# Patient Record
Sex: MALE | Race: WHITE | Employment: OTHER | URBAN - NONMETROPOLITAN AREA
[De-identification: names, ages, dates, MRNs, and addresses within clinical notes are randomized per-mention and may not be internally consistent; named-entity substitution may affect disease eponyms.]

---

## 2017-01-07 DIAGNOSIS — I10 ESSENTIAL HYPERTENSION: Primary | ICD-10-CM

## 2017-01-09 RX ORDER — OLMESARTAN MEDOXOMIL 20 MG/1
TABLET, FILM COATED ORAL
Qty: 90 TABLET | Refills: 1 | Status: SHIPPED | OUTPATIENT
Start: 2017-01-09 | End: 2017-04-06 | Stop reason: ALTCHOICE

## 2017-01-20 ENCOUNTER — NURSE ONLY (OUTPATIENT)
Dept: FAMILY MEDICINE CLINIC | Facility: CLINIC | Age: 77
End: 2017-01-20

## 2017-01-20 ENCOUNTER — TELEPHONE (OUTPATIENT)
Dept: INTERNAL MEDICINE CLINIC | Facility: CLINIC | Age: 77
End: 2017-01-20

## 2017-01-20 DIAGNOSIS — I48.0 PAROXYSMAL A-FIB (HCC): Primary | ICD-10-CM

## 2017-01-20 DIAGNOSIS — Z95.1 S/P CABG (CORONARY ARTERY BYPASS GRAFT): ICD-10-CM

## 2017-01-20 DIAGNOSIS — Z79.01 MONITORING FOR LONG-TERM ANTICOAGULANT USE: ICD-10-CM

## 2017-01-20 DIAGNOSIS — Z51.81 MONITORING FOR LONG-TERM ANTICOAGULANT USE: ICD-10-CM

## 2017-01-20 DIAGNOSIS — Z95.2 H/O AORTIC VALVE REPLACEMENT: ICD-10-CM

## 2017-01-20 LAB — INR: 2.1 (ref 0.8–1.3)

## 2017-01-20 NOTE — TELEPHONE ENCOUNTER
----- Message from Tyra Beck MD sent at 1/20/2017 10:22 AM CST -----  Notify pt inr is at goal, recheck in 4 weeks

## 2017-02-20 ENCOUNTER — NURSE ONLY (OUTPATIENT)
Dept: FAMILY MEDICINE CLINIC | Facility: CLINIC | Age: 77
End: 2017-02-20

## 2017-02-20 DIAGNOSIS — I48.0 PAROXYSMAL A-FIB (HCC): ICD-10-CM

## 2017-02-20 DIAGNOSIS — Z79.01 MONITORING FOR LONG-TERM ANTICOAGULANT USE: Primary | ICD-10-CM

## 2017-02-20 DIAGNOSIS — Z95.2 H/O AORTIC VALVE REPLACEMENT: ICD-10-CM

## 2017-02-20 DIAGNOSIS — I25.10 ATHEROSCLEROSIS OF NATIVE CORONARY ARTERY OF NATIVE HEART WITHOUT ANGINA PECTORIS: ICD-10-CM

## 2017-02-20 DIAGNOSIS — Z51.81 MONITORING FOR LONG-TERM ANTICOAGULANT USE: Primary | ICD-10-CM

## 2017-02-20 DIAGNOSIS — Z95.1 S/P CABG (CORONARY ARTERY BYPASS GRAFT): ICD-10-CM

## 2017-02-20 LAB — INR: 1.9 (ref 0.8–1.2)

## 2017-02-20 PROCEDURE — 85610 PROTHROMBIN TIME: CPT | Performed by: INTERNAL MEDICINE

## 2017-03-06 ENCOUNTER — PATIENT OUTREACH (OUTPATIENT)
Dept: CASE MANAGEMENT | Age: 77
End: 2017-03-06

## 2017-03-20 ENCOUNTER — NURSE ONLY (OUTPATIENT)
Dept: FAMILY MEDICINE CLINIC | Facility: CLINIC | Age: 77
End: 2017-03-20

## 2017-03-20 DIAGNOSIS — Z79.01 LONG TERM (CURRENT) USE OF ANTICOAGULANTS: Primary | ICD-10-CM

## 2017-03-20 LAB — INR: 3.1 (ref 0.8–1.2)

## 2017-03-20 PROCEDURE — 85610 PROTHROMBIN TIME: CPT | Performed by: INTERNAL MEDICINE

## 2017-03-26 DIAGNOSIS — E11.9 TYPE 2 DIABETES MELLITUS WITHOUT COMPLICATION, WITHOUT LONG-TERM CURRENT USE OF INSULIN (HCC): Primary | ICD-10-CM

## 2017-03-27 RX ORDER — GLIMEPIRIDE 2 MG/1
TABLET ORAL
Qty: 180 TABLET | Refills: 0 | Status: SHIPPED | OUTPATIENT
Start: 2017-03-27 | End: 2017-06-25

## 2017-04-04 ENCOUNTER — TELEPHONE (OUTPATIENT)
Dept: INTERNAL MEDICINE CLINIC | Facility: CLINIC | Age: 77
End: 2017-04-04

## 2017-04-04 ENCOUNTER — NURSE ONLY (OUTPATIENT)
Dept: FAMILY MEDICINE CLINIC | Facility: CLINIC | Age: 77
End: 2017-04-04

## 2017-04-04 DIAGNOSIS — Z95.2 H/O AORTIC VALVE REPLACEMENT: ICD-10-CM

## 2017-04-04 DIAGNOSIS — Z51.81 MONITORING FOR LONG-TERM ANTICOAGULANT USE: ICD-10-CM

## 2017-04-04 DIAGNOSIS — Z79.01 MONITORING FOR LONG-TERM ANTICOAGULANT USE: ICD-10-CM

## 2017-04-04 NOTE — TELEPHONE ENCOUNTER
----- Message from Kristin Castellanos MD sent at 4/4/2017 10:09 AM CDT -----  Cont current coumadin dose and recheck inr in 4 weeks

## 2017-04-04 NOTE — TELEPHONE ENCOUNTER
Spoke with patient and relayed test results. He verbalized understanding and agreed with plan. Coumadin Clinic is monitoring patient.

## 2017-04-06 ENCOUNTER — OFFICE VISIT (OUTPATIENT)
Dept: INTERNAL MEDICINE CLINIC | Facility: CLINIC | Age: 77
End: 2017-04-06

## 2017-04-06 VITALS
SYSTOLIC BLOOD PRESSURE: 130 MMHG | DIASTOLIC BLOOD PRESSURE: 70 MMHG | WEIGHT: 198 LBS | HEART RATE: 62 BPM | RESPIRATION RATE: 16 BRPM | OXYGEN SATURATION: 98 % | HEIGHT: 72 IN | TEMPERATURE: 98 F | BODY MASS INDEX: 26.82 KG/M2

## 2017-04-06 DIAGNOSIS — Z00.00 PHYSICAL EXAM, ANNUAL: Primary | ICD-10-CM

## 2017-04-06 DIAGNOSIS — I25.5 ISCHEMIC DILATED CARDIOMYOPATHY (HCC): Chronic | ICD-10-CM

## 2017-04-06 DIAGNOSIS — I65.21 RIGHT-SIDED CAROTID ARTERY OCCLUSION WITHOUT CEREBRAL INFARCTION: ICD-10-CM

## 2017-04-06 DIAGNOSIS — E78.2 MIXED HYPERLIPIDEMIA: ICD-10-CM

## 2017-04-06 DIAGNOSIS — E11.9 TYPE 2 DIABETES MELLITUS WITHOUT COMPLICATION, WITHOUT LONG-TERM CURRENT USE OF INSULIN (HCC): ICD-10-CM

## 2017-04-06 DIAGNOSIS — Z12.5 PROSTATE CANCER SCREENING: ICD-10-CM

## 2017-04-06 DIAGNOSIS — Z13.39 SCREENING FOR ALCOHOL PROBLEM: ICD-10-CM

## 2017-04-06 DIAGNOSIS — Z95.1 S/P CABG (CORONARY ARTERY BYPASS GRAFT): ICD-10-CM

## 2017-04-06 DIAGNOSIS — I48.0 PAROXYSMAL A-FIB (HCC): ICD-10-CM

## 2017-04-06 DIAGNOSIS — I10 ESSENTIAL HYPERTENSION: ICD-10-CM

## 2017-04-06 DIAGNOSIS — I25.10 ATHEROSCLEROSIS OF NATIVE CORONARY ARTERY OF NATIVE HEART WITHOUT ANGINA PECTORIS: ICD-10-CM

## 2017-04-06 DIAGNOSIS — Z00.00 ENCOUNTER FOR ANNUAL HEALTH EXAMINATION: ICD-10-CM

## 2017-04-06 DIAGNOSIS — Z95.2 H/O AORTIC VALVE REPLACEMENT: ICD-10-CM

## 2017-04-06 DIAGNOSIS — Z13.31 DEPRESSION SCREENING: ICD-10-CM

## 2017-04-06 DIAGNOSIS — I42.0 ISCHEMIC DILATED CARDIOMYOPATHY (HCC): Chronic | ICD-10-CM

## 2017-04-06 PROCEDURE — 99397 PER PM REEVAL EST PAT 65+ YR: CPT | Performed by: INTERNAL MEDICINE

## 2017-04-06 PROCEDURE — 96160 PT-FOCUSED HLTH RISK ASSMT: CPT | Performed by: INTERNAL MEDICINE

## 2017-04-06 PROCEDURE — G0442 ANNUAL ALCOHOL SCREEN 15 MIN: HCPCS | Performed by: INTERNAL MEDICINE

## 2017-04-06 PROCEDURE — G0439 PPPS, SUBSEQ VISIT: HCPCS | Performed by: INTERNAL MEDICINE

## 2017-04-06 RX ORDER — LOSARTAN POTASSIUM 50 MG/1
50 TABLET ORAL DAILY
Qty: 90 TABLET | Refills: 1 | COMMUNITY
Start: 2017-04-06 | End: 2017-07-10

## 2017-04-06 RX ORDER — ATORVASTATIN CALCIUM 40 MG/1
40 TABLET, FILM COATED ORAL DAILY
Qty: 90 TABLET | Refills: 1 | Status: SHIPPED | OUTPATIENT
Start: 2017-04-06 | End: 2017-10-16

## 2017-04-06 NOTE — PROGRESS NOTES
HPI:   Evelena Goodell is a 67year old male who presents for a MA (Medicare Advantage) [facility] (Once per calendar year).     HPI:  Here for physical  No complaints  denies hypoglycemia event  Denies edema, cp or sob  Annual Physical due on 04/06/2018 DIGOX 125 MCG Oral Tab TAKE 1 TABLET BY MOUTH DAILY   Warfarin Sodium 4 MG Oral Tab Take 1 to 1 1/2 tablet every night as directed by Coumadin Clinic.    furosemide 40 MG Oral Tab TAKE ONE TABLET BY MOUTH ONCE DAILY   Verapamil HCl  MG Oral Tab CR T back pain  NEURO: denies headaches  PSYCHE: denies depression or anxiety  HEMATOLOGIC: denies hx of anemia  ENDOCRINE: denies thyroid history  ALL/ASTHMA: denies hx of allergy or asthma    EXAM:   /70 mmHg  Pulse 62  Temp(Src) 97.8 °F (36.6 °C)  Resp texture, turgor normal, no rashes or lesions   Lymph nodes: Cervical, supraclavicular nodes normal   Neurologic: Bilateral barefoot skin diabetic exam is normal, visualized feet and the appearance is normal.  Bilateral monofilament/sensation of both feet i Myesha Montalvo does not have a Living Will on file in [address] [place] [address] Rd. Discussed with patient and provided information      Healthcare Power of Francisca on file in Epic:    Carleen Og does not have a Power of  for [employer] on file in [address] [place] [address] Rd.  Discussed with judie Functional Ability     Bathing or Showering: Able without help    Toileting: Able without help    Dressing: Able without help    Eating: Able without help    Driving: Able without help    Preparing your meals: Able without help    Managing money/bills: SERVICES  INDICATIONS AND SCHEDULE Internal Lab or Procedure External Lab or Procedure   Diabetes Screening      HbgA1C   Annually HEMOGLOBIN A1C (% of total Hgb)   Date Value   05/30/2013 7.1*     HGBA1C (%)   Date Value   11/17/2016 6.5*   04/23/2014 7. 0 performed in visit on 04/14/15  -PNEUMOCOCCAL VACC, 13 BELÉN IM         Pneumococcal 23 (Pneumovax) No orders found for this or any previous visit. Hepatitis B No orders found for this or any previous visit.      Tetanus No orders found for this or any pr

## 2017-04-06 NOTE — PATIENT INSTRUCTIONS
Prevention Guidelines, Men Ages 72 and Older  Screening tests and vaccines are an important part of managing your health. Health counseling is essential, too. Below are guidelines for these, for men ages 72 and older.  Talk with your healthcare provider t Prostate cancer All men in this age group, talk to healthcare provider about risks and benefits of digital rectal exam (ELIAS) and prostate-specific antigen (PSA) screening1 At routine exams   Syphilis Men at increased risk for infection – talk with your hea Fall prevention (exercise, vitamin D supplements) All men in this age group At routine exams   Sexually transmitted infection Men at increased risk for infection – talk with your healthcare provider At routine exams   Use of daily aspirin Men ages 39 to 78 Cardiovascular Disease Screening     Cholesterol, covered every 5 yrs including Total, LDL and Trigs LDL CHOLESTEROL (mg/dL)   Date Value   02/01/2016 64     LDL-CHOLESTEROL (mg/dL (calc))   Date Value   05/30/2013 98     LDL CHOLESTROL (mg/dL)   Date Valu PSA  Annually to 75 then as needed or ELIAS annually to 75 There are no preventive care reminders to display for this patient. No results found for this or any previous visit.    Annually (age 48 or over), ELIAS not paid separately when covered E/M service is [employer]. org/publications/Documents/personal_dec. pdf  An information packet, including necessary form from the [employer] [address] website. http://www. [place]. [place]/public/books/advin.htm  A link to the Mahin Serrano Cholesterol, covered every 5 yrs including Total, LDL and Trigs LDL CHOLESTEROL (mg/dL)   Date Value   02/01/2016 64     LDL-CHOLESTEROL (mg/dL (calc))   Date Value   05/30/2013 98     LDL CHOLESTROL (mg/dL)   Date Value   04/23/2014 61     CHOLESTEROL, T [employer]no. org/publications/Documents/personal_dec. pdf  An information packet, including necessary form from the [employer] website. http://www. [place]ph.state. [place]/public/books/advin.htm  A link to the Mahin Serrano

## 2017-04-09 DIAGNOSIS — E11.9 TYPE 2 DIABETES MELLITUS WITHOUT COMPLICATION, WITHOUT LONG-TERM CURRENT USE OF INSULIN (HCC): Primary | ICD-10-CM

## 2017-04-17 ENCOUNTER — LAB ENCOUNTER (OUTPATIENT)
Dept: LAB | Age: 77
End: 2017-04-17
Attending: INTERNAL MEDICINE
Payer: MEDICARE

## 2017-04-17 DIAGNOSIS — E78.2 MIXED HYPERLIPIDEMIA: ICD-10-CM

## 2017-04-17 DIAGNOSIS — E11.9 TYPE 2 DIABETES MELLITUS WITHOUT COMPLICATION, WITHOUT LONG-TERM CURRENT USE OF INSULIN (HCC): ICD-10-CM

## 2017-04-17 DIAGNOSIS — I10 ESSENTIAL HYPERTENSION: ICD-10-CM

## 2017-04-17 DIAGNOSIS — Z12.5 PROSTATE CANCER SCREENING: ICD-10-CM

## 2017-04-17 DIAGNOSIS — I48.0 PAROXYSMAL A-FIB (HCC): ICD-10-CM

## 2017-04-17 PROCEDURE — 84443 ASSAY THYROID STIM HORMONE: CPT | Performed by: INTERNAL MEDICINE

## 2017-04-17 PROCEDURE — 80053 COMPREHEN METABOLIC PANEL: CPT | Performed by: INTERNAL MEDICINE

## 2017-04-17 PROCEDURE — 85027 COMPLETE CBC AUTOMATED: CPT | Performed by: INTERNAL MEDICINE

## 2017-04-17 PROCEDURE — 36415 COLL VENOUS BLD VENIPUNCTURE: CPT | Performed by: INTERNAL MEDICINE

## 2017-04-17 PROCEDURE — 81003 URINALYSIS AUTO W/O SCOPE: CPT | Performed by: INTERNAL MEDICINE

## 2017-04-17 PROCEDURE — G0103 PSA SCREENING: HCPCS | Performed by: INTERNAL MEDICINE

## 2017-04-17 PROCEDURE — 83036 HEMOGLOBIN GLYCOSYLATED A1C: CPT | Performed by: INTERNAL MEDICINE

## 2017-04-17 PROCEDURE — 82043 UR ALBUMIN QUANTITATIVE: CPT | Performed by: INTERNAL MEDICINE

## 2017-04-17 PROCEDURE — 80061 LIPID PANEL: CPT | Performed by: INTERNAL MEDICINE

## 2017-04-17 PROCEDURE — 82570 ASSAY OF URINE CREATININE: CPT | Performed by: INTERNAL MEDICINE

## 2017-04-19 ENCOUNTER — HOSPITAL ENCOUNTER (OUTPATIENT)
Dept: CV DIAGNOSTICS | Age: 77
Discharge: HOME | End: 2017-04-19
Attending: INTERNAL MEDICINE
Payer: MEDICARE

## 2017-04-19 DIAGNOSIS — I42.0 ISCHEMIC DILATED CARDIOMYOPATHY (HCC): Chronic | ICD-10-CM

## 2017-04-19 DIAGNOSIS — I25.5 ISCHEMIC DILATED CARDIOMYOPATHY (HCC): Chronic | ICD-10-CM

## 2017-04-19 PROCEDURE — 93306 TTE W/DOPPLER COMPLETE: CPT | Performed by: INTERNAL MEDICINE

## 2017-04-19 PROCEDURE — 93306 TTE W/DOPPLER COMPLETE: CPT

## 2017-04-20 ENCOUNTER — TELEPHONE (OUTPATIENT)
Dept: INTERNAL MEDICINE CLINIC | Facility: CLINIC | Age: 77
End: 2017-04-20

## 2017-04-20 NOTE — TELEPHONE ENCOUNTER
Notes Recorded by Alexandra Neville MD on 4/20/2017 at 8:45 AM  Stable EF. No new AV prosthesis functioning normally      Spoke with Felipe Keenan and relayed test results. She verbalized understanding and agreed with plan.  She wanted to know if patient could finish

## 2017-04-27 ENCOUNTER — PATIENT OUTREACH (OUTPATIENT)
Dept: CASE MANAGEMENT | Age: 77
End: 2017-04-27

## 2017-05-02 ENCOUNTER — NURSE ONLY (OUTPATIENT)
Dept: FAMILY MEDICINE CLINIC | Facility: CLINIC | Age: 77
End: 2017-05-02

## 2017-05-02 DIAGNOSIS — Z95.2 H/O AORTIC VALVE REPLACEMENT: ICD-10-CM

## 2017-05-02 DIAGNOSIS — Z51.81 MONITORING FOR LONG-TERM ANTICOAGULANT USE: ICD-10-CM

## 2017-05-02 DIAGNOSIS — Z79.01 MONITORING FOR LONG-TERM ANTICOAGULANT USE: ICD-10-CM

## 2017-05-30 ENCOUNTER — TELEPHONE (OUTPATIENT)
Dept: INTERNAL MEDICINE CLINIC | Facility: CLINIC | Age: 77
End: 2017-05-30

## 2017-05-30 ENCOUNTER — NURSE ONLY (OUTPATIENT)
Dept: FAMILY MEDICINE CLINIC | Facility: CLINIC | Age: 77
End: 2017-05-30

## 2017-05-30 DIAGNOSIS — Z95.2 H/O AORTIC VALVE REPLACEMENT: ICD-10-CM

## 2017-05-30 DIAGNOSIS — Z51.81 MONITORING FOR LONG-TERM ANTICOAGULANT USE: ICD-10-CM

## 2017-05-30 DIAGNOSIS — Z79.01 MONITORING FOR LONG-TERM ANTICOAGULANT USE: ICD-10-CM

## 2017-05-30 NOTE — TELEPHONE ENCOUNTER
Spoke with patient and he confirms the coumadin clinic is monitoring his levels. States he has already been notified about results.

## 2017-05-30 NOTE — TELEPHONE ENCOUNTER
----- Message from Rudy Lorenzana MD sent at 5/30/2017  4:03 PM CDT -----  Cont current coumadin dose.  Recheck inr in 4 weeks

## 2017-06-12 ENCOUNTER — PATIENT OUTREACH (OUTPATIENT)
Dept: CASE MANAGEMENT | Age: 77
End: 2017-06-12

## 2017-06-12 DIAGNOSIS — I10 ESSENTIAL HYPERTENSION: Primary | ICD-10-CM

## 2017-06-12 DIAGNOSIS — I65.21 RIGHT-SIDED CAROTID ARTERY OCCLUSION WITHOUT CEREBRAL INFARCTION: ICD-10-CM

## 2017-06-12 DIAGNOSIS — E11.9 TYPE 2 DIABETES MELLITUS WITHOUT COMPLICATION, WITHOUT LONG-TERM CURRENT USE OF INSULIN (HCC): ICD-10-CM

## 2017-06-12 DIAGNOSIS — I48.0 PAROXYSMAL A-FIB (HCC): ICD-10-CM

## 2017-06-12 DIAGNOSIS — E78.2 MIXED HYPERLIPIDEMIA: ICD-10-CM

## 2017-06-12 PROCEDURE — 99490 CHRNC CARE MGMT STAFF 1ST 20: CPT

## 2017-06-12 NOTE — PROGRESS NOTES
Spoke to pt at length about CCM, current care plan and performed CCM assessment with pt, reviewed meds and compliance. Reviewed pt dx DM, HTN, HLD, a-fib, CAD, hx of CABG. Support system: lives with spouse and family nearby  Diet: Low carb diet.   Exercis

## 2017-06-25 DIAGNOSIS — E11.9 TYPE 2 DIABETES MELLITUS WITHOUT COMPLICATION, WITHOUT LONG-TERM CURRENT USE OF INSULIN (HCC): ICD-10-CM

## 2017-06-26 RX ORDER — GLIMEPIRIDE 2 MG/1
TABLET ORAL
Qty: 180 TABLET | Refills: 1 | Status: SHIPPED | OUTPATIENT
Start: 2017-06-26 | End: 2017-12-27

## 2017-06-29 PROBLEM — I42.0 DCM (DILATED CARDIOMYOPATHY) (HCC): Status: ACTIVE | Noted: 2017-06-29

## 2017-07-10 ENCOUNTER — TELEPHONE (OUTPATIENT)
Dept: INTERNAL MEDICINE CLINIC | Facility: CLINIC | Age: 77
End: 2017-07-10

## 2017-07-10 DIAGNOSIS — E11.9 TYPE 2 DIABETES MELLITUS WITHOUT COMPLICATION, WITHOUT LONG-TERM CURRENT USE OF INSULIN (HCC): ICD-10-CM

## 2017-07-10 DIAGNOSIS — I10 ESSENTIAL HYPERTENSION: ICD-10-CM

## 2017-07-10 RX ORDER — LOSARTAN POTASSIUM 50 MG/1
50 TABLET ORAL DAILY
Qty: 90 TABLET | Refills: 1 | Status: SHIPPED | OUTPATIENT
Start: 2017-07-10 | End: 2018-01-03

## 2017-07-10 NOTE — TELEPHONE ENCOUNTER
Dr. Chip Jacobs recommends switch from Benicar to Losartan. Spoke with spouse and relayed message. Advised to continue monitoring BP and call with any concerns. She verbalized understanding and agreed with plan.    Rx sent to [facility]

## 2017-07-10 NOTE — TELEPHONE ENCOUNTER
Pt's wife called and said the pt's current blood pressure medication is too expensive and wants to know if there is an alternate that can be prescribed?

## 2017-07-24 ENCOUNTER — PATIENT OUTREACH (OUTPATIENT)
Dept: CASE MANAGEMENT | Age: 77
End: 2017-07-24

## 2017-07-24 NOTE — PROGRESS NOTES
Chart review performed. No upcoming appts with PCP. Will call pt for CCM next month.     Time spent: collecting and reviewing pt data 4 minutes

## 2017-08-01 ENCOUNTER — NURSE ONLY (OUTPATIENT)
Dept: FAMILY MEDICINE CLINIC | Facility: CLINIC | Age: 77
End: 2017-08-01

## 2017-08-01 DIAGNOSIS — Z51.81 MONITORING FOR LONG-TERM ANTICOAGULANT USE: ICD-10-CM

## 2017-08-01 DIAGNOSIS — Z79.01 MONITORING FOR LONG-TERM ANTICOAGULANT USE: ICD-10-CM

## 2017-08-01 DIAGNOSIS — Z95.2 H/O AORTIC VALVE REPLACEMENT: ICD-10-CM

## 2017-08-01 LAB — INR: 2.8 (ref 0.8–1.3)

## 2017-08-21 ENCOUNTER — PATIENT OUTREACH (OUTPATIENT)
Dept: CASE MANAGEMENT | Age: 77
End: 2017-08-21

## 2017-08-21 NOTE — PROGRESS NOTES
Spoke to pt for CCM today and wish pt a happy birthday. Pt states he is doing well and enjoying spending his birthday with his spouse and daughters. Pt thanked me for the call today. Will call pt again another time for CCM assessment.   PCP 6 month appt

## 2017-08-29 ENCOUNTER — NURSE ONLY (OUTPATIENT)
Dept: FAMILY MEDICINE CLINIC | Facility: CLINIC | Age: 77
End: 2017-08-29

## 2017-08-29 DIAGNOSIS — Z51.81 MONITORING FOR LONG-TERM ANTICOAGULANT USE: ICD-10-CM

## 2017-08-29 DIAGNOSIS — I25.5 ISCHEMIC DILATED CARDIOMYOPATHY (HCC): Primary | Chronic | ICD-10-CM

## 2017-08-29 DIAGNOSIS — I42.0 ISCHEMIC DILATED CARDIOMYOPATHY (HCC): Primary | Chronic | ICD-10-CM

## 2017-08-29 DIAGNOSIS — Z79.01 MONITORING FOR LONG-TERM ANTICOAGULANT USE: ICD-10-CM

## 2017-08-29 LAB — INR: 1.8 (ref 0.8–1.2)

## 2017-08-29 PROCEDURE — 85610 PROTHROMBIN TIME: CPT | Performed by: INTERNAL MEDICINE

## 2017-09-21 ENCOUNTER — PATIENT OUTREACH (OUTPATIENT)
Dept: CASE MANAGEMENT | Age: 77
End: 2017-09-21

## 2017-09-21 NOTE — PROGRESS NOTES
Reviewed chart for CCM. Will f/up with pt after upcoming PCP appt on 10/03/17. Pt up to date with health maintenance. Time spent: 2 minutes collecting, reviewing pt data, and documentation.

## 2017-10-03 ENCOUNTER — OFFICE VISIT (OUTPATIENT)
Dept: INTERNAL MEDICINE CLINIC | Facility: CLINIC | Age: 77
End: 2017-10-03

## 2017-10-03 VITALS
OXYGEN SATURATION: 97 % | HEIGHT: 72 IN | TEMPERATURE: 98 F | BODY MASS INDEX: 27.22 KG/M2 | RESPIRATION RATE: 16 BRPM | HEART RATE: 64 BPM | WEIGHT: 201 LBS | SYSTOLIC BLOOD PRESSURE: 136 MMHG | DIASTOLIC BLOOD PRESSURE: 86 MMHG

## 2017-10-03 DIAGNOSIS — I48.0 PAROXYSMAL A-FIB (HCC): ICD-10-CM

## 2017-10-03 DIAGNOSIS — Z23 NEED FOR INFLUENZA VACCINATION: ICD-10-CM

## 2017-10-03 DIAGNOSIS — E11.9 TYPE 2 DIABETES MELLITUS WITHOUT COMPLICATION, WITHOUT LONG-TERM CURRENT USE OF INSULIN (HCC): Primary | ICD-10-CM

## 2017-10-03 DIAGNOSIS — E11.9 TYPE 2 DIABETES MELLITUS WITHOUT COMPLICATION, WITHOUT LONG-TERM CURRENT USE OF INSULIN (HCC): ICD-10-CM

## 2017-10-03 DIAGNOSIS — E78.2 MIXED HYPERLIPIDEMIA: ICD-10-CM

## 2017-10-03 DIAGNOSIS — I25.10 ATHEROSCLEROSIS OF NATIVE CORONARY ARTERY OF NATIVE HEART WITHOUT ANGINA PECTORIS: ICD-10-CM

## 2017-10-03 DIAGNOSIS — I10 ESSENTIAL HYPERTENSION: ICD-10-CM

## 2017-10-03 PROCEDURE — 90653 IIV ADJUVANT VACCINE IM: CPT | Performed by: INTERNAL MEDICINE

## 2017-10-03 PROCEDURE — 99214 OFFICE O/P EST MOD 30 MIN: CPT | Performed by: INTERNAL MEDICINE

## 2017-10-03 PROCEDURE — G0008 ADMIN INFLUENZA VIRUS VAC: HCPCS | Performed by: INTERNAL MEDICINE

## 2017-10-03 RX ORDER — DIGOXIN 125 MCG
125 TABLET ORAL DAILY
COMMUNITY
End: 2017-10-10

## 2017-10-03 RX ORDER — SIMVASTATIN 20 MG
20 TABLET ORAL DAILY
COMMUNITY
End: 2017-10-03 | Stop reason: ALTCHOICE

## 2017-10-03 NOTE — PROGRESS NOTES
HPI:    Patient ID: Nona Stallworth is a 65year old male. HPI  HPI:   Nona Stallworth is a 65year old male who presents for recheck of his diabetes, htn, hld, cad and afib. Patient’s FBS have been at goal. No hypoglycemia.  Last visit with ophthalmolog ER 10 MEQ Oral Tab CR Take 1 tablet (10 mEq total) by mouth 2 (two) times daily.  Disp: 180 tablet Rfl: 3   GLIMEPIRIDE 2 MG Oral Tab TAKE 1 TABLET BY MOUTH TWICE DAILY Disp: 180 tablet Rfl: 1   Atorvastatin Calcium 40 MG Oral Tab Take 1 tablet (40 mg total week    Exercise:  twice per week, three times per week.   Diet: watches sugar closely and watches calories closely     REVIEW OF SYSTEMS:   GENERAL HEALTH: feels well otherwise  SKIN: denies any unusual skin lesions or rashes  RESPIRATORY: denies shortness METFORMIN  MG Oral Tab TAKE 1 TABLET BY MOUTH TWICE DAILY WITH MEALS Disp: 180 tablet Rfl: 0   Potassium Chloride ER 10 MEQ Oral Tab CR Take 1 tablet (10 mEq total) by mouth 2 (two) times daily.  Disp: 180 tablet Rfl: 3   GLIMEPIRIDE 2 MG Oral Tab

## 2017-10-11 ENCOUNTER — PATIENT OUTREACH (OUTPATIENT)
Dept: CASE MANAGEMENT | Age: 77
End: 2017-10-11

## 2017-10-11 ENCOUNTER — NURSE ONLY (OUTPATIENT)
Dept: FAMILY MEDICINE CLINIC | Facility: CLINIC | Age: 77
End: 2017-10-11

## 2017-10-11 DIAGNOSIS — I10 ESSENTIAL HYPERTENSION: ICD-10-CM

## 2017-10-11 DIAGNOSIS — I48.0 PAROXYSMAL A-FIB (HCC): Primary | ICD-10-CM

## 2017-10-11 DIAGNOSIS — E11.9 TYPE 2 DIABETES MELLITUS WITHOUT COMPLICATION, WITHOUT LONG-TERM CURRENT USE OF INSULIN (HCC): ICD-10-CM

## 2017-10-11 DIAGNOSIS — E78.2 MIXED HYPERLIPIDEMIA: ICD-10-CM

## 2017-10-11 DIAGNOSIS — Z95.1 S/P CABG (CORONARY ARTERY BYPASS GRAFT): ICD-10-CM

## 2017-10-11 DIAGNOSIS — I48.0 PAROXYSMAL A-FIB (HCC): ICD-10-CM

## 2017-10-11 DIAGNOSIS — I25.10 ATHEROSCLEROSIS OF NATIVE CORONARY ARTERY OF NATIVE HEART WITHOUT ANGINA PECTORIS: ICD-10-CM

## 2017-10-11 DIAGNOSIS — Z79.01 MONITORING FOR LONG-TERM ANTICOAGULANT USE: ICD-10-CM

## 2017-10-11 DIAGNOSIS — Z51.81 MONITORING FOR LONG-TERM ANTICOAGULANT USE: ICD-10-CM

## 2017-10-11 PROCEDURE — 85610 PROTHROMBIN TIME: CPT | Performed by: INTERNAL MEDICINE

## 2017-10-11 PROCEDURE — 99490 CHRNC CARE MGMT STAFF 1ST 20: CPT

## 2017-10-11 NOTE — PROGRESS NOTES
Spoke to pt at length about CCM, current care plan and performed CCM assessment with pt, reviewed meds and compliance. Reviewed pt dx HTN, HLD, a-fib, DM, and hx CABG. Support system: Lives with spouse. Diet: Follows low carb well balanced diet.   Pt test

## 2017-10-16 DIAGNOSIS — E78.2 MIXED HYPERLIPIDEMIA: ICD-10-CM

## 2017-10-16 RX ORDER — ATORVASTATIN CALCIUM 40 MG/1
TABLET, FILM COATED ORAL
Qty: 90 TABLET | Refills: 1 | Status: ON HOLD | OUTPATIENT
Start: 2017-10-16 | End: 2018-04-17

## 2017-11-13 ENCOUNTER — NURSE ONLY (OUTPATIENT)
Dept: FAMILY MEDICINE CLINIC | Facility: CLINIC | Age: 77
End: 2017-11-13

## 2017-11-13 DIAGNOSIS — I48.0 PAROXYSMAL A-FIB (HCC): ICD-10-CM

## 2017-11-13 DIAGNOSIS — Z79.01 MONITORING FOR LONG-TERM ANTICOAGULANT USE: ICD-10-CM

## 2017-11-13 DIAGNOSIS — Z51.81 MONITORING FOR LONG-TERM ANTICOAGULANT USE: ICD-10-CM

## 2017-11-20 ENCOUNTER — PATIENT OUTREACH (OUTPATIENT)
Dept: CASE MANAGEMENT | Age: 77
End: 2017-11-20

## 2017-11-20 NOTE — PROGRESS NOTES
Reviewed chart for CCM. LVM for pt to call back. Time spent this ccm encounter: 3 minutes collecting, reviewing pt data, communication and documentation.

## 2017-12-11 ENCOUNTER — PATIENT OUTREACH (OUTPATIENT)
Dept: CASE MANAGEMENT | Age: 77
End: 2017-12-11

## 2017-12-11 NOTE — PROGRESS NOTES
Reviewed chart for ccm. LVM to call back. Next AWV scheduled for 04/03/18. Pt up to with health maintenance. Time spent for ccm: 3 min collecting, reviewing pt data, communication and documentation.

## 2017-12-27 ENCOUNTER — NURSE ONLY (OUTPATIENT)
Dept: FAMILY MEDICINE CLINIC | Facility: CLINIC | Age: 77
End: 2017-12-27

## 2017-12-27 DIAGNOSIS — E11.9 TYPE 2 DIABETES MELLITUS WITHOUT COMPLICATION, WITHOUT LONG-TERM CURRENT USE OF INSULIN (HCC): ICD-10-CM

## 2017-12-27 DIAGNOSIS — I48.0 PAROXYSMAL A-FIB (HCC): Primary | ICD-10-CM

## 2017-12-27 DIAGNOSIS — Z51.81 MONITORING FOR LONG-TERM ANTICOAGULANT USE: ICD-10-CM

## 2017-12-27 DIAGNOSIS — Z79.01 MONITORING FOR LONG-TERM ANTICOAGULANT USE: ICD-10-CM

## 2017-12-27 RX ORDER — GLIMEPIRIDE 2 MG/1
TABLET ORAL
Qty: 180 TABLET | Refills: 0 | Status: SHIPPED | OUTPATIENT
Start: 2017-12-27 | End: 2018-04-01

## 2018-01-03 DIAGNOSIS — I10 ESSENTIAL HYPERTENSION: ICD-10-CM

## 2018-01-03 RX ORDER — LOSARTAN POTASSIUM 50 MG/1
TABLET ORAL
Qty: 90 TABLET | Refills: 1 | Status: SHIPPED | OUTPATIENT
Start: 2018-01-03 | End: 2018-04-03

## 2018-01-08 ENCOUNTER — PATIENT OUTREACH (OUTPATIENT)
Dept: CASE MANAGEMENT | Age: 78
End: 2018-01-08

## 2018-01-08 NOTE — PROGRESS NOTES
Have been unsuccessful reaching pt for past 3 months for ccm. Pt has been removed from ccm program and letter sent to pt. Pt can re-enroll at anytime by calling [facility] main number.

## 2018-01-26 ENCOUNTER — NURSE ONLY (OUTPATIENT)
Dept: FAMILY MEDICINE CLINIC | Facility: CLINIC | Age: 78
End: 2018-01-26

## 2018-01-26 DIAGNOSIS — I48.0 PAROXYSMAL A-FIB (HCC): ICD-10-CM

## 2018-01-26 DIAGNOSIS — Z79.01 MONITORING FOR LONG-TERM ANTICOAGULANT USE: ICD-10-CM

## 2018-01-26 DIAGNOSIS — Z51.81 MONITORING FOR LONG-TERM ANTICOAGULANT USE: ICD-10-CM

## 2018-01-26 LAB — INR: 2.4 (ref 0.8–1.3)

## 2018-02-05 ENCOUNTER — APPOINTMENT (OUTPATIENT)
Dept: LAB | Age: 78
End: 2018-02-05
Attending: INTERNAL MEDICINE
Payer: MEDICARE

## 2018-02-05 DIAGNOSIS — I10 ESSENTIAL HYPERTENSION: ICD-10-CM

## 2018-02-05 DIAGNOSIS — E11.9 TYPE 2 DIABETES MELLITUS WITHOUT COMPLICATION, WITHOUT LONG-TERM CURRENT USE OF INSULIN (HCC): ICD-10-CM

## 2018-02-05 LAB
ALBUMIN SERPL-MCNC: 3.8 G/DL (ref 3.5–4.8)
ALP LIVER SERPL-CCNC: 62 U/L (ref 45–117)
ALT SERPL-CCNC: 24 U/L (ref 17–63)
AST SERPL-CCNC: 17 U/L (ref 15–41)
BILIRUB SERPL-MCNC: 0.6 MG/DL (ref 0.1–2)
BUN BLD-MCNC: 23 MG/DL (ref 8–20)
CALCIUM BLD-MCNC: 9.5 MG/DL (ref 8.3–10.3)
CHLORIDE: 109 MMOL/L (ref 101–111)
CO2: 27 MMOL/L (ref 22–32)
CREAT BLD-MCNC: 0.94 MG/DL (ref 0.7–1.3)
EST. AVERAGE GLUCOSE BLD GHB EST-MCNC: 140 MG/DL (ref 68–126)
GLUCOSE BLD-MCNC: 137 MG/DL (ref 70–99)
HBA1C MFR BLD HPLC: 6.5 % (ref ?–5.7)
M PROTEIN MFR SERPL ELPH: 7.4 G/DL (ref 6.1–8.3)
POTASSIUM SERPL-SCNC: 4.3 MMOL/L (ref 3.6–5.1)
SODIUM SERPL-SCNC: 143 MMOL/L (ref 136–144)

## 2018-02-05 PROCEDURE — 80053 COMPREHEN METABOLIC PANEL: CPT

## 2018-02-05 PROCEDURE — 83036 HEMOGLOBIN GLYCOSYLATED A1C: CPT

## 2018-04-01 DIAGNOSIS — E11.9 TYPE 2 DIABETES MELLITUS WITHOUT COMPLICATION, WITHOUT LONG-TERM CURRENT USE OF INSULIN (HCC): ICD-10-CM

## 2018-04-02 ENCOUNTER — NURSE ONLY (OUTPATIENT)
Dept: FAMILY MEDICINE CLINIC | Facility: CLINIC | Age: 78
End: 2018-04-02

## 2018-04-02 DIAGNOSIS — I48.0 PAROXYSMAL A-FIB (HCC): Primary | ICD-10-CM

## 2018-04-02 DIAGNOSIS — I42.0 ISCHEMIC DILATED CARDIOMYOPATHY (HCC): Chronic | ICD-10-CM

## 2018-04-02 DIAGNOSIS — I25.5 ISCHEMIC DILATED CARDIOMYOPATHY (HCC): Chronic | ICD-10-CM

## 2018-04-02 DIAGNOSIS — Z51.81 MONITORING FOR LONG-TERM ANTICOAGULANT USE: ICD-10-CM

## 2018-04-02 DIAGNOSIS — Z79.01 MONITORING FOR LONG-TERM ANTICOAGULANT USE: ICD-10-CM

## 2018-04-02 RX ORDER — GLIMEPIRIDE 2 MG/1
TABLET ORAL
Qty: 180 TABLET | Refills: 0 | Status: ON HOLD | OUTPATIENT
Start: 2018-04-02 | End: 2018-04-17

## 2018-04-02 NOTE — PATIENT INSTRUCTIONS
RESULTS FAXED TO [facility]. [place] COMES TO THIS OFFICE FOR INR.

## 2018-04-03 ENCOUNTER — OFFICE VISIT (OUTPATIENT)
Dept: INTERNAL MEDICINE CLINIC | Facility: CLINIC | Age: 78
End: 2018-04-03

## 2018-04-03 ENCOUNTER — LAB ENCOUNTER (OUTPATIENT)
Dept: LAB | Age: 78
End: 2018-04-03
Attending: INTERNAL MEDICINE
Payer: MEDICARE

## 2018-04-03 VITALS
WEIGHT: 202 LBS | OXYGEN SATURATION: 97 % | BODY MASS INDEX: 27.36 KG/M2 | HEART RATE: 59 BPM | TEMPERATURE: 98 F | HEIGHT: 72 IN | DIASTOLIC BLOOD PRESSURE: 78 MMHG | SYSTOLIC BLOOD PRESSURE: 110 MMHG | RESPIRATION RATE: 16 BRPM

## 2018-04-03 DIAGNOSIS — D68.4 ACQUIRED COAGULATION FACTOR INHIBITOR DISORDER (HCC): ICD-10-CM

## 2018-04-03 DIAGNOSIS — E11.9 TYPE 2 DIABETES MELLITUS WITHOUT COMPLICATION, WITHOUT LONG-TERM CURRENT USE OF INSULIN (HCC): ICD-10-CM

## 2018-04-03 DIAGNOSIS — Z00.00 PHYSICAL EXAM, ANNUAL: Primary | ICD-10-CM

## 2018-04-03 DIAGNOSIS — E78.5 HYPERLIPIDEMIA ASSOCIATED WITH TYPE 2 DIABETES MELLITUS (HCC): ICD-10-CM

## 2018-04-03 DIAGNOSIS — I25.5 ISCHEMIC DILATED CARDIOMYOPATHY (HCC): Chronic | ICD-10-CM

## 2018-04-03 DIAGNOSIS — Z95.2 H/O AORTIC VALVE REPLACEMENT: ICD-10-CM

## 2018-04-03 DIAGNOSIS — I42.0 ISCHEMIC DILATED CARDIOMYOPATHY (HCC): Chronic | ICD-10-CM

## 2018-04-03 DIAGNOSIS — E11.59 HYPERTENSION ASSOCIATED WITH DIABETES (HCC): ICD-10-CM

## 2018-04-03 DIAGNOSIS — Z95.1 S/P CABG (CORONARY ARTERY BYPASS GRAFT): ICD-10-CM

## 2018-04-03 DIAGNOSIS — I25.10 ATHEROSCLEROSIS OF NATIVE CORONARY ARTERY OF NATIVE HEART WITHOUT ANGINA PECTORIS: ICD-10-CM

## 2018-04-03 DIAGNOSIS — Z00.00 ENCOUNTER FOR ANNUAL HEALTH EXAMINATION: ICD-10-CM

## 2018-04-03 DIAGNOSIS — E11.69 HYPERLIPIDEMIA ASSOCIATED WITH TYPE 2 DIABETES MELLITUS (HCC): ICD-10-CM

## 2018-04-03 DIAGNOSIS — Z12.5 PROSTATE CANCER SCREENING: ICD-10-CM

## 2018-04-03 DIAGNOSIS — I65.21 RIGHT-SIDED CAROTID ARTERY OCCLUSION WITHOUT CEREBRAL INFARCTION: ICD-10-CM

## 2018-04-03 DIAGNOSIS — I48.0 PAROXYSMAL A-FIB (HCC): ICD-10-CM

## 2018-04-03 DIAGNOSIS — I15.2 HYPERTENSION ASSOCIATED WITH DIABETES (HCC): ICD-10-CM

## 2018-04-03 PROCEDURE — 82570 ASSAY OF URINE CREATININE: CPT | Performed by: INTERNAL MEDICINE

## 2018-04-03 PROCEDURE — G0439 PPPS, SUBSEQ VISIT: HCPCS | Performed by: INTERNAL MEDICINE

## 2018-04-03 PROCEDURE — 82043 UR ALBUMIN QUANTITATIVE: CPT | Performed by: INTERNAL MEDICINE

## 2018-04-03 PROCEDURE — 99397 PER PM REEVAL EST PAT 65+ YR: CPT | Performed by: INTERNAL MEDICINE

## 2018-04-03 PROCEDURE — 96160 PT-FOCUSED HLTH RISK ASSMT: CPT | Performed by: INTERNAL MEDICINE

## 2018-04-03 RX ORDER — POTASSIUM CHLORIDE 750 MG/1
10 TABLET, EXTENDED RELEASE ORAL 2 TIMES DAILY
Status: ON HOLD | COMMUNITY
End: 2018-04-20

## 2018-04-03 NOTE — PROGRESS NOTES
HPI:   Franklin Santana is a 65year old male who presents for a MA (Medicare Advantage) [facility] (Once per calendar year).     HPI:  Here for physical  Reports feeling great and normal state of health  Denies cp or sob    PAST MEDICAL, SOCIAL, FAMILY HIST takes aspirin and has it on his medication list.   CAGE Alcohol screening   Luís Hillman was screened for Alcohol abuse and had a score of 0 so is at low risk.      Patient Care Team: Patient Care Team:  Liza Aguillon MD as PCP - General (Internal Medic Diabetes [facility] Dysmetabolic syndrome X; Easy bruising; Essential hypertension, malignant; Flatulence/gas pain/belching; Hemorrhoids; High cholesterol;  Inguinal hernia without mention of obstruction or gangrene, unilateral or unspecified, (not spe Pass               Visual Acuity  Right Eye Visual Acuity: Corrected Right Eye Chart Acuity: 20/20   Left Eye Visual Acuity: Corrected Left Eye Chart Acuity: 20/20   Both Eyes Visual Acuity: Corrected Both Eyes Chart Acuity: 20/20   Able To Tolerate Visual 10/04/2016   • Pneumococcal (Prevnar 13) 04/14/2015   • Pneumovax 04/14/2007        ASSESSMENT AND OTHER RELEVANT CHRONIC CONDITIONS:   Elenita Yoon is a 65year old male who presents for a Medicare Assessment.      PLAN SUMMARY:   Diagnoses and all orde indicates understanding of these issues and agrees to the plan. Reinforced healthy diet, lifestyle, and exercise. Return in about 6 months (around 10/3/2018).      Prakash Soliman MD, 4/3/2018     General Health          This section provided for quick re Please get every year    Pneumococcal 13 (Prevnar)  Covered Once after 65 04/14/2015 Please get once after your 65th birthday    Pneumococcal 23 (Pneumovax)  Covered Once after 65 04/14/2007 Please get once after your 65th birthday    Hepatitis B for Moder Annually LDL-CHOLESTEROL (mg/dL (calc))   Date Value   05/30/2013 98     LDL CHOLESTROL (mg/dL)   Date Value   04/23/2014 61     LDL Cholesterol (mg/dL)   Date Value   04/17/2017 67    No flowsheet data found.      Dilated Eye exam  Annually Data entered on

## 2018-04-03 NOTE — PATIENT INSTRUCTIONS
Diabetes: Activity Tips    Being more active can help you manage your diabetes. The tips on this sheet can help you get the most from your exercise. They can also help you stay safe.   Staying Active  It’s important for adults to spend less time sitting a You may be told to plan your exercise for 1 to 2 hours after a meal. In most cases, you don’t need to eat while being active. If you take insulin or medicine that can cause low blood sugar, test your blood sugar before exercising.  And carry a fast-acting s Tests on this list are recommended by your physician but may not be covered, or covered at this frequency, by your insurer. Please check with your insurance carrier before scheduling to verify coverage.     PREVENTATIVE SERVICES  INDICATIONS AND SCHEDULE In Electrocardiogram date10/26/2016 Routine EKG is not a screening covered service except at the Welcome to Medicare Visit    Abdominal aortic aneurysm screening (once between ages 73-68)  No results found for this or any previous visit.  Limited to patients -INFLUENZA VIRUS VACCINE, PRESERV FREE, >=1YEARS OF AGE   -ADMIN INFLUENZA VIRUS VAC    Please get every year    Pneumococcal 13 (Prevnar)  Covered Once after 65   Orders placed or performed in visit on 04/14/15  -PNEUMOCOCCAL VACC, 13 BELÉN IM    Please ge

## 2018-04-16 ENCOUNTER — OFFICE VISIT (OUTPATIENT)
Dept: INTERNAL MEDICINE CLINIC | Facility: CLINIC | Age: 78
End: 2018-04-16

## 2018-04-16 ENCOUNTER — APPOINTMENT (OUTPATIENT)
Dept: GENERAL RADIOLOGY | Facility: HOSPITAL | Age: 78
DRG: 308 | End: 2018-04-16
Attending: EMERGENCY MEDICINE
Payer: MEDICARE

## 2018-04-16 ENCOUNTER — HOSPITAL ENCOUNTER (INPATIENT)
Facility: HOSPITAL | Age: 78
LOS: 4 days | Discharge: HOME | DRG: 308 | End: 2018-04-21
Attending: EMERGENCY MEDICINE | Admitting: HOSPITALIST
Payer: MEDICARE

## 2018-04-16 VITALS
WEIGHT: 212 LBS | SYSTOLIC BLOOD PRESSURE: 112 MMHG | BODY MASS INDEX: 28.71 KG/M2 | TEMPERATURE: 98 F | HEART RATE: 92 BPM | RESPIRATION RATE: 18 BRPM | DIASTOLIC BLOOD PRESSURE: 88 MMHG | HEIGHT: 72 IN | OXYGEN SATURATION: 97 %

## 2018-04-16 DIAGNOSIS — I50.21 ACUTE SYSTOLIC HEART FAILURE (HCC): ICD-10-CM

## 2018-04-16 DIAGNOSIS — Z51.81 MONITORING FOR LONG-TERM ANTICOAGULANT USE: ICD-10-CM

## 2018-04-16 DIAGNOSIS — R06.02 SOB (SHORTNESS OF BREATH): ICD-10-CM

## 2018-04-16 DIAGNOSIS — I10 ESSENTIAL HYPERTENSION: ICD-10-CM

## 2018-04-16 DIAGNOSIS — I20.8 ATYPICAL ANGINA (HCC): Primary | ICD-10-CM

## 2018-04-16 DIAGNOSIS — I48.0 PAROXYSMAL A-FIB (HCC): ICD-10-CM

## 2018-04-16 DIAGNOSIS — S16.1XXA STRAIN OF NECK MUSCLE, INITIAL ENCOUNTER: ICD-10-CM

## 2018-04-16 DIAGNOSIS — I48.91 ATRIAL FIBRILLATION WITH RVR (HCC): ICD-10-CM

## 2018-04-16 DIAGNOSIS — Z79.01 MONITORING FOR LONG-TERM ANTICOAGULANT USE: ICD-10-CM

## 2018-04-16 DIAGNOSIS — I48.91 ATRIAL FIBRILLATION WITH RVR (HCC): Primary | ICD-10-CM

## 2018-04-16 PROBLEM — E78.5 DYSLIPIDEMIA: Status: ACTIVE | Noted: 2018-04-03

## 2018-04-16 PROCEDURE — 93000 ELECTROCARDIOGRAM COMPLETE: CPT | Performed by: INTERNAL MEDICINE

## 2018-04-16 PROCEDURE — 99223 1ST HOSP IP/OBS HIGH 75: CPT | Performed by: INTERNAL MEDICINE

## 2018-04-16 PROCEDURE — 99214 OFFICE O/P EST MOD 30 MIN: CPT | Performed by: INTERNAL MEDICINE

## 2018-04-16 PROCEDURE — 71045 X-RAY EXAM CHEST 1 VIEW: CPT | Performed by: EMERGENCY MEDICINE

## 2018-04-16 RX ORDER — LOSARTAN POTASSIUM 50 MG/1
50 TABLET ORAL 2 TIMES DAILY
Qty: 180 TABLET | Refills: 3 | Status: ON HOLD | COMMUNITY
Start: 2018-04-16 | End: 2018-04-20

## 2018-04-16 RX ORDER — SODIUM CHLORIDE 9 MG/ML
INJECTION, SOLUTION INTRAVENOUS CONTINUOUS
Status: DISCONTINUED | OUTPATIENT
Start: 2018-04-16 | End: 2018-04-16

## 2018-04-16 RX ORDER — WARFARIN SODIUM 2 MG/1
4 TABLET ORAL NIGHTLY
Status: DISCONTINUED | OUTPATIENT
Start: 2018-04-16 | End: 2018-04-19

## 2018-04-16 RX ORDER — DEXTROSE MONOHYDRATE 25 G/50ML
50 INJECTION, SOLUTION INTRAVENOUS
Status: DISCONTINUED | OUTPATIENT
Start: 2018-04-16 | End: 2018-04-21

## 2018-04-16 RX ORDER — ACETAMINOPHEN 325 MG/1
650 TABLET ORAL EVERY 6 HOURS PRN
Status: DISCONTINUED | OUTPATIENT
Start: 2018-04-16 | End: 2018-04-21

## 2018-04-16 RX ORDER — ATORVASTATIN CALCIUM 40 MG/1
40 TABLET, FILM COATED ORAL DAILY
Status: DISCONTINUED | OUTPATIENT
Start: 2018-04-16 | End: 2018-04-21

## 2018-04-16 RX ORDER — LOSARTAN POTASSIUM 50 MG/1
50 TABLET ORAL DAILY
Status: DISCONTINUED | OUTPATIENT
Start: 2018-04-16 | End: 2018-04-20

## 2018-04-16 RX ORDER — ONDANSETRON 2 MG/ML
4 INJECTION INTRAMUSCULAR; INTRAVENOUS EVERY 6 HOURS PRN
Status: DISCONTINUED | OUTPATIENT
Start: 2018-04-16 | End: 2018-04-21

## 2018-04-16 RX ORDER — CYCLOBENZAPRINE HCL 10 MG
10 TABLET ORAL NIGHTLY
Qty: 7 TABLET | Refills: 0 | Status: ON HOLD | OUTPATIENT
Start: 2018-04-16 | End: 2018-04-17

## 2018-04-16 RX ORDER — FUROSEMIDE 20 MG/1
20 TABLET ORAL DAILY
Status: DISCONTINUED | OUTPATIENT
Start: 2018-04-16 | End: 2018-04-18

## 2018-04-16 RX ORDER — CYCLOBENZAPRINE HCL 10 MG
10 TABLET ORAL NIGHTLY
Status: DISCONTINUED | OUTPATIENT
Start: 2018-04-16 | End: 2018-04-21

## 2018-04-16 RX ORDER — ASPIRIN 81 MG/1
81 TABLET ORAL DAILY
Status: DISCONTINUED | OUTPATIENT
Start: 2018-04-16 | End: 2018-04-21

## 2018-04-16 NOTE — RESPIRATORY THERAPY NOTE
Pt is on MILES protocol and does not wear CPAP at home. Will continue to monitor pt with pulse ox at night.

## 2018-04-16 NOTE — ED PROVIDER NOTES
Patient Seen in: [facility] Emergency Department    History   Patient presents with:  Arrythmia/Palpitations (cardiovascular)  Dyspnea HARRISON SOB (respiratory)    Stated Complaint: shortness of breath, not feeling well, in a-fib    HPI    Jerrell garcia Smokeless tobacco: Never Used                      Comment: quit over 35 years ago  Alcohol use:  Yes              Comment: 8-10 drinks per week      Review of Systems    Positive for stated complaint: shortness of breath, not feeling well, in a-fib  Othe DIFFERENTIAL WITH PLATELET    Narrative: The following orders were created for panel order CBC WITH DIFFERENTIAL WITH PLATELET.   Procedure                               Abnormality         Status                     ---------

## 2018-04-16 NOTE — CONSULTS
[facility]    PATIENT'S NAME: Aye Cha   ATTENDING PHYSICIAN: AMADOU De Ada: Scarlet Plata M.D.    PATIENT ACCOUNT#:   [de-identified]    LOCATION:  [address]  MEDICAL RECORD #:   [id number]       DATE OF BIRTH: and his rate is slowing. The patient denies chest pain. He has a little right shoulder ache when he moves his shoulder. His low back has been painful at rest.  He is not short of breath.   Heart rate was initially 140s, now is 105 and irregular, his bloo bicarb 21. BUN and creatinine 25 and 1.4. Troponin negative. INR 2.69. CBC normal except for hemoglobin of 12.8. EKG:  Atrial fibrillation, rapid ventricular response, interventricular conduction delay consistent with left bundle branch block.     AS

## 2018-04-16 NOTE — ED INITIAL ASSESSMENT (HPI)
Shortness of breath this am.  Was seen by primary MD today. EKG showed new onset afib. Hx open heart, 2012, HTN.

## 2018-04-16 NOTE — H&P
[facility]OSPITAL[facility]  History and Physical     Ray Yann Patient Status:  Emergency    1940 MRN [id number]   Location [facility] Attending Timothy Marie MD   Hosp Day # 0 PCP Anay Burton MD     Chief Complaint: that he does not use drugs. Family History:   Family History   Problem Relation Age of Onset   • Prostate Cancer Brother    • Diabetes Father    • Diabetes Mother        Allergies:    Ace Inhibitors          Coughing    Medications:    No current facilit Normocephalic atraumatic. Moist mucous membranes. EOM-I. PERRLA. Anicteric. Neck: No lymphadenopathy. No JVD. No carotid bruits. Respiratory: Clear to auscultation bilaterally. No wheezes. No rhonchi.   Cardiovascular: irreg irreg  Chest and Back: No tend

## 2018-04-16 NOTE — PATIENT INSTRUCTIONS
Neck Clock (Flexibility)    1. Lie on your back on the floor, with your knees bent and your feet flat on the floor. Place a rolled-up towel or neck roll under your neck. 2. Close your eyes and imagine a clock face.  With your nose, slowly trace the outer

## 2018-04-16 NOTE — CONSULTS
[facility] Cardiology Consultation Marina Quigley MD    The patient was interviewed, examined, the chart was reviewed and the consult was dictated. This is a 65year old male with a chief complaint of exertional shortness of breath and shoulder discomfort. Antony Rai

## 2018-04-16 NOTE — PROGRESS NOTES
HPI:    Patient ID: Cintia Garcia is a 65year old male. Shortness Of Breath   This is a new problem. The current episode started 1 to 4 weeks ago. The problem occurs constantly. The problem has been unchanged.  Associated symptoms include neck pain (r Rfl: 0   Warfarin Sodium 4 MG Oral Tab Take 1 to 1 1/2 tablet every night as directed by Coumadin Clinic.  Disp: 120 tablet Rfl: 3   furosemide 40 MG Oral Tab TAKE ONE TABLET BY MOUTH ONCE DAILY Disp: 90 tablet Rfl: 3   Metoprolol Tartrate 50 MG Oral Tab TA Referrals:  ELECTROCARDIOGRAM, COMPLETE  CARD ECHO 2D DOPPLER (CPT=93306)  XR CHEST PA + LAT CHEST (CPT=71046)       DS#[id number]

## 2018-04-17 ENCOUNTER — APPOINTMENT (OUTPATIENT)
Dept: CV DIAGNOSTICS | Facility: HOSPITAL | Age: 78
DRG: 308 | End: 2018-04-17
Attending: INTERNAL MEDICINE
Payer: MEDICARE

## 2018-04-17 ENCOUNTER — APPOINTMENT (OUTPATIENT)
Dept: GENERAL RADIOLOGY | Facility: HOSPITAL | Age: 78
DRG: 308 | End: 2018-04-17
Attending: INTERNAL MEDICINE
Payer: MEDICARE

## 2018-04-17 PROCEDURE — 93306 TTE W/DOPPLER COMPLETE: CPT | Performed by: INTERNAL MEDICINE

## 2018-04-17 PROCEDURE — 71045 X-RAY EXAM CHEST 1 VIEW: CPT | Performed by: INTERNAL MEDICINE

## 2018-04-17 PROCEDURE — 99232 SBSQ HOSP IP/OBS MODERATE 35: CPT | Performed by: INTERNAL MEDICINE

## 2018-04-17 RX ORDER — GLIMEPIRIDE 2 MG/1
2 TABLET ORAL 2 TIMES DAILY
COMMUNITY
End: 2018-10-08

## 2018-04-17 RX ORDER — FUROSEMIDE 40 MG/1
40 TABLET ORAL DAILY
Status: ON HOLD | COMMUNITY
End: 2018-04-21

## 2018-04-17 RX ORDER — FUROSEMIDE 10 MG/ML
40 INJECTION INTRAMUSCULAR; INTRAVENOUS ONCE
Status: COMPLETED | OUTPATIENT
Start: 2018-04-17 | End: 2018-04-17

## 2018-04-17 RX ORDER — METOPROLOL TARTRATE 50 MG/1
25 TABLET, FILM COATED ORAL 2 TIMES DAILY
Status: ON HOLD | COMMUNITY
End: 2018-04-20

## 2018-04-17 RX ORDER — METOPROLOL TARTRATE 50 MG/1
50 TABLET, FILM COATED ORAL
Status: DISCONTINUED | OUTPATIENT
Start: 2018-04-17 | End: 2018-04-21

## 2018-04-17 RX ORDER — WARFARIN SODIUM 4 MG/1
4 TABLET ORAL SEE ADMIN INSTRUCTIONS
COMMUNITY
End: 2018-10-29

## 2018-04-17 RX ORDER — WARFARIN SODIUM 4 MG/1
6 TABLET ORAL SEE ADMIN INSTRUCTIONS
COMMUNITY
End: 2018-07-25

## 2018-04-17 RX ORDER — ATORVASTATIN CALCIUM 40 MG/1
40 TABLET, FILM COATED ORAL DAILY
COMMUNITY
End: 2018-04-25

## 2018-04-17 NOTE — PAYOR COMM NOTE
--------------  Order Requisition   Place in observation Once (Order #[id number]) on 4/16/18   STATUS CHANGED TO INPT ON 4/17  Order Requisition   Admit to inpatient Once (Order #[id number]) on 4/17/18     ADMISSION REVIEW     Payor: Zhang Jennings Jenelle Zhong is a pleasant 26-year-old male presenting to the emergency department for palpitations.   Patient states that since last night he started noticing his heart beating a little fast prompting his visit here he did have recent changes in medication 3 charbel History of Present Illness: Mireya Vasquez is a 65year old male with PMhx of DM2, HTN, DL, Afib presents with palpitations. Pt states symptoms started last night. He felt his heart racing so he came in. He denies any CP.  Pt states he recenlty had some me HISTORY OF PRESENT ILLNESS:  A 74-year-old patient recently known to me but long-term patient of my partner, Dr. Babetta Alpers, with history of coronary disease, dilated cardiomyopathy, left bundle branch block, aortic valve replacement, paroxysmal atrial fibri Pt now requiring 7L hi-flow to maintain O2 sat >90%. Lung sounds diminished at bases. RR 20. Hospitalist notified and orders received. HOSPITALIST:  Denies any SOB but needing 7L O2. He denies any CP.      Vital signs:  Temp:  [97.1 °F (36.2 °C)-98.1 °F Cyclobenzaprine HCl (cyclobenzaprine) tab 10 mg     Date Action Dose Route User    4/16/2018 2118 Given 10 mg Oral Reilly Pike, RN      diltiazem 100mg/100ml in NaCl (CARDIZEM) premix/add-vantage     Date Action Dose Route User    4/17/2018 9772 New metoprolol Tartrate (LOPRESSOR) tab 25 mg     Date Action Dose Route User    4/17/2018 0524 Given 25 mg Oral Manny Pike, RN    4/16/2018 1725 Given 25 mg Oral Lissett Infante RN      0.9%  NaCl infusion     Date Action Dose Route User    4/16/2018 1802 R

## 2018-04-17 NOTE — PLAN OF CARE
Assumed care at 1900. Pt A&Ox4. 5L NC, pt wears no O2 at baseline. Hospitalist updated on respiratory status, no new orders. Afib on tele. Cardizem gtt adjusted per protocol. Coumadin given. Up with sba. Needs attended to. Will monitor.      Pt requiring 7L

## 2018-04-17 NOTE — PLAN OF CARE
Assumed care at 0700. Denies pain. Initially on 7L of O2, attempted to wean, currently at 5L. Mildly sob w/ exertion/ conversation. Denies cough. Lungs diminished. CXR this am, IV lasix x 1. A fib on tele. HR 80s-120s. Cardizem gtt at 15ml/hour.

## 2018-04-17 NOTE — PROGRESS NOTES
[address]  [facility] Cardiology Progress Note - Tom Fatima Patient Status:  Inpatient    1940 MRN [id number]   [facility] 7NE-A Attending Mireya Mcconnell MD   Hosp Day # 0 PCP Hayde Bearden MD     Subjective:  Some constitutional distress. HEENT: Normocephalic, anicteric sclera, neck supple, no thyromegaly or adenopathy. Neck: No JVD, carotids 2+, no bruits. Cardiac: Regular rate and rhythm.  S1, S2 normal. No murmur, pericardial rub, S3, thrill, heave or extra car Min PRN   Or      dextrose 50 % injection 50 mL 50 mL Intravenous Q15 Min PRN   Or      glucose (DEX4) oral liquid 30 g 30 g Oral Q15 Min PRN   Or      Glucose-Vitamin C (DEX-4) 4-0.006 g chewable tab 8 tablet 8 tablet Oral Q15 Min PRN   acetaminophen (TYL

## 2018-04-17 NOTE — PROGRESS NOTES
[facility]  Progress Note     Lucia Obrien Patient Status:  Observation    1940 MRN [id number]   [facility] 7NE-A Attending Nolan Stallworth MD   Hosp Day # 0 PCP Jewell Delcid MD     Chief Complaint: palpitations    S: Alesha Angles Warfarin Sodium  4 mg Oral Nightly   • Cyclobenzaprine HCl  10 mg Oral Nightly   • Insulin Aspart Pen  1-5 Units Subcutaneous TID CC and HS       ASSESSMENT / PLAN:     1. Afib w/ RVR  1. Continue diltiazem gtt  2. Cardiology to eval  3.  Monitor on tele  4

## 2018-04-18 ENCOUNTER — TELEPHONE (OUTPATIENT)
Dept: INTERNAL MEDICINE CLINIC | Facility: CLINIC | Age: 78
End: 2018-04-18

## 2018-04-18 PROCEDURE — 99232 SBSQ HOSP IP/OBS MODERATE 35: CPT | Performed by: INTERNAL MEDICINE

## 2018-04-18 RX ORDER — FUROSEMIDE 10 MG/ML
40 INJECTION INTRAMUSCULAR; INTRAVENOUS DAILY
Status: DISCONTINUED | OUTPATIENT
Start: 2018-04-18 | End: 2018-04-21

## 2018-04-18 RX ORDER — VERAPAMIL HYDROCHLORIDE 40 MG/1
40 TABLET ORAL EVERY 8 HOURS SCHEDULED
Status: DISCONTINUED | OUTPATIENT
Start: 2018-04-18 | End: 2018-04-18

## 2018-04-18 RX ORDER — VERAPAMIL HYDROCHLORIDE 80 MG/1
80 TABLET ORAL EVERY 8 HOURS SCHEDULED
Status: DISCONTINUED | OUTPATIENT
Start: 2018-04-18 | End: 2018-04-21

## 2018-04-18 NOTE — PROGRESS NOTES
[facility] Cardiology Progress Note - Solange Neilherminio Patient Status:  Inpatient    1940 MRN [id number]   [facility] 7NE-A Attending Jevon Hart MD   Hosp Day # 1 PCP Angus Guillermo MD   Subjective:  Patient i coagulation factor inhibitor disorder (HCC)     Dyslipidemia     Atrial fibrillation with RVR (HCC)     Hypoxia     Acute systolic heart failure (HCC)      Objective:   Temp: 97.8 °F (36.6 °C)  Pulse: 108  Resp: 18  BP: 132/79    Intake/Output:     Intake/ Coughing    Medications:    Current Facility-Administered Medications:  Verapamil HCl (CALAN) tab 40 mg 40 mg Oral Q8H [address]   Metoprolol Tartrate (LOPRESSOR) tab 50 mg 50 mg Oral 2x Daily(Beta Blocker)   Perflutren Lipid Microsphere (DEFINITY) 6.52 MG/ML in

## 2018-04-18 NOTE — PLAN OF CARE
Assumed care at 1900. Pt A&Ox4. Requiring 5-7L O2. Dyspnea noted with exertion. At rest pt denies being sob and appears comfortable. Lung sounds diminished, faint crackles. Afib on tele. Cardizem gtt adjusted per protocol.  Rate decreased for HR 60-70 and l

## 2018-04-18 NOTE — TELEPHONE ENCOUNTER
Spoke with Bridget Oquendo, states patient has been there since Monday 4/16/18. She states they have not been able to regulate his pulse and he is still being monitored by Dr. Priya Shah.    She requested the information of a different Cardiology team as she would like

## 2018-04-18 NOTE — PROGRESS NOTES
[facility]  Progress Note     Christopher Patel Patient Status:  Observation    1940 MRN [id number]   [facility] 7NE-A Attending Saurav Tripathi MD   Hosp Day # 1 PCP Renetta Garcia MD     Chief Complaint: palpitations    S: Williams Cline 20 mg Oral Daily   • Losartan Potassium  50 mg Oral Daily   • Warfarin Sodium  4 mg Oral Nightly   • Cyclobenzaprine HCl  10 mg Oral Nightly   • Insulin Aspart Pen  1-5 Units Subcutaneous TID CC and HS       ASSESSMENT / PLAN:     1. Afib w/ RVR  1.  Contin

## 2018-04-18 NOTE — TELEPHONE ENCOUNTER
Patient's spouse called and requested to speak to  Patient. She stated that patient is in the hospital since Sunday, and will be there for another day- has an irregular heartbeat, and wants to know what is going on? Please advise.

## 2018-04-18 NOTE — PLAN OF CARE
Able to wean off cardizem. O2 to 4l per nc. bp stable. States feeling better. Monitor blood glucose. Will cont to monitor.    CARDIOVASCULAR - ADULT    • Maintains optimal cardiac output and hemodynamic stability Progressing    • Absence of cardiac arrhythm

## 2018-04-19 ENCOUNTER — APPOINTMENT (OUTPATIENT)
Dept: GENERAL RADIOLOGY | Facility: HOSPITAL | Age: 78
DRG: 308 | End: 2018-04-19
Attending: INTERNAL MEDICINE
Payer: MEDICARE

## 2018-04-19 PROCEDURE — 99232 SBSQ HOSP IP/OBS MODERATE 35: CPT | Performed by: INTERNAL MEDICINE

## 2018-04-19 PROCEDURE — 71045 X-RAY EXAM CHEST 1 VIEW: CPT | Performed by: INTERNAL MEDICINE

## 2018-04-19 RX ORDER — AMIODARONE HYDROCHLORIDE 200 MG/1
400 TABLET ORAL 2 TIMES DAILY
Status: DISCONTINUED | OUTPATIENT
Start: 2018-04-19 | End: 2018-04-21

## 2018-04-19 RX ORDER — AMIODARONE HYDROCHLORIDE 200 MG/1
400 TABLET ORAL
Status: DISCONTINUED | OUTPATIENT
Start: 2018-04-19 | End: 2018-04-19

## 2018-04-19 RX ORDER — WARFARIN SODIUM 2 MG/1
4 TABLET ORAL NIGHTLY
Status: DISCONTINUED | OUTPATIENT
Start: 2018-04-20 | End: 2018-04-21

## 2018-04-19 RX ORDER — MAGNESIUM OXIDE 400 MG (241.3 MG MAGNESIUM) TABLET
400 TABLET ONCE
Status: COMPLETED | OUTPATIENT
Start: 2018-04-19 | End: 2018-04-19

## 2018-04-19 NOTE — PLAN OF CARE
Patient A/Ox4 pleasant and cooperative with care and staff  Currently on 2L NC stating at 90%.    AFIB HR 100s this morning, Dr. Bruna Celis updated on patient's HR sustaining in the 130s at approximately 04:00 and requiring his scheduled medications earlier  P

## 2018-04-19 NOTE — PROGRESS NOTES
[facility]  Progress Note     Taylor Tawny Patient Status:  Inpatient    1940 MRN [id number]   [facility] 7NE-A Attending Florencio Marrero MD   Hosp Day # 2 PCP Shantanu Santiago MD     Chief Complaint: Afib    S: Patient with no Recent Labs   Lab  04/16/18   1142   TROP  <0.046            Imaging: Imaging data reviewed in Epic.     Medications:   • [START ON 4/20/2018] Warfarin Sodium  4 mg Oral Nightly   • furosemide  40 mg Intravenous Daily   • Verapamil HCl  80 mg Oral Q8H

## 2018-04-19 NOTE — CONSULTS
I was asked by Dr. Mario Banuelos to evaluate for AFib RVR    65year old male with PMhx as below with AF/RVR post-op from aortic valve surgery without recurrence until this hospitalization.       Early April seen in Dr Prosper Ramírez office and change verapamil to ARB Units Subcutaneous TID CC and HS          Smoking status: Former Smoker  For 20.00 Years     Smokeless tobacco: Never Used    Comment: quit over 35 years ago    Alcohol use Yes    Comment: 8-10 drinks per week       Family history: no sudden cardiac death atrium: The left atrium was moderately dilated. 5. Right atrium: The atrium was mildly to moderately dilated. 6. Aortic valve: A prosthesis was present and functioning normally. The     prosthesis had a normal range of motion.  The sewing ring appeared no

## 2018-04-19 NOTE — PROGRESS NOTES
[facility] Cardiology Progress Note - Skyla Lorenzana Patient Status:  Inpatient    1940 MRN [id number]   [facility] 7NE-A Attending Giovani Graham MD   Hosp Day # 2 PCP Eh Rivera MD     Subjective:   The patien RVR ([facility]     Hypoxia     Acute systolic heart failure (HCC)      Objective:   Temp: 98 °F (36.7 °C)  Pulse: 78  Resp: 20  BP: 120/99    Intake/Output:     Intake/Output Summary (Last 24 hours) at 04/19/18 0941  Last data filed at 04/19/18 0850   Gross per 04/16/18   1142   TROP  <0.046       Allergies:      Ace Inhibitors          Coughing    Medications:    Current Facility-Administered Medications:  [START ON 4/20/2018] Warfarin Sodium (COUMADIN) tab 4 mg 4 mg Oral Nightly   furosemide (LASIX) injection 40

## 2018-04-19 NOTE — PLAN OF CARE
Assumed care of pt 1930. Aox4. A-fib per tele. HR 80-90's. Plan to recheck INR levels in am. Received pt on 4L02, sat's 95%. IBANEZ. Lungs dim. Encouraged IS. IS to 750. Plan for CXR in am. . Voiding in BR. Clear, yellow urine. Denies pain.  POC updated wit

## 2018-04-20 PROCEDURE — 99232 SBSQ HOSP IP/OBS MODERATE 35: CPT | Performed by: INTERNAL MEDICINE

## 2018-04-20 RX ORDER — METOPROLOL TARTRATE 50 MG/1
50 TABLET, FILM COATED ORAL
Qty: 180 TABLET | Refills: 1 | Status: SHIPPED | OUTPATIENT
Start: 2018-04-20 | End: 2018-06-26

## 2018-04-20 RX ORDER — AMIODARONE HYDROCHLORIDE 200 MG/1
TABLET ORAL
Qty: 97 TABLET | Refills: 0 | Status: SHIPPED | OUTPATIENT
Start: 2018-04-20 | End: 2018-04-20

## 2018-04-20 RX ORDER — MAGNESIUM OXIDE 400 MG (241.3 MG MAGNESIUM) TABLET
400 TABLET ONCE
Status: COMPLETED | OUTPATIENT
Start: 2018-04-20 | End: 2018-04-20

## 2018-04-20 RX ORDER — LOSARTAN POTASSIUM 25 MG/1
25 TABLET ORAL DAILY
Qty: 90 TABLET | Refills: 1 | Status: SHIPPED | OUTPATIENT
Start: 2018-04-21 | End: 2018-04-25

## 2018-04-20 RX ORDER — ATORVASTATIN CALCIUM 40 MG/1
40 TABLET, FILM COATED ORAL DAILY
Qty: 90 TABLET | Refills: 1 | Status: SHIPPED | OUTPATIENT
Start: 2018-04-20 | End: 2018-06-26

## 2018-04-20 RX ORDER — POTASSIUM CHLORIDE 20 MEQ/1
40 TABLET, EXTENDED RELEASE ORAL EVERY 4 HOURS
Status: COMPLETED | OUTPATIENT
Start: 2018-04-20 | End: 2018-04-20

## 2018-04-20 RX ORDER — LOSARTAN POTASSIUM 25 MG/1
25 TABLET ORAL DAILY
Status: DISCONTINUED | OUTPATIENT
Start: 2018-04-21 | End: 2018-04-21

## 2018-04-20 NOTE — PLAN OF CARE
SPO2% ON ROOM AIR AT REST: 94%  SPO2% AMBULATION ON ROOM AIR: 82%  SPO2% AMBULATION ON O2: 92% ON: 4 LITERS PER MINUTE

## 2018-04-20 NOTE — PROGRESS NOTES
[facility] Cardiology Progress Note - Laura Ventura Patient Status:  Inpatient    1940 MRN [id number]   [facility] 7NE-A Attending Sergio Gonzalez MD   Hosp Day # 3 PCP Clarissa Coe MD     Subjective:   The patien systolic heart failure (HCC)     Coagulopathy ([facility]     Uncontrolled type 2 diabetes mellitus with ketoacidosis without coma, without long-term current use of insulin (HCC)      Objective:   Temp: 98 °F (36.7 °C)  Pulse: 93  Resp: 20  BP: 135/88    Intake/ hours. Allergies:      Ace Inhibitors          Coughing    Medications:    Current Facility-Administered Medications:  Warfarin Sodium (COUMADIN) tab 4 mg 4 mg Oral Nightly   amiodarone HCl (PACERONE) tab 400 mg 400 mg Oral BID   furosemide (LASIX) injec

## 2018-04-20 NOTE — PROGRESS NOTES
[facility]PITAL[facility]  Progress Note     Jacques Oar Patient Status:  Inpatient    1940 MRN [id number]   [facility] 7NE-A Attending Sergio Gonzalez MD   Hosp Day # 3 PCP Clarissa Coe MD     Chief Complaint: low appetite  S: Patient wa Oral Daily   • Losartan Potassium  50 mg Oral Daily   • Cyclobenzaprine HCl  10 mg Oral Nightly   • Insulin Aspart Pen  1-5 Units Subcutaneous TID CC and HS       ASSESSMENT / PLAN:     1. Afib w/ RVR- resolved  1. verapimil and BB, amiodarone   2.  Possibl

## 2018-04-20 NOTE — PLAN OF CARE
Assumed care of pt 1930. Aox4. A-fib per tele. HR 80-90's. Scheduled verapamil and amiodarone given as ordered. 2L02 while awake, 3L02 while sleeping. Ciaran Lobe. Voiding in BR. Gait steady. Pt denies pain. Sleeping comfortably. Will continue to monitor.

## 2018-04-21 VITALS
TEMPERATURE: 99 F | WEIGHT: 215.19 LBS | BODY MASS INDEX: 29.15 KG/M2 | OXYGEN SATURATION: 94 % | RESPIRATION RATE: 20 BRPM | SYSTOLIC BLOOD PRESSURE: 124 MMHG | DIASTOLIC BLOOD PRESSURE: 69 MMHG | HEART RATE: 71 BPM | HEIGHT: 72.01 IN

## 2018-04-21 PROCEDURE — 99239 HOSP IP/OBS DSCHRG MGMT >30: CPT | Performed by: INTERNAL MEDICINE

## 2018-04-21 RX ORDER — POTASSIUM CHLORIDE 20 MEQ/1
40 TABLET, EXTENDED RELEASE ORAL ONCE
Status: COMPLETED | OUTPATIENT
Start: 2018-04-21 | End: 2018-04-21

## 2018-04-21 RX ORDER — SPIRONOLACTONE 25 MG/1
25 TABLET ORAL DAILY
Qty: 90 TABLET | Refills: 0 | Status: SHIPPED | OUTPATIENT
Start: 2018-04-21 | End: 2018-06-26

## 2018-04-21 RX ORDER — POTASSIUM CHLORIDE 20 MEQ/1
40 TABLET, EXTENDED RELEASE ORAL ONCE
Status: DISCONTINUED | OUTPATIENT
Start: 2018-04-21 | End: 2018-04-21

## 2018-04-21 RX ORDER — SPIRONOLACTONE 25 MG/1
25 TABLET ORAL DAILY
Status: DISCONTINUED | OUTPATIENT
Start: 2018-04-21 | End: 2018-04-21

## 2018-04-21 RX ORDER — FUROSEMIDE 10 MG/ML
40 INJECTION INTRAMUSCULAR; INTRAVENOUS ONCE
Status: COMPLETED | OUTPATIENT
Start: 2018-04-21 | End: 2018-04-21

## 2018-04-21 RX ORDER — FUROSEMIDE 40 MG/1
40 TABLET ORAL 2 TIMES DAILY
Qty: 120 TABLET | Refills: 2 | Status: SHIPPED | OUTPATIENT
Start: 2018-04-21 | End: 2018-05-17

## 2018-04-21 NOTE — PLAN OF CARE
A&Ox4. Afib on tele. Wean O2 to O.5L while resting. O2 walk completed this afternoon. Voiding per urinal. Up ad eulogio, tolerating well. Denies pain. Zofran given x1 for nausea w/ relief. Family updated on POC. Will continue to monitor.     CARDIOVASCULAR - AD

## 2018-04-21 NOTE — DISCHARGE SUMMARY
[facility] HOSPITALIST  DISCHARGE SUMMARY     Isaac Roe Patient Status:  Inpatient    1940 MRN [id number]   [facility] 7NE-A Attending No att. providers found   Hosp Day # 4 PCP Cheikh Kendall MD     Date of Admission: 2018  Date continue his coumadin.    Procedures during hospitalization:   • none  Incidental or significant findings and recommendations (brief descriptions):  • As above    Lab/Test results pending at Discharge:   · none    Consultants:  • Cardiology, Electrophysiolo decreased   Quantity:  90 tablet  Refills:  1     Metoprolol Tartrate 50 MG Tabs  Commonly known as:  LOPRESSOR  What changed:  · how much to take  · how to take this  · when to take this  · additional instructions  · Another medication with the same name acute distress. Respiratory: Clear to auscultation bilaterally. No wheezes. No rhonchi. Cardiovascular: S1, S2. IR IR   Abdomen: Soft, nontender, nondistended. Positive bowel sounds. No rebound or guarding. Neurologic: No focal neurological deficits.

## 2018-04-21 NOTE — PROGRESS NOTES
Pt seen and examined this am    He may be DC today. Home O2 arrangement.      DC summary to follow     Kayleigh Archuleta MD

## 2018-04-21 NOTE — PROGRESS NOTES
[facility] Cardiology Progress Note - Amari Fishman Patient Status:  Inpatient    1940 MRN [id number]   [facility] 7NE-A Attending Holland Skinner MD   Hosp Day # 4 PCP Hortensia High MD     Subjective:   The patien diabetes ([facility]     Acquired coagulation factor inhibitor disorder (HCC)     Dyslipidemia     Atrial fibrillation with RVR (HCC)     Hypoxia     Acute systolic heart failure (HCC)     Coagulopathy ([facility]     Uncontrolled type 2 diabetes mellitus with [facility] 8.4   MG  1.7*  1.8   --   1.9   GLU  218*  226*   --   229*       No results for input(s): ALT, AST, ALB, AMYLASE, LIPASE, LDH in the last 72 hours. Invalid input(s): ALPHOS, TBIL, DBIL, TPROT    No results for input(s): TROP in the last 72 hours.     A

## 2018-04-21 NOTE — PLAN OF CARE
NURSING DISCHARGE NOTE    Discharged Home via Wheelchair. Accompanied by Spouse  Belongings Taken by patient/family. Discharge instructions reviewed with patient's spouse, including new mediations and follow up appointments.  Verbalized Understandin

## 2018-04-21 NOTE — CM/SW NOTE
Home oxygen order and sats noted. [facility] referral made to [facility]. Referral pending.  sw to follow once referral is accepted

## 2018-04-21 NOTE — PROGRESS NOTES
04/20/18 1634   Mobility   O2 walk?  Yes   SPO2 on Room Air at Rest 94   SPO2 Ambulation on Room Air 82   SPO2 Ambulation on Oxygen 92   Ambulation oxygen flow (liters per minute) 4

## 2018-04-23 ENCOUNTER — PATIENT OUTREACH (OUTPATIENT)
Dept: CASE MANAGEMENT | Age: 78
End: 2018-04-23

## 2018-04-23 ENCOUNTER — NURSE ONLY (OUTPATIENT)
Dept: FAMILY MEDICINE CLINIC | Facility: CLINIC | Age: 78
End: 2018-04-23

## 2018-04-23 DIAGNOSIS — I50.21 ACUTE SYSTOLIC HEART FAILURE (HCC): ICD-10-CM

## 2018-04-23 DIAGNOSIS — I48.91 ATRIAL FIBRILLATION WITH RVR (HCC): ICD-10-CM

## 2018-04-23 DIAGNOSIS — I48.0 PAROXYSMAL A-FIB (HCC): ICD-10-CM

## 2018-04-23 DIAGNOSIS — I25.5 ISCHEMIC DILATED CARDIOMYOPATHY (HCC): Chronic | ICD-10-CM

## 2018-04-23 DIAGNOSIS — Z79.01 MONITORING FOR LONG-TERM ANTICOAGULANT USE: ICD-10-CM

## 2018-04-23 DIAGNOSIS — I42.0 ISCHEMIC DILATED CARDIOMYOPATHY (HCC): Chronic | ICD-10-CM

## 2018-04-23 DIAGNOSIS — Z51.81 MONITORING FOR LONG-TERM ANTICOAGULANT USE: ICD-10-CM

## 2018-04-24 NOTE — PROGRESS NOTES
Initial Post Discharge Follow Up   Discharge Date: 4/21/18  Contact Date: 4/23/2018    Consent Verification:  Assessment Completed With: Patient  Spouse: Erlinda Million received per patient?  written  HIPAA Verified?   Yes    Discharge Dx:    Shan bradshaw Tartrate 50 MG Oral Tab Take 1 tablet (50 mg total) by mouth 2x Daily(Beta Blocker). Disp: 180 tablet Rfl: 1   atorvastatin 40 MG Oral Tab Take 1 tablet (40 mg total) by mouth daily.  Disp: 90 tablet Rfl: 1   Verapamil HCl  MG Oral Tab CR Take 1 table Lb   Today? 204 Lb. Were you told about any fluid restrictions? No  Have you noticed any shortness of breath or waking up short of breath? no  Since discharge do you feel you are urinating more or less? more    Are you urinating more at night?  Patient is [address] [facility] 190, [id number] [facility] [id number] [phone] SP CARDIOLOGY  [place] at [address] [id number] [facility] [phone]          PCP TCM/HFU appointme

## 2018-04-27 ENCOUNTER — NURSE ONLY (OUTPATIENT)
Dept: FAMILY MEDICINE CLINIC | Facility: CLINIC | Age: 78
End: 2018-04-27

## 2018-04-27 ENCOUNTER — OFFICE VISIT (OUTPATIENT)
Dept: INTERNAL MEDICINE CLINIC | Facility: CLINIC | Age: 78
End: 2018-04-27

## 2018-04-27 VITALS
SYSTOLIC BLOOD PRESSURE: 112 MMHG | OXYGEN SATURATION: 98 % | HEIGHT: 72 IN | RESPIRATION RATE: 16 BRPM | WEIGHT: 201 LBS | DIASTOLIC BLOOD PRESSURE: 80 MMHG | TEMPERATURE: 98 F | HEART RATE: 53 BPM | BODY MASS INDEX: 27.22 KG/M2

## 2018-04-27 DIAGNOSIS — Z95.2 H/O AORTIC VALVE REPLACEMENT: ICD-10-CM

## 2018-04-27 DIAGNOSIS — I50.21 ACUTE SYSTOLIC HEART FAILURE (HCC): Primary | ICD-10-CM

## 2018-04-27 DIAGNOSIS — Z51.81 MONITORING FOR LONG-TERM ANTICOAGULANT USE: ICD-10-CM

## 2018-04-27 DIAGNOSIS — I48.0 PAROXYSMAL A-FIB (HCC): Primary | ICD-10-CM

## 2018-04-27 DIAGNOSIS — I48.91 ATRIAL FIBRILLATION WITH RVR (HCC): ICD-10-CM

## 2018-04-27 DIAGNOSIS — Z09 HOSPITAL DISCHARGE FOLLOW-UP: ICD-10-CM

## 2018-04-27 DIAGNOSIS — Z79.01 MONITORING FOR LONG-TERM ANTICOAGULANT USE: ICD-10-CM

## 2018-04-27 PROBLEM — N18.30 CKD STAGE 3 DUE TO TYPE 2 DIABETES MELLITUS (HCC): Status: ACTIVE | Noted: 2018-04-27

## 2018-04-27 PROBLEM — E78.5 DYSLIPIDEMIA: Status: RESOLVED | Noted: 2018-04-03 | Resolved: 2018-04-27

## 2018-04-27 PROBLEM — E11.22 CKD STAGE 3 DUE TO TYPE 2 DIABETES MELLITUS (HCC): Status: ACTIVE | Noted: 2018-04-27

## 2018-04-27 PROCEDURE — 1111F DSCHRG MED/CURRENT MED MERGE: CPT | Performed by: INTERNAL MEDICINE

## 2018-04-27 PROCEDURE — 99496 TRANSJ CARE MGMT HIGH F2F 7D: CPT | Performed by: INTERNAL MEDICINE

## 2018-04-27 NOTE — PATIENT INSTRUCTIONS
Understanding Atrial Fibrillation    An arrhythmia is any problem with the speed or pattern of the heartbeat. Atrial fibrillation (AFib) is a common type of arrhythmia. It causes fast, chaotic electrical signals in the atria.  This leads to poor functioni · A fast, pounding, irregular heartbeat  · Shortness of breath  · Tiredness  · Dizziness or fainting  · Chest pain  How is atrial fibrillation treated? Treatments for AFib can include any of the options below. · Medicines.  You may be prescribed:  ¨ Heart © 7577-2889 [employer] [address]. [address] [facility] in the [place], [facility] [address]. All rights reserved. This information is not intended as a substitute for professional medical care. Always follow your healthcare professional's instructions.

## 2018-04-27 NOTE — PROGRESS NOTES
HPI:    Franklin Santana is a 65year old male here today for hospital follow up.    He was discharged from Inpatient hospital, [facility] to Home   Admission Date: 4/16/18   Discharge Date: 4/21/18  Hospital Discharge Diagnoses (since 3/28/2018)     Af feels fine. Denies fatigue, sob or palpitations. Seen at [facility]. Lasix decreased due to ckd. PAST MEDICAL, SOCIAL, FAMILY HISTORIES REVIEWED WITH PT      Allergies:  He is allergic to ace inhibitors.     Current Meds:    Current Outpatient Prescrip recurrent); Myalgia and myositis, unspecified; Obesity, unspecified; Special screening for malignant neoplasm of prostate; Type II or unspecified type diabetes mellitus without mention of complication, uncontrolled; Undiagnosed cardiac murmurs;  Unspecified systolic heart failure [facility])    Atrial fibrillation with RVR [facility])    Hospital discharge follow-up      - acute heart failure- compensated. Suspect ckd fue to lasix and aldactone. Monitor, cont arb, BB  - a fib- rate controlled.  Cont coumadin (titrated at

## 2018-05-02 ENCOUNTER — TELEPHONE (OUTPATIENT)
Dept: INTERNAL MEDICINE CLINIC | Facility: CLINIC | Age: 78
End: 2018-05-02

## 2018-05-02 NOTE — TELEPHONE ENCOUNTER
Received CMN form from [address] [place] [address],[address] for pt's oxygen. Complete form, sign and fax back to #[phone]. Form in triage.

## 2018-05-03 ENCOUNTER — NURSE ONLY (OUTPATIENT)
Dept: FAMILY MEDICINE CLINIC | Facility: CLINIC | Age: 78
End: 2018-05-03

## 2018-05-03 ENCOUNTER — APPOINTMENT (OUTPATIENT)
Dept: LAB | Age: 78
End: 2018-05-03
Attending: NURSE PRACTITIONER
Payer: MEDICARE

## 2018-05-03 DIAGNOSIS — Z51.81 MONITORING FOR LONG-TERM ANTICOAGULANT USE: ICD-10-CM

## 2018-05-03 DIAGNOSIS — I48.91 ATRIAL FIBRILLATION WITH RVR (HCC): ICD-10-CM

## 2018-05-03 DIAGNOSIS — Z79.01 MONITORING FOR LONG-TERM ANTICOAGULANT USE: ICD-10-CM

## 2018-05-03 DIAGNOSIS — Z95.2 H/O AORTIC VALVE REPLACEMENT: ICD-10-CM

## 2018-05-03 DIAGNOSIS — I48.0 PAROXYSMAL A-FIB (HCC): ICD-10-CM

## 2018-05-03 PROCEDURE — 85610 PROTHROMBIN TIME: CPT

## 2018-05-10 ENCOUNTER — TELEPHONE (OUTPATIENT)
Dept: INTERNAL MEDICINE CLINIC | Facility: CLINIC | Age: 78
End: 2018-05-10

## 2018-05-10 ENCOUNTER — NURSE ONLY (OUTPATIENT)
Dept: FAMILY MEDICINE CLINIC | Facility: CLINIC | Age: 78
End: 2018-05-10

## 2018-05-10 DIAGNOSIS — I48.0 PAROXYSMAL A-FIB (HCC): ICD-10-CM

## 2018-05-10 DIAGNOSIS — Z79.01 MONITORING FOR LONG-TERM ANTICOAGULANT USE: ICD-10-CM

## 2018-05-10 DIAGNOSIS — Z51.81 MONITORING FOR LONG-TERM ANTICOAGULANT USE: ICD-10-CM

## 2018-05-10 DIAGNOSIS — Z95.2 H/O AORTIC VALVE REPLACEMENT: ICD-10-CM

## 2018-05-10 NOTE — TELEPHONE ENCOUNTER
Spouse reports was discharged from the hospital with a oxygen tank. The spouse states the pt does not use the machine and has not been SOB at home since discharge. The pt has even finished vacuuming the house without having any SOB symptoms.    The spouse s

## 2018-05-10 NOTE — TELEPHONE ENCOUNTER
Patient's wife called and they have an oxygen tank that was ordered for them. Amanda Hong does not need oxygen and she is wondering who ordered it.

## 2018-05-14 ENCOUNTER — TELEPHONE (OUTPATIENT)
Dept: INTERNAL MEDICINE CLINIC | Facility: CLINIC | Age: 78
End: 2018-05-14

## 2018-05-14 ENCOUNTER — OFFICE VISIT (OUTPATIENT)
Dept: INTERNAL MEDICINE CLINIC | Facility: CLINIC | Age: 78
End: 2018-05-14

## 2018-05-14 VITALS
DIASTOLIC BLOOD PRESSURE: 62 MMHG | BODY MASS INDEX: 26.89 KG/M2 | RESPIRATION RATE: 16 BRPM | SYSTOLIC BLOOD PRESSURE: 118 MMHG | HEART RATE: 59 BPM | OXYGEN SATURATION: 97 % | TEMPERATURE: 98 F | WEIGHT: 198.5 LBS | HEIGHT: 72 IN

## 2018-05-14 DIAGNOSIS — I25.5 ISCHEMIC DILATED CARDIOMYOPATHY (HCC): Chronic | ICD-10-CM

## 2018-05-14 DIAGNOSIS — I48.0 PAROXYSMAL A-FIB (HCC): Primary | ICD-10-CM

## 2018-05-14 DIAGNOSIS — I42.0 ISCHEMIC DILATED CARDIOMYOPATHY (HCC): Chronic | ICD-10-CM

## 2018-05-14 DIAGNOSIS — I50.20 SYSTOLIC CONGESTIVE HEART FAILURE, UNSPECIFIED HF CHRONICITY (HCC): ICD-10-CM

## 2018-05-14 PROCEDURE — 99214 OFFICE O/P EST MOD 30 MIN: CPT | Performed by: PHYSICIAN ASSISTANT

## 2018-05-14 NOTE — TELEPHONE ENCOUNTER
Order faxed to [facility] to discontinue oxygen.

## 2018-05-14 NOTE — PROGRESS NOTES
Celine Choudhary is a 65year old male. HPI:   Patient following up on hypoxia. Has not been checking or using o2 at home. Has not felt short of breath since hospital discharge. Taking lasix as directed.  o2 today normal.   Is scheduled with cardiologist Diabetes mellitus ([facility]    • Dysmetabolic syndrome X    • Easy bruising    • Essential hypertension, malignant    • Flatulence/gas pain/belching    • Hemorrhoids    • High cholesterol    • Inguinal hernia without mention of obstruction or gangrene, unilate Paroxysmal a-fib (hcc)  (primary encounter diagnosis)- in normal rhythm today.  f/u with cardiology and electrophysiology for further management  Ischemic dilated cardiomyopathy (hcc) ef 40%- BP at goal, stable  Systolic congestive heart failure, unspecif

## 2018-05-17 ENCOUNTER — ANTI-COAG VISIT (OUTPATIENT)
Dept: FAMILY MEDICINE CLINIC | Facility: CLINIC | Age: 78
End: 2018-05-17

## 2018-05-17 DIAGNOSIS — I48.0 PAROXYSMAL A-FIB (HCC): ICD-10-CM

## 2018-05-17 DIAGNOSIS — Z79.01 MONITORING FOR LONG-TERM ANTICOAGULANT USE: ICD-10-CM

## 2018-05-17 DIAGNOSIS — Z51.81 MONITORING FOR LONG-TERM ANTICOAGULANT USE: ICD-10-CM

## 2018-05-17 PROCEDURE — 85610 PROTHROMBIN TIME: CPT | Performed by: INTERNAL MEDICINE

## 2018-05-17 NOTE — PATIENT INSTRUCTIONS
Patient awaiting call from coumadin clinic/ [facility].  To recheck in approximately 1 month

## 2018-05-31 ENCOUNTER — ANTI-COAG VISIT (OUTPATIENT)
Dept: FAMILY MEDICINE CLINIC | Facility: CLINIC | Age: 78
End: 2018-05-31

## 2018-05-31 DIAGNOSIS — I48.0 PAROXYSMAL A-FIB (HCC): ICD-10-CM

## 2018-05-31 DIAGNOSIS — Z79.01 MONITORING FOR LONG-TERM ANTICOAGULANT USE: ICD-10-CM

## 2018-05-31 DIAGNOSIS — Z51.81 MONITORING FOR LONG-TERM ANTICOAGULANT USE: ICD-10-CM

## 2018-05-31 DIAGNOSIS — Z95.2 H/O AORTIC VALVE REPLACEMENT: ICD-10-CM

## 2018-05-31 NOTE — PATIENT INSTRUCTIONS
PATIENT WILL WAIT TO HEAR [facility]

## 2018-06-08 ENCOUNTER — LABORATORY ENCOUNTER (OUTPATIENT)
Dept: LAB | Age: 78
End: 2018-06-08
Attending: INTERNAL MEDICINE
Payer: MEDICARE

## 2018-06-08 DIAGNOSIS — I25.5 ISCHEMIC DILATED CARDIOMYOPATHY (HCC): ICD-10-CM

## 2018-06-08 DIAGNOSIS — I48.0 PAROXYSMAL ATRIAL FIBRILLATION (HCC): Primary | ICD-10-CM

## 2018-06-08 DIAGNOSIS — Z12.5 PROSTATE CANCER SCREENING: ICD-10-CM

## 2018-06-08 DIAGNOSIS — Z79.01 MONITORING FOR LONG-TERM ANTICOAGULANT USE: ICD-10-CM

## 2018-06-08 DIAGNOSIS — I48.0 PAROXYSMAL A-FIB (HCC): ICD-10-CM

## 2018-06-08 DIAGNOSIS — I50.21 ACUTE SYSTOLIC HEART FAILURE (HCC): ICD-10-CM

## 2018-06-08 DIAGNOSIS — Z51.81 MONITORING FOR LONG-TERM ANTICOAGULANT USE: ICD-10-CM

## 2018-06-08 DIAGNOSIS — E11.9 TYPE 2 DIABETES MELLITUS WITHOUT COMPLICATION, WITHOUT LONG-TERM CURRENT USE OF INSULIN (HCC): ICD-10-CM

## 2018-06-08 DIAGNOSIS — I42.0 ISCHEMIC DILATED CARDIOMYOPATHY (HCC): ICD-10-CM

## 2018-06-08 PROCEDURE — 85025 COMPLETE CBC W/AUTO DIFF WBC: CPT

## 2018-06-08 PROCEDURE — 80048 BASIC METABOLIC PNL TOTAL CA: CPT

## 2018-06-08 PROCEDURE — 36415 COLL VENOUS BLD VENIPUNCTURE: CPT

## 2018-06-08 PROCEDURE — 85610 PROTHROMBIN TIME: CPT

## 2018-06-12 ENCOUNTER — HOSPITAL (OUTPATIENT)
Dept: OTHER | Age: 78
End: 2018-06-12
Attending: INTERNAL MEDICINE

## 2018-06-12 LAB
GLUCOSE BLDC GLUCOMTR-MCNC: 73 MG/DL (ref 65–99)
GLUCOSE BLDC GLUCOMTR-MCNC: 87 MG/DL (ref 65–99)
INR PPP: 1.3
PROTHROMBIN TIME: 13.4 SECONDS (ref 9.7–11.8)
PROTHROMBIN TIME: ABNORMAL

## 2018-06-13 LAB
GLUCOSE BLDC GLUCOMTR-MCNC: 169 MG/DL (ref 65–99)
INR PPP: 1.2
PROTHROMBIN TIME: 12 SECONDS (ref 9.7–11.8)
PROTHROMBIN TIME: ABNORMAL

## 2018-06-14 ENCOUNTER — ANTI-COAG VISIT (OUTPATIENT)
Dept: FAMILY MEDICINE CLINIC | Facility: CLINIC | Age: 78
End: 2018-06-14

## 2018-06-14 DIAGNOSIS — Z51.81 ANTICOAGULATION MANAGEMENT ENCOUNTER: Primary | ICD-10-CM

## 2018-06-14 DIAGNOSIS — Z79.01 ANTICOAGULATION MANAGEMENT ENCOUNTER: Primary | ICD-10-CM

## 2018-06-21 ENCOUNTER — ANTI-COAG VISIT (OUTPATIENT)
Dept: FAMILY MEDICINE CLINIC | Facility: CLINIC | Age: 78
End: 2018-06-21

## 2018-06-21 DIAGNOSIS — Z79.01 MONITORING FOR LONG-TERM ANTICOAGULANT USE: ICD-10-CM

## 2018-06-21 DIAGNOSIS — Z51.81 MONITORING FOR LONG-TERM ANTICOAGULANT USE: ICD-10-CM

## 2018-06-21 DIAGNOSIS — I48.0 PAROXYSMAL A-FIB (HCC): ICD-10-CM

## 2018-06-21 PROCEDURE — 85610 PROTHROMBIN TIME: CPT | Performed by: FAMILY MEDICINE

## 2018-06-21 NOTE — PATIENT INSTRUCTIONS
Patient will wait to hear from [facility] for further instructions.

## 2018-07-02 DIAGNOSIS — E11.9 TYPE 2 DIABETES MELLITUS WITHOUT COMPLICATION, WITHOUT LONG-TERM CURRENT USE OF INSULIN (HCC): ICD-10-CM

## 2018-07-03 RX ORDER — GLIMEPIRIDE 2 MG/1
TABLET ORAL
Qty: 180 TABLET | Refills: 0 | Status: SHIPPED | OUTPATIENT
Start: 2018-07-03 | End: 2018-07-31

## 2018-07-07 DIAGNOSIS — E11.9 TYPE 2 DIABETES MELLITUS WITHOUT COMPLICATION, WITHOUT LONG-TERM CURRENT USE OF INSULIN (HCC): ICD-10-CM

## 2018-07-09 ENCOUNTER — ANTI-COAG VISIT (OUTPATIENT)
Dept: FAMILY MEDICINE CLINIC | Facility: CLINIC | Age: 78
End: 2018-07-09

## 2018-07-09 DIAGNOSIS — I48.0 PAROXYSMAL A-FIB (HCC): ICD-10-CM

## 2018-07-09 DIAGNOSIS — Z79.01 MONITORING FOR LONG-TERM ANTICOAGULANT USE: ICD-10-CM

## 2018-07-09 DIAGNOSIS — Z51.81 MONITORING FOR LONG-TERM ANTICOAGULANT USE: ICD-10-CM

## 2018-07-09 LAB — INR: 4.4 (ref 0.8–1.2)

## 2018-07-09 PROCEDURE — 85610 PROTHROMBIN TIME: CPT

## 2018-07-09 NOTE — PROGRESS NOTES
PATIENT INSTRUCTED NOT TO TAKE ANY COUMADIN UNTIL INSTRUCTED BY COUMADIN NURSE.   EXPRESSES UNDERSTANDING

## 2018-09-24 ENCOUNTER — OFFICE VISIT (OUTPATIENT)
Dept: INTERNAL MEDICINE CLINIC | Facility: CLINIC | Age: 78
End: 2018-09-24
Payer: COMMERCIAL

## 2018-09-24 VITALS
HEART RATE: 64 BPM | SYSTOLIC BLOOD PRESSURE: 118 MMHG | BODY MASS INDEX: 27.66 KG/M2 | RESPIRATION RATE: 16 BRPM | HEIGHT: 71.5 IN | TEMPERATURE: 98 F | WEIGHT: 202 LBS | DIASTOLIC BLOOD PRESSURE: 60 MMHG

## 2018-09-24 DIAGNOSIS — E11.29 TYPE 2 DIABETES MELLITUS WITH MICROALBUMINURIA, WITHOUT LONG-TERM CURRENT USE OF INSULIN (HCC): Primary | ICD-10-CM

## 2018-09-24 DIAGNOSIS — M10.9 ACUTE GOUT INVOLVING TOE OF RIGHT FOOT, UNSPECIFIED CAUSE: ICD-10-CM

## 2018-09-24 DIAGNOSIS — E11.59 HYPERTENSION ASSOCIATED WITH DIABETES (HCC): ICD-10-CM

## 2018-09-24 DIAGNOSIS — N18.30 CKD STAGE 3 DUE TO TYPE 2 DIABETES MELLITUS (HCC): ICD-10-CM

## 2018-09-24 DIAGNOSIS — I15.2 HYPERTENSION ASSOCIATED WITH DIABETES (HCC): ICD-10-CM

## 2018-09-24 DIAGNOSIS — E11.22 CKD STAGE 3 DUE TO TYPE 2 DIABETES MELLITUS (HCC): ICD-10-CM

## 2018-09-24 DIAGNOSIS — R80.9 TYPE 2 DIABETES MELLITUS WITH MICROALBUMINURIA, WITHOUT LONG-TERM CURRENT USE OF INSULIN (HCC): Primary | ICD-10-CM

## 2018-09-24 DIAGNOSIS — Z23 NEED FOR PROPHYLACTIC VACCINATION AND INOCULATION AGAINST INFLUENZA: ICD-10-CM

## 2018-09-24 PROCEDURE — G0008 ADMIN INFLUENZA VIRUS VAC: HCPCS | Performed by: PHYSICIAN ASSISTANT

## 2018-09-24 PROCEDURE — 90653 IIV ADJUVANT VACCINE IM: CPT | Performed by: PHYSICIAN ASSISTANT

## 2018-09-24 PROCEDURE — 99214 OFFICE O/P EST MOD 30 MIN: CPT | Performed by: PHYSICIAN ASSISTANT

## 2018-09-24 RX ORDER — PREDNISONE 20 MG/1
20 TABLET ORAL 2 TIMES DAILY
Qty: 10 TABLET | Refills: 0 | Status: SHIPPED | OUTPATIENT
Start: 2018-09-24 | End: 2018-09-29

## 2018-09-24 NOTE — PATIENT INSTRUCTIONS
Have labs drawn, fasting 8-10 hours prior (water before is ok). Take prednisone as directed for 5 days.    Follow up with cardiologist as planned

## 2018-09-24 NOTE — PROGRESS NOTES
HPI:    Patient ID: Pancho Stubbs is a 77year old male. Patient presents with:   Toe Pain: c/o right great toe pain for the past month; does admit to redness and inflammation; did admit to a fall in early September; Rm 4  Diabetes: 6 month FU  Hypertens problems. Review of Systems   Constitutional: Negative for chills, diaphoresis, fatigue and fever. Cardiovascular: Negative for chest pain. Gastrointestinal: Negative for abdominal pain.    Endocrine: Negative for polydipsia, polyphagia and polyu clear and moist.   Cardiovascular: Normal rate, regular rhythm and normal heart sounds. No murmur heard. Pulses:       Dorsalis pedis pulses are 2+ on the right side, and 2+ on the left side.    Pulmonary/Chest: Effort normal and breath sounds normal. compliant on ARB, check CMP and continue current therapy. Avoid NSAIDS  Acute gout involving toe of right foot, unspecified cause- avoid NSAIDs due to chronic anticoagulant use and CKD. Start prednisone as follows.  Discussed medication indications, risks,

## 2018-10-07 DIAGNOSIS — E11.9 TYPE 2 DIABETES MELLITUS WITHOUT COMPLICATION, WITHOUT LONG-TERM CURRENT USE OF INSULIN (HCC): ICD-10-CM

## 2018-10-08 ENCOUNTER — OFFICE VISIT (OUTPATIENT)
Dept: INTERNAL MEDICINE CLINIC | Facility: CLINIC | Age: 78
End: 2018-10-08
Payer: COMMERCIAL

## 2018-10-08 ENCOUNTER — TELEPHONE (OUTPATIENT)
Dept: INTERNAL MEDICINE CLINIC | Facility: CLINIC | Age: 78
End: 2018-10-08

## 2018-10-08 VITALS
SYSTOLIC BLOOD PRESSURE: 122 MMHG | HEART RATE: 60 BPM | RESPIRATION RATE: 16 BRPM | DIASTOLIC BLOOD PRESSURE: 82 MMHG | WEIGHT: 202 LBS | TEMPERATURE: 98 F | HEIGHT: 71.5 IN | BODY MASS INDEX: 27.66 KG/M2

## 2018-10-08 DIAGNOSIS — M10.071 ACUTE IDIOPATHIC GOUT INVOLVING TOE OF RIGHT FOOT: Primary | ICD-10-CM

## 2018-10-08 PROCEDURE — 99213 OFFICE O/P EST LOW 20 MIN: CPT | Performed by: INTERNAL MEDICINE

## 2018-10-08 RX ORDER — GLIMEPIRIDE 2 MG/1
TABLET ORAL
Qty: 180 TABLET | Refills: 1 | Status: SHIPPED | OUTPATIENT
Start: 2018-10-08 | End: 2018-10-15

## 2018-10-08 RX ORDER — INDOMETHACIN 50 MG/1
50 CAPSULE ORAL
Qty: 30 CAPSULE | Refills: 0 | Status: ON HOLD | OUTPATIENT
Start: 2018-10-08 | End: 2018-10-16

## 2018-10-08 NOTE — TELEPHONE ENCOUNTER
Dr. Koffi Chow is aware of the interaction and it is ok to dispense. April aware and expressed understanding.

## 2018-10-08 NOTE — TELEPHONE ENCOUNTER
April from [address] Maur[address] calling to verify it is ok to dispense indomethacin 50 MG since the patient is on warfarin, and there is a risk for increased bleeding.

## 2018-10-08 NOTE — PROGRESS NOTES
HPI:    Patient ID: Ruiz Figueroa is a 77year old male. Toe Pain   This is a new problem. Episode onset: 4 weeks. The problem occurs constantly. The problem has been unchanged. Associated symptoms comments: Right 1st tor pain (medial joint).  The symp Pulmonary/Chest: Effort normal and breath sounds normal. No respiratory distress. He has no wheezes. He has no rales. Abdominal: Soft. Bowel sounds are normal. He exhibits no distension. There is no rebound.    Musculoskeletal:        Right foot: There is

## 2018-10-09 ENCOUNTER — MED REC SCAN ONLY (OUTPATIENT)
Dept: INTERNAL MEDICINE CLINIC | Facility: CLINIC | Age: 78
End: 2018-10-09

## 2018-10-15 ENCOUNTER — HOSPITAL ENCOUNTER (INPATIENT)
Facility: HOSPITAL | Age: 78
LOS: 1 days | Discharge: HOME | DRG: 641 | End: 2018-10-16
Attending: EMERGENCY MEDICINE | Admitting: INTERNAL MEDICINE
Payer: MEDICARE

## 2018-10-15 ENCOUNTER — APPOINTMENT (OUTPATIENT)
Dept: LAB | Facility: HOSPITAL | Age: 78
DRG: 641 | End: 2018-10-15
Attending: CLINICAL NURSE SPECIALIST
Payer: MEDICARE

## 2018-10-15 ENCOUNTER — TELEPHONE (OUTPATIENT)
Dept: INTERNAL MEDICINE CLINIC | Facility: CLINIC | Age: 78
End: 2018-10-15

## 2018-10-15 DIAGNOSIS — E87.5 HYPERKALEMIA: Primary | ICD-10-CM

## 2018-10-15 DIAGNOSIS — E11.22 CKD STAGE 3 DUE TO TYPE 2 DIABETES MELLITUS (HCC): ICD-10-CM

## 2018-10-15 DIAGNOSIS — R80.9 TYPE 2 DIABETES MELLITUS WITH MICROALBUMINURIA, WITHOUT LONG-TERM CURRENT USE OF INSULIN (HCC): ICD-10-CM

## 2018-10-15 DIAGNOSIS — N18.30 CKD STAGE 3 DUE TO TYPE 2 DIABETES MELLITUS (HCC): ICD-10-CM

## 2018-10-15 DIAGNOSIS — E11.29 TYPE 2 DIABETES MELLITUS WITH MICROALBUMINURIA, WITHOUT LONG-TERM CURRENT USE OF INSULIN (HCC): ICD-10-CM

## 2018-10-15 PROBLEM — N17.9 ACUTE RENAL FAILURE (ARF): Status: ACTIVE | Noted: 2018-10-15

## 2018-10-15 PROBLEM — R73.9 HYPERGLYCEMIA: Status: ACTIVE | Noted: 2018-10-15

## 2018-10-15 PROBLEM — R79.89 AZOTEMIA: Status: ACTIVE | Noted: 2018-10-15

## 2018-10-15 PROBLEM — D64.9 ANEMIA: Status: ACTIVE | Noted: 2018-10-15

## 2018-10-15 PROBLEM — N17.9 ACUTE RENAL FAILURE (ARF) (HCC): Status: ACTIVE | Noted: 2018-10-15

## 2018-10-15 PROCEDURE — 83036 HEMOGLOBIN GLYCOSYLATED A1C: CPT

## 2018-10-15 PROCEDURE — 80061 LIPID PANEL: CPT

## 2018-10-15 PROCEDURE — 36415 COLL VENOUS BLD VENIPUNCTURE: CPT

## 2018-10-15 PROCEDURE — 80053 COMPREHEN METABOLIC PANEL: CPT

## 2018-10-15 PROCEDURE — 99223 1ST HOSP IP/OBS HIGH 75: CPT | Performed by: INTERNAL MEDICINE

## 2018-10-15 RX ORDER — SODIUM POLYSTYRENE SULFONATE 15 G/60ML
30 SUSPENSION ORAL; RECTAL ONCE
Status: COMPLETED | OUTPATIENT
Start: 2018-10-15 | End: 2018-10-15

## 2018-10-15 RX ORDER — HYDRALAZINE HYDROCHLORIDE 20 MG/ML
5 INJECTION INTRAMUSCULAR; INTRAVENOUS EVERY 4 HOURS PRN
Status: DISCONTINUED | OUTPATIENT
Start: 2018-10-15 | End: 2018-10-16

## 2018-10-15 RX ORDER — ASPIRIN 81 MG/1
81 TABLET ORAL NIGHTLY
Status: DISCONTINUED | OUTPATIENT
Start: 2018-10-15 | End: 2018-10-16

## 2018-10-15 RX ORDER — DEXTROSE MONOHYDRATE 25 G/50ML
50 INJECTION, SOLUTION INTRAVENOUS
Status: DISCONTINUED | OUTPATIENT
Start: 2018-10-15 | End: 2018-10-16

## 2018-10-15 RX ORDER — ATORVASTATIN CALCIUM 40 MG/1
40 TABLET, FILM COATED ORAL NIGHTLY
Status: DISCONTINUED | OUTPATIENT
Start: 2018-10-15 | End: 2018-10-16

## 2018-10-15 RX ORDER — GLIMEPIRIDE 2 MG/1
2 TABLET ORAL 2 TIMES DAILY
COMMUNITY
End: 2019-06-28

## 2018-10-15 RX ORDER — DEXTROSE MONOHYDRATE 25 G/50ML
50 INJECTION, SOLUTION INTRAVENOUS ONCE
Status: COMPLETED | OUTPATIENT
Start: 2018-10-15 | End: 2018-10-15

## 2018-10-15 RX ORDER — ACETAMINOPHEN 325 MG/1
650 TABLET ORAL EVERY 6 HOURS PRN
Status: DISCONTINUED | OUTPATIENT
Start: 2018-10-15 | End: 2018-10-16

## 2018-10-15 RX ORDER — METOCLOPRAMIDE HYDROCHLORIDE 5 MG/ML
5 INJECTION INTRAMUSCULAR; INTRAVENOUS EVERY 8 HOURS PRN
Status: DISCONTINUED | OUTPATIENT
Start: 2018-10-15 | End: 2018-10-16

## 2018-10-15 RX ORDER — METOPROLOL SUCCINATE 100 MG/1
200 TABLET, EXTENDED RELEASE ORAL
Status: DISCONTINUED | OUTPATIENT
Start: 2018-10-16 | End: 2018-10-16

## 2018-10-15 RX ORDER — ONDANSETRON 2 MG/ML
4 INJECTION INTRAMUSCULAR; INTRAVENOUS EVERY 6 HOURS PRN
Status: DISCONTINUED | OUTPATIENT
Start: 2018-10-15 | End: 2018-10-16

## 2018-10-15 RX ORDER — AMIODARONE HYDROCHLORIDE 200 MG/1
200 TABLET ORAL DAILY
Status: DISCONTINUED | OUTPATIENT
Start: 2018-10-16 | End: 2018-10-16

## 2018-10-15 RX ORDER — SODIUM CHLORIDE 9 MG/ML
INJECTION, SOLUTION INTRAVENOUS CONTINUOUS
Status: DISCONTINUED | OUTPATIENT
Start: 2018-10-15 | End: 2018-10-16

## 2018-10-15 RX ORDER — WARFARIN SODIUM 2 MG/1
2 TABLET ORAL SEE ADMIN INSTRUCTIONS
COMMUNITY
End: 2020-10-20

## 2018-10-15 RX ORDER — WARFARIN SODIUM 2 MG/1
4 TABLET ORAL
Status: DISCONTINUED | OUTPATIENT
Start: 2018-10-16 | End: 2018-10-16

## 2018-10-15 RX ORDER — SODIUM POLYSTYRENE SULFONATE 15 G/60ML
SUSPENSION ORAL; RECTAL
Status: DISPENSED
Start: 2018-10-15 | End: 2018-10-16

## 2018-10-15 RX ORDER — WARFARIN SODIUM 2 MG/1
2 TABLET ORAL
Status: DISCONTINUED | OUTPATIENT
Start: 2018-10-15 | End: 2018-10-16

## 2018-10-15 NOTE — H&P
[facility] HOSPITALIST  History and Physical     Chema Ian Patient Status:  Emergency    1940 MRN [id number]   Location [address] [facility] Attending Rose Mary Camacho MD   Hosp Day # 0 PCP Sreedhar Smalls MD     Chief Complaint smokeless tobacco. He reports that he drinks alcohol. He reports that he does not use drugs. Family History:   Family History   Problem Relation Age of Onset   • Prostate Cancer Brother    • Diabetes Father    • Diabetes Mother        Allergies:    Ace I (6')   Wt 198 lb (89.8 kg)   SpO2 100%   BMI 26.85 kg/m²   General: No acute distress. Alert and oriented x 3. HEENT: Normocephalic atraumatic. Moist mucous membranes. EOM-I. PERRLA. Anicteric. Neck: No lymphadenopathy. No JVD. No carotid bruits.   Respir Afib  1. Continue home meds, BB and amio  2.  Monitor INR    Quality:  · DVT Prophylaxis: coumadin  · CODE status: Full  · Pike: none    Plan of care discussed with patient, ED physician    Daryle Ast, MD  10/15/2018

## 2018-10-15 NOTE — CONSULTS
[facility]  Report of Consultation    Latrice Mirza Patient Status:  Inpatient    1940 MRN [id number]   [facility] 7NE-A Attending Skyla Rangel MD   Hosp Day # 0 PCP Rudy Lorenzana MD     Reason for Consultation:  ILIR  Hyper [facility] ENDOSCOPY   • COLONOSCOPY N/A 6/27/2014    Performed by Mino Cervantes MD at [facility] ENDOSCOPY   • HERNIA SURGERY     • VALVE REPAIR       Family History   Problem Relation Age of Onset   • Prostate Cancer Brother    • Diabetes Father    • Diabetes Mother signs: Blood pressure (!) 170/66, pulse 90, temperature 97.7 °F (36.5 °C), temperature source Oral, resp. rate 20, height 72\", weight 198 lb, SpO2 100 %. General: No acute distress. Alert and oriented x 3. HEENT: Moist mucous membranes. EOM-I.  PERRL  Ne Imaging:  Reviewed    Impression:  1. ILIR - suspect due to high dose nsaids in background of ongoing arb/aldactone use  His volume status is stable  - agree w/ fluids  - aldactone/lasix/losartan held  - monitor labs closely  - check urine chem  2.

## 2018-10-15 NOTE — ED INITIAL ASSESSMENT (HPI)
Pt presents to the ED accompanied by family with complaints of abnormal result. Pt was having routine blood work done today and was told to come to the ED due to elevated potassium. Pt denies any complaints.  Pt awake and alert, skin w/d,resps reg/unlabored

## 2018-10-15 NOTE — ED PROVIDER NOTES
Patient Seen in: [facility] Emergency Department    History   Patient presents with:  Abnormal Result (metabolic, cardiac)    Stated Complaint: pt called by lab for change in K    HPI    Patient presents with hyperkalemia.   The patient states that h 6/27/2014    Performed by Itzel Joseph MD at [address] [facility] ENDOSCOPY   • HERNIA SURGERY     • VALVE REPAIR             Social History    Tobacco Use      Smoking status: Former Smoker        Years: 20.00      Smokeless tobacco: Never Used      Tobacco comment: q the following components:    PT 30.6 (*)     INR 2.82 (*)     All other components within normal limits   CBC W/ DIFFERENTIAL - Abnormal; Notable for the following components:    RBC 3.54 (*)     HGB 11.0 (*)     HCT 33.3 (*)     Lymphocyte Absolute 0.82 ( patient's renal insufficiency may be related to the indomethacin and that appears to be what induced the hyperkalemia. He will be admitted to telemetry for close observation and further treatment.   Dr. Sully wahl from the hospitalist service will admit

## 2018-10-15 NOTE — TELEPHONE ENCOUNTER
The lab contacted the office to report a potassium level of 6.4. Graciela Tidwell will be informed of this value. Per Graciela Tidwell, due to the pt's potassium levels and worsened kidney functions, the pt is to go to the ER for IV fluids and monitoring.      Spouse was

## 2018-10-16 VITALS
OXYGEN SATURATION: 96 % | RESPIRATION RATE: 18 BRPM | TEMPERATURE: 98 F | HEIGHT: 72 IN | BODY MASS INDEX: 26.82 KG/M2 | SYSTOLIC BLOOD PRESSURE: 145 MMHG | WEIGHT: 198 LBS | DIASTOLIC BLOOD PRESSURE: 71 MMHG | HEART RATE: 62 BPM

## 2018-10-16 PROCEDURE — 99239 HOSP IP/OBS DSCHRG MGMT >30: CPT | Performed by: INTERNAL MEDICINE

## 2018-10-16 PROCEDURE — 99232 SBSQ HOSP IP/OBS MODERATE 35: CPT | Performed by: INTERNAL MEDICINE

## 2018-10-16 RX ORDER — FUROSEMIDE 10 MG/ML
20 INJECTION INTRAMUSCULAR; INTRAVENOUS ONCE
Status: COMPLETED | OUTPATIENT
Start: 2018-10-16 | End: 2018-10-16

## 2018-10-16 RX ORDER — ALLOPURINOL 100 MG/1
100 TABLET ORAL DAILY
Qty: 30 TABLET | Refills: 1 | Status: SHIPPED | OUTPATIENT
Start: 2018-10-16 | End: 2018-10-29

## 2018-10-16 RX ORDER — SODIUM CHLORIDE 450 MG/100ML
INJECTION, SOLUTION INTRAVENOUS CONTINUOUS
Status: DISCONTINUED | OUTPATIENT
Start: 2018-10-16 | End: 2018-10-16

## 2018-10-16 NOTE — PLAN OF CARE
Assumed patient care at 0730. Vital signs stable   K- 5.2 this am.  One time doses of lasix and sodium bicarb given.  IVF changed to 0.45 NS   Plan to re-draw K this afternoon   Anticipated discharge   Plan of care discussed with patient and family   Will c

## 2018-10-16 NOTE — PROGRESS NOTES
[facility]  Progress Note    Ender  Patient Status:  Inpatient    1940 MRN [id number]   [facility] 7NE-A Attending Shanelle Garza MD   Hosp Day # 1 PCP Tyra Beck MD     No complains today  Denies n/v/sob/cp      Cu pulses   Abdomen: Soft, nontender, nondistended. Positive bowel sounds. No rebound tenderness   Neurologic: No focal neurological deficits. Musculoskeletal: Full range of motion of all extremities. No swelling noted. Integument: No lesions.  No erythe

## 2018-10-16 NOTE — PROGRESS NOTES
[facility]PITAL[facility]  Progress Note     [facility] Patient Status:  Inpatient    1940 MRN [id number]   [facility] 7NE-A Attending Franci Waggoner MD   Hosp Day # 1 PCP Lillian Stone MD     Chief Complaint: hyperkalemia    S: Patie results for input(s): TROP, CK in the last 168 hours. Imaging: Imaging data reviewed in Epic.     Medications:   • Insulin Aspart Pen  1-5 Units Subcutaneous TID CC and HS   • amiodarone HCl  200 mg Oral Daily   • aspirin  81 mg Oral Nightly   • shaun

## 2018-10-16 NOTE — DISCHARGE SUMMARY
[facility] [place] HOSPITALIST  DISCHARGE SUMMARY     Chema Sosa Patient Status:  Inpatient    1940 MRN [id number]   [facility] 7NE-A Attending Jessica Silver MD   Hosp Day # 1 PCP Sreedhar Smalls MD     Date of Admission: 10/15/2018  Date o (100 mg total) by mouth daily. Quantity:  30 tablet  Refills:  1        CONTINUE taking these medications      Instructions Prescription details   amiodarone HCl 200 MG Tabs  Commonly known as:  PACERONE      Take 1 tablet (200 mg total) by mouth daily. taking these medications    indomethacin 50 MG Caps  Commonly known as:  INDOCIN              Where to Get Your Medications      These medications were sent to [address] [place] [address], [address], [phone], [phone]

## 2018-10-16 NOTE — PAYOR COMM NOTE
Admit to inpatient Once (Order #[id number]) on 10/15/18    ADMISSION REVIEW     Payor: [address] [place] [address] #:  [id number]  Authorization Number: [id number]    Admit date: 10/15/18  Admit time: 1647       Admitting Physician: Jazmine Rivera specified as recurrent)    • Myalgia and myositis, unspecified    • Special screening for malignant neoplasm of prostate    • Type II or unspecified type diabetes mellitus without mention of complication, uncontrolled    • Undiagnosed cardiac murmurs    • 21.3 (*)     Calculated Osmolality 313 (*)     GFR, Non- 27 (*)     GFR, -American 31 (*)     A/G Ratio 0.9 (*)     All other components within normal limits   MAGNESIUM - Abnormal; Notable for the following components:    Magnesium further treatment. Dr. Susan Goodman on from the hospitalist service will admit the patient primarily. A consult has also been placed to Dr. Terry Alonzo from nephrology.     Admission disposition: 10/15/2018  3:32 PM    Disposition and Plan     Clinical Impressio on file prior to encounter. Current Outpatient Medications on File Prior to Encounter:  glimepiride 2 MG Oral Tab Take 2 mg by mouth 2 (two) times daily. Disp:  Rfl:    Warfarin Sodium 2 MG Oral Tab Take 2 mg by mouth See Admin Instructions.  Monday, [address] Regular rate and rhythm. No murmurs, rubs or gallops. Equal pulses. Chest and Back: No tenderness or deformity. Abdomen: Soft, nontender, nondistended. Positive bowel sounds. No rebound, guarding or organomegaly.   Neurologic: No focal neurological defi 81 mg     Date Action Dose Route User    10/15/2018 1953 Given 81 mg Oral Wendy Mcneill RN      atorvastatin (LIPITOR) tab 40 mg     Date Action Dose Route User    10/15/2018 1953 Given 40 mg Oral Wendy Mcneill RN      calcium gluconate 1 g in so Date Action Dose Route User    10/15/2018 1338 New Bag 1000 mL Intravenous Jessica De La Cruz, RN      Sodium Polystyrene Sulfonate (KAYEXALATE) 15 GM/60ML suspension 30 g     Date Action Dose Route User    10/15/2018 1611 Given 30 g Oral Emperatriz Carlton, R

## 2018-10-16 NOTE — PROGRESS NOTES
Discussed lab values with Dr. Yajaira Walton.  Okay for discharge   Medication and follow up discharge instructions reviewed   IV removed, tele removed   Waiting for patients wife for transport home

## 2018-10-16 NOTE — PLAN OF CARE
Patient alert and oriented times four. Meds given per MAR. Patient denies any pain or discomfort. Vital signs stable. Up with 1 person assist to the bathroom. Resting comfortably in bed. Call light in reach.     CARDIOVASCULAR - ADULT    • Maintains optimal

## 2018-10-17 ENCOUNTER — PATIENT OUTREACH (OUTPATIENT)
Dept: CASE MANAGEMENT | Age: 78
End: 2018-10-17

## 2018-10-17 DIAGNOSIS — Z02.9 ENCOUNTERS FOR ADMINISTRATIVE PURPOSE: ICD-10-CM

## 2018-10-17 PROCEDURE — 1111F DSCHRG MED/CURRENT MED MERGE: CPT

## 2018-10-17 NOTE — PROGRESS NOTES
Initial Post Discharge Follow Up   Discharge Date: 10/16/18  Contact Date: 10/17/2018    Consent Verification:  Assessment Completed With: Spouse: Kellee Escobar received per patient?  written  HIPAA Verified?   Yes    Discharge Dx:    Hyperkalemia furosemide 40 MG Oral Tab Take 0.5 tablets (20 mg total) by mouth daily. Disp: 90 tablet Rfl: 3   amiodarone HCl 200 MG Oral Tab Take 1 tablet (200 mg total) by mouth daily.  Disp: 90 tablet Rfl: 3   atorvastatin 40 MG Oral Tab Take 1 tablet (40 mg total) 6 [address] [place] [address] E)    Nov 29, 2018  1:15 AM CST REMOTE SERVICE with SP DEVICE REMOTE TX SP CARDIOLOGY ([facility] at [facility])    Feb 07, 2019  2:00 PM CST FOLLOW UP with Wilberto Braga MD SP CARDIOLOGY [place] at [address] [place] [address] Dr)    Mar 25, 2019

## 2018-10-29 ENCOUNTER — OFFICE VISIT (OUTPATIENT)
Dept: INTERNAL MEDICINE CLINIC | Facility: CLINIC | Age: 78
End: 2018-10-29
Payer: COMMERCIAL

## 2018-10-29 ENCOUNTER — APPOINTMENT (OUTPATIENT)
Dept: LAB | Age: 78
End: 2018-10-29
Attending: INTERNAL MEDICINE
Payer: MEDICARE

## 2018-10-29 VITALS
HEART RATE: 68 BPM | TEMPERATURE: 98 F | OXYGEN SATURATION: 97 % | SYSTOLIC BLOOD PRESSURE: 118 MMHG | HEIGHT: 71.5 IN | RESPIRATION RATE: 18 BRPM | WEIGHT: 201 LBS | DIASTOLIC BLOOD PRESSURE: 66 MMHG | BODY MASS INDEX: 27.52 KG/M2

## 2018-10-29 DIAGNOSIS — N17.9 ACUTE KIDNEY INJURY (HCC): ICD-10-CM

## 2018-10-29 DIAGNOSIS — E87.5 HYPERKALEMIA: ICD-10-CM

## 2018-10-29 DIAGNOSIS — N17.9 ACUTE KIDNEY INJURY (HCC): Primary | ICD-10-CM

## 2018-10-29 DIAGNOSIS — Z09 HOSPITAL DISCHARGE FOLLOW-UP: ICD-10-CM

## 2018-10-29 PROBLEM — D64.9 ANEMIA: Status: RESOLVED | Noted: 2018-10-15 | Resolved: 2018-10-29

## 2018-10-29 PROBLEM — R79.89 AZOTEMIA: Status: RESOLVED | Noted: 2018-10-15 | Resolved: 2018-10-29

## 2018-10-29 PROBLEM — R73.9 HYPERGLYCEMIA: Status: RESOLVED | Noted: 2018-10-15 | Resolved: 2018-10-29

## 2018-10-29 PROBLEM — E78.5 DYSLIPIDEMIA: Status: RESOLVED | Noted: 2018-04-03 | Resolved: 2018-10-29

## 2018-10-29 PROCEDURE — 99495 TRANSJ CARE MGMT MOD F2F 14D: CPT | Performed by: INTERNAL MEDICINE

## 2018-10-29 PROCEDURE — 80048 BASIC METABOLIC PNL TOTAL CA: CPT

## 2018-10-29 PROCEDURE — 36415 COLL VENOUS BLD VENIPUNCTURE: CPT

## 2018-10-29 RX ORDER — ALLOPURINOL 100 MG/1
100 TABLET ORAL DAILY
Qty: 90 TABLET | Refills: 3 | Status: SHIPPED | OUTPATIENT
Start: 2018-10-29 | End: 2019-11-03

## 2018-10-29 NOTE — PROGRESS NOTES
HPI:    Josep Nguyễn is a 77year old male here today for hospital follow up.    He was discharged from Inpatient hospital, [facility] to Home   Admission Date: 10/15/18   Discharge Date: 10/16/18  Hospital Discharge Diagnoses (since 9/29/2018) attacks  Today he feels fine. No complaints    PAST MEDICAL, SOCIAL, FAMILY HISTORIES REVIEWED WITH PT            Allergies:  He is allergic to ace inhibitors.     Current Meds:    Current Outpatient Medications on File Prior to Visit:  spironolactone 25 MG murmurs, Unspecified sleep apnea ([facility] 6-26-13), and Wears glasses. He  has a past surgical history that includes cabg (2/2012); cath transcatheter aortic valve replacement (2/2012); hernia surgery; valve repair; and COLONOSCOPY (N/A, 6/27/2014). daily.    - Acute kidney injury due to NSAIDS- resolved.   - hyperkalemia due to #1- resilved  -gout- cont allopurinol    Orders Placed This Encounter      Basic Metabolic Panel (8) [E]      Meds & Refills for this Visit:  Requested Prescriptions     Signed

## 2018-11-09 ENCOUNTER — OFFICE VISIT (OUTPATIENT)
Dept: NEPHROLOGY | Facility: CLINIC | Age: 78
End: 2018-11-09
Payer: COMMERCIAL

## 2018-11-09 VITALS — DIASTOLIC BLOOD PRESSURE: 70 MMHG | WEIGHT: 204 LBS | SYSTOLIC BLOOD PRESSURE: 116 MMHG | BODY MASS INDEX: 28 KG/M2

## 2018-11-09 DIAGNOSIS — N18.30 CKD STAGE 3 DUE TO TYPE 2 DIABETES MELLITUS (HCC): Primary | ICD-10-CM

## 2018-11-09 DIAGNOSIS — E11.22 CKD STAGE 3 DUE TO TYPE 2 DIABETES MELLITUS (HCC): Primary | ICD-10-CM

## 2018-11-09 DIAGNOSIS — N17.9 AKI (ACUTE KIDNEY INJURY) (HCC): ICD-10-CM

## 2018-11-09 PROCEDURE — 99214 OFFICE O/P EST MOD 30 MIN: CPT | Performed by: INTERNAL MEDICINE

## 2018-11-09 NOTE — PROGRESS NOTES
Nephrology Progress Note      Evelena Goodell is a 77year old male. HPI:   Patient presents with:  Chronic Kidney Disease    76 y/o male with hxof HTN, DM, Afib, who was seen in hospital for evaluation of ILIR/hyperkalemia in mid October 2018.   He inita Performed by Ivonne Ocampo MD at [facility] ENDOSCOPY   • HERNIA SURGERY     • VALVE REPAIR        Family History   Problem Relation Age of Onset   • Prostate Cancer Brother    • Diabetes Father    • Diabetes Mother       Social History: Social History    T edema  Denies skin rashes/myalgias/arthralgias      PHYSICAL EXAM:   /70   Wt 204 lb   BMI 28.45 kg/m²   Wt Readings from Last 6 Encounters:  11/09/18 : 204 lb  10/30/18 : 200 lb  10/29/18 : 201 lb  10/15/18 : 198 lb  10/08/18 : 202 lb  09/24/18 : 20

## 2018-12-31 ENCOUNTER — LAB ENCOUNTER (OUTPATIENT)
Dept: LAB | Facility: HOSPITAL | Age: 78
End: 2018-12-31
Attending: INTERNAL MEDICINE
Payer: MEDICARE

## 2018-12-31 DIAGNOSIS — N18.30 CKD STAGE 3 DUE TO TYPE 2 DIABETES MELLITUS (HCC): ICD-10-CM

## 2018-12-31 DIAGNOSIS — E11.22 CKD STAGE 3 DUE TO TYPE 2 DIABETES MELLITUS (HCC): ICD-10-CM

## 2018-12-31 DIAGNOSIS — N17.9 AKI (ACUTE KIDNEY INJURY) (HCC): ICD-10-CM

## 2018-12-31 LAB
ANION GAP SERPL CALC-SCNC: 6 MMOL/L (ref 0–18)
BUN BLD-MCNC: 46 MG/DL (ref 8–20)
BUN/CREAT SERPL: 23.4 (ref 10–20)
CALCIUM BLD-MCNC: 8.6 MG/DL (ref 8.3–10.3)
CHLORIDE SERPL-SCNC: 109 MMOL/L (ref 101–111)
CO2 SERPL-SCNC: 24 MMOL/L (ref 22–32)
CREAT BLD-MCNC: 1.97 MG/DL (ref 0.7–1.3)
GLUCOSE BLD-MCNC: 170 MG/DL (ref 70–99)
HAV IGM SER QL: 1.4 MG/DL (ref 1.8–2.5)
OSMOLALITY SERPL CALC.SUM OF ELEC: 304 MOSM/KG (ref 275–295)
POTASSIUM SERPL-SCNC: 4.4 MMOL/L (ref 3.6–5.1)
SODIUM SERPL-SCNC: 139 MMOL/L (ref 136–144)
URATE SERPL-MCNC: 5.5 MG/DL (ref 2.4–8.7)

## 2018-12-31 PROCEDURE — 84550 ASSAY OF BLOOD/URIC ACID: CPT

## 2018-12-31 PROCEDURE — 83735 ASSAY OF MAGNESIUM: CPT

## 2018-12-31 PROCEDURE — 36415 COLL VENOUS BLD VENIPUNCTURE: CPT

## 2018-12-31 PROCEDURE — 80048 BASIC METABOLIC PNL TOTAL CA: CPT

## 2019-01-02 ENCOUNTER — OFFICE VISIT (OUTPATIENT)
Dept: NEPHROLOGY | Facility: CLINIC | Age: 79
End: 2019-01-02
Payer: COMMERCIAL

## 2019-01-02 VITALS — SYSTOLIC BLOOD PRESSURE: 110 MMHG | DIASTOLIC BLOOD PRESSURE: 58 MMHG | BODY MASS INDEX: 29 KG/M2 | WEIGHT: 207 LBS

## 2019-01-02 DIAGNOSIS — N18.30 CKD STAGE 3 DUE TO TYPE 2 DIABETES MELLITUS (HCC): Primary | ICD-10-CM

## 2019-01-02 DIAGNOSIS — N17.9 AKI (ACUTE KIDNEY INJURY) (HCC): ICD-10-CM

## 2019-01-02 DIAGNOSIS — E11.22 CKD STAGE 3 DUE TO TYPE 2 DIABETES MELLITUS (HCC): Primary | ICD-10-CM

## 2019-01-02 PROCEDURE — 99213 OFFICE O/P EST LOW 20 MIN: CPT | Performed by: INTERNAL MEDICINE

## 2019-01-02 RX ORDER — LOSARTAN POTASSIUM 25 MG/1
25 TABLET ORAL DAILY
Qty: 30 TABLET | Refills: 5 | Status: SHIPPED | OUTPATIENT
Start: 2019-01-02 | End: 2019-06-24

## 2019-01-02 RX ORDER — SPIRONOLACTONE 25 MG/1
12.5 TABLET ORAL DAILY
Qty: 90 TABLET | Refills: 3 | Status: SHIPPED | OUTPATIENT
Start: 2019-01-02 | End: 2020-04-20

## 2019-01-02 NOTE — PROGRESS NOTES
Nephrology Progress Note      Lorenza Brunson is a 77year old male. HPI:   Patient presents with:  Acute Renal Injury  Hypertension    78 y/o male with hxof HTN, DM, Afib, who was seen in hospital for evaluation of ILIR/hyperkalemia in mid October 2018. Family History   Problem Relation Age of Onset   • Prostate Cancer Brother    • Diabetes Father    • Diabetes Mother       Social History: Social History    Tobacco Use      Smoking status: Former Smoker        Years: 20.00      Smokeless tobacco: Nev hematuria  Denies LE edema  Denies skin rashes/myalgias/arthralgias      PHYSICAL EXAM:   /58   Wt 207 lb   BMI 28.87 kg/m²   Wt Readings from Last 6 Encounters:  01/02/19 : 207 lb  11/09/18 : 204 lb  10/30/18 : 200 lb  10/29/18 : 201 lb  10/15/18 : aldactone to 12.5 daily  - decrease losartan to 25 daily    2. Hyperkalemia - resolved    3. Gout - no further problem since d/c home. Continue allopurinol; gave pt a script for steroids to be used prn for acute attacks in stead of nsaids    4.  HTN- contro

## 2019-01-22 ENCOUNTER — APPOINTMENT (OUTPATIENT)
Dept: LAB | Facility: HOSPITAL | Age: 79
End: 2019-01-22
Attending: INTERNAL MEDICINE
Payer: MEDICARE

## 2019-01-22 DIAGNOSIS — N18.30 CKD STAGE 3 DUE TO TYPE 2 DIABETES MELLITUS (HCC): ICD-10-CM

## 2019-01-22 DIAGNOSIS — N17.9 AKI (ACUTE KIDNEY INJURY) (HCC): ICD-10-CM

## 2019-01-22 DIAGNOSIS — E11.22 CKD STAGE 3 DUE TO TYPE 2 DIABETES MELLITUS (HCC): ICD-10-CM

## 2019-01-22 LAB
ANION GAP SERPL CALC-SCNC: 9 MMOL/L (ref 0–18)
BUN BLD-MCNC: 50 MG/DL (ref 8–20)
BUN/CREAT SERPL: 22.9 (ref 10–20)
CALCIUM BLD-MCNC: 8.8 MG/DL (ref 8.3–10.3)
CHLORIDE SERPL-SCNC: 102 MMOL/L (ref 101–111)
CO2 SERPL-SCNC: 25 MMOL/L (ref 22–32)
CREAT BLD-MCNC: 2.18 MG/DL (ref 0.7–1.3)
GLUCOSE BLD-MCNC: 236 MG/DL (ref 70–99)
HAV IGM SER QL: 1.6 MG/DL (ref 1.8–2.5)
OSMOLALITY SERPL CALC.SUM OF ELEC: 303 MOSM/KG (ref 275–295)
POTASSIUM SERPL-SCNC: 4.3 MMOL/L (ref 3.6–5.1)
SODIUM SERPL-SCNC: 136 MMOL/L (ref 136–144)

## 2019-01-22 PROCEDURE — 36415 COLL VENOUS BLD VENIPUNCTURE: CPT

## 2019-01-22 PROCEDURE — 83735 ASSAY OF MAGNESIUM: CPT

## 2019-01-22 PROCEDURE — 80048 BASIC METABOLIC PNL TOTAL CA: CPT

## 2019-01-23 ENCOUNTER — TELEPHONE (OUTPATIENT)
Dept: INTERNAL MEDICINE CLINIC | Facility: CLINIC | Age: 79
End: 2019-01-23

## 2019-01-23 DIAGNOSIS — E11.8 TYPE 2 DIABETES MELLITUS WITH COMPLICATION, WITHOUT LONG-TERM CURRENT USE OF INSULIN (HCC): Primary | ICD-10-CM

## 2019-01-23 DIAGNOSIS — L84 CALLUS: ICD-10-CM

## 2019-01-23 DIAGNOSIS — M79.671 PAIN IN BOTH FEET: ICD-10-CM

## 2019-01-23 DIAGNOSIS — E10.8: ICD-10-CM

## 2019-01-23 DIAGNOSIS — M79.672 PAIN IN BOTH FEET: ICD-10-CM

## 2019-01-23 NOTE — TELEPHONE ENCOUNTER
Referral placed. Patient is on [facility] Medicare PPO plan. Spoke with Spouse and advised above.

## 2019-01-23 NOTE — TELEPHONE ENCOUNTER
Patient's spouse called and requested a referral to be written for patient to see Dr. Latoya Marie and Ankle Specialist, in [facility]. Patient is not able to see this specialist without a referral since the MD is not in network. Please advise.  # [phone]

## 2019-03-01 ENCOUNTER — TELEPHONE (OUTPATIENT)
Dept: INTERNAL MEDICINE CLINIC | Facility: CLINIC | Age: 79
End: 2019-03-01

## 2019-03-01 NOTE — TELEPHONE ENCOUNTER
Wife states patient's last bowel movement was 2 days ago. Denies abdominal pain or blood in stool. Started a stool softener yesterday w/out any relief.      Advised patient continue taking stool softener as directed until bowel movement is achieved, advised

## 2019-03-01 NOTE — TELEPHONE ENCOUNTER
Patient's wife called, said the patient has been having trouble with bowel movements, and has been using stool softeners for 2 days without improvement. Please c/b to advise what else he can do.

## 2019-03-05 ENCOUNTER — TELEPHONE (OUTPATIENT)
Dept: NEPHROLOGY | Facility: CLINIC | Age: 79
End: 2019-03-05

## 2019-03-05 ENCOUNTER — TELEPHONE (OUTPATIENT)
Dept: INTERNAL MEDICINE CLINIC | Facility: CLINIC | Age: 79
End: 2019-03-05

## 2019-03-05 ENCOUNTER — APPOINTMENT (OUTPATIENT)
Dept: GENERAL RADIOLOGY | Facility: HOSPITAL | Age: 79
End: 2019-03-05
Attending: EMERGENCY MEDICINE
Payer: MEDICARE

## 2019-03-05 ENCOUNTER — HOSPITAL ENCOUNTER (EMERGENCY)
Facility: HOSPITAL | Age: 79
Discharge: HOME | End: 2019-03-05
Attending: EMERGENCY MEDICINE
Payer: MEDICARE

## 2019-03-05 VITALS
DIASTOLIC BLOOD PRESSURE: 89 MMHG | HEIGHT: 72 IN | TEMPERATURE: 98 F | SYSTOLIC BLOOD PRESSURE: 156 MMHG | BODY MASS INDEX: 27.77 KG/M2 | WEIGHT: 205 LBS | RESPIRATION RATE: 18 BRPM | OXYGEN SATURATION: 99 % | HEART RATE: 86 BPM

## 2019-03-05 DIAGNOSIS — K56.41 FECAL IMPACTION (HCC): Primary | ICD-10-CM

## 2019-03-05 LAB
ALBUMIN SERPL-MCNC: 3.6 G/DL (ref 3.4–5)
ALBUMIN/GLOB SERPL: 1.1 {RATIO} (ref 1–2)
ALP LIVER SERPL-CCNC: 75 U/L (ref 45–117)
ALT SERPL-CCNC: 55 U/L (ref 16–61)
ANION GAP SERPL CALC-SCNC: 8 MMOL/L (ref 0–18)
AST SERPL-CCNC: 38 U/L (ref 15–37)
BASOPHILS # BLD AUTO: 0.04 X10(3) UL (ref 0–0.2)
BASOPHILS NFR BLD AUTO: 0.7 %
BILIRUB SERPL-MCNC: 0.3 MG/DL (ref 0.1–2)
BUN BLD-MCNC: 35 MG/DL (ref 7–18)
BUN/CREAT SERPL: 16.7 (ref 10–20)
CALCIUM BLD-MCNC: 8.8 MG/DL (ref 8.5–10.1)
CHLORIDE SERPL-SCNC: 108 MMOL/L (ref 98–107)
CO2 SERPL-SCNC: 24 MMOL/L (ref 21–32)
CREAT BLD-MCNC: 2.1 MG/DL (ref 0.7–1.3)
DEPRECATED RDW RBC AUTO: 46.4 FL (ref 35.1–46.3)
EOSINOPHIL # BLD AUTO: 0.07 X10(3) UL (ref 0–0.7)
EOSINOPHIL NFR BLD AUTO: 1.1 %
ERYTHROCYTE [DISTWIDTH] IN BLOOD BY AUTOMATED COUNT: 13.5 % (ref 11–15)
GLOBULIN PLAS-MCNC: 3.4 G/DL (ref 2.8–4.4)
GLUCOSE BLD-MCNC: 131 MG/DL (ref 70–99)
HCT VFR BLD AUTO: 34.3 % (ref 39–53)
HGB BLD-MCNC: 11.5 G/DL (ref 13–17.5)
IMM GRANULOCYTES # BLD AUTO: 0.02 X10(3) UL (ref 0–1)
IMM GRANULOCYTES NFR BLD: 0.3 %
LYMPHOCYTES # BLD AUTO: 1.05 X10(3) UL (ref 1–4)
LYMPHOCYTES NFR BLD AUTO: 17.1 %
M PROTEIN MFR SERPL ELPH: 7 G/DL (ref 6.4–8.2)
MCH RBC QN AUTO: 31.2 PG (ref 26–34)
MCHC RBC AUTO-ENTMCNC: 33.5 G/DL (ref 31–37)
MCV RBC AUTO: 93 FL (ref 80–100)
MONOCYTES # BLD AUTO: 0.66 X10(3) UL (ref 0.1–1)
MONOCYTES NFR BLD AUTO: 10.7 %
NEUTROPHILS # BLD AUTO: 4.31 X10 (3) UL (ref 1.5–7.7)
NEUTROPHILS # BLD AUTO: 4.31 X10(3) UL (ref 1.5–7.7)
NEUTROPHILS NFR BLD AUTO: 70.1 %
OSMOLALITY SERPL CALC.SUM OF ELEC: 300 MOSM/KG (ref 275–295)
PLATELET # BLD AUTO: 196 10(3)UL (ref 150–450)
POTASSIUM SERPL-SCNC: 4.6 MMOL/L (ref 3.5–5.1)
RBC # BLD AUTO: 3.69 X10(6)UL (ref 3.8–5.8)
SODIUM SERPL-SCNC: 140 MMOL/L (ref 136–145)
WBC # BLD AUTO: 6.2 X10(3) UL (ref 4–11)

## 2019-03-05 PROCEDURE — 80053 COMPREHEN METABOLIC PANEL: CPT | Performed by: EMERGENCY MEDICINE

## 2019-03-05 PROCEDURE — 82272 OCCULT BLD FECES 1-3 TESTS: CPT

## 2019-03-05 PROCEDURE — 99285 EMERGENCY DEPT VISIT HI MDM: CPT

## 2019-03-05 PROCEDURE — 96360 HYDRATION IV INFUSION INIT: CPT

## 2019-03-05 PROCEDURE — 99284 EMERGENCY DEPT VISIT MOD MDM: CPT

## 2019-03-05 PROCEDURE — 74019 RADEX ABDOMEN 2 VIEWS: CPT | Performed by: EMERGENCY MEDICINE

## 2019-03-05 PROCEDURE — 96361 HYDRATE IV INFUSION ADD-ON: CPT

## 2019-03-05 PROCEDURE — 85025 COMPLETE CBC W/AUTO DIFF WBC: CPT | Performed by: EMERGENCY MEDICINE

## 2019-03-05 RX ORDER — SODIUM CHLORIDE 9 MG/ML
INJECTION, SOLUTION INTRAVENOUS CONTINUOUS
Status: DISCONTINUED | OUTPATIENT
Start: 2019-03-05 | End: 2019-03-05

## 2019-03-05 RX ORDER — LIDOCAINE HYDROCHLORIDE 20 MG/ML
10 JELLY TOPICAL ONCE
Status: COMPLETED | OUTPATIENT
Start: 2019-03-05 | End: 2019-03-05

## 2019-03-05 RX ORDER — BISACODYL 10 MG
10 SUPPOSITORY, RECTAL RECTAL
Status: DISCONTINUED | OUTPATIENT
Start: 2019-03-05 | End: 2019-03-05

## 2019-03-05 NOTE — TELEPHONE ENCOUNTER
Patient's wife called stating patient has been having a hard time moving his bowels and has abdominal bloating. Patient had a 3 month f/u scheduled for 4/12. At wife's request I moved appointment up to 3/13. The would like you to call back.

## 2019-03-05 NOTE — ED PROVIDER NOTES
Patient Seen in: [facility] Emergency Department    History   Patient presents with:  Constipation (gastrointestinal)    Stated Complaint: constipation    HPI    Patient presents for constipation.   He told nursing staff he had not had a bowel moveme History:   Procedure Laterality Date   • CABG  2/2012    CABG x 3   • CATH TRANSCATHETER AORTIC VALVE REPLACEMENT  2/2012   • COLONOSCOPY N/A 6/27/2014    Performed by Kaye Ying MD at [facility] Abnormal; Notable for the following components:       Result Value    Glucose 131 (*)     Chloride 108 (*)     BUN 35 (*)     Creatinine 2.10 (*)     Calculated Osmolality 300 (*)     GFR, Non- 29 (*)     GFR, -American 34 (*)     AS full.  Soapsuds enema administered. Patient had more evacuation after this      At this point, I believe patient may be safely discharged home. I recommend rest and fluids with a clear liquid diet until he feels that he has completely evacuated.   He greta

## 2019-03-05 NOTE — TELEPHONE ENCOUNTER
The pt's appointment with Dr. Seema Leung was scheduled for 3/13/2019 due to abdominal symptoms. The spouse did contact the office on 3/1/2019 c/o constipation. The following was recommended for the pt:    Advised patient continue taking stool softener as di

## 2019-03-05 NOTE — ED INITIAL ASSESSMENT (HPI)
Pt here for evaluation of constipation   Per pt, \"I haven't had a bowel movement in a couple days. I've been having issues for a week or so. I took Colace last Thursday and I went a few times but it didn't clean me out. Now i'm having issues again.  I spok

## 2019-03-05 NOTE — TELEPHONE ENCOUNTER
Patient spouse called and stated patient is having bowel issues, and has a swollen stomach, and is tight. Please advise.

## 2019-03-13 ENCOUNTER — OFFICE VISIT (OUTPATIENT)
Dept: NEPHROLOGY | Facility: CLINIC | Age: 79
End: 2019-03-13
Payer: COMMERCIAL

## 2019-03-13 VITALS — DIASTOLIC BLOOD PRESSURE: 70 MMHG | WEIGHT: 207 LBS | SYSTOLIC BLOOD PRESSURE: 120 MMHG | BODY MASS INDEX: 28 KG/M2

## 2019-03-13 DIAGNOSIS — E11.22 CKD STAGE 3 DUE TO TYPE 2 DIABETES MELLITUS (HCC): Primary | ICD-10-CM

## 2019-03-13 DIAGNOSIS — N18.30 CKD STAGE 3 DUE TO TYPE 2 DIABETES MELLITUS (HCC): Primary | ICD-10-CM

## 2019-03-13 PROCEDURE — 99214 OFFICE O/P EST MOD 30 MIN: CPT | Performed by: INTERNAL MEDICINE

## 2019-03-13 NOTE — PROGRESS NOTES
Nephrology Progress Note      Tavares Hyatt is a 77year old male. HPI:   Patient presents with:  Acute Renal Injury  Hypertension    76 y/o male with hxof HTN, DM, Afib, who was seen in hospital for evaluation of ILIR/hyperkalemia in mid October 2018. HERNIA SURGERY     • VALVE REPAIR        Family History   Problem Relation Age of Onset   • Prostate Cancer Brother    • Diabetes Father    • Diabetes Mother       Social History: Social History    Tobacco Use      Smoking status: Former Smoker        Year rashes/myalgias/arthralgias      PHYSICAL EXAM:   /70   Wt 207 lb   BMI 28.07 kg/m²   Wt Readings from Last 6 Encounters:  03/13/19 : 207 lb  03/05/19 : 205 lb  02/07/19 : 207 lb  01/02/19 : 207 lb  11/09/18 : 204 lb  10/30/18 : 200 lb       General:

## 2019-03-15 PROBLEM — I50.22 CHRONIC SYSTOLIC HEART FAILURE (HCC): Status: ACTIVE | Noted: 2019-03-15

## 2019-03-24 ENCOUNTER — MA CHART PREP (OUTPATIENT)
Dept: FAMILY MEDICINE CLINIC | Facility: CLINIC | Age: 79
End: 2019-03-24

## 2019-03-24 PROBLEM — I77.9 CAROTID ARTERIAL DISEASE: Status: ACTIVE | Noted: 2019-03-24

## 2019-03-24 PROBLEM — I65.21 OCCLUSION OF RIGHT CAROTID ARTERY: Status: ACTIVE | Noted: 2019-03-24

## 2019-03-24 PROBLEM — I77.9 CAROTID ARTERIAL DISEASE (HCC): Status: ACTIVE | Noted: 2019-03-24

## 2019-03-25 ENCOUNTER — OFFICE VISIT (OUTPATIENT)
Dept: INTERNAL MEDICINE CLINIC | Facility: CLINIC | Age: 79
End: 2019-03-25
Payer: COMMERCIAL

## 2019-03-25 VITALS
TEMPERATURE: 98 F | DIASTOLIC BLOOD PRESSURE: 76 MMHG | WEIGHT: 206 LBS | RESPIRATION RATE: 16 BRPM | BODY MASS INDEX: 28.84 KG/M2 | SYSTOLIC BLOOD PRESSURE: 128 MMHG | HEART RATE: 60 BPM | HEIGHT: 71 IN

## 2019-03-25 DIAGNOSIS — E11.59 HYPERTENSION ASSOCIATED WITH DIABETES (HCC): ICD-10-CM

## 2019-03-25 DIAGNOSIS — Z00.00 ANNUAL PHYSICAL EXAM: Primary | ICD-10-CM

## 2019-03-25 DIAGNOSIS — Z00.00 ENCOUNTER FOR ANNUAL HEALTH EXAMINATION: ICD-10-CM

## 2019-03-25 DIAGNOSIS — E11.69 HYPERLIPIDEMIA ASSOCIATED WITH TYPE 2 DIABETES MELLITUS (HCC): ICD-10-CM

## 2019-03-25 DIAGNOSIS — E11.22 CKD STAGE 3 DUE TO TYPE 2 DIABETES MELLITUS (HCC): ICD-10-CM

## 2019-03-25 DIAGNOSIS — N18.30 CKD STAGE 3 DUE TO TYPE 2 DIABETES MELLITUS (HCC): ICD-10-CM

## 2019-03-25 DIAGNOSIS — Z79.01 MONITORING FOR LONG-TERM ANTICOAGULANT USE: ICD-10-CM

## 2019-03-25 DIAGNOSIS — E11.8 TYPE 2 DIABETES MELLITUS WITH COMPLICATION, WITHOUT LONG-TERM CURRENT USE OF INSULIN (HCC): ICD-10-CM

## 2019-03-25 DIAGNOSIS — I65.21 OCCLUSION OF RIGHT CAROTID ARTERY: ICD-10-CM

## 2019-03-25 DIAGNOSIS — I44.7 LBBB (LEFT BUNDLE BRANCH BLOCK): ICD-10-CM

## 2019-03-25 DIAGNOSIS — I42.0 ISCHEMIC DILATED CARDIOMYOPATHY (HCC): Chronic | ICD-10-CM

## 2019-03-25 DIAGNOSIS — I15.2 HYPERTENSION ASSOCIATED WITH DIABETES (HCC): ICD-10-CM

## 2019-03-25 DIAGNOSIS — Z51.81 MONITORING FOR LONG-TERM ANTICOAGULANT USE: ICD-10-CM

## 2019-03-25 DIAGNOSIS — E78.5 HYPERLIPIDEMIA ASSOCIATED WITH TYPE 2 DIABETES MELLITUS (HCC): ICD-10-CM

## 2019-03-25 DIAGNOSIS — D68.4 ACQUIRED COAGULATION FACTOR INHIBITOR DISORDER (HCC): ICD-10-CM

## 2019-03-25 DIAGNOSIS — I25.5 ISCHEMIC DILATED CARDIOMYOPATHY (HCC): Chronic | ICD-10-CM

## 2019-03-25 DIAGNOSIS — I50.22 CHRONIC SYSTOLIC HEART FAILURE (HCC): ICD-10-CM

## 2019-03-25 DIAGNOSIS — D68.9 COAGULATION DEFECT (HCC): ICD-10-CM

## 2019-03-25 DIAGNOSIS — I48.0 PAROXYSMAL A-FIB (HCC): ICD-10-CM

## 2019-03-25 DIAGNOSIS — Z95.2 H/O AORTIC VALVE REPLACEMENT: ICD-10-CM

## 2019-03-25 DIAGNOSIS — I25.10 CORONARY ARTERY DISEASE INVOLVING NATIVE CORONARY ARTERY OF NATIVE HEART WITHOUT ANGINA PECTORIS: ICD-10-CM

## 2019-03-25 DIAGNOSIS — Z95.1 S/P CABG (CORONARY ARTERY BYPASS GRAFT): ICD-10-CM

## 2019-03-25 DIAGNOSIS — I65.21 RIGHT-SIDED CAROTID ARTERY OCCLUSION WITHOUT CEREBRAL INFARCTION: ICD-10-CM

## 2019-03-25 DIAGNOSIS — D68.9 COAGULOPATHY (HCC): ICD-10-CM

## 2019-03-25 PROBLEM — I20.89 ATYPICAL ANGINA (HCC): Status: RESOLVED | Noted: 2019-03-25 | Resolved: 2019-03-25

## 2019-03-25 PROBLEM — N18.4 CKD (CHRONIC KIDNEY DISEASE) STAGE 4, GFR 15-29 ML/MIN (HCC): Chronic | Status: RESOLVED | Noted: 2019-03-25 | Resolved: 2019-03-25

## 2019-03-25 PROBLEM — I20.89 ATYPICAL ANGINA: Status: ACTIVE | Noted: 2019-03-25

## 2019-03-25 PROBLEM — N18.4 CKD (CHRONIC KIDNEY DISEASE) STAGE 4, GFR 15-29 ML/MIN (HCC): Chronic | Status: ACTIVE | Noted: 2019-03-25

## 2019-03-25 PROBLEM — I20.89 ATYPICAL ANGINA (HCC): Status: ACTIVE | Noted: 2019-03-25

## 2019-03-25 PROBLEM — I20.8 ATYPICAL ANGINA: Status: RESOLVED | Noted: 2019-03-25 | Resolved: 2019-03-25

## 2019-03-25 PROBLEM — I20.89 ATYPICAL ANGINA: Status: RESOLVED | Noted: 2019-03-25 | Resolved: 2019-03-25

## 2019-03-25 PROBLEM — I20.8 ATYPICAL ANGINA: Status: ACTIVE | Noted: 2019-03-25

## 2019-03-25 PROBLEM — I20.8 ATYPICAL ANGINA (HCC): Status: RESOLVED | Noted: 2019-03-25 | Resolved: 2019-03-25

## 2019-03-25 PROBLEM — I20.8 ATYPICAL ANGINA (HCC): Status: ACTIVE | Noted: 2019-03-25

## 2019-03-25 PROCEDURE — G0439 PPPS, SUBSEQ VISIT: HCPCS | Performed by: INTERNAL MEDICINE

## 2019-03-25 PROCEDURE — 99397 PER PM REEVAL EST PAT 65+ YR: CPT | Performed by: INTERNAL MEDICINE

## 2019-03-25 PROCEDURE — 96160 PT-FOCUSED HLTH RISK ASSMT: CPT | Performed by: INTERNAL MEDICINE

## 2019-03-25 NOTE — PATIENT INSTRUCTIONS
Colt Haji's SCREENING SCHEDULE   Tests on this list are recommended by your physician but may not be covered, or covered at this frequency, by your insurer. Please check with your insurance carrier before scheduling to verify coverage.     HORACIO medical reasons    Electrocardiogram date10/15/2018 Routine EKG is not a screening covered service except at the Welcome to Medicare Visit    Abdominal aortic aneurysm screening (once between ages 73-68)  No results found for this or any previous visit.  Karen Choi INFLUENZA VIRUS VACCINE, PRESERV FREE, >=1YEARS OF AGE   • ADMIN INFLUENZA VIRUS VAC    Please get every year    Pneumococcal 13 (Prevnar)  Covered Once after 65 Orders placed or performed in visit on 04/14/15   • PNEUMOCOCCAL VACC, 13 BELÉN IM    Please ge

## 2019-03-25 NOTE — PROGRESS NOTES
HPI:   Carleen Og is a 77year old male who presents for a MA (Medicare Advantage) [address] [facility] [place]Once per calendar year).     HPI:  Here for physical  No issues to day  Reports normal state of heatlh  Denies cp or sob    PAST MEDICAL, SOCIAL, FAMILY HIST MD as PCP - General (Internal Medicine)  [address]- Anti Coagulation Clinic/ Grace Light MD (Cardiovascular Diseases)  Janice Greco MD (NEPHROLOGY)    Patient Active Problem List:     Paroxysmal A-fib [facility])     S/P CABG (coronary artery bypass Take 25 mg by mouth daily. spironolactone 25 MG Oral Tab Take 0.5 tablets (12.5 mg total) by mouth daily. allopurinol 100 MG Oral Tab Take 1 tablet (100 mg total) by mouth daily. atorvastatin 40 MG Oral Tab Take 1 tablet (40 mg total) by mouth daily. that he drinks alcohol. He reports that he does not use drugs.      REVIEW OF SYSTEMS:   GENERAL: feels well otherwise  SKIN: denies any unusual skin lesions  EYES: denies blurred vision or double vision  HEENT: denies nasal congestion, sinus pain or ST  BG no CVA tenderness   Lungs:   Clear to auscultation bilaterally, respirations unlabored   Chest Wall:  No tenderness or deformity   Heart:  Regular rate and rhythm, S1, S2 normal, no murmur, rub or gallop   Abdomen:   Soft, non-tender, bowel sounds active a use    LBBB (left bundle branch block)    Paroxysmal A-fib (HCC)    Right-sided carotid artery occlusion without cerebral infarction    S/P CABG (coronary artery bypass graft)    Type 2 diabetes mellitus with complication, without long-term current use of arterial disease (HCC)-stable. Cont control of cad risk factors      The patient indicates understanding of these issues and agrees to the plan. Reinforced healthy diet, lifestyle, and exercise. Return in about 6 months (around 9/25/2019).      Jose J Monique Annually No results found for: FOB No flowsheet data found.     Glaucoma Screening      Ophthalmology Visit Annually: Diabetics, FHx Glaucoma, AA>50, > 65 Data entered on: 1/5/2018   Last Dilated Eye Exam 1/5/2018       Prostate Cancer Screening No flowsheet data found. Drug Serum Conc  Annually No results found for: DIGOXIN, DIG, VALP No flowsheet data found.        Diabetes      HgbA1C  Annually HEMOGLOBIN A1c (% of total Hgb)   Date Value   05/30/2013 7.1 (H)     HGBA1C (%)   Date Value amiodarone HCl 200 MG Oral Tab Take 1 tablet (200 mg total) by mouth daily. Disp: 90 tablet Rfl: 3   aspirin 81 MG Oral Tab EC Take 81 mg by mouth nightly. Disp:  Rfl:      Allergies:   Ace Inhibitors          Coughing   PHYSICAL EXAM:   Physical Exam

## 2019-04-10 DIAGNOSIS — E11.9 TYPE 2 DIABETES MELLITUS WITHOUT COMPLICATION, WITHOUT LONG-TERM CURRENT USE OF INSULIN (HCC): ICD-10-CM

## 2019-04-10 RX ORDER — GLIMEPIRIDE 2 MG/1
TABLET ORAL
Qty: 180 TABLET | Refills: 0 | Status: SHIPPED | OUTPATIENT
Start: 2019-04-10 | End: 2019-07-08

## 2019-05-09 ENCOUNTER — APPOINTMENT (OUTPATIENT)
Dept: LAB | Facility: HOSPITAL | Age: 79
End: 2019-05-09
Attending: INTERNAL MEDICINE
Payer: MEDICARE

## 2019-05-09 DIAGNOSIS — E11.8 TYPE 2 DIABETES MELLITUS WITH COMPLICATION, WITHOUT LONG-TERM CURRENT USE OF INSULIN (HCC): ICD-10-CM

## 2019-05-09 PROCEDURE — 82043 UR ALBUMIN QUANTITATIVE: CPT

## 2019-05-09 PROCEDURE — 36415 COLL VENOUS BLD VENIPUNCTURE: CPT

## 2019-05-09 PROCEDURE — 82570 ASSAY OF URINE CREATININE: CPT

## 2019-05-09 PROCEDURE — 83036 HEMOGLOBIN GLYCOSYLATED A1C: CPT

## 2019-06-19 ENCOUNTER — PATIENT OUTREACH (OUTPATIENT)
Dept: INTERNAL MEDICINE CLINIC | Facility: CLINIC | Age: 79
End: 2019-06-19

## 2019-06-19 DIAGNOSIS — E11.9 TYPE 2 DIABETES MELLITUS WITHOUT COMPLICATION, WITHOUT LONG-TERM CURRENT USE OF INSULIN (HCC): Primary | ICD-10-CM

## 2019-06-30 RX ORDER — LOSARTAN POTASSIUM 25 MG/1
TABLET ORAL
Qty: 30 TABLET | Refills: 5 | Status: SHIPPED | OUTPATIENT
Start: 2019-06-30 | End: 2019-12-30

## 2019-07-02 ENCOUNTER — HOSPITAL ENCOUNTER (EMERGENCY)
Facility: HOSPITAL | Age: 79
Discharge: HOME | End: 2019-07-02
Attending: EMERGENCY MEDICINE
Payer: MEDICARE

## 2019-07-02 VITALS
SYSTOLIC BLOOD PRESSURE: 155 MMHG | HEIGHT: 72 IN | OXYGEN SATURATION: 95 % | TEMPERATURE: 98 F | HEART RATE: 60 BPM | DIASTOLIC BLOOD PRESSURE: 86 MMHG | WEIGHT: 207 LBS | BODY MASS INDEX: 28.04 KG/M2 | RESPIRATION RATE: 18 BRPM

## 2019-07-02 DIAGNOSIS — R04.0 EPISTAXIS: Primary | ICD-10-CM

## 2019-07-02 LAB
INR BLD: 2.05 (ref 0.9–1.1)
PSA SERPL DL<=0.01 NG/ML-MCNC: 24.4 SECONDS (ref 12.5–14.7)

## 2019-07-02 PROCEDURE — 85610 PROTHROMBIN TIME: CPT | Performed by: EMERGENCY MEDICINE

## 2019-07-02 PROCEDURE — 30903 CONTROL OF NOSEBLEED: CPT

## 2019-07-02 PROCEDURE — 36415 COLL VENOUS BLD VENIPUNCTURE: CPT

## 2019-07-02 PROCEDURE — 99284 EMERGENCY DEPT VISIT MOD MDM: CPT

## 2019-07-02 PROCEDURE — 99283 EMERGENCY DEPT VISIT LOW MDM: CPT

## 2019-07-02 RX ORDER — CEPHALEXIN 500 MG/1
500 CAPSULE ORAL 4 TIMES DAILY
Qty: 12 CAPSULE | Refills: 0 | Status: SHIPPED | OUTPATIENT
Start: 2019-07-02 | End: 2019-07-05

## 2019-07-02 NOTE — ED PROVIDER NOTES
Patient Seen in: [facility] Emergency Department    History   Patient presents with:  Nose Bleed (nasopharyngeal)    Stated Complaint:     HPI    Patient is a 59-year-old gentleman on Coumadin comes in for evaluation of her right nare epistaxis.   Pa complaint:   Other systems are as noted in HPI. Constitutional and vital signs reviewed. All other systems reviewed and negative except as noted above. Physical Exam     ED Triage Vitals [07/02/19 0201]   /86   Pulse 60   Resp 18   Temp 97. 8 discharge              Disposition and Plan     Clinical Impression:  Epistaxis  (primary encounter diagnosis)    Disposition:  Discharge  7/2/2019  3:11 am    Follow-up:  Krishna Payton MD  [address] [place] [address] Dr MADRIGAL [phone]

## 2019-07-03 ENCOUNTER — TELEPHONE (OUTPATIENT)
Dept: INTERNAL MEDICINE CLINIC | Facility: CLINIC | Age: 79
End: 2019-07-03

## 2019-07-03 NOTE — TELEPHONE ENCOUNTER
Spoke with Lizette Murguia and she reports patient is c/o increased pain in sinuses since balloon was placed. States he can not sleep and area looks \"distended\", thinks they might have over inflated it.  He was instructed by the ED doctor to keep it in for 2 days,

## 2019-07-03 NOTE — TELEPHONE ENCOUNTER
Patient's wife called, said the patient was in the ER yesterday for a nose bleed, and a \"balloon\" was placed up his nostril. She said he is complaining of a lot of pain, wants to know what he should do. Please c/b to advise.

## 2019-07-05 NOTE — CM/SW NOTE
Pt has appt with ENT on Monday 7/8. The office recommended patient be on the antibiotic longer than the 3 days.     Cephalexin 500 mg QID x 5 days #20 no refill  Gilberto Patton  [phone]    Called in to pharmacy

## 2019-07-08 DIAGNOSIS — E11.9 TYPE 2 DIABETES MELLITUS WITHOUT COMPLICATION, WITHOUT LONG-TERM CURRENT USE OF INSULIN (HCC): ICD-10-CM

## 2019-07-08 RX ORDER — GLIMEPIRIDE 2 MG/1
TABLET ORAL
Qty: 180 TABLET | Refills: 1 | Status: SHIPPED | OUTPATIENT
Start: 2019-07-08 | End: 2019-12-29

## 2019-07-10 ENCOUNTER — LAB ENCOUNTER (OUTPATIENT)
Dept: LAB | Facility: HOSPITAL | Age: 79
End: 2019-07-10
Attending: INTERNAL MEDICINE
Payer: MEDICARE

## 2019-07-10 DIAGNOSIS — N18.30 CKD STAGE 3 DUE TO TYPE 2 DIABETES MELLITUS (HCC): ICD-10-CM

## 2019-07-10 DIAGNOSIS — E11.22 CKD STAGE 3 DUE TO TYPE 2 DIABETES MELLITUS (HCC): ICD-10-CM

## 2019-07-10 LAB
ANION GAP SERPL CALC-SCNC: 8 MMOL/L (ref 0–18)
BASOPHILS # BLD AUTO: 0.04 X10(3) UL (ref 0–0.2)
BASOPHILS NFR BLD AUTO: 0.7 %
BILIRUB UR QL STRIP.AUTO: NEGATIVE
BUN BLD-MCNC: 43 MG/DL (ref 7–18)
BUN/CREAT SERPL: 19 (ref 10–20)
CALCIUM BLD-MCNC: 9.1 MG/DL (ref 8.5–10.1)
CHLORIDE SERPL-SCNC: 107 MMOL/L (ref 98–112)
CLARITY UR REFRACT.AUTO: CLEAR
CO2 SERPL-SCNC: 25 MMOL/L (ref 21–32)
CREAT BLD-MCNC: 2.26 MG/DL (ref 0.7–1.3)
CREAT UR-SCNC: 17.9 MG/DL
DEPRECATED RDW RBC AUTO: 45.7 FL (ref 35.1–46.3)
EOSINOPHIL # BLD AUTO: 0.13 X10(3) UL (ref 0–0.7)
EOSINOPHIL NFR BLD AUTO: 2.1 %
ERYTHROCYTE [DISTWIDTH] IN BLOOD BY AUTOMATED COUNT: 13.2 % (ref 11–15)
GLUCOSE BLD-MCNC: 123 MG/DL (ref 70–99)
GLUCOSE UR STRIP.AUTO-MCNC: NEGATIVE MG/DL
HCT VFR BLD AUTO: 36.7 % (ref 39–53)
HGB BLD-MCNC: 12 G/DL (ref 13–17.5)
IMM GRANULOCYTES # BLD AUTO: 0.02 X10(3) UL (ref 0–1)
IMM GRANULOCYTES NFR BLD: 0.3 %
KETONES UR STRIP.AUTO-MCNC: NEGATIVE MG/DL
LEUKOCYTE ESTERASE UR QL STRIP.AUTO: NEGATIVE
LYMPHOCYTES # BLD AUTO: 1.28 X10(3) UL (ref 1–4)
LYMPHOCYTES NFR BLD AUTO: 21.2 %
MCH RBC QN AUTO: 30.4 PG (ref 26–34)
MCHC RBC AUTO-ENTMCNC: 32.7 G/DL (ref 31–37)
MCV RBC AUTO: 92.9 FL (ref 80–100)
MONOCYTES # BLD AUTO: 0.7 X10(3) UL (ref 0.1–1)
MONOCYTES NFR BLD AUTO: 11.6 %
NEUTROPHILS # BLD AUTO: 3.88 X10 (3) UL (ref 1.5–7.7)
NEUTROPHILS # BLD AUTO: 3.88 X10(3) UL (ref 1.5–7.7)
NEUTROPHILS NFR BLD AUTO: 64.1 %
NITRITE UR QL STRIP.AUTO: NEGATIVE
OSMOLALITY SERPL CALC.SUM OF ELEC: 302 MOSM/KG (ref 275–295)
PH UR STRIP.AUTO: 5 [PH] (ref 4.5–8)
PHOSPHATE SERPL-MCNC: 3.2 MG/DL (ref 2.5–4.9)
PLATELET # BLD AUTO: 218 10(3)UL (ref 150–450)
POTASSIUM SERPL-SCNC: 4.3 MMOL/L (ref 3.5–5.1)
PROT UR STRIP.AUTO-MCNC: NEGATIVE MG/DL
PROT UR-MCNC: <5 MG/DL
PTH-INTACT SERPL-MCNC: 114.2 PG/ML (ref 18.5–88)
RBC # BLD AUTO: 3.95 X10(6)UL (ref 3.8–5.8)
RBC UR QL AUTO: NEGATIVE
SODIUM SERPL-SCNC: 140 MMOL/L (ref 136–145)
SP GR UR STRIP.AUTO: 1.01 (ref 1–1.03)
UROBILINOGEN UR STRIP.AUTO-MCNC: <2 MG/DL
VIT D+METAB SERPL-MCNC: 11.5 NG/ML (ref 30–100)
WBC # BLD AUTO: 6.1 X10(3) UL (ref 4–11)

## 2019-07-10 PROCEDURE — 84156 ASSAY OF PROTEIN URINE: CPT

## 2019-07-10 PROCEDURE — 83970 ASSAY OF PARATHORMONE: CPT

## 2019-07-10 PROCEDURE — 36415 COLL VENOUS BLD VENIPUNCTURE: CPT

## 2019-07-10 PROCEDURE — 82306 VITAMIN D 25 HYDROXY: CPT

## 2019-07-10 PROCEDURE — 84100 ASSAY OF PHOSPHORUS: CPT

## 2019-07-10 PROCEDURE — 85025 COMPLETE CBC W/AUTO DIFF WBC: CPT

## 2019-07-10 PROCEDURE — 81003 URINALYSIS AUTO W/O SCOPE: CPT

## 2019-07-10 PROCEDURE — 80048 BASIC METABOLIC PNL TOTAL CA: CPT

## 2019-07-10 PROCEDURE — 82570 ASSAY OF URINE CREATININE: CPT

## 2019-07-17 ENCOUNTER — OFFICE VISIT (OUTPATIENT)
Dept: NEPHROLOGY | Facility: CLINIC | Age: 79
End: 2019-07-17
Payer: COMMERCIAL

## 2019-07-17 VITALS — DIASTOLIC BLOOD PRESSURE: 76 MMHG | BODY MASS INDEX: 29 KG/M2 | SYSTOLIC BLOOD PRESSURE: 124 MMHG | WEIGHT: 214 LBS

## 2019-07-17 DIAGNOSIS — E11.22 CKD STAGE 3 DUE TO TYPE 2 DIABETES MELLITUS (HCC): Primary | ICD-10-CM

## 2019-07-17 DIAGNOSIS — N18.30 CKD STAGE 3 DUE TO TYPE 2 DIABETES MELLITUS (HCC): Primary | ICD-10-CM

## 2019-07-17 PROCEDURE — 99213 OFFICE O/P EST LOW 20 MIN: CPT | Performed by: INTERNAL MEDICINE

## 2019-07-17 NOTE — PROGRESS NOTES
Nephrology Progress Note      Sara Waddell is a 77year old male.     HPI:   Patient presents with:  Chronic Kidney Disease  Hypertension    78 y/o male with hxof HTN, DM, Afib, who was seen in hospital for evaluation of ILIR/hyperkalemia in mid October 2 Relation Age of Onset   • Prostate Cancer Brother    • Diabetes Father    • Diabetes Mother       Social History: Social History    Tobacco Use      Smoking status: Former Smoker        Years: 20.00      Smokeless tobacco: Never Used      Tobacco comment: flank pain  Denies N/V/D  Denies change in urinary habits or gross hematuria  Denies LE edema  Denies skin rashes/myalgias/arthralgias      PHYSICAL EXAM:   /76   Wt 214 lb   BMI 29.02 kg/m²   Wt Readings from Last 6 Encounters:  07/17/19 : 214 lb  0 Latest Ref Range: 39.0 - 53.0 % 36.7 (L)   Platelet Count Latest Ref Range: 150.0 - 450.0 10(3)uL 218.0   RBC Latest Ref Range: 3.80 - 5.80 x10(6)uL 3.95   MCH Latest Ref Range: 26.0 - 34.0 pg 30.4   MCHC Latest Ref Range: 31.0 - 37.0 g/dL 32.7   MCV Lates fairly bland most recently. Volume status stable. - cpm; will monitor CKD profile    2. Hyperkalemia - resolved    3. Gout - no further flares. Continue allopurinol     4. HTN- controlled; adjust meds as noted above    5. DM; controlled     6.  Afib- contr

## 2019-09-19 PROBLEM — Z95.810 ICD (IMPLANTABLE CARDIOVERTER-DEFIBRILLATOR) IN PLACE: Status: ACTIVE | Noted: 2019-09-19

## 2019-09-24 ENCOUNTER — OFFICE VISIT (OUTPATIENT)
Dept: INTERNAL MEDICINE CLINIC | Facility: CLINIC | Age: 79
End: 2019-09-24
Payer: COMMERCIAL

## 2019-09-24 ENCOUNTER — LAB ENCOUNTER (OUTPATIENT)
Dept: LAB | Age: 79
End: 2019-09-24
Attending: INTERNAL MEDICINE
Payer: MEDICARE

## 2019-09-24 VITALS
SYSTOLIC BLOOD PRESSURE: 124 MMHG | BODY MASS INDEX: 29.26 KG/M2 | WEIGHT: 209 LBS | HEART RATE: 60 BPM | TEMPERATURE: 98 F | HEIGHT: 71 IN | DIASTOLIC BLOOD PRESSURE: 84 MMHG | RESPIRATION RATE: 16 BRPM

## 2019-09-24 DIAGNOSIS — E78.2 MIXED HYPERLIPIDEMIA: ICD-10-CM

## 2019-09-24 DIAGNOSIS — E11.8 TYPE 2 DIABETES MELLITUS WITH COMPLICATION, WITHOUT LONG-TERM CURRENT USE OF INSULIN (HCC): Primary | ICD-10-CM

## 2019-09-24 DIAGNOSIS — I10 ESSENTIAL HYPERTENSION: ICD-10-CM

## 2019-09-24 DIAGNOSIS — I48.0 PAROXYSMAL A-FIB (HCC): ICD-10-CM

## 2019-09-24 DIAGNOSIS — E11.8 TYPE 2 DIABETES MELLITUS WITH COMPLICATION, WITHOUT LONG-TERM CURRENT USE OF INSULIN (HCC): ICD-10-CM

## 2019-09-24 LAB
ALBUMIN SERPL-MCNC: 3.7 G/DL (ref 3.4–5)
ALBUMIN/GLOB SERPL: 1 {RATIO} (ref 1–2)
ALP LIVER SERPL-CCNC: 80 U/L (ref 45–117)
ALT SERPL-CCNC: 89 U/L (ref 16–61)
ANION GAP SERPL CALC-SCNC: 7 MMOL/L (ref 0–18)
AST SERPL-CCNC: 61 U/L (ref 15–37)
BASOPHILS # BLD AUTO: 0.05 X10(3) UL (ref 0–0.2)
BASOPHILS NFR BLD AUTO: 0.9 %
BILIRUB SERPL-MCNC: 0.6 MG/DL (ref 0.1–2)
BUN BLD-MCNC: 42 MG/DL (ref 7–18)
BUN/CREAT SERPL: 19.6 (ref 10–20)
CALCIUM BLD-MCNC: 9.3 MG/DL (ref 8.5–10.1)
CHLORIDE SERPL-SCNC: 105 MMOL/L (ref 98–112)
CHOLEST SMN-MCNC: 151 MG/DL (ref ?–200)
CO2 SERPL-SCNC: 24 MMOL/L (ref 21–32)
CREAT BLD-MCNC: 2.14 MG/DL (ref 0.7–1.3)
CREAT UR-SCNC: 189 MG/DL
DEPRECATED RDW RBC AUTO: 46.5 FL (ref 35.1–46.3)
EOSINOPHIL # BLD AUTO: 0.15 X10(3) UL (ref 0–0.7)
EOSINOPHIL NFR BLD AUTO: 2.6 %
ERYTHROCYTE [DISTWIDTH] IN BLOOD BY AUTOMATED COUNT: 13.7 % (ref 11–15)
EST. AVERAGE GLUCOSE BLD GHB EST-MCNC: 157 MG/DL (ref 68–126)
GLOBULIN PLAS-MCNC: 3.6 G/DL (ref 2.8–4.4)
GLUCOSE BLD-MCNC: 138 MG/DL (ref 70–99)
HBA1C MFR BLD HPLC: 7.1 % (ref ?–5.7)
HCT VFR BLD AUTO: 38.5 % (ref 39–53)
HDLC SERPL-MCNC: 51 MG/DL (ref 40–59)
HGB BLD-MCNC: 12.7 G/DL (ref 13–17.5)
IMM GRANULOCYTES # BLD AUTO: 0.02 X10(3) UL (ref 0–1)
IMM GRANULOCYTES NFR BLD: 0.3 %
LDLC SERPL CALC-MCNC: 59 MG/DL (ref ?–100)
LYMPHOCYTES # BLD AUTO: 1.02 X10(3) UL (ref 1–4)
LYMPHOCYTES NFR BLD AUTO: 17.7 %
M PROTEIN MFR SERPL ELPH: 7.3 G/DL (ref 6.4–8.2)
MCH RBC QN AUTO: 30.8 PG (ref 26–34)
MCHC RBC AUTO-ENTMCNC: 33 G/DL (ref 31–37)
MCV RBC AUTO: 93.2 FL (ref 80–100)
MICROALBUMIN UR-MCNC: 1 MG/DL
MICROALBUMIN/CREAT 24H UR-RTO: 5.3 UG/MG (ref ?–30)
MONOCYTES # BLD AUTO: 0.63 X10(3) UL (ref 0.1–1)
MONOCYTES NFR BLD AUTO: 10.9 %
NEUTROPHILS # BLD AUTO: 3.89 X10 (3) UL (ref 1.5–7.7)
NEUTROPHILS # BLD AUTO: 3.89 X10(3) UL (ref 1.5–7.7)
NEUTROPHILS NFR BLD AUTO: 67.6 %
NONHDLC SERPL-MCNC: 100 MG/DL (ref ?–130)
OSMOLALITY SERPL CALC.SUM OF ELEC: 295 MOSM/KG (ref 275–295)
PLATELET # BLD AUTO: 208 10(3)UL (ref 150–450)
POTASSIUM SERPL-SCNC: 4.5 MMOL/L (ref 3.5–5.1)
RBC # BLD AUTO: 4.13 X10(6)UL (ref 3.8–5.8)
SODIUM SERPL-SCNC: 136 MMOL/L (ref 136–145)
TRIGL SERPL-MCNC: 205 MG/DL (ref 30–149)
TSI SER-ACNC: 1.4 MIU/ML (ref 0.36–3.74)
VLDLC SERPL CALC-MCNC: 41 MG/DL (ref 0–30)
WBC # BLD AUTO: 5.8 X10(3) UL (ref 4–11)

## 2019-09-24 PROCEDURE — 82570 ASSAY OF URINE CREATININE: CPT

## 2019-09-24 PROCEDURE — 82043 UR ALBUMIN QUANTITATIVE: CPT

## 2019-09-24 PROCEDURE — 83036 HEMOGLOBIN GLYCOSYLATED A1C: CPT

## 2019-09-24 PROCEDURE — 85025 COMPLETE CBC W/AUTO DIFF WBC: CPT

## 2019-09-24 PROCEDURE — 36415 COLL VENOUS BLD VENIPUNCTURE: CPT

## 2019-09-24 PROCEDURE — 99214 OFFICE O/P EST MOD 30 MIN: CPT | Performed by: INTERNAL MEDICINE

## 2019-09-24 PROCEDURE — 80061 LIPID PANEL: CPT

## 2019-09-24 PROCEDURE — 84443 ASSAY THYROID STIM HORMONE: CPT

## 2019-09-24 PROCEDURE — 80053 COMPREHEN METABOLIC PANEL: CPT

## 2019-09-24 NOTE — PATIENT INSTRUCTIONS
Diabetes: Activity Tips    Being more active can help you manage your diabetes. The tips on this sheet can help you get the most from your exercise. They can also help you stay safe.   Staying active  It’s important for adults to spend less time sitting a You may be told to plan your exercise for 1 to 2 hours after a meal. In most cases, you don’t need to eat while being active. Test your blood sugar before exercising if you take insulin or medicine that can cause low blood sugar.  And carry a fast-acting menjivar

## 2019-09-24 NOTE — PROGRESS NOTES
HPI:    Patient ID: Mireya Vasquez is a [de-identified]year old male. HPI  HPI:   Mireya Vasquez is a [de-identified]year old male who presents for recheck of his diabetes, htn, hyperlipidemia, afib and cad. Patient’s FBS have been at goal. afb has been rate controlled.  No b on even days , 20 mg on odd days Disp: 45 tablet Rfl: 1   furosemide 20 MG Oral Tab Take 1 tablet (20 mg total) by mouth every other day.  Take 40 mg on even days , 20 mg on odd days Disp: 45 tablet Rfl: 1   GLIMEPIRIDE 2 MG Oral Tab TAKE 1 TABLET BY MOUTH without mention of complication, uncontrolled    • Undiagnosed cardiac murmurs    • Unspecified sleep apnea [facility] 6-26-13    AHI 21.7 SaO2 yashira 79.9 %    • Wears glasses       Past Surgical History:   Procedure Laterality Date   • CABG  2/2012    CAB controlled. Cont control of CAD risk facotrs  Recommendations are: continue present meds, check HgbA1C,  fasting lipids and CMP, lose wgt with carbohydrate controlled diet and exercise, refer to Ophthamology, check feet daily.   The patient indicates unders EXAM:   Physical Exam           ASSESSMENT/PLAN:   Paroxysmal a-fib (hcc)  Type 2 diabetes mellitus with complication, without long-term current use of insulin (hcc)  (primary encounter diagnosis)  Essential hypertension  Mixed hyperlipidemia    Orders Puja

## 2019-09-25 ENCOUNTER — TELEPHONE (OUTPATIENT)
Dept: INTERNAL MEDICINE CLINIC | Facility: CLINIC | Age: 79
End: 2019-09-25

## 2019-09-25 DIAGNOSIS — R79.89 ELEVATED LFTS: Primary | ICD-10-CM

## 2019-09-25 NOTE — TELEPHONE ENCOUNTER
----- Message from Margy Heart MD sent at 9/25/2019  9:11 AM CDT -----  dm2 is controlled  ua is band  ckd is stable. Normal tsh  lft is elevated.  Check US liver with elasticity  Cbc is normal

## 2019-09-25 NOTE — TELEPHONE ENCOUNTER
Results and recommendations d/w spouse. Understanding expressed and # given to schedule. Order placed.

## 2019-10-06 DIAGNOSIS — E11.8 TYPE 2 DIABETES MELLITUS WITH COMPLICATION, WITHOUT LONG-TERM CURRENT USE OF INSULIN (HCC): ICD-10-CM

## 2019-10-07 NOTE — TELEPHONE ENCOUNTER
Last time medication was refilled 03/2019   Quantity  and number of refills 180 with 1 refill    Last office visit 09/2019   Next office visit 03/2020 scheduled

## 2019-10-25 DIAGNOSIS — E11.69 HYPERLIPIDEMIA ASSOCIATED WITH TYPE 2 DIABETES MELLITUS (HCC): Primary | ICD-10-CM

## 2019-10-25 DIAGNOSIS — E78.5 HYPERLIPIDEMIA ASSOCIATED WITH TYPE 2 DIABETES MELLITUS (HCC): Primary | ICD-10-CM

## 2019-10-25 RX ORDER — ATORVASTATIN CALCIUM 40 MG/1
40 TABLET, FILM COATED ORAL DAILY
Qty: 90 TABLET | Refills: 3 | Status: SHIPPED | OUTPATIENT
Start: 2019-10-25 | End: 2020-10-19

## 2019-10-25 NOTE — TELEPHONE ENCOUNTER
Patient's wife called to ask if atorvastatin 40mg can be prescribed from our office rather than Dr Labmert Rae. Please send to [address] on file.

## 2019-11-03 DIAGNOSIS — M1A.9XX0 CHRONIC GOUT WITHOUT TOPHUS, UNSPECIFIED CAUSE, UNSPECIFIED SITE: Primary | ICD-10-CM

## 2019-11-04 RX ORDER — ALLOPURINOL 100 MG/1
TABLET ORAL
Qty: 90 TABLET | Refills: 3 | Status: SHIPPED | OUTPATIENT
Start: 2019-11-04 | End: 2020-11-02

## 2019-12-29 DIAGNOSIS — E11.9 TYPE 2 DIABETES MELLITUS WITHOUT COMPLICATION, WITHOUT LONG-TERM CURRENT USE OF INSULIN (HCC): ICD-10-CM

## 2019-12-29 DIAGNOSIS — E11.8 TYPE 2 DIABETES MELLITUS WITH COMPLICATION, WITHOUT LONG-TERM CURRENT USE OF INSULIN (HCC): ICD-10-CM

## 2019-12-29 RX ORDER — GLIMEPIRIDE 2 MG/1
TABLET ORAL
Qty: 180 TABLET | Refills: 1 | Status: SHIPPED | OUTPATIENT
Start: 2019-12-29 | End: 2020-07-06

## 2019-12-30 RX ORDER — LOSARTAN POTASSIUM 25 MG/1
TABLET ORAL
Qty: 90 TABLET | Refills: 1 | Status: SHIPPED | OUTPATIENT
Start: 2019-12-30 | End: 2020-06-24

## 2020-01-13 ENCOUNTER — LAB ENCOUNTER (OUTPATIENT)
Dept: LAB | Age: 80
End: 2020-01-13
Attending: INTERNAL MEDICINE
Payer: MEDICARE

## 2020-01-13 DIAGNOSIS — I13.10 CARDIORENAL DISEASE: ICD-10-CM

## 2020-01-13 DIAGNOSIS — E11.22 CKD STAGE 3 DUE TO TYPE 2 DIABETES MELLITUS (HCC): ICD-10-CM

## 2020-01-13 DIAGNOSIS — N18.30 CKD STAGE 3 DUE TO TYPE 2 DIABETES MELLITUS (HCC): ICD-10-CM

## 2020-01-13 LAB
ANION GAP SERPL CALC-SCNC: 6 MMOL/L (ref 0–18)
BASOPHILS # BLD AUTO: 0.06 X10(3) UL (ref 0–0.2)
BASOPHILS NFR BLD AUTO: 1 %
BUN BLD-MCNC: 39 MG/DL (ref 7–18)
BUN/CREAT SERPL: 18.4 (ref 10–20)
CALCIUM BLD-MCNC: 9.1 MG/DL (ref 8.5–10.1)
CHLORIDE SERPL-SCNC: 107 MMOL/L (ref 98–112)
CO2 SERPL-SCNC: 25 MMOL/L (ref 21–32)
CREAT BLD-MCNC: 2.12 MG/DL (ref 0.7–1.3)
DEPRECATED RDW RBC AUTO: 45.1 FL (ref 35.1–46.3)
EOSINOPHIL # BLD AUTO: 0.19 X10(3) UL (ref 0–0.7)
EOSINOPHIL NFR BLD AUTO: 3.3 %
ERYTHROCYTE [DISTWIDTH] IN BLOOD BY AUTOMATED COUNT: 13.2 % (ref 11–15)
GLUCOSE BLD-MCNC: 214 MG/DL (ref 70–99)
HAV IGM SER QL: 1.6 MG/DL (ref 1.6–2.6)
HCT VFR BLD AUTO: 37.4 % (ref 39–53)
HGB BLD-MCNC: 12.3 G/DL (ref 13–17.5)
IMM GRANULOCYTES # BLD AUTO: 0.01 X10(3) UL (ref 0–1)
IMM GRANULOCYTES NFR BLD: 0.2 %
LYMPHOCYTES # BLD AUTO: 1.34 X10(3) UL (ref 1–4)
LYMPHOCYTES NFR BLD AUTO: 23.1 %
MCH RBC QN AUTO: 30.7 PG (ref 26–34)
MCHC RBC AUTO-ENTMCNC: 32.9 G/DL (ref 31–37)
MCV RBC AUTO: 93.3 FL (ref 80–100)
MONOCYTES # BLD AUTO: 0.56 X10(3) UL (ref 0.1–1)
MONOCYTES NFR BLD AUTO: 9.7 %
NEUTROPHILS # BLD AUTO: 3.63 X10 (3) UL (ref 1.5–7.7)
NEUTROPHILS # BLD AUTO: 3.63 X10(3) UL (ref 1.5–7.7)
NEUTROPHILS NFR BLD AUTO: 62.7 %
OSMOLALITY SERPL CALC.SUM OF ELEC: 302 MOSM/KG (ref 275–295)
PATIENT FASTING Y/N/NP: NO
PHOSPHATE SERPL-MCNC: 2.8 MG/DL (ref 2.5–4.9)
PLATELET # BLD AUTO: 209 10(3)UL (ref 150–450)
POTASSIUM SERPL-SCNC: 4.7 MMOL/L (ref 3.5–5.1)
PTH-INTACT SERPL-MCNC: 87.7 PG/ML (ref 18.5–88)
RBC # BLD AUTO: 4.01 X10(6)UL (ref 3.8–5.8)
SODIUM SERPL-SCNC: 138 MMOL/L (ref 136–145)
URATE SERPL-MCNC: 6.5 MG/DL (ref 3.5–7.2)
VIT D+METAB SERPL-MCNC: 28.6 NG/ML (ref 30–100)
WBC # BLD AUTO: 5.8 X10(3) UL (ref 4–11)

## 2020-01-13 PROCEDURE — 80048 BASIC METABOLIC PNL TOTAL CA: CPT

## 2020-01-13 PROCEDURE — 82306 VITAMIN D 25 HYDROXY: CPT

## 2020-01-13 PROCEDURE — 85025 COMPLETE CBC W/AUTO DIFF WBC: CPT

## 2020-01-13 PROCEDURE — 84550 ASSAY OF BLOOD/URIC ACID: CPT

## 2020-01-13 PROCEDURE — 84100 ASSAY OF PHOSPHORUS: CPT

## 2020-01-13 PROCEDURE — 83970 ASSAY OF PARATHORMONE: CPT

## 2020-01-13 PROCEDURE — 83735 ASSAY OF MAGNESIUM: CPT

## 2020-01-15 ENCOUNTER — OFFICE VISIT (OUTPATIENT)
Dept: NEPHROLOGY | Facility: CLINIC | Age: 80
End: 2020-01-15
Payer: COMMERCIAL

## 2020-01-15 VITALS
SYSTOLIC BLOOD PRESSURE: 118 MMHG | DIASTOLIC BLOOD PRESSURE: 68 MMHG | HEART RATE: 68 BPM | WEIGHT: 215.19 LBS | HEIGHT: 72 IN | OXYGEN SATURATION: 98 % | BODY MASS INDEX: 29.15 KG/M2 | RESPIRATION RATE: 16 BRPM

## 2020-01-15 DIAGNOSIS — N18.30 STAGE 3 CHRONIC KIDNEY DISEASE (HCC): Primary | ICD-10-CM

## 2020-01-15 PROCEDURE — 99213 OFFICE O/P EST LOW 20 MIN: CPT | Performed by: INTERNAL MEDICINE

## 2020-01-15 NOTE — PROGRESS NOTES
Nephrology Progress Note      Gabriela Fontana is a [de-identified]year old male. HPI:   Patient presents with: Follow - Up    [de-identified] y/o male with hxof HTN, DM, Afib, who was seen in hospital for evaluation of ILIR/hyperkalemia in mid October 2018.   He initally had an Tobacco comment: quit over 35 years ago    Alcohol use: Yes      Comment: 8-10 drinks per week    Drug use: No       Medications (Active prior to today's visit):  Current Outpatient Medications   Medication Sig Dispense Refill   • LOSARTAN POTASSIUM 25 Resp 16   Ht 72\"   Wt 215 lb 3.2 oz (97.6 kg)   SpO2 98%   BMI 29.19 kg/m²   Wt Readings from Last 6 Encounters:  01/15/20 : 215 lb 3.2 oz (97.6 kg)  09/24/19 : 209 lb (94.8 kg)  08/08/19 : 211 lb (95.7 kg)  08/07/19 : 209 lb (94.8 kg)  07/17/19 : 214 lb 5.8   Hemoglobin Latest Ref Range: 13.0 - 17.5 g/dL 12.3 (L)   Hematocrit Latest Ref Range: 39.0 - 53.0 % 37.4 (L)   Platelet Count Latest Ref Range: 150.0 - 450.0 10(3)uL 209.0   RBC Latest Ref Range: 3.80 - 5.80 x10(6)uL 4.01   MCH Latest Ref Range: 26. 0

## 2020-04-20 RX ORDER — SPIRONOLACTONE 25 MG/1
TABLET ORAL
Qty: 90 TABLET | Refills: 0 | Status: SHIPPED | OUTPATIENT
Start: 2020-04-20 | End: 2020-08-03

## 2020-06-04 ENCOUNTER — TELEPHONE (OUTPATIENT)
Dept: INTERNAL MEDICINE CLINIC | Facility: CLINIC | Age: 80
End: 2020-06-04

## 2020-06-04 NOTE — TELEPHONE ENCOUNTER
Spouse called to let us know pt has a cough and isnt feeling well. No known fever at this time. Please call to discuss next steps.      Thank you

## 2020-06-04 NOTE — TELEPHONE ENCOUNTER
Spoke to patient's spouse Karolina Harris),    When I talked to patient's spouse she informed me about his cough, \"it happened a few times during the night. \"    I began to evaluate the cough further with the spouse, however, the spouse Karolina Harris) stated, Ernesto Mccloud is mad

## 2020-06-21 DIAGNOSIS — E11.8 TYPE 2 DIABETES MELLITUS WITH COMPLICATION, WITHOUT LONG-TERM CURRENT USE OF INSULIN (HCC): ICD-10-CM

## 2020-06-24 RX ORDER — LOSARTAN POTASSIUM 25 MG/1
TABLET ORAL
Qty: 90 TABLET | Refills: 1 | Status: SHIPPED | OUTPATIENT
Start: 2020-06-24 | End: 2020-12-21

## 2020-07-02 ENCOUNTER — PATIENT OUTREACH (OUTPATIENT)
Dept: INTERNAL MEDICINE CLINIC | Facility: CLINIC | Age: 80
End: 2020-07-02

## 2020-07-02 DIAGNOSIS — E11.59 HYPERTENSION ASSOCIATED WITH DIABETES (HCC): ICD-10-CM

## 2020-07-02 DIAGNOSIS — E11.22 CKD STAGE 3 DUE TO TYPE 2 DIABETES MELLITUS (HCC): ICD-10-CM

## 2020-07-02 DIAGNOSIS — I15.2 HYPERTENSION ASSOCIATED WITH DIABETES (HCC): ICD-10-CM

## 2020-07-02 DIAGNOSIS — N18.30 CKD STAGE 3 DUE TO TYPE 2 DIABETES MELLITUS (HCC): ICD-10-CM

## 2020-07-02 DIAGNOSIS — I77.9 CAROTID ARTERY DISEASE, UNSPECIFIED LATERALITY, UNSPECIFIED TYPE (HCC): Primary | ICD-10-CM

## 2020-07-02 DIAGNOSIS — E11.69 HYPERLIPIDEMIA ASSOCIATED WITH TYPE 2 DIABETES MELLITUS (HCC): ICD-10-CM

## 2020-07-02 DIAGNOSIS — E78.5 HYPERLIPIDEMIA ASSOCIATED WITH TYPE 2 DIABETES MELLITUS (HCC): ICD-10-CM

## 2020-07-02 DIAGNOSIS — I25.5 ISCHEMIC DILATED CARDIOMYOPATHY (HCC): Chronic | ICD-10-CM

## 2020-07-02 DIAGNOSIS — E11.8 TYPE 2 DIABETES MELLITUS WITH COMPLICATION, WITHOUT LONG-TERM CURRENT USE OF INSULIN (HCC): ICD-10-CM

## 2020-07-02 DIAGNOSIS — I50.22 CHRONIC SYSTOLIC HEART FAILURE (HCC): ICD-10-CM

## 2020-07-02 DIAGNOSIS — D68.9 COAGULOPATHY (HCC): ICD-10-CM

## 2020-07-02 DIAGNOSIS — I42.0 ISCHEMIC DILATED CARDIOMYOPATHY (HCC): Chronic | ICD-10-CM

## 2020-07-02 NOTE — PROGRESS NOTES
Spoke with pt and HRA scheduled for 7/27. Pt will have labs done prior and would like orders placed for [address]. This was done.

## 2020-07-06 DIAGNOSIS — E11.9 TYPE 2 DIABETES MELLITUS WITHOUT COMPLICATION, WITHOUT LONG-TERM CURRENT USE OF INSULIN (HCC): ICD-10-CM

## 2020-07-06 RX ORDER — GLIMEPIRIDE 2 MG/1
TABLET ORAL
Qty: 180 TABLET | Refills: 1 | Status: SHIPPED | OUTPATIENT
Start: 2020-07-06 | End: 2021-01-03

## 2020-07-10 ENCOUNTER — TELEPHONE (OUTPATIENT)
Dept: NEPHROLOGY | Facility: CLINIC | Age: 80
End: 2020-07-10

## 2020-07-27 ENCOUNTER — LAB ENCOUNTER (OUTPATIENT)
Dept: LAB | Age: 80
End: 2020-07-27
Attending: INTERNAL MEDICINE
Payer: MEDICARE

## 2020-07-27 ENCOUNTER — OFFICE VISIT (OUTPATIENT)
Dept: INTERNAL MEDICINE CLINIC | Facility: CLINIC | Age: 80
End: 2020-07-27
Payer: COMMERCIAL

## 2020-07-27 VITALS
WEIGHT: 218 LBS | DIASTOLIC BLOOD PRESSURE: 78 MMHG | SYSTOLIC BLOOD PRESSURE: 122 MMHG | HEART RATE: 66 BPM | RESPIRATION RATE: 16 BRPM | TEMPERATURE: 99 F | BODY MASS INDEX: 28.89 KG/M2 | HEIGHT: 73 IN

## 2020-07-27 DIAGNOSIS — E78.5 HYPERLIPIDEMIA ASSOCIATED WITH TYPE 2 DIABETES MELLITUS (HCC): ICD-10-CM

## 2020-07-27 DIAGNOSIS — I48.0 PAROXYSMAL A-FIB (HCC): ICD-10-CM

## 2020-07-27 DIAGNOSIS — I77.9 CAROTID ARTERY DISEASE, UNSPECIFIED LATERALITY, UNSPECIFIED TYPE (HCC): ICD-10-CM

## 2020-07-27 DIAGNOSIS — I15.2 HYPERTENSION ASSOCIATED WITH DIABETES (HCC): ICD-10-CM

## 2020-07-27 DIAGNOSIS — I25.5 ISCHEMIC DILATED CARDIOMYOPATHY (HCC): Chronic | ICD-10-CM

## 2020-07-27 DIAGNOSIS — I25.5 ISCHEMIC DILATED CARDIOMYOPATHY (HCC): ICD-10-CM

## 2020-07-27 DIAGNOSIS — E11.59 HYPERTENSION ASSOCIATED WITH DIABETES (HCC): ICD-10-CM

## 2020-07-27 DIAGNOSIS — Z00.00 ANNUAL PHYSICAL EXAM: Primary | ICD-10-CM

## 2020-07-27 DIAGNOSIS — I50.22 CHRONIC SYSTOLIC HEART FAILURE (HCC): ICD-10-CM

## 2020-07-27 DIAGNOSIS — E11.8 TYPE 2 DIABETES MELLITUS WITH COMPLICATION, WITHOUT LONG-TERM CURRENT USE OF INSULIN (HCC): ICD-10-CM

## 2020-07-27 DIAGNOSIS — N18.30 CKD STAGE 3 DUE TO TYPE 2 DIABETES MELLITUS (HCC): ICD-10-CM

## 2020-07-27 DIAGNOSIS — I25.10 CORONARY ARTERY DISEASE INVOLVING NATIVE CORONARY ARTERY OF NATIVE HEART WITHOUT ANGINA PECTORIS: ICD-10-CM

## 2020-07-27 DIAGNOSIS — Z95.1 S/P CABG (CORONARY ARTERY BYPASS GRAFT): ICD-10-CM

## 2020-07-27 DIAGNOSIS — I65.21 RIGHT-SIDED CAROTID ARTERY OCCLUSION WITHOUT CEREBRAL INFARCTION: ICD-10-CM

## 2020-07-27 DIAGNOSIS — E11.69 HYPERLIPIDEMIA ASSOCIATED WITH TYPE 2 DIABETES MELLITUS (HCC): ICD-10-CM

## 2020-07-27 DIAGNOSIS — N18.30 STAGE 3 CHRONIC KIDNEY DISEASE (HCC): ICD-10-CM

## 2020-07-27 DIAGNOSIS — I44.7 LBBB (LEFT BUNDLE BRANCH BLOCK): ICD-10-CM

## 2020-07-27 DIAGNOSIS — Z00.00 ENCOUNTER FOR ANNUAL HEALTH EXAMINATION: ICD-10-CM

## 2020-07-27 DIAGNOSIS — I42.0 ISCHEMIC DILATED CARDIOMYOPATHY (HCC): Chronic | ICD-10-CM

## 2020-07-27 DIAGNOSIS — D68.9 COAGULOPATHY (HCC): ICD-10-CM

## 2020-07-27 DIAGNOSIS — Z95.810 ICD (IMPLANTABLE CARDIOVERTER-DEFIBRILLATOR) IN PLACE: ICD-10-CM

## 2020-07-27 DIAGNOSIS — E11.22 CKD STAGE 3 DUE TO TYPE 2 DIABETES MELLITUS (HCC): ICD-10-CM

## 2020-07-27 DIAGNOSIS — D68.4 ACQUIRED COAGULATION FACTOR INHIBITOR DISORDER (HCC): ICD-10-CM

## 2020-07-27 DIAGNOSIS — Z95.2 H/O AORTIC VALVE REPLACEMENT: ICD-10-CM

## 2020-07-27 DIAGNOSIS — I42.0 ISCHEMIC DILATED CARDIOMYOPATHY (HCC): ICD-10-CM

## 2020-07-27 LAB
ALBUMIN SERPL-MCNC: 3.5 G/DL (ref 3.4–5)
ALBUMIN/GLOB SERPL: 0.9 {RATIO} (ref 1–2)
ALP LIVER SERPL-CCNC: 83 U/L (ref 45–117)
ALT SERPL-CCNC: 48 U/L (ref 16–61)
ANION GAP SERPL CALC-SCNC: 6 MMOL/L (ref 0–18)
AST SERPL-CCNC: 31 U/L (ref 15–37)
BASOPHILS # BLD AUTO: 0.04 X10(3) UL (ref 0–0.2)
BASOPHILS NFR BLD AUTO: 0.7 %
BILIRUB SERPL-MCNC: 0.4 MG/DL (ref 0.1–2)
BILIRUB UR QL STRIP.AUTO: NEGATIVE
BUN BLD-MCNC: 44 MG/DL (ref 7–18)
BUN/CREAT SERPL: 21.1 (ref 10–20)
CALCIUM BLD-MCNC: 9.2 MG/DL (ref 8.5–10.1)
CHLORIDE SERPL-SCNC: 110 MMOL/L (ref 98–112)
CHOLEST SMN-MCNC: 144 MG/DL (ref ?–200)
CLARITY UR REFRACT.AUTO: CLEAR
CO2 SERPL-SCNC: 23 MMOL/L (ref 21–32)
CREAT BLD-MCNC: 2.09 MG/DL (ref 0.7–1.3)
CREAT UR-SCNC: 31.2 MG/DL
DEPRECATED RDW RBC AUTO: 47.7 FL (ref 35.1–46.3)
EOSINOPHIL # BLD AUTO: 0.13 X10(3) UL (ref 0–0.7)
EOSINOPHIL NFR BLD AUTO: 2.2 %
ERYTHROCYTE [DISTWIDTH] IN BLOOD BY AUTOMATED COUNT: 14 % (ref 11–15)
EST. AVERAGE GLUCOSE BLD GHB EST-MCNC: 146 MG/DL (ref 68–126)
GLOBULIN PLAS-MCNC: 3.8 G/DL (ref 2.8–4.4)
GLUCOSE BLD-MCNC: 145 MG/DL (ref 70–99)
GLUCOSE UR STRIP.AUTO-MCNC: NEGATIVE MG/DL
HAV IGM SER QL: 1.7 MG/DL (ref 1.6–2.6)
HBA1C MFR BLD HPLC: 6.7 % (ref ?–5.7)
HCT VFR BLD AUTO: 36.3 % (ref 39–53)
HDLC SERPL-MCNC: 52 MG/DL (ref 40–59)
HGB BLD-MCNC: 11.7 G/DL (ref 13–17.5)
IMM GRANULOCYTES # BLD AUTO: 0.01 X10(3) UL (ref 0–1)
IMM GRANULOCYTES NFR BLD: 0.2 %
KETONES UR STRIP.AUTO-MCNC: NEGATIVE MG/DL
LDLC SERPL CALC-MCNC: 65 MG/DL (ref ?–100)
LEUKOCYTE ESTERASE UR QL STRIP.AUTO: NEGATIVE
LYMPHOCYTES # BLD AUTO: 1.24 X10(3) UL (ref 1–4)
LYMPHOCYTES NFR BLD AUTO: 20.9 %
M PROTEIN MFR SERPL ELPH: 7.3 G/DL (ref 6.4–8.2)
MCH RBC QN AUTO: 30.1 PG (ref 26–34)
MCHC RBC AUTO-ENTMCNC: 32.2 G/DL (ref 31–37)
MCV RBC AUTO: 93.3 FL (ref 80–100)
MICROALBUMIN UR-MCNC: <0.5 MG/DL
MONOCYTES # BLD AUTO: 0.59 X10(3) UL (ref 0.1–1)
MONOCYTES NFR BLD AUTO: 10 %
NEUTROPHILS # BLD AUTO: 3.91 X10 (3) UL (ref 1.5–7.7)
NEUTROPHILS # BLD AUTO: 3.91 X10(3) UL (ref 1.5–7.7)
NEUTROPHILS NFR BLD AUTO: 66 %
NITRITE UR QL STRIP.AUTO: NEGATIVE
NONHDLC SERPL-MCNC: 92 MG/DL (ref ?–130)
OSMOLALITY SERPL CALC.SUM OF ELEC: 302 MOSM/KG (ref 275–295)
PATIENT FASTING Y/N/NP: NO
PATIENT FASTING Y/N/NP: NO
PH UR STRIP.AUTO: 5 [PH] (ref 4.5–8)
PHOSPHATE SERPL-MCNC: 2.8 MG/DL (ref 2.5–4.9)
PLATELET # BLD AUTO: 191 10(3)UL (ref 150–450)
POTASSIUM SERPL-SCNC: 4.2 MMOL/L (ref 3.5–5.1)
PROT UR STRIP.AUTO-MCNC: NEGATIVE MG/DL
RBC # BLD AUTO: 3.89 X10(6)UL (ref 3.8–5.8)
RBC UR QL AUTO: NEGATIVE
SODIUM SERPL-SCNC: 139 MMOL/L (ref 136–145)
SP GR UR STRIP.AUTO: 1.01 (ref 1–1.03)
TRIGL SERPL-MCNC: 136 MG/DL (ref 30–149)
TSI SER-ACNC: 1.3 MIU/ML (ref 0.36–3.74)
URATE SERPL-MCNC: 7 MG/DL (ref 3.5–7.2)
UROBILINOGEN UR STRIP.AUTO-MCNC: <2 MG/DL
VIT D+METAB SERPL-MCNC: 23.5 NG/ML (ref 30–100)
VLDLC SERPL CALC-MCNC: 27 MG/DL (ref 0–30)
WBC # BLD AUTO: 5.9 X10(3) UL (ref 4–11)

## 2020-07-27 PROCEDURE — 85025 COMPLETE CBC W/AUTO DIFF WBC: CPT

## 2020-07-27 PROCEDURE — 3008F BODY MASS INDEX DOCD: CPT | Performed by: INTERNAL MEDICINE

## 2020-07-27 PROCEDURE — 84443 ASSAY THYROID STIM HORMONE: CPT

## 2020-07-27 PROCEDURE — 3074F SYST BP LT 130 MM HG: CPT | Performed by: INTERNAL MEDICINE

## 2020-07-27 PROCEDURE — 83735 ASSAY OF MAGNESIUM: CPT

## 2020-07-27 PROCEDURE — 82570 ASSAY OF URINE CREATININE: CPT

## 2020-07-27 PROCEDURE — G0439 PPPS, SUBSEQ VISIT: HCPCS | Performed by: INTERNAL MEDICINE

## 2020-07-27 PROCEDURE — 96160 PT-FOCUSED HLTH RISK ASSMT: CPT | Performed by: INTERNAL MEDICINE

## 2020-07-27 PROCEDURE — 82306 VITAMIN D 25 HYDROXY: CPT

## 2020-07-27 PROCEDURE — 84550 ASSAY OF BLOOD/URIC ACID: CPT

## 2020-07-27 PROCEDURE — 82043 UR ALBUMIN QUANTITATIVE: CPT

## 2020-07-27 PROCEDURE — 3078F DIAST BP <80 MM HG: CPT | Performed by: INTERNAL MEDICINE

## 2020-07-27 PROCEDURE — 80061 LIPID PANEL: CPT

## 2020-07-27 PROCEDURE — 80053 COMPREHEN METABOLIC PANEL: CPT

## 2020-07-27 PROCEDURE — 36415 COLL VENOUS BLD VENIPUNCTURE: CPT

## 2020-07-27 PROCEDURE — 81003 URINALYSIS AUTO W/O SCOPE: CPT

## 2020-07-27 PROCEDURE — 83036 HEMOGLOBIN GLYCOSYLATED A1C: CPT

## 2020-07-27 PROCEDURE — 99397 PER PM REEVAL EST PAT 65+ YR: CPT | Performed by: INTERNAL MEDICINE

## 2020-07-27 PROCEDURE — 84100 ASSAY OF PHOSPHORUS: CPT

## 2020-07-27 NOTE — PATIENT INSTRUCTIONS
Andrade Haji's SCREENING SCHEDULE   Tests on this list are recommended by your physician but may not be covered, or covered at this frequency, by your insurer. Please check with your insurance carrier before scheduling to verify coverage.     HORACIO indicated for medical reasons    Electrocardiogram date10/15/2018 Routine EKG is not a screening covered service except at the [place] to Medicare Visit    Abdominal aortic aneurysm screening (once between ages 73-68)  No results found for this or any prev 10/09/12   • INFLUENZA VIRUS VACCINE, PRESERV FREE, >=1YEARS OF AGE   • ADMIN INFLUENZA VIRUS VAC    Please get every year    Pneumococcal 13 (Prevnar)  Covered Once after 65 Orders placed or performed in visit on 04/14/15   • PNEUMOCOCCAL VACC, 13 BELÉN IM

## 2020-07-27 NOTE — PROGRESS NOTES
HPI:   Carleen Og is a [de-identified]year old male who presents for a MA (Medicare Advantage) [facility] [place] (Once per calendar year). HPI:  Here for HRA  No complaints  Normal state of health  denies cp, sob, hypoglycemia.  denies edema    PAST MEDICAL, SOCIAL, F Alcohol abuse and had a score of 0 so is at low risk.      Patient Care Team: Patient Care Team:  Yumiko Daniel MD as PCP - General (Internal Medicine)  [facility]- Anti Coagulation Clinic/ Joice Curling, MD (Cardiovascular Diseases)  Daisy Alex, TAKE 1 TABLET BY MOUTH TWICE DAILY  LOSARTAN POTASSIUM 25 MG Oral Tab, TAKE 1 TABLET(25 MG) BY MOUTH DAILY  METFORMIN  MG Oral Tab, TAKE 1 TABLET(500 MG) BY MOUTH TWICE DAILY WITH MEALS  SPIRONOLACTONE 25 MG Oral Tab, TAKE 1/2 TABLET BY MOUTH EVERY brother. SOCIAL HISTORY:   He  reports that he has quit smoking. He quit after 20.00 years of use. He has never used smokeless tobacco. He reports current alcohol use. He reports that he does not use drugs.      REVIEW OF SYSTEMS:   GENERAL: feels well ot trachea midline, no adenopathy, thyroid: not enlarged, symmetric, no tenderness/mass/nodules, no carotid bruit or JVD   Back:   Symmetric, no curvature, ROM normal, no CVA tenderness   Lungs:   Clear to auscultation bilaterally, respirations unlabored   Ch 40%    Hypertension associated with diabetes ([facility] [address]    H/O aortic valve replacement    S/P CABG (coronary artery bypass graft)    Paroxysmal A-fib (HCC)    ICD (implantable cardioverter-defibrillator) [facility]    Carotid artery disease, unspecified later cardioverter-defibrillator) [facility]- stable. Cont monitor by cardiology    The patient indicates understanding of these issues and agrees to the plan. Reinforced healthy diet, lifestyle, and exercise. No follow-ups on file.      Macario Mazariegos MD, 7/ Screening      Ophthalmology Visit Annually: Diabetics, FHx Glaucoma, AA>50, > 65 Data entered on: 9/4/2019   Last Dilated Eye Exam 9/4/2019       Prostate Cancer Screening      PSA  Annually There are no preventive care reminders to display for th found for: DIGOXIN, DIG, VALP No flowsheet data found.        Diabetes      HgbA1C  Annually HEMOGLOBIN A1c (% of total Hgb)   Date Value   05/30/2013 7.1 (H)     HGBA1C (%)   Date Value   04/23/2014 7.0 (H)     HgbA1C (%)   Date Value   09/24/2019 7.1 (H) Wednesday, Friday, Saturday. • aspirin 81 MG Oral Tab EC Take 81 mg by mouth nightly. • furosemide 20 MG Oral Tab Take 1 tablet (20 mg total) by mouth every other day. Take 40 mg on even days , 20 mg on odd days 45 tablet 2     Allergies:   Ace I

## 2020-09-09 ENCOUNTER — OFFICE VISIT (OUTPATIENT)
Dept: NEPHROLOGY | Facility: CLINIC | Age: 80
End: 2020-09-09
Payer: COMMERCIAL

## 2020-09-09 VITALS — DIASTOLIC BLOOD PRESSURE: 76 MMHG | WEIGHT: 219 LBS | BODY MASS INDEX: 29 KG/M2 | SYSTOLIC BLOOD PRESSURE: 118 MMHG

## 2020-09-09 DIAGNOSIS — N18.30 STAGE 3 CHRONIC KIDNEY DISEASE (HCC): Primary | ICD-10-CM

## 2020-09-09 PROCEDURE — 3078F DIAST BP <80 MM HG: CPT | Performed by: INTERNAL MEDICINE

## 2020-09-09 PROCEDURE — 3074F SYST BP LT 130 MM HG: CPT | Performed by: INTERNAL MEDICINE

## 2020-09-09 PROCEDURE — 99213 OFFICE O/P EST LOW 20 MIN: CPT | Performed by: INTERNAL MEDICINE

## 2020-09-09 NOTE — PROGRESS NOTES
Nephrology Progress Note      Enma Meyer is a 79year old male.     HPI:   Patient presents with:  Chronic Kidney Disease: labs7/27  Hypertension    80 y/o male with hxof HTN, DM, Afib, who was seen in hospital for evaluation of ILIR/hyperkalemia in mid Smokeless tobacco: Never Used      Tobacco comment: quit over 35 years ago    Alcohol use: Yes      Comment: 8-10 drinks per week    Drug use: No       Medications (Active prior to today's visit):  Current Outpatient Medications   Medication Sig Dispens Readings from Last 6 Encounters:  09/09/20 : 219 lb (99.3 kg)  08/03/20 : 216 lb 12.8 oz (98.3 kg)  07/27/20 : 218 lb (98.9 kg)  02/07/20 : 217 lb (98.4 kg)  01/15/20 : 215 lb 3.2 oz (97.6 kg)  09/24/19 : 209 lb (94.8 kg)       General: Alert and oriented Latest Ref Range: 1.6 - 2.6 mg/dL 1.7    PHOSPHORUS Latest Ref Range: 2.5 - 4.9 mg/dL 2.8    URIC ACID Latest Ref Range: 3.5 - 7.2 mg/dL 7.0    Cholesterol, Total Latest Ref Range: <200 mg/dL 144    Triglycerides Latest Ref Range: 30 - 149 mg/dL 136    HDL Immature Granulocyte % Latest Units: % 0.2    Color Urine Latest Ref Range: Yellow  Straw    CLARITY URINE Latest Ref Range: Clear  Clear    Spec Gravity Latest Ref Range: 1.001 - 1.030  1.009    Glucose Urine Latest Ref Range: Negative mg/dl Negative

## 2020-10-18 DIAGNOSIS — E78.5 HYPERLIPIDEMIA ASSOCIATED WITH TYPE 2 DIABETES MELLITUS (HCC): ICD-10-CM

## 2020-10-18 DIAGNOSIS — E11.69 HYPERLIPIDEMIA ASSOCIATED WITH TYPE 2 DIABETES MELLITUS (HCC): ICD-10-CM

## 2020-10-19 RX ORDER — ATORVASTATIN CALCIUM 40 MG/1
TABLET, FILM COATED ORAL
Qty: 90 TABLET | Refills: 3 | Status: SHIPPED | OUTPATIENT
Start: 2020-10-19 | End: 2021-10-03

## 2020-10-19 NOTE — TELEPHONE ENCOUNTER
LOV 09/09/2020    Labs 07/27/2020    Next OV   Your Appointments       Wednesday March 10, 2021 11:00 AM  Exam - Established with Eden Beck MD  [facility]phrology ([facility]) [address]

## 2020-10-19 NOTE — TELEPHONE ENCOUNTER
Last time medication was refilled 10/25/19  Quantity and number of refills 90 w/ 3   Last OV 7/27/2020  Next OV 1/28/2021

## 2020-10-20 RX ORDER — SPIRONOLACTONE 25 MG/1
TABLET ORAL
Qty: 90 TABLET | Refills: 3 | Status: SHIPPED | OUTPATIENT
Start: 2020-10-20 | End: 2021-12-14

## 2020-11-02 DIAGNOSIS — M1A.9XX0 CHRONIC GOUT WITHOUT TOPHUS, UNSPECIFIED CAUSE, UNSPECIFIED SITE: ICD-10-CM

## 2020-11-02 RX ORDER — ALLOPURINOL 100 MG/1
TABLET ORAL
Qty: 90 TABLET | Refills: 3 | Status: SHIPPED | OUTPATIENT
Start: 2020-11-02 | End: 2021-10-25

## 2020-12-16 ENCOUNTER — MED REC SCAN ONLY (OUTPATIENT)
Dept: INTERNAL MEDICINE CLINIC | Facility: CLINIC | Age: 80
End: 2020-12-16

## 2020-12-20 DIAGNOSIS — E11.8 TYPE 2 DIABETES MELLITUS WITH COMPLICATION, WITHOUT LONG-TERM CURRENT USE OF INSULIN (HCC): ICD-10-CM

## 2020-12-22 RX ORDER — LOSARTAN POTASSIUM 25 MG/1
25 TABLET ORAL DAILY
Qty: 90 TABLET | Refills: 1 | Status: SHIPPED | OUTPATIENT
Start: 2020-12-22 | End: 2021-06-14

## 2021-01-03 DIAGNOSIS — E11.9 TYPE 2 DIABETES MELLITUS WITHOUT COMPLICATION, WITHOUT LONG-TERM CURRENT USE OF INSULIN (HCC): ICD-10-CM

## 2021-01-03 RX ORDER — GLIMEPIRIDE 2 MG/1
TABLET ORAL
Qty: 180 TABLET | Refills: 1 | Status: SHIPPED | OUTPATIENT
Start: 2021-01-03 | End: 2021-06-21

## 2021-01-25 DIAGNOSIS — Z23 NEED FOR VACCINATION: ICD-10-CM

## 2021-01-28 ENCOUNTER — OFFICE VISIT (OUTPATIENT)
Dept: INTERNAL MEDICINE CLINIC | Facility: CLINIC | Age: 81
End: 2021-01-28
Payer: COMMERCIAL

## 2021-01-28 VITALS
OXYGEN SATURATION: 98 % | WEIGHT: 221 LBS | SYSTOLIC BLOOD PRESSURE: 130 MMHG | TEMPERATURE: 97 F | RESPIRATION RATE: 16 BRPM | DIASTOLIC BLOOD PRESSURE: 72 MMHG | BODY MASS INDEX: 29.93 KG/M2 | HEIGHT: 72 IN | HEART RATE: 68 BPM

## 2021-01-28 DIAGNOSIS — E11.8 TYPE 2 DIABETES MELLITUS WITH COMPLICATION, WITHOUT LONG-TERM CURRENT USE OF INSULIN (HCC): Primary | ICD-10-CM

## 2021-01-28 DIAGNOSIS — I10 ESSENTIAL HYPERTENSION: ICD-10-CM

## 2021-01-28 DIAGNOSIS — I48.0 PAROXYSMAL A-FIB (HCC): ICD-10-CM

## 2021-01-28 PROCEDURE — 99214 OFFICE O/P EST MOD 30 MIN: CPT | Performed by: INTERNAL MEDICINE

## 2021-01-28 PROCEDURE — 3008F BODY MASS INDEX DOCD: CPT | Performed by: INTERNAL MEDICINE

## 2021-01-28 PROCEDURE — 3075F SYST BP GE 130 - 139MM HG: CPT | Performed by: INTERNAL MEDICINE

## 2021-01-28 PROCEDURE — 3078F DIAST BP <80 MM HG: CPT | Performed by: INTERNAL MEDICINE

## 2021-01-28 NOTE — PATIENT INSTRUCTIONS
Diabetes: Activity Tips    Being more active can help you manage your diabetes. The tips on this sheet can help you get the most from your exercise. They can also help you stay safe.    Staying active   It’s important for adults to spend less time sitting You may be told to plan your exercise for 1 to 2 hours after a meal. In most cases, you don’t need to eat while being active. Test your blood sugar before exercising if you take insulin or medicine that can cause low blood sugar.  And carry a fast-acting menjivar

## 2021-01-28 NOTE — PROGRESS NOTES
HPI:    Patient ID: Tavares Hyatt is a 79year old male. Diabetes      HPI:   Tavares Hyatt is a 79year old male who presents for recheck of his diabetes, htn , afib. Patient’s FBS have been at goal. No hypoglycemia.   Pt has been checking his feet Outpatient Medications   Medication Sig Dispense Refill   • AMIODARONE  MG Oral Tab TAKE 1 TABLET(200 MG) BY MOUTH DAILY 90 tablet 3   • GLIMEPIRIDE 2 MG Oral Tab TAKE 1 TABLET BY MOUTH TWICE DAILY 180 tablet 1   • Losartan Potassium 25 MG Oral Tab Procedure Laterality Date   • CABG  2/2012    CABG x 3   • CATH TRANSCATHETER AORTIC VALVE REPLACEMENT  2/2012   • COLONOSCOPY N/A 6/27/2014    Performed by Ramila Tucker MD at [facility] ENDOSCOPY   • HERNIA SURGERY     • VALVE REPAIR        Social History: these issues and agrees to the plan. The patient is asked to return in 6 m.     Review of Systems         Current Outpatient Medications   Medication Sig Dispense Refill   • AMIODARONE  MG Oral Tab TAKE 1 TABLET(200 MG) BY MOUTH DAILY 90 tablet 3

## 2021-02-01 ENCOUNTER — IMMUNIZATION (OUTPATIENT)
Dept: LAB | Age: 81
End: 2021-02-01
Attending: HOSPITALIST
Payer: MEDICARE

## 2021-02-01 DIAGNOSIS — Z23 NEED FOR VACCINATION: Primary | ICD-10-CM

## 2021-02-01 PROCEDURE — 0001A SARSCOV2 VAC 30MCG/0.3ML IM: CPT

## 2021-02-22 ENCOUNTER — IMMUNIZATION (OUTPATIENT)
Dept: LAB | Age: 81
End: 2021-02-22
Attending: HOSPITALIST
Payer: MEDICARE

## 2021-02-22 DIAGNOSIS — Z23 NEED FOR VACCINATION: Primary | ICD-10-CM

## 2021-02-22 PROCEDURE — 0002A SARSCOV2 VAC 30MCG/0.3ML IM: CPT

## 2021-03-03 ENCOUNTER — LAB ENCOUNTER (OUTPATIENT)
Dept: LAB | Facility: HOSPITAL | Age: 81
End: 2021-03-03
Attending: INTERNAL MEDICINE
Payer: MEDICARE

## 2021-03-03 DIAGNOSIS — N18.30 STAGE 3 CHRONIC KIDNEY DISEASE (HCC): ICD-10-CM

## 2021-03-03 LAB
ANION GAP SERPL CALC-SCNC: 3 MMOL/L (ref 0–18)
BASOPHILS # BLD AUTO: 0.05 X10(3) UL (ref 0–0.2)
BASOPHILS NFR BLD AUTO: 0.7 %
BUN BLD-MCNC: 32 MG/DL (ref 7–18)
BUN/CREAT SERPL: 15.8 (ref 10–20)
CALCIUM BLD-MCNC: 9.1 MG/DL (ref 8.5–10.1)
CHLORIDE SERPL-SCNC: 108 MMOL/L (ref 98–112)
CO2 SERPL-SCNC: 27 MMOL/L (ref 21–32)
CREAT BLD-MCNC: 2.02 MG/DL
DEPRECATED RDW RBC AUTO: 47.8 FL (ref 35.1–46.3)
EOSINOPHIL # BLD AUTO: 0.19 X10(3) UL (ref 0–0.7)
EOSINOPHIL NFR BLD AUTO: 2.8 %
ERYTHROCYTE [DISTWIDTH] IN BLOOD BY AUTOMATED COUNT: 13.7 % (ref 11–15)
GLUCOSE BLD-MCNC: 145 MG/DL (ref 70–99)
HAV IGM SER QL: 1.6 MG/DL (ref 1.6–2.6)
HCT VFR BLD AUTO: 38.6 %
HGB BLD-MCNC: 12.4 G/DL
IMM GRANULOCYTES # BLD AUTO: 0.01 X10(3) UL (ref 0–1)
IMM GRANULOCYTES NFR BLD: 0.1 %
LYMPHOCYTES # BLD AUTO: 1.22 X10(3) UL (ref 1–4)
LYMPHOCYTES NFR BLD AUTO: 17.9 %
MCH RBC QN AUTO: 30.6 PG (ref 26–34)
MCHC RBC AUTO-ENTMCNC: 32.1 G/DL (ref 31–37)
MCV RBC AUTO: 95.3 FL
MONOCYTES # BLD AUTO: 0.57 X10(3) UL (ref 0.1–1)
MONOCYTES NFR BLD AUTO: 8.4 %
NEUTROPHILS # BLD AUTO: 4.78 X10 (3) UL (ref 1.5–7.7)
NEUTROPHILS # BLD AUTO: 4.78 X10(3) UL (ref 1.5–7.7)
NEUTROPHILS NFR BLD AUTO: 70.1 %
OSMOLALITY SERPL CALC.SUM OF ELEC: 295 MOSM/KG (ref 275–295)
PATIENT FASTING Y/N/NP: YES
PHOSPHATE SERPL-MCNC: 2.8 MG/DL (ref 2.5–4.9)
PLATELET # BLD AUTO: 180 10(3)UL (ref 150–450)
POTASSIUM SERPL-SCNC: 4.6 MMOL/L (ref 3.5–5.1)
PTH-INTACT SERPL-MCNC: 97.4 PG/ML (ref 18.5–88)
RBC # BLD AUTO: 4.05 X10(6)UL
SODIUM SERPL-SCNC: 138 MMOL/L (ref 136–145)
URATE SERPL-MCNC: 5.8 MG/DL
VIT D+METAB SERPL-MCNC: 27.7 NG/ML (ref 30–100)
WBC # BLD AUTO: 6.8 X10(3) UL (ref 4–11)

## 2021-03-03 PROCEDURE — 36415 COLL VENOUS BLD VENIPUNCTURE: CPT

## 2021-03-03 PROCEDURE — 80048 BASIC METABOLIC PNL TOTAL CA: CPT

## 2021-03-03 PROCEDURE — 84550 ASSAY OF BLOOD/URIC ACID: CPT

## 2021-03-03 PROCEDURE — 83735 ASSAY OF MAGNESIUM: CPT

## 2021-03-03 PROCEDURE — 84100 ASSAY OF PHOSPHORUS: CPT

## 2021-03-03 PROCEDURE — 82306 VITAMIN D 25 HYDROXY: CPT

## 2021-03-03 PROCEDURE — 83970 ASSAY OF PARATHORMONE: CPT

## 2021-03-03 PROCEDURE — 85025 COMPLETE CBC W/AUTO DIFF WBC: CPT

## 2021-03-05 ENCOUNTER — TELEPHONE (OUTPATIENT)
Dept: NEPHROLOGY | Facility: CLINIC | Age: 81
End: 2021-03-05

## 2021-03-10 ENCOUNTER — OFFICE VISIT (OUTPATIENT)
Dept: NEPHROLOGY | Facility: CLINIC | Age: 81
End: 2021-03-10
Payer: COMMERCIAL

## 2021-03-10 VITALS — DIASTOLIC BLOOD PRESSURE: 74 MMHG | BODY MASS INDEX: 30 KG/M2 | WEIGHT: 218 LBS | SYSTOLIC BLOOD PRESSURE: 116 MMHG

## 2021-03-10 DIAGNOSIS — N18.4 STAGE 4 CHRONIC KIDNEY DISEASE (HCC): Primary | ICD-10-CM

## 2021-03-10 PROBLEM — N18.30 CKD STAGE 3 DUE TO TYPE 2 DIABETES MELLITUS (HCC): Status: RESOLVED | Noted: 2018-04-27 | Resolved: 2021-03-10

## 2021-03-10 PROBLEM — E11.22 CKD STAGE 3 DUE TO TYPE 2 DIABETES MELLITUS (HCC): Status: RESOLVED | Noted: 2018-04-27 | Resolved: 2021-03-10

## 2021-03-10 PROCEDURE — 99213 OFFICE O/P EST LOW 20 MIN: CPT | Performed by: INTERNAL MEDICINE

## 2021-03-10 PROCEDURE — 3074F SYST BP LT 130 MM HG: CPT | Performed by: INTERNAL MEDICINE

## 2021-03-10 PROCEDURE — 3078F DIAST BP <80 MM HG: CPT | Performed by: INTERNAL MEDICINE

## 2021-03-10 NOTE — PROGRESS NOTES
Nephrology Progress Note      Desiree Richardson is a 79year old male. HPI:   Patient presents with:  Chronic Kidney Disease    78 y/o male with hxof HTN, DM, Afib, who was seen in hospital for evaluation of ILIR/hyperkalemia in mid October 2018.   He inita comment: quit over 35 years ago    Vaping Use      Vaping Use: Never used    Alcohol use: Yes      Comment: 8-10 drinks per week    Drug use: No       Medications (Active prior to today's visit):  Current Outpatient Medications   Medication Sig Dispense Re kg)  08/03/20 : 216 lb 12.8 oz (98.3 kg)  07/27/20 : 218 lb (98.9 kg)       General: Alert and oriented in no apparent distress.     HEENT: No scleral icterus, MMM  Neck: Supple, no TAYLER or thyromegaly  Cardiac: Regular rate and rhythm, S1, S2 normal, no mur MCH Latest Ref Range: 26.0 - 34.0 pg 30.6   MCHC Latest Ref Range: 31.0 - 37.0 g/dL 32.1   MCV Latest Ref Range: 80.0 - 100.0 fL 95.3   RDW Latest Ref Range: 11.0 - 15.0 % 13.7   RDW-SD Latest Ref Range: 35.1 - 46.3 fL 47.8 (H)   Prelim Neutrophil Abs La

## 2021-05-21 ENCOUNTER — HOSPITAL ENCOUNTER (EMERGENCY)
Facility: HOSPITAL | Age: 81
Discharge: HOME | End: 2021-05-21
Attending: EMERGENCY MEDICINE
Payer: MEDICARE

## 2021-05-21 VITALS
OXYGEN SATURATION: 100 % | HEART RATE: 59 BPM | TEMPERATURE: 98 F | DIASTOLIC BLOOD PRESSURE: 84 MMHG | RESPIRATION RATE: 16 BRPM | HEIGHT: 72 IN | WEIGHT: 208 LBS | BODY MASS INDEX: 28.17 KG/M2 | SYSTOLIC BLOOD PRESSURE: 145 MMHG

## 2021-05-21 DIAGNOSIS — S51.811A SKIN TEAR OF RIGHT FOREARM WITHOUT COMPLICATION, INITIAL ENCOUNTER: Primary | ICD-10-CM

## 2021-05-21 PROCEDURE — 99283 EMERGENCY DEPT VISIT LOW MDM: CPT

## 2021-05-21 NOTE — ED PROVIDER NOTES
Patient Seen in: [facility] Emergency Department      History   Patient presents with:  Laceration/Abrasion    Stated Complaint: skin tear from 2 days ago, now bleeding again.  on thinners    HPI/Subjective:   HPI    Patient is a 34-year-old male pre 20.00      Smokeless tobacco: Never Used      Tobacco comment: quit over 35 years ago    Vaping Use      Vaping Use: Never used    Alcohol use: Yes      Comment: 8-10 drinks per week    Drug use:  No             Review of Systems    Positive for stated comp time.    Patient's skin tear was cleaned and dressed sterilely here in the emergency room with Gelfoam, gauze, and an Ace wrap.   Patient was reexamined prior to discharge with no evidence of any bleeding good radial pulse in the affected arm and no evidenc

## 2021-05-21 NOTE — ED INITIAL ASSESSMENT (HPI)
PT PRESENTS TO ED WITH SKIN TEAR FROM 2 DAYS AGO AFTER HE STATES HE BUMPED ARM ON CHAIR. PT STATES WIFE ATTEMPTED TO TAKE BANDAGE OFF TODAY AND BLEEDING RESUMED. PT IS ON BLOOD THINNERS. DENIES PAIN, DENIES HITTING HEAD.   SKIN TEAR IS ON RIGHT FOREARM

## 2021-05-24 ENCOUNTER — OFFICE VISIT (OUTPATIENT)
Dept: INTERNAL MEDICINE CLINIC | Facility: CLINIC | Age: 81
End: 2021-05-24
Payer: COMMERCIAL

## 2021-05-24 VITALS
TEMPERATURE: 98 F | DIASTOLIC BLOOD PRESSURE: 82 MMHG | HEIGHT: 72 IN | BODY MASS INDEX: 29.26 KG/M2 | OXYGEN SATURATION: 99 % | HEART RATE: 68 BPM | SYSTOLIC BLOOD PRESSURE: 124 MMHG | RESPIRATION RATE: 16 BRPM | WEIGHT: 216 LBS

## 2021-05-24 DIAGNOSIS — S51.811D SKIN TEAR OF RIGHT FOREARM WITHOUT COMPLICATION, SUBSEQUENT ENCOUNTER: Primary | ICD-10-CM

## 2021-05-24 PROCEDURE — 1111F DSCHRG MED/CURRENT MED MERGE: CPT | Performed by: INTERNAL MEDICINE

## 2021-05-24 PROCEDURE — 3074F SYST BP LT 130 MM HG: CPT | Performed by: INTERNAL MEDICINE

## 2021-05-24 PROCEDURE — 99213 OFFICE O/P EST LOW 20 MIN: CPT | Performed by: INTERNAL MEDICINE

## 2021-05-24 PROCEDURE — 3079F DIAST BP 80-89 MM HG: CPT | Performed by: INTERNAL MEDICINE

## 2021-05-24 PROCEDURE — 3008F BODY MASS INDEX DOCD: CPT | Performed by: INTERNAL MEDICINE

## 2021-05-24 NOTE — PROGRESS NOTES
HPI/Subjective:   Patient ID: Franklin Santana is a 79year old male.     HPI   ER follow up  Patient is a 79-year-old male presented emergency room with a history of suffering a skin tear to his right upper extremity 2 days ago when he bumped it against the Coughing    Objective:   Physical Exam  Vitals reviewed. Constitutional:       Appearance: Normal appearance. Cardiovascular:      Pulses: Normal pulses. Heart sounds: Normal heart sounds.    Pulmonary:      Effort: Pulmonary effort is normal.

## 2021-05-24 NOTE — PATIENT INSTRUCTIONS
First Aid: Bleeding   External bleeding occurs when the body’s protective skin is broken. In severe cases, blood loss may place the victim’s life in danger. Direct pressure and elevation usually stops bleeding, even the rush of blood from an artery.   Whe stopped with direct pressure  Daren last reviewed this educational content on 12/1/2019  © 2939-0958 The [facility]. All rights reserved. This information is not intended as a substitute for professional medical care.  Always follow your healt

## 2021-06-11 ENCOUNTER — LAB ENCOUNTER (OUTPATIENT)
Dept: LAB | Age: 81
End: 2021-06-11
Attending: INTERNAL MEDICINE
Payer: MEDICARE

## 2021-06-11 DIAGNOSIS — E11.8 TYPE 2 DIABETES MELLITUS WITH COMPLICATION, WITHOUT LONG-TERM CURRENT USE OF INSULIN (HCC): ICD-10-CM

## 2021-06-11 DIAGNOSIS — I10 ESSENTIAL HYPERTENSION: ICD-10-CM

## 2021-06-11 DIAGNOSIS — N18.4 STAGE 4 CHRONIC KIDNEY DISEASE (HCC): ICD-10-CM

## 2021-06-11 PROCEDURE — 82043 UR ALBUMIN QUANTITATIVE: CPT

## 2021-06-11 PROCEDURE — 82570 ASSAY OF URINE CREATININE: CPT

## 2021-06-11 PROCEDURE — 84550 ASSAY OF BLOOD/URIC ACID: CPT

## 2021-06-11 PROCEDURE — 84156 ASSAY OF PROTEIN URINE: CPT

## 2021-06-11 PROCEDURE — 80061 LIPID PANEL: CPT

## 2021-06-11 PROCEDURE — 82306 VITAMIN D 25 HYDROXY: CPT

## 2021-06-11 PROCEDURE — 83036 HEMOGLOBIN GLYCOSYLATED A1C: CPT

## 2021-06-11 PROCEDURE — 36415 COLL VENOUS BLD VENIPUNCTURE: CPT

## 2021-06-11 PROCEDURE — 85025 COMPLETE CBC W/AUTO DIFF WBC: CPT

## 2021-06-11 PROCEDURE — 83735 ASSAY OF MAGNESIUM: CPT

## 2021-06-11 PROCEDURE — 84100 ASSAY OF PHOSPHORUS: CPT

## 2021-06-11 PROCEDURE — 83970 ASSAY OF PARATHORMONE: CPT

## 2021-06-11 PROCEDURE — 80053 COMPREHEN METABOLIC PANEL: CPT

## 2021-06-14 RX ORDER — LOSARTAN POTASSIUM 25 MG/1
TABLET ORAL
Qty: 90 TABLET | Refills: 1 | Status: SHIPPED | OUTPATIENT
Start: 2021-06-14 | End: 2021-12-14

## 2021-06-15 ENCOUNTER — OFFICE VISIT (OUTPATIENT)
Dept: INTERNAL MEDICINE CLINIC | Facility: CLINIC | Age: 81
End: 2021-06-15
Payer: COMMERCIAL

## 2021-06-15 VITALS
SYSTOLIC BLOOD PRESSURE: 130 MMHG | DIASTOLIC BLOOD PRESSURE: 76 MMHG | TEMPERATURE: 99 F | OXYGEN SATURATION: 99 % | WEIGHT: 216 LBS | BODY MASS INDEX: 29.26 KG/M2 | RESPIRATION RATE: 16 BRPM | HEIGHT: 72 IN | HEART RATE: 60 BPM

## 2021-06-15 DIAGNOSIS — I48.0 PAROXYSMAL A-FIB (HCC): ICD-10-CM

## 2021-06-15 DIAGNOSIS — E11.69 HYPERLIPIDEMIA ASSOCIATED WITH TYPE 2 DIABETES MELLITUS (HCC): ICD-10-CM

## 2021-06-15 DIAGNOSIS — Z95.1 S/P CABG (CORONARY ARTERY BYPASS GRAFT): ICD-10-CM

## 2021-06-15 DIAGNOSIS — Z95.810 ICD (IMPLANTABLE CARDIOVERTER-DEFIBRILLATOR) IN PLACE: ICD-10-CM

## 2021-06-15 DIAGNOSIS — N18.4 TYPE 2 DIABETES MELLITUS WITH STAGE 4 CHRONIC KIDNEY DISEASE, WITHOUT LONG-TERM CURRENT USE OF INSULIN (HCC): ICD-10-CM

## 2021-06-15 DIAGNOSIS — I25.5 ISCHEMIC DILATED CARDIOMYOPATHY (HCC): Chronic | ICD-10-CM

## 2021-06-15 DIAGNOSIS — Z00.00 ANNUAL PHYSICAL EXAM: Primary | ICD-10-CM

## 2021-06-15 DIAGNOSIS — D68.4 ACQUIRED COAGULATION FACTOR INHIBITOR DISORDER (HCC): ICD-10-CM

## 2021-06-15 DIAGNOSIS — E11.59 HYPERTENSION ASSOCIATED WITH DIABETES (HCC): ICD-10-CM

## 2021-06-15 DIAGNOSIS — I15.2 HYPERTENSION ASSOCIATED WITH DIABETES (HCC): ICD-10-CM

## 2021-06-15 DIAGNOSIS — E11.22 TYPE 2 DIABETES MELLITUS WITH STAGE 4 CHRONIC KIDNEY DISEASE, WITHOUT LONG-TERM CURRENT USE OF INSULIN (HCC): ICD-10-CM

## 2021-06-15 DIAGNOSIS — I77.9 CAROTID ARTERY DISEASE, UNSPECIFIED LATERALITY, UNSPECIFIED TYPE (HCC): ICD-10-CM

## 2021-06-15 DIAGNOSIS — I42.0 ISCHEMIC DILATED CARDIOMYOPATHY (HCC): Chronic | ICD-10-CM

## 2021-06-15 DIAGNOSIS — D68.9 COAGULOPATHY (HCC): ICD-10-CM

## 2021-06-15 DIAGNOSIS — Z95.2 H/O AORTIC VALVE REPLACEMENT: ICD-10-CM

## 2021-06-15 DIAGNOSIS — I65.21 RIGHT-SIDED CAROTID ARTERY OCCLUSION WITHOUT CEREBRAL INFARCTION: ICD-10-CM

## 2021-06-15 DIAGNOSIS — Z00.00 ENCOUNTER FOR ANNUAL HEALTH EXAMINATION: ICD-10-CM

## 2021-06-15 DIAGNOSIS — I25.10 CORONARY ARTERY DISEASE INVOLVING NATIVE CORONARY ARTERY OF NATIVE HEART WITHOUT ANGINA PECTORIS: ICD-10-CM

## 2021-06-15 DIAGNOSIS — I44.7 LBBB (LEFT BUNDLE BRANCH BLOCK): ICD-10-CM

## 2021-06-15 DIAGNOSIS — I50.22 CHRONIC SYSTOLIC HEART FAILURE (HCC): ICD-10-CM

## 2021-06-15 DIAGNOSIS — E78.5 HYPERLIPIDEMIA ASSOCIATED WITH TYPE 2 DIABETES MELLITUS (HCC): ICD-10-CM

## 2021-06-15 PROCEDURE — 3008F BODY MASS INDEX DOCD: CPT | Performed by: INTERNAL MEDICINE

## 2021-06-15 PROCEDURE — 3078F DIAST BP <80 MM HG: CPT | Performed by: INTERNAL MEDICINE

## 2021-06-15 PROCEDURE — 3075F SYST BP GE 130 - 139MM HG: CPT | Performed by: INTERNAL MEDICINE

## 2021-06-15 PROCEDURE — G0439 PPPS, SUBSEQ VISIT: HCPCS | Performed by: INTERNAL MEDICINE

## 2021-06-15 PROCEDURE — 96160 PT-FOCUSED HLTH RISK ASSMT: CPT | Performed by: INTERNAL MEDICINE

## 2021-06-15 PROCEDURE — 99397 PER PM REEVAL EST PAT 65+ YR: CPT | Performed by: INTERNAL MEDICINE

## 2021-06-15 NOTE — PATIENT INSTRUCTIONS
Diabetes: Activity Tips    Being more active can help you manage your diabetes. The tips on this sheet can help you get the most from your exercise. They can also help you stay safe.    Staying active   It’s important for adults to spend less time sitting being active. Test your blood sugar before exercising if you take insulin or medicine that can cause low blood sugar. And carry a fast-acting sugar that will raise your blood sugar level quickly. This includes glucose tablets or glucose gel in a tube.  Use Screening    Fasting Blood Sugar / Glucose    One screening every 12 months if never tested or if previously tested but not diagnosed with pre-diabetes   One screening every 6 months if diagnosed with pre-diabetes Lab Results   Component Value Date    GLU unless medically necessary (cut with metal); may be covered with your pharmacy prescription benefits -    Tetanus, Diptheria and Pertusis TD and TDaP Not covered by Medicare Part B -  No recommendations at this time    Zoster Not covered by Medicare Part B from the [facility] regarding Advance Directives.

## 2021-06-20 ENCOUNTER — HOSPITAL ENCOUNTER (EMERGENCY)
Facility: HOSPITAL | Age: 81
Discharge: HOME | End: 2021-06-20
Attending: EMERGENCY MEDICINE
Payer: MEDICARE

## 2021-06-20 VITALS
SYSTOLIC BLOOD PRESSURE: 150 MMHG | TEMPERATURE: 97 F | RESPIRATION RATE: 18 BRPM | OXYGEN SATURATION: 94 % | HEART RATE: 60 BPM | HEIGHT: 72 IN | BODY MASS INDEX: 28.04 KG/M2 | WEIGHT: 207 LBS | DIASTOLIC BLOOD PRESSURE: 84 MMHG

## 2021-06-20 DIAGNOSIS — R04.0 EPISTAXIS: Primary | ICD-10-CM

## 2021-06-20 PROCEDURE — 99284 EMERGENCY DEPT VISIT MOD MDM: CPT

## 2021-06-20 PROCEDURE — 85025 COMPLETE CBC W/AUTO DIFF WBC: CPT | Performed by: EMERGENCY MEDICINE

## 2021-06-20 PROCEDURE — 80048 BASIC METABOLIC PNL TOTAL CA: CPT | Performed by: EMERGENCY MEDICINE

## 2021-06-20 PROCEDURE — 30903 CONTROL OF NOSEBLEED: CPT

## 2021-06-20 PROCEDURE — 85610 PROTHROMBIN TIME: CPT | Performed by: EMERGENCY MEDICINE

## 2021-06-20 RX ORDER — CEPHALEXIN 500 MG/1
500 CAPSULE ORAL 2 TIMES DAILY
Qty: 10 CAPSULE | Refills: 0 | Status: SHIPPED | OUTPATIENT
Start: 2021-06-20 | End: 2021-06-25

## 2021-06-20 NOTE — ED PROVIDER NOTES
Patient Seen in: [facility] Emergency Department      History   Patient presents with:  Nose Bleed    Stated Complaint: nose bleed started 1 hour ago, still gushing    HPI/Subjective:   HPI    59-year-old male with epistaxis left nare.   He is on [employer] Yes      Comment: 8-10 drinks per week    Drug use: Never             Review of Systems    Positive for stated complaint: nose bleed started 1 hour ago, still gushing  Other systems are as noted in HPI. Constitutional and vital signs reviewed.       All ot within normal limits   BASIC METABOLIC PANEL (8) - Abnormal; Notable for the following components:    Glucose 176 (*)     BUN 52 (*)     Creatinine 2.52 (*)     BUN/CREA Ratio 20.6 (*)     Calculated Osmolality 306 (*)     GFR, Non- 23 (*) you have any concerns          Medications Prescribed:  Current Discharge Medication List    START taking these medications    cephALEXin 500 MG Oral Cap  Take 1 capsule (500 mg total) by mouth 2 (two) times daily for 5 days.   Qty: 10 capsule Refills: 0

## 2021-06-21 ENCOUNTER — TELEPHONE (OUTPATIENT)
Dept: INTERNAL MEDICINE CLINIC | Facility: CLINIC | Age: 81
End: 2021-06-21

## 2021-06-21 DIAGNOSIS — E11.9 TYPE 2 DIABETES MELLITUS WITHOUT COMPLICATION, WITHOUT LONG-TERM CURRENT USE OF INSULIN (HCC): ICD-10-CM

## 2021-06-21 DIAGNOSIS — E11.8 TYPE 2 DIABETES MELLITUS WITH COMPLICATION, WITHOUT LONG-TERM CURRENT USE OF INSULIN (HCC): ICD-10-CM

## 2021-06-21 DIAGNOSIS — R04.0 EPISTAXIS: Primary | ICD-10-CM

## 2021-06-21 RX ORDER — GLIMEPIRIDE 2 MG/1
2 TABLET ORAL 2 TIMES DAILY
Qty: 180 TABLET | Refills: 1 | Status: SHIPPED | OUTPATIENT
Start: 2021-06-21 | End: 2021-12-14

## 2021-06-21 NOTE — TELEPHONE ENCOUNTER
Fax request from [facility] in [address] [place] [address] Drive for a refill on Glimepiride 2 mg tabs qty 180 and Metformin 500 mg tabs qty 180

## 2021-06-21 NOTE — TELEPHONE ENCOUNTER
Per  Patient pt to f/u with ENT. Refer to:   Wan Guerrero   Phone: [phone]     D/w spouse above recommendations she expressed understanding and will call to schedule.

## 2021-06-21 NOTE — TELEPHONE ENCOUNTER
Spoke with spouse pt has appt scheduled on Wednesday morning for removal of balloon that was placed by ER to stop nose bleed. Pt did not take his coumadin last night as he was still bleeding. ENT prefers pt holds coumadin today and tomorrow.      Per Dr. Chris Shah

## 2021-06-21 NOTE — TELEPHONE ENCOUNTER
Was in the hospital for bleeding nose that they werent able to control. Spouse reporting that the nose is still oozing with a blood mixture similar to saliva. It is not coming out of his mouth.  Its still coming out of his nose but its not blood that is con

## 2021-06-21 NOTE — TELEPHONE ENCOUNTER
ENT said that he must be advised that he needs the approval be off blood thinners before he goes to the procedure to have the balloon removed. Please call to advise.

## 2021-06-23 ENCOUNTER — TELEPHONE (OUTPATIENT)
Dept: INTERNAL MEDICINE CLINIC | Facility: CLINIC | Age: 81
End: 2021-06-23

## 2021-06-23 NOTE — TELEPHONE ENCOUNTER
Patient's spouse called and stated he went to see Dr Eduardo Smalls, ENT, and was prescribed Afrin.  However, on the label it states \"please ask MD if you have any 3 of the following\": Heart disease, Diabetes, high Blood Pressure, thyroid disease, and/or troubl

## 2021-06-24 NOTE — TELEPHONE ENCOUNTER
pts Ec reports pt was directed from ENT just to use afrin for two days.    Pts EC directed to continue medication just for two days as directed and f/u with ENT if issue is still persistent

## 2021-06-29 ENCOUNTER — LAB ENCOUNTER (OUTPATIENT)
Dept: LAB | Age: 81
End: 2021-06-29
Attending: OTOLARYNGOLOGY
Payer: MEDICARE

## 2021-06-29 DIAGNOSIS — J34.89 NASAL MASS: ICD-10-CM

## 2021-06-29 PROCEDURE — 88305 TISSUE EXAM BY PATHOLOGIST: CPT

## 2021-07-21 ENCOUNTER — MED REC SCAN ONLY (OUTPATIENT)
Dept: INTERNAL MEDICINE CLINIC | Facility: CLINIC | Age: 81
End: 2021-07-21

## 2021-08-11 ENCOUNTER — MED REC SCAN ONLY (OUTPATIENT)
Dept: INTERNAL MEDICINE CLINIC | Facility: CLINIC | Age: 81
End: 2021-08-11

## 2021-09-08 ENCOUNTER — OFFICE VISIT (OUTPATIENT)
Dept: NEPHROLOGY | Facility: CLINIC | Age: 81
End: 2021-09-08
Payer: COMMERCIAL

## 2021-09-08 VITALS
DIASTOLIC BLOOD PRESSURE: 70 MMHG | OXYGEN SATURATION: 98 % | RESPIRATION RATE: 16 BRPM | WEIGHT: 215 LBS | HEART RATE: 80 BPM | SYSTOLIC BLOOD PRESSURE: 122 MMHG | HEIGHT: 72 IN | BODY MASS INDEX: 29.12 KG/M2

## 2021-09-08 DIAGNOSIS — N18.30 STAGE 3 CHRONIC KIDNEY DISEASE, UNSPECIFIED WHETHER STAGE 3A OR 3B CKD (HCC): Primary | ICD-10-CM

## 2021-09-08 PROCEDURE — 3008F BODY MASS INDEX DOCD: CPT | Performed by: INTERNAL MEDICINE

## 2021-09-08 PROCEDURE — 3078F DIAST BP <80 MM HG: CPT | Performed by: INTERNAL MEDICINE

## 2021-09-08 PROCEDURE — 3074F SYST BP LT 130 MM HG: CPT | Performed by: INTERNAL MEDICINE

## 2021-09-08 PROCEDURE — 99213 OFFICE O/P EST LOW 20 MIN: CPT | Performed by: INTERNAL MEDICINE

## 2021-09-08 NOTE — PROGRESS NOTES
Nephrology Progress Note      Mireya Vasquez is a 79year old male.     HPI:   Patient presents with:  Chronic Kidney Disease  Hypertension    78y/o male with hxof HTN, DM, Afib, who was seen in hospital for evaluation of ILIR/hyperkalemia in mid October 20 Smokeless tobacco: Never Used    Vaping Use      Vaping Use: Never used    Alcohol use: Yes      Comment: 8-10 drinks per week    Drug use: Never       Medications (Active prior to today's visit):  Current Outpatient Medications   Medication Sig Dispense R lb (93.9 kg)  06/15/21 : 216 lb (98 kg)  05/24/21 : 216 lb (98 kg)  05/21/21 : 208 lb (94.3 kg)  03/10/21 : 218 lb (98.9 kg)       General: Alert and oriented in no apparent distress.     HEENT: No scleral icterus, MMM  Neck: Supple, no TAYLER or thyromegaly Latest Ref Range: 1.50 - 7.70 x10 (3) uL 5.02   Neutrophils Absolute Latest Ref Range: 1.50 - 7.70 x10(3) uL 5.02   Lymphocytes Absolute Latest Ref Range: 1.00 - 4.00 x10(3) uL 2.14   Monocytes Absolute Latest Ref Range: 0.10 - 1.00 x10(3) uL 0.79   Eos[place]

## 2021-10-01 ENCOUNTER — IMMUNIZATION (OUTPATIENT)
Dept: LAB | Facility: HOSPITAL | Age: 81
End: 2021-10-01
Attending: EMERGENCY MEDICINE
Payer: MEDICARE

## 2021-10-01 DIAGNOSIS — Z23 NEED FOR VACCINATION: Primary | ICD-10-CM

## 2021-10-01 PROCEDURE — 0003A SARSCOV2 VAC 30MCG/0.3ML IM: CPT

## 2021-10-03 DIAGNOSIS — E78.5 HYPERLIPIDEMIA ASSOCIATED WITH TYPE 2 DIABETES MELLITUS (HCC): ICD-10-CM

## 2021-10-03 DIAGNOSIS — E11.69 HYPERLIPIDEMIA ASSOCIATED WITH TYPE 2 DIABETES MELLITUS (HCC): ICD-10-CM

## 2021-10-03 RX ORDER — ATORVASTATIN CALCIUM 40 MG/1
TABLET, FILM COATED ORAL
Qty: 90 TABLET | Refills: 3 | Status: SHIPPED | OUTPATIENT
Start: 2021-10-03 | End: 2021-12-14

## 2021-10-24 DIAGNOSIS — M1A.9XX0 CHRONIC GOUT WITHOUT TOPHUS, UNSPECIFIED CAUSE, UNSPECIFIED SITE: ICD-10-CM

## 2021-10-25 RX ORDER — ALLOPURINOL 100 MG/1
TABLET ORAL
Qty: 90 TABLET | Refills: 3 | Status: SHIPPED | OUTPATIENT
Start: 2021-10-25 | End: 2021-12-14

## 2021-12-14 ENCOUNTER — LAB ENCOUNTER (OUTPATIENT)
Dept: LAB | Age: 81
End: 2021-12-14
Attending: INTERNAL MEDICINE
Payer: MEDICARE

## 2021-12-14 ENCOUNTER — OFFICE VISIT (OUTPATIENT)
Dept: INTERNAL MEDICINE CLINIC | Facility: CLINIC | Age: 81
End: 2021-12-14
Payer: COMMERCIAL

## 2021-12-14 VITALS
WEIGHT: 218 LBS | HEIGHT: 72 IN | BODY MASS INDEX: 29.53 KG/M2 | TEMPERATURE: 97 F | SYSTOLIC BLOOD PRESSURE: 128 MMHG | DIASTOLIC BLOOD PRESSURE: 80 MMHG | RESPIRATION RATE: 16 BRPM | HEART RATE: 68 BPM | OXYGEN SATURATION: 99 %

## 2021-12-14 DIAGNOSIS — N18.4 TYPE 2 DIABETES MELLITUS WITH STAGE 4 CHRONIC KIDNEY DISEASE, WITHOUT LONG-TERM CURRENT USE OF INSULIN (HCC): Primary | ICD-10-CM

## 2021-12-14 DIAGNOSIS — M1A.9XX0 CHRONIC GOUT WITHOUT TOPHUS, UNSPECIFIED CAUSE, UNSPECIFIED SITE: ICD-10-CM

## 2021-12-14 DIAGNOSIS — E11.22 TYPE 2 DIABETES MELLITUS WITH STAGE 4 CHRONIC KIDNEY DISEASE, WITHOUT LONG-TERM CURRENT USE OF INSULIN (HCC): Primary | ICD-10-CM

## 2021-12-14 DIAGNOSIS — N18.4 TYPE 2 DIABETES MELLITUS WITH STAGE 4 CHRONIC KIDNEY DISEASE, WITHOUT LONG-TERM CURRENT USE OF INSULIN (HCC): ICD-10-CM

## 2021-12-14 DIAGNOSIS — I25.10 CORONARY ARTERY DISEASE INVOLVING NATIVE CORONARY ARTERY OF NATIVE HEART WITHOUT ANGINA PECTORIS: ICD-10-CM

## 2021-12-14 DIAGNOSIS — I48.0 PAROXYSMAL A-FIB (HCC): ICD-10-CM

## 2021-12-14 DIAGNOSIS — I10 ESSENTIAL HYPERTENSION: ICD-10-CM

## 2021-12-14 DIAGNOSIS — E11.22 TYPE 2 DIABETES MELLITUS WITH STAGE 4 CHRONIC KIDNEY DISEASE, WITHOUT LONG-TERM CURRENT USE OF INSULIN (HCC): ICD-10-CM

## 2021-12-14 PROCEDURE — 3008F BODY MASS INDEX DOCD: CPT | Performed by: INTERNAL MEDICINE

## 2021-12-14 PROCEDURE — 3074F SYST BP LT 130 MM HG: CPT | Performed by: INTERNAL MEDICINE

## 2021-12-14 PROCEDURE — 85025 COMPLETE CBC W/AUTO DIFF WBC: CPT

## 2021-12-14 PROCEDURE — 99214 OFFICE O/P EST MOD 30 MIN: CPT | Performed by: INTERNAL MEDICINE

## 2021-12-14 PROCEDURE — 82570 ASSAY OF URINE CREATININE: CPT

## 2021-12-14 PROCEDURE — 80053 COMPREHEN METABOLIC PANEL: CPT

## 2021-12-14 PROCEDURE — 3079F DIAST BP 80-89 MM HG: CPT | Performed by: INTERNAL MEDICINE

## 2021-12-14 PROCEDURE — 82043 UR ALBUMIN QUANTITATIVE: CPT

## 2021-12-14 PROCEDURE — 80061 LIPID PANEL: CPT

## 2021-12-14 PROCEDURE — 83036 HEMOGLOBIN GLYCOSYLATED A1C: CPT

## 2021-12-14 PROCEDURE — 36415 COLL VENOUS BLD VENIPUNCTURE: CPT

## 2021-12-14 RX ORDER — METOPROLOL SUCCINATE 200 MG/1
200 TABLET, EXTENDED RELEASE ORAL DAILY
Qty: 90 TABLET | Refills: 3 | Status: SHIPPED | OUTPATIENT
Start: 2021-12-14

## 2021-12-14 RX ORDER — ALLOPURINOL 100 MG/1
100 TABLET ORAL DAILY
Qty: 90 TABLET | Refills: 3 | Status: SHIPPED | OUTPATIENT
Start: 2021-12-14

## 2021-12-14 RX ORDER — GLIMEPIRIDE 2 MG/1
2 TABLET ORAL 2 TIMES DAILY
Qty: 180 TABLET | Refills: 1 | Status: SHIPPED | OUTPATIENT
Start: 2021-12-14

## 2021-12-14 RX ORDER — ATORVASTATIN CALCIUM 40 MG/1
40 TABLET, FILM COATED ORAL DAILY
Qty: 90 TABLET | Refills: 3 | Status: SHIPPED | OUTPATIENT
Start: 2021-12-14

## 2021-12-14 RX ORDER — FUROSEMIDE 40 MG/1
40 TABLET ORAL EVERY OTHER DAY
Qty: 45 TABLET | Refills: 3 | Status: SHIPPED | OUTPATIENT
Start: 2021-12-14

## 2021-12-14 RX ORDER — SPIRONOLACTONE 25 MG/1
12.5 TABLET ORAL DAILY
Qty: 90 TABLET | Refills: 3 | Status: SHIPPED | OUTPATIENT
Start: 2021-12-14

## 2021-12-14 RX ORDER — FUROSEMIDE 20 MG/1
TABLET ORAL
Qty: 45 TABLET | Refills: 2 | Status: SHIPPED | OUTPATIENT
Start: 2021-12-14

## 2021-12-14 RX ORDER — LOSARTAN POTASSIUM 25 MG/1
25 TABLET ORAL DAILY
Qty: 90 TABLET | Refills: 1 | Status: SHIPPED | OUTPATIENT
Start: 2021-12-14

## 2021-12-14 NOTE — PROGRESS NOTES
Subjective:   Patient ID: Taylor Hector is a 79year old male. Diabetes      HPI:   Taylor Hector is a 79year old male who presents for recheck of his diabetes, HTN , CAD and afib. Patient’s FBS have been at goal. No hypoglycemia.   Pt denies any ti Medication Sig Dispense Refill   • glimepiride 2 MG Oral Tab Take 1 tablet (2 mg total) by mouth 2 (two) times daily. 180 tablet 1   • metFORMIN 500 MG Oral Tab Take 1 tablet (500 mg total) by mouth 2 (two) times daily with meals.  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 Recommendations are: continue present meds, check HgbA1C, check fasting lipids and CMP, lose wgt with carbohydrate controlled diet and exercise,  Ophthamology exam yearly, check feet daily.   The patient indicates understanding of these hypertension  Chronic gout without tophus, unspecified cause, unspecified site    Orders Placed This Encounter      Comp Metabolic Panel (14)      CBC With Differential With Platelet      Lipid Panel      Hemoglobin A1C      Microalb/Creat Ratio, Random Ur

## 2022-01-10 ENCOUNTER — LAB ENCOUNTER (OUTPATIENT)
Dept: LAB | Age: 82
End: 2022-01-10
Attending: INTERNAL MEDICINE
Payer: MEDICARE

## 2022-01-10 DIAGNOSIS — N18.30 STAGE 3 CHRONIC KIDNEY DISEASE, UNSPECIFIED WHETHER STAGE 3A OR 3B CKD (HCC): ICD-10-CM

## 2022-01-10 LAB
ANION GAP SERPL CALC-SCNC: 8 MMOL/L (ref 0–18)
BASOPHILS # BLD AUTO: 0.06 X10(3) UL (ref 0–0.2)
BASOPHILS NFR BLD AUTO: 0.8 %
BUN BLD-MCNC: 45 MG/DL (ref 7–18)
CALCIUM BLD-MCNC: 9.3 MG/DL (ref 8.5–10.1)
CHLORIDE SERPL-SCNC: 111 MMOL/L (ref 98–112)
CO2 SERPL-SCNC: 24 MMOL/L (ref 21–32)
CREAT BLD-MCNC: 2.56 MG/DL
EOSINOPHIL # BLD AUTO: 0.12 X10(3) UL (ref 0–0.7)
EOSINOPHIL NFR BLD AUTO: 1.7 %
ERYTHROCYTE [DISTWIDTH] IN BLOOD BY AUTOMATED COUNT: 14.6 %
FASTING STATUS PATIENT QL REPORTED: NO
GLUCOSE BLD-MCNC: 156 MG/DL (ref 70–99)
HCT VFR BLD AUTO: 38.6 %
HGB BLD-MCNC: 12.7 G/DL
IMM GRANULOCYTES # BLD AUTO: 0.02 X10(3) UL (ref 0–1)
IMM GRANULOCYTES NFR BLD: 0.3 %
LYMPHOCYTES # BLD AUTO: 1.55 X10(3) UL (ref 1–4)
LYMPHOCYTES NFR BLD AUTO: 21.4 %
MAGNESIUM SERPL-MCNC: 1.8 MG/DL (ref 1.6–2.6)
MCH RBC QN AUTO: 31.4 PG (ref 26–34)
MCHC RBC AUTO-ENTMCNC: 32.9 G/DL (ref 31–37)
MCV RBC AUTO: 95.3 FL
MONOCYTES # BLD AUTO: 0.79 X10(3) UL (ref 0.1–1)
MONOCYTES NFR BLD AUTO: 10.9 %
NEUTROPHILS # BLD AUTO: 4.69 X10 (3) UL (ref 1.5–7.7)
NEUTROPHILS # BLD AUTO: 4.69 X10(3) UL (ref 1.5–7.7)
NEUTROPHILS NFR BLD AUTO: 64.9 %
OSMOLALITY SERPL CALC.SUM OF ELEC: 311 MOSM/KG (ref 275–295)
PHOSPHATE SERPL-MCNC: 2.8 MG/DL (ref 2.5–4.9)
PLATELET # BLD AUTO: 205 10(3)UL (ref 150–450)
POTASSIUM SERPL-SCNC: 4.5 MMOL/L (ref 3.5–5.1)
PTH-INTACT SERPL-MCNC: 116 PG/ML (ref 18.5–88)
RBC # BLD AUTO: 4.05 X10(6)UL
SODIUM SERPL-SCNC: 143 MMOL/L (ref 136–145)
VIT D+METAB SERPL-MCNC: 26.1 NG/ML (ref 30–100)
WBC # BLD AUTO: 7.2 X10(3) UL (ref 4–11)

## 2022-01-10 PROCEDURE — 82306 VITAMIN D 25 HYDROXY: CPT

## 2022-01-10 PROCEDURE — 85025 COMPLETE CBC W/AUTO DIFF WBC: CPT

## 2022-01-10 PROCEDURE — 83735 ASSAY OF MAGNESIUM: CPT

## 2022-01-10 PROCEDURE — 36415 COLL VENOUS BLD VENIPUNCTURE: CPT

## 2022-01-10 PROCEDURE — 83970 ASSAY OF PARATHORMONE: CPT

## 2022-01-10 PROCEDURE — 80048 BASIC METABOLIC PNL TOTAL CA: CPT

## 2022-01-10 PROCEDURE — 84100 ASSAY OF PHOSPHORUS: CPT

## 2022-01-12 ENCOUNTER — OFFICE VISIT (OUTPATIENT)
Dept: NEPHROLOGY | Facility: CLINIC | Age: 82
End: 2022-01-12
Payer: COMMERCIAL

## 2022-01-12 VITALS
HEART RATE: 73 BPM | OXYGEN SATURATION: 98 % | RESPIRATION RATE: 18 BRPM | SYSTOLIC BLOOD PRESSURE: 110 MMHG | WEIGHT: 216 LBS | HEIGHT: 72 IN | DIASTOLIC BLOOD PRESSURE: 54 MMHG | BODY MASS INDEX: 29.26 KG/M2

## 2022-01-12 DIAGNOSIS — N18.4 STAGE 4 CHRONIC KIDNEY DISEASE (HCC): Primary | ICD-10-CM

## 2022-01-12 PROCEDURE — 3008F BODY MASS INDEX DOCD: CPT | Performed by: INTERNAL MEDICINE

## 2022-01-12 PROCEDURE — 99213 OFFICE O/P EST LOW 20 MIN: CPT | Performed by: INTERNAL MEDICINE

## 2022-01-12 PROCEDURE — 3074F SYST BP LT 130 MM HG: CPT | Performed by: INTERNAL MEDICINE

## 2022-01-12 PROCEDURE — 3078F DIAST BP <80 MM HG: CPT | Performed by: INTERNAL MEDICINE

## 2022-01-12 NOTE — PROGRESS NOTES
Nephrology Progress Note      Susanna Colón is a 79year old male.     HPI:   Patient presents with:  Chronic Kidney Disease  Hypertension    80y/o male with hxof HTN, DM, Afib, who was seen in hospital for evaluation of ILIR/hyperkalemia in mid October 20 Smokeless tobacco: Never Used    Vaping Use      Vaping Use: Never used    Alcohol use: Yes      Comment: 8-10 drinks per week    Drug use: Never       Medications (Active prior to today's visit):  Current Outpatient Medications   Medication Sig Dispense R : 218 lb (98.9 kg)  09/08/21 : 215 lb (97.5 kg)  06/20/21 : 207 lb (93.9 kg)  06/15/21 : 216 lb (98 kg)  05/24/21 : 216 lb (98 kg)       General: Alert and oriented in no apparent distress.     HEENT: No scleral icterus, MMM  Neck: Supple, no TAYLER or thyrome Neutrophil Abs Latest Ref Range: 1.50 - 7.70 x10 (3) uL 5.02   Neutrophils Absolute Latest Ref Range: 1.50 - 7.70 x10(3) uL 5.02   Lymphocytes Absolute Latest Ref Range: 1.00 - 4.00 x10(3) uL 2.14   Monocytes Absolute Latest Ref Range: 0.10 - 1.00 x10(3) u

## 2022-03-30 ENCOUNTER — MED REC SCAN ONLY (OUTPATIENT)
Dept: INTERNAL MEDICINE CLINIC | Facility: CLINIC | Age: 82
End: 2022-03-30

## 2022-04-06 ENCOUNTER — TELEPHONE (OUTPATIENT)
Dept: INTERNAL MEDICINE CLINIC | Facility: CLINIC | Age: 82
End: 2022-04-06

## 2022-04-06 RX ORDER — GLIPIZIDE 5 MG/1
5 TABLET ORAL
Qty: 180 TABLET | Refills: 0 | Status: SHIPPED | OUTPATIENT
Start: 2022-04-06

## 2022-04-06 NOTE — TELEPHONE ENCOUNTER
Patient called in stating his insurance will no longer cover :glimepiride 2 MG Oral Tab. Patient would like to know if Dr. Rosi Fernandes would like to send him an alternative rx.

## 2022-04-06 NOTE — TELEPHONE ENCOUNTER
Pedro Boggs, please see pt message below  Pt is asking for alternative for Glimepiride 2 mg due to coverage issue with insurance  Suggested Alternatives are Glipizide 5 mg or 10 mg  Please advise, thanks

## 2022-04-06 NOTE — TELEPHONE ENCOUNTER
Switch to glipizide 5 mg BID, monitor for hypoglycemia and check blood sugars bid     Rx sent  Pt notified

## 2022-04-07 NOTE — PATIENT INSTRUCTIONS
Continue current medications   We will discontinue oxygen at this time. Follow up with cardiologist and electrophysiologist as planned. Wheelchair/Stroller

## 2022-06-13 DIAGNOSIS — N18.4 TYPE 2 DIABETES MELLITUS WITH STAGE 4 CHRONIC KIDNEY DISEASE, WITHOUT LONG-TERM CURRENT USE OF INSULIN (HCC): ICD-10-CM

## 2022-06-13 DIAGNOSIS — E11.22 TYPE 2 DIABETES MELLITUS WITH STAGE 4 CHRONIC KIDNEY DISEASE, WITHOUT LONG-TERM CURRENT USE OF INSULIN (HCC): ICD-10-CM

## 2022-06-19 DIAGNOSIS — I10 ESSENTIAL HYPERTENSION: ICD-10-CM

## 2022-06-20 RX ORDER — LOSARTAN POTASSIUM 25 MG/1
TABLET ORAL
Qty: 90 TABLET | Refills: 1 | Status: SHIPPED | OUTPATIENT
Start: 2022-06-20

## 2022-06-23 ENCOUNTER — OFFICE VISIT (OUTPATIENT)
Dept: INTERNAL MEDICINE CLINIC | Facility: CLINIC | Age: 82
End: 2022-06-23
Payer: COMMERCIAL

## 2022-06-23 ENCOUNTER — LAB ENCOUNTER (OUTPATIENT)
Dept: LAB | Age: 82
End: 2022-06-23
Attending: INTERNAL MEDICINE
Payer: MEDICARE

## 2022-06-23 VITALS
WEIGHT: 222 LBS | DIASTOLIC BLOOD PRESSURE: 66 MMHG | TEMPERATURE: 98 F | BODY MASS INDEX: 31.78 KG/M2 | HEART RATE: 74 BPM | SYSTOLIC BLOOD PRESSURE: 114 MMHG | RESPIRATION RATE: 16 BRPM | HEIGHT: 70 IN | OXYGEN SATURATION: 98 %

## 2022-06-23 DIAGNOSIS — E78.5 HYPERLIPIDEMIA ASSOCIATED WITH TYPE 2 DIABETES MELLITUS (HCC): ICD-10-CM

## 2022-06-23 DIAGNOSIS — I65.21 RIGHT-SIDED CAROTID ARTERY OCCLUSION WITHOUT CEREBRAL INFARCTION: ICD-10-CM

## 2022-06-23 DIAGNOSIS — Z00.00 ANNUAL PHYSICAL EXAM: Primary | ICD-10-CM

## 2022-06-23 DIAGNOSIS — E11.22 TYPE 2 DIABETES MELLITUS WITH STAGE 4 CHRONIC KIDNEY DISEASE, WITHOUT LONG-TERM CURRENT USE OF INSULIN (HCC): ICD-10-CM

## 2022-06-23 DIAGNOSIS — I15.2 HYPERTENSION ASSOCIATED WITH DIABETES (HCC): ICD-10-CM

## 2022-06-23 DIAGNOSIS — E11.69 HYPERLIPIDEMIA ASSOCIATED WITH TYPE 2 DIABETES MELLITUS (HCC): ICD-10-CM

## 2022-06-23 DIAGNOSIS — Z95.810 ICD (IMPLANTABLE CARDIOVERTER-DEFIBRILLATOR) IN PLACE: ICD-10-CM

## 2022-06-23 DIAGNOSIS — I48.0 PAROXYSMAL A-FIB (HCC): ICD-10-CM

## 2022-06-23 DIAGNOSIS — I77.9 CAROTID ARTERY DISEASE, UNSPECIFIED LATERALITY, UNSPECIFIED TYPE (HCC): ICD-10-CM

## 2022-06-23 DIAGNOSIS — D68.9 COAGULOPATHY (HCC): ICD-10-CM

## 2022-06-23 DIAGNOSIS — Z95.2 H/O AORTIC VALVE REPLACEMENT: ICD-10-CM

## 2022-06-23 DIAGNOSIS — I50.22 CHRONIC SYSTOLIC HEART FAILURE (HCC): ICD-10-CM

## 2022-06-23 DIAGNOSIS — I25.5 ISCHEMIC DILATED CARDIOMYOPATHY (HCC): Chronic | ICD-10-CM

## 2022-06-23 DIAGNOSIS — N18.4 TYPE 2 DIABETES MELLITUS WITH STAGE 4 CHRONIC KIDNEY DISEASE, WITHOUT LONG-TERM CURRENT USE OF INSULIN (HCC): ICD-10-CM

## 2022-06-23 DIAGNOSIS — E11.59 HYPERTENSION ASSOCIATED WITH DIABETES (HCC): ICD-10-CM

## 2022-06-23 DIAGNOSIS — Z00.00 ENCOUNTER FOR ANNUAL HEALTH EXAMINATION: ICD-10-CM

## 2022-06-23 DIAGNOSIS — I42.0 ISCHEMIC DILATED CARDIOMYOPATHY (HCC): Chronic | ICD-10-CM

## 2022-06-23 DIAGNOSIS — Z95.1 S/P CABG (CORONARY ARTERY BYPASS GRAFT): ICD-10-CM

## 2022-06-23 DIAGNOSIS — D68.4 ACQUIRED COAGULATION FACTOR INHIBITOR DISORDER (HCC): ICD-10-CM

## 2022-06-23 DIAGNOSIS — I44.7 LBBB (LEFT BUNDLE BRANCH BLOCK): ICD-10-CM

## 2022-06-23 DIAGNOSIS — I25.10 CORONARY ARTERY DISEASE INVOLVING NATIVE CORONARY ARTERY OF NATIVE HEART WITHOUT ANGINA PECTORIS: ICD-10-CM

## 2022-06-23 LAB
ALBUMIN SERPL-MCNC: 3.4 G/DL (ref 3.4–5)
ALBUMIN/GLOB SERPL: 1 {RATIO} (ref 1–2)
ALP LIVER SERPL-CCNC: 88 U/L
ALT SERPL-CCNC: 39 U/L
ANION GAP SERPL CALC-SCNC: 8 MMOL/L (ref 0–18)
AST SERPL-CCNC: 29 U/L (ref 15–37)
BASOPHILS # BLD AUTO: 0.04 X10(3) UL (ref 0–0.2)
BASOPHILS NFR BLD AUTO: 0.7 %
BILIRUB SERPL-MCNC: 0.4 MG/DL (ref 0.1–2)
BILIRUB UR QL STRIP.AUTO: NEGATIVE
BUN BLD-MCNC: 49 MG/DL (ref 7–18)
CALCIUM BLD-MCNC: 9.2 MG/DL (ref 8.5–10.1)
CHLORIDE SERPL-SCNC: 108 MMOL/L (ref 98–112)
CHOLEST SERPL-MCNC: 142 MG/DL (ref ?–200)
CLARITY UR REFRACT.AUTO: CLEAR
CO2 SERPL-SCNC: 23 MMOL/L (ref 21–32)
COLOR UR AUTO: YELLOW
CREAT BLD-MCNC: 2.47 MG/DL
CREAT UR-SCNC: 32.6 MG/DL
EOSINOPHIL # BLD AUTO: 0.09 X10(3) UL (ref 0–0.7)
EOSINOPHIL NFR BLD AUTO: 1.5 %
ERYTHROCYTE [DISTWIDTH] IN BLOOD BY AUTOMATED COUNT: 14.3 %
EST. AVERAGE GLUCOSE BLD GHB EST-MCNC: 160 MG/DL (ref 68–126)
FASTING PATIENT LIPID ANSWER: NO
FASTING STATUS PATIENT QL REPORTED: NO
GLOBULIN PLAS-MCNC: 3.5 G/DL (ref 2.8–4.4)
GLUCOSE BLD-MCNC: 196 MG/DL (ref 70–99)
GLUCOSE UR STRIP.AUTO-MCNC: NEGATIVE MG/DL
HBA1C MFR BLD: 7.2 % (ref ?–5.7)
HCT VFR BLD AUTO: 36.7 %
HDLC SERPL-MCNC: 51 MG/DL (ref 40–59)
HGB BLD-MCNC: 11.9 G/DL
IMM GRANULOCYTES # BLD AUTO: 0.02 X10(3) UL (ref 0–1)
IMM GRANULOCYTES NFR BLD: 0.3 %
KETONES UR STRIP.AUTO-MCNC: NEGATIVE MG/DL
LDLC SERPL CALC-MCNC: 57 MG/DL (ref ?–100)
LEUKOCYTE ESTERASE UR QL STRIP.AUTO: NEGATIVE
LYMPHOCYTES # BLD AUTO: 1.23 X10(3) UL (ref 1–4)
LYMPHOCYTES NFR BLD AUTO: 20.1 %
MCH RBC QN AUTO: 31.4 PG (ref 26–34)
MCHC RBC AUTO-ENTMCNC: 32.4 G/DL (ref 31–37)
MCV RBC AUTO: 96.8 FL
MICROALBUMIN UR-MCNC: 0.67 MG/DL
MICROALBUMIN/CREAT 24H UR-RTO: 20.6 UG/MG (ref ?–30)
MONOCYTES # BLD AUTO: 0.6 X10(3) UL (ref 0.1–1)
MONOCYTES NFR BLD AUTO: 9.8 %
NEUTROPHILS # BLD AUTO: 4.15 X10 (3) UL (ref 1.5–7.7)
NEUTROPHILS # BLD AUTO: 4.15 X10(3) UL (ref 1.5–7.7)
NEUTROPHILS NFR BLD AUTO: 67.6 %
NITRITE UR QL STRIP.AUTO: NEGATIVE
NONHDLC SERPL-MCNC: 91 MG/DL (ref ?–130)
OSMOLALITY SERPL CALC.SUM OF ELEC: 306 MOSM/KG (ref 275–295)
PH UR STRIP.AUTO: 5.5 [PH] (ref 5–8)
PLATELET # BLD AUTO: 188 10(3)UL (ref 150–450)
POTASSIUM SERPL-SCNC: 4.4 MMOL/L (ref 3.5–5.1)
PROT SERPL-MCNC: 6.9 G/DL (ref 6.4–8.2)
PROT UR STRIP.AUTO-MCNC: NEGATIVE MG/DL
RBC # BLD AUTO: 3.79 X10(6)UL
RBC UR QL AUTO: NEGATIVE
SODIUM SERPL-SCNC: 139 MMOL/L (ref 136–145)
SP GR UR STRIP.AUTO: 1.01 (ref 1–1.03)
TRIGL SERPL-MCNC: 210 MG/DL (ref 30–149)
TSI SER-ACNC: 1.15 MIU/ML (ref 0.36–3.74)
UROBILINOGEN UR STRIP.AUTO-MCNC: 0.2 MG/DL
VLDLC SERPL CALC-MCNC: 31 MG/DL (ref 0–30)
WBC # BLD AUTO: 6.1 X10(3) UL (ref 4–11)

## 2022-06-23 PROCEDURE — 84443 ASSAY THYROID STIM HORMONE: CPT

## 2022-06-23 PROCEDURE — 85025 COMPLETE CBC W/AUTO DIFF WBC: CPT

## 2022-06-23 PROCEDURE — 82043 UR ALBUMIN QUANTITATIVE: CPT

## 2022-06-23 PROCEDURE — 81003 URINALYSIS AUTO W/O SCOPE: CPT

## 2022-06-23 PROCEDURE — 83036 HEMOGLOBIN GLYCOSYLATED A1C: CPT

## 2022-06-23 PROCEDURE — 36415 COLL VENOUS BLD VENIPUNCTURE: CPT

## 2022-06-23 PROCEDURE — 82570 ASSAY OF URINE CREATININE: CPT

## 2022-06-23 PROCEDURE — 80061 LIPID PANEL: CPT

## 2022-06-23 PROCEDURE — 80053 COMPREHEN METABOLIC PANEL: CPT

## 2022-07-11 ENCOUNTER — LAB ENCOUNTER (OUTPATIENT)
Dept: LAB | Facility: HOSPITAL | Age: 82
End: 2022-07-11
Attending: INTERNAL MEDICINE
Payer: MEDICARE

## 2022-07-11 DIAGNOSIS — N18.4 STAGE 4 CHRONIC KIDNEY DISEASE (HCC): ICD-10-CM

## 2022-07-11 LAB
ANION GAP SERPL CALC-SCNC: 7 MMOL/L (ref 0–18)
BASOPHILS # BLD AUTO: 0.04 X10(3) UL (ref 0–0.2)
BASOPHILS NFR BLD AUTO: 0.7 %
BUN BLD-MCNC: 57 MG/DL (ref 7–18)
CALCIUM BLD-MCNC: 9.5 MG/DL (ref 8.5–10.1)
CHLORIDE SERPL-SCNC: 108 MMOL/L (ref 98–112)
CO2 SERPL-SCNC: 24 MMOL/L (ref 21–32)
CREAT BLD-MCNC: 2.68 MG/DL
EOSINOPHIL # BLD AUTO: 0.15 X10(3) UL (ref 0–0.7)
EOSINOPHIL NFR BLD AUTO: 2.5 %
ERYTHROCYTE [DISTWIDTH] IN BLOOD BY AUTOMATED COUNT: 14.2 %
FASTING STATUS PATIENT QL REPORTED: NO
GLUCOSE BLD-MCNC: 226 MG/DL (ref 70–99)
HCT VFR BLD AUTO: 35.9 %
HGB BLD-MCNC: 11.8 G/DL
IMM GRANULOCYTES # BLD AUTO: 0.01 X10(3) UL (ref 0–1)
IMM GRANULOCYTES NFR BLD: 0.2 %
LYMPHOCYTES # BLD AUTO: 1.19 X10(3) UL (ref 1–4)
LYMPHOCYTES NFR BLD AUTO: 19.6 %
MCH RBC QN AUTO: 31.6 PG (ref 26–34)
MCHC RBC AUTO-ENTMCNC: 32.9 G/DL (ref 31–37)
MCV RBC AUTO: 96 FL
MONOCYTES # BLD AUTO: 0.6 X10(3) UL (ref 0.1–1)
MONOCYTES NFR BLD AUTO: 9.9 %
NEUTROPHILS # BLD AUTO: 4.08 X10 (3) UL (ref 1.5–7.7)
NEUTROPHILS # BLD AUTO: 4.08 X10(3) UL (ref 1.5–7.7)
NEUTROPHILS NFR BLD AUTO: 67.1 %
OSMOLALITY SERPL CALC.SUM OF ELEC: 311 MOSM/KG (ref 275–295)
PHOSPHATE SERPL-MCNC: 3.6 MG/DL (ref 2.5–4.9)
PLATELET # BLD AUTO: 166 10(3)UL (ref 150–450)
POTASSIUM SERPL-SCNC: 4.8 MMOL/L (ref 3.5–5.1)
PTH-INTACT SERPL-MCNC: 97.3 PG/ML (ref 18.5–88)
RBC # BLD AUTO: 3.74 X10(6)UL
SODIUM SERPL-SCNC: 139 MMOL/L (ref 136–145)
VIT D+METAB SERPL-MCNC: 32.8 NG/ML (ref 30–100)
WBC # BLD AUTO: 6.1 X10(3) UL (ref 4–11)

## 2022-07-11 PROCEDURE — 85025 COMPLETE CBC W/AUTO DIFF WBC: CPT

## 2022-07-11 PROCEDURE — 84100 ASSAY OF PHOSPHORUS: CPT

## 2022-07-11 PROCEDURE — 82306 VITAMIN D 25 HYDROXY: CPT

## 2022-07-11 PROCEDURE — 36415 COLL VENOUS BLD VENIPUNCTURE: CPT

## 2022-07-11 PROCEDURE — 83970 ASSAY OF PARATHORMONE: CPT

## 2022-07-11 PROCEDURE — 80048 BASIC METABOLIC PNL TOTAL CA: CPT

## 2022-07-13 ENCOUNTER — OFFICE VISIT (OUTPATIENT)
Dept: NEPHROLOGY | Facility: CLINIC | Age: 82
End: 2022-07-13
Payer: COMMERCIAL

## 2022-07-13 VITALS — BODY MASS INDEX: 32 KG/M2 | WEIGHT: 221.25 LBS | SYSTOLIC BLOOD PRESSURE: 124 MMHG | DIASTOLIC BLOOD PRESSURE: 62 MMHG

## 2022-07-13 DIAGNOSIS — N18.4 STAGE 4 CHRONIC KIDNEY DISEASE (HCC): Primary | ICD-10-CM

## 2022-07-13 PROCEDURE — 3074F SYST BP LT 130 MM HG: CPT | Performed by: INTERNAL MEDICINE

## 2022-07-13 PROCEDURE — 99213 OFFICE O/P EST LOW 20 MIN: CPT | Performed by: INTERNAL MEDICINE

## 2022-07-13 PROCEDURE — 3078F DIAST BP <80 MM HG: CPT | Performed by: INTERNAL MEDICINE

## 2022-07-24 DIAGNOSIS — E11.22 TYPE 2 DIABETES MELLITUS WITH STAGE 4 CHRONIC KIDNEY DISEASE, WITHOUT LONG-TERM CURRENT USE OF INSULIN (HCC): ICD-10-CM

## 2022-07-24 DIAGNOSIS — N18.4 TYPE 2 DIABETES MELLITUS WITH STAGE 4 CHRONIC KIDNEY DISEASE, WITHOUT LONG-TERM CURRENT USE OF INSULIN (HCC): ICD-10-CM

## 2022-07-25 RX ORDER — GLIPIZIDE 5 MG/1
TABLET ORAL
Qty: 180 TABLET | Refills: 0 | Status: SHIPPED | OUTPATIENT
Start: 2022-07-25

## 2022-08-04 NOTE — PATIENT INSTRUCTIONS
Treating Gout Attacks     Raising the joint above the level of your heart can help reduce gout symptoms. Gout is a disease that affects the joints. It is caused by excess uric acid in your blood that may lead to crystals forming in your joints.  Left ? Shellfish (lobster, crab, shrimp, scallop, mussel)  ? Certain fish (anchovy, sardine, herring, mackerel)  · Take any medicines prescribed by your healthcare provider. · Lose weight if you need to. · Reduce high fructose corn syrup in meals and drinks. unknown

## 2022-10-26 DIAGNOSIS — E11.22 TYPE 2 DIABETES MELLITUS WITH STAGE 4 CHRONIC KIDNEY DISEASE, WITHOUT LONG-TERM CURRENT USE OF INSULIN (HCC): ICD-10-CM

## 2022-10-26 DIAGNOSIS — N18.4 TYPE 2 DIABETES MELLITUS WITH STAGE 4 CHRONIC KIDNEY DISEASE, WITHOUT LONG-TERM CURRENT USE OF INSULIN (HCC): ICD-10-CM

## 2022-10-26 RX ORDER — GLIPIZIDE 5 MG/1
TABLET ORAL
Qty: 180 TABLET | Refills: 0 | Status: SHIPPED | OUTPATIENT
Start: 2022-10-26

## 2022-11-11 NOTE — TELEPHONE ENCOUNTER
INR monitored by Dr. Cris Lugo. Libtayo Counseling- I discussed with the patient the risks of Libtayo including but not limited to nausea, vomiting, diarrhea, and bone or muscle pain.  The patient verbalized understanding of the proper use and possible adverse effects of Libtayo.  All of the patient's questions and concerns were addressed.

## 2022-11-14 ENCOUNTER — LAB ENCOUNTER (OUTPATIENT)
Dept: LAB | Facility: HOSPITAL | Age: 82
End: 2022-11-14
Attending: INTERNAL MEDICINE
Payer: MEDICARE

## 2022-11-14 DIAGNOSIS — N18.4 STAGE 4 CHRONIC KIDNEY DISEASE (HCC): ICD-10-CM

## 2022-11-14 LAB
ANION GAP SERPL CALC-SCNC: 5 MMOL/L (ref 0–18)
BASOPHILS # BLD AUTO: 0.03 X10(3) UL (ref 0–0.2)
BASOPHILS NFR BLD AUTO: 0.5 %
BUN BLD-MCNC: 47 MG/DL (ref 7–18)
CALCIUM BLD-MCNC: 9.5 MG/DL (ref 8.5–10.1)
CHLORIDE SERPL-SCNC: 110 MMOL/L (ref 98–112)
CO2 SERPL-SCNC: 25 MMOL/L (ref 21–32)
CREAT BLD-MCNC: 2.31 MG/DL
EOSINOPHIL # BLD AUTO: 0.11 X10(3) UL (ref 0–0.7)
EOSINOPHIL NFR BLD AUTO: 2 %
ERYTHROCYTE [DISTWIDTH] IN BLOOD BY AUTOMATED COUNT: 13.2 %
FASTING STATUS PATIENT QL REPORTED: NO
GFR SERPLBLD BASED ON 1.73 SQ M-ARVRAT: 27 ML/MIN/1.73M2 (ref 60–?)
GLUCOSE BLD-MCNC: 182 MG/DL (ref 70–99)
HCT VFR BLD AUTO: 37 %
HGB BLD-MCNC: 11.9 G/DL
IMM GRANULOCYTES # BLD AUTO: 0.01 X10(3) UL (ref 0–1)
IMM GRANULOCYTES NFR BLD: 0.2 %
LYMPHOCYTES # BLD AUTO: 1.04 X10(3) UL (ref 1–4)
LYMPHOCYTES NFR BLD AUTO: 18.6 %
MAGNESIUM SERPL-MCNC: 1.8 MG/DL (ref 1.6–2.6)
MCH RBC QN AUTO: 30.7 PG (ref 26–34)
MCHC RBC AUTO-ENTMCNC: 32.2 G/DL (ref 31–37)
MCV RBC AUTO: 95.6 FL
MONOCYTES # BLD AUTO: 0.69 X10(3) UL (ref 0.1–1)
MONOCYTES NFR BLD AUTO: 12.4 %
NEUTROPHILS # BLD AUTO: 3.7 X10 (3) UL (ref 1.5–7.7)
NEUTROPHILS # BLD AUTO: 3.7 X10(3) UL (ref 1.5–7.7)
NEUTROPHILS NFR BLD AUTO: 66.3 %
OSMOLALITY SERPL CALC.SUM OF ELEC: 307 MOSM/KG (ref 275–295)
PHOSPHATE SERPL-MCNC: 2.7 MG/DL (ref 2.5–4.9)
PLATELET # BLD AUTO: 190 10(3)UL (ref 150–450)
POTASSIUM SERPL-SCNC: 4.7 MMOL/L (ref 3.5–5.1)
RBC # BLD AUTO: 3.87 X10(6)UL
SODIUM SERPL-SCNC: 140 MMOL/L (ref 136–145)
WBC # BLD AUTO: 5.6 X10(3) UL (ref 4–11)

## 2022-11-14 PROCEDURE — 36415 COLL VENOUS BLD VENIPUNCTURE: CPT

## 2022-11-14 PROCEDURE — 84100 ASSAY OF PHOSPHORUS: CPT

## 2022-11-14 PROCEDURE — 80048 BASIC METABOLIC PNL TOTAL CA: CPT

## 2022-11-14 PROCEDURE — 85025 COMPLETE CBC W/AUTO DIFF WBC: CPT

## 2022-11-14 PROCEDURE — 83735 ASSAY OF MAGNESIUM: CPT

## 2022-11-16 ENCOUNTER — OFFICE VISIT (OUTPATIENT)
Dept: NEPHROLOGY | Facility: CLINIC | Age: 82
End: 2022-11-16
Payer: COMMERCIAL

## 2022-11-16 VITALS — WEIGHT: 205 LBS | BODY MASS INDEX: 29 KG/M2 | DIASTOLIC BLOOD PRESSURE: 50 MMHG | SYSTOLIC BLOOD PRESSURE: 112 MMHG

## 2022-11-16 DIAGNOSIS — N18.4 STAGE 4 CHRONIC KIDNEY DISEASE (HCC): Primary | ICD-10-CM

## 2022-11-16 PROCEDURE — 3074F SYST BP LT 130 MM HG: CPT | Performed by: INTERNAL MEDICINE

## 2022-11-16 PROCEDURE — 99213 OFFICE O/P EST LOW 20 MIN: CPT | Performed by: INTERNAL MEDICINE

## 2022-11-16 PROCEDURE — 3078F DIAST BP <80 MM HG: CPT | Performed by: INTERNAL MEDICINE

## 2022-12-13 ENCOUNTER — LAB ENCOUNTER (OUTPATIENT)
Dept: LAB | Age: 82
End: 2022-12-13
Attending: INTERNAL MEDICINE
Payer: MEDICARE

## 2022-12-13 ENCOUNTER — OFFICE VISIT (OUTPATIENT)
Dept: INTERNAL MEDICINE CLINIC | Facility: CLINIC | Age: 82
End: 2022-12-13
Payer: COMMERCIAL

## 2022-12-13 VITALS
OXYGEN SATURATION: 99 % | DIASTOLIC BLOOD PRESSURE: 66 MMHG | SYSTOLIC BLOOD PRESSURE: 128 MMHG | TEMPERATURE: 98 F | BODY MASS INDEX: 29.78 KG/M2 | HEIGHT: 70 IN | RESPIRATION RATE: 16 BRPM | HEART RATE: 68 BPM | WEIGHT: 208 LBS

## 2022-12-13 DIAGNOSIS — I10 ESSENTIAL HYPERTENSION: ICD-10-CM

## 2022-12-13 DIAGNOSIS — E11.22 TYPE 2 DIABETES MELLITUS WITH STAGE 4 CHRONIC KIDNEY DISEASE, WITHOUT LONG-TERM CURRENT USE OF INSULIN (HCC): Primary | ICD-10-CM

## 2022-12-13 DIAGNOSIS — N18.4 TYPE 2 DIABETES MELLITUS WITH STAGE 4 CHRONIC KIDNEY DISEASE, WITHOUT LONG-TERM CURRENT USE OF INSULIN (HCC): Primary | ICD-10-CM

## 2022-12-13 DIAGNOSIS — E11.22 TYPE 2 DIABETES MELLITUS WITH STAGE 4 CHRONIC KIDNEY DISEASE, WITHOUT LONG-TERM CURRENT USE OF INSULIN (HCC): ICD-10-CM

## 2022-12-13 DIAGNOSIS — I48.0 PAROXYSMAL A-FIB (HCC): ICD-10-CM

## 2022-12-13 DIAGNOSIS — N18.4 TYPE 2 DIABETES MELLITUS WITH STAGE 4 CHRONIC KIDNEY DISEASE, WITHOUT LONG-TERM CURRENT USE OF INSULIN (HCC): ICD-10-CM

## 2022-12-13 LAB
ALBUMIN SERPL-MCNC: 3.3 G/DL (ref 3.4–5)
ALBUMIN/GLOB SERPL: 1 {RATIO} (ref 1–2)
ALP LIVER SERPL-CCNC: 84 U/L
ALT SERPL-CCNC: 39 U/L
ANION GAP SERPL CALC-SCNC: 9 MMOL/L (ref 0–18)
AST SERPL-CCNC: 26 U/L (ref 15–37)
BASOPHILS # BLD AUTO: 0.04 X10(3) UL (ref 0–0.2)
BASOPHILS NFR BLD AUTO: 0.6 %
BILIRUB SERPL-MCNC: 0.6 MG/DL (ref 0.1–2)
BUN BLD-MCNC: 34 MG/DL (ref 7–18)
CALCIUM BLD-MCNC: 9.5 MG/DL (ref 8.5–10.1)
CHLORIDE SERPL-SCNC: 107 MMOL/L (ref 98–112)
CO2 SERPL-SCNC: 25 MMOL/L (ref 21–32)
CREAT BLD-MCNC: 2.04 MG/DL
EOSINOPHIL # BLD AUTO: 0.13 X10(3) UL (ref 0–0.7)
EOSINOPHIL NFR BLD AUTO: 2 %
ERYTHROCYTE [DISTWIDTH] IN BLOOD BY AUTOMATED COUNT: 13.2 %
EST. AVERAGE GLUCOSE BLD GHB EST-MCNC: 140 MG/DL (ref 68–126)
FASTING STATUS PATIENT QL REPORTED: NO
GFR SERPLBLD BASED ON 1.73 SQ M-ARVRAT: 32 ML/MIN/1.73M2 (ref 60–?)
GLOBULIN PLAS-MCNC: 3.3 G/DL (ref 2.8–4.4)
GLUCOSE BLD-MCNC: 143 MG/DL (ref 70–99)
HBA1C MFR BLD: 6.5 % (ref ?–5.7)
HCT VFR BLD AUTO: 36.4 %
HGB BLD-MCNC: 12.1 G/DL
IMM GRANULOCYTES # BLD AUTO: 0.02 X10(3) UL (ref 0–1)
IMM GRANULOCYTES NFR BLD: 0.3 %
LYMPHOCYTES # BLD AUTO: 1.23 X10(3) UL (ref 1–4)
LYMPHOCYTES NFR BLD AUTO: 18.9 %
MCH RBC QN AUTO: 31.3 PG (ref 26–34)
MCHC RBC AUTO-ENTMCNC: 33.2 G/DL (ref 31–37)
MCV RBC AUTO: 94.1 FL
MONOCYTES # BLD AUTO: 0.75 X10(3) UL (ref 0.1–1)
MONOCYTES NFR BLD AUTO: 11.5 %
NEUTROPHILS # BLD AUTO: 4.33 X10 (3) UL (ref 1.5–7.7)
NEUTROPHILS # BLD AUTO: 4.33 X10(3) UL (ref 1.5–7.7)
NEUTROPHILS NFR BLD AUTO: 66.7 %
OSMOLALITY SERPL CALC.SUM OF ELEC: 302 MOSM/KG (ref 275–295)
PLATELET # BLD AUTO: 190 10(3)UL (ref 150–450)
POTASSIUM SERPL-SCNC: 4.6 MMOL/L (ref 3.5–5.1)
PROT SERPL-MCNC: 6.6 G/DL (ref 6.4–8.2)
RBC # BLD AUTO: 3.87 X10(6)UL
SODIUM SERPL-SCNC: 141 MMOL/L (ref 136–145)
WBC # BLD AUTO: 6.5 X10(3) UL (ref 4–11)

## 2022-12-13 PROCEDURE — 99214 OFFICE O/P EST MOD 30 MIN: CPT | Performed by: INTERNAL MEDICINE

## 2022-12-13 PROCEDURE — 36415 COLL VENOUS BLD VENIPUNCTURE: CPT

## 2022-12-13 PROCEDURE — 3008F BODY MASS INDEX DOCD: CPT | Performed by: INTERNAL MEDICINE

## 2022-12-13 PROCEDURE — 3078F DIAST BP <80 MM HG: CPT | Performed by: INTERNAL MEDICINE

## 2022-12-13 PROCEDURE — 83036 HEMOGLOBIN GLYCOSYLATED A1C: CPT

## 2022-12-13 PROCEDURE — 85025 COMPLETE CBC W/AUTO DIFF WBC: CPT

## 2022-12-13 PROCEDURE — 3074F SYST BP LT 130 MM HG: CPT | Performed by: INTERNAL MEDICINE

## 2022-12-13 PROCEDURE — 80053 COMPREHEN METABOLIC PANEL: CPT

## 2022-12-13 PROCEDURE — 1126F AMNT PAIN NOTED NONE PRSNT: CPT | Performed by: INTERNAL MEDICINE

## 2022-12-13 RX ORDER — LOSARTAN POTASSIUM 25 MG/1
25 TABLET ORAL DAILY
Qty: 90 TABLET | Refills: 1 | Status: SHIPPED | OUTPATIENT
Start: 2022-12-13

## 2022-12-13 RX ORDER — FUROSEMIDE 40 MG/1
40 TABLET ORAL EVERY OTHER DAY
Qty: 45 TABLET | Refills: 3 | Status: SHIPPED | OUTPATIENT
Start: 2022-12-13

## 2022-12-13 RX ORDER — FUROSEMIDE 20 MG/1
TABLET ORAL
Qty: 45 TABLET | Refills: 2 | Status: SHIPPED | OUTPATIENT
Start: 2022-12-13

## 2022-12-13 RX ORDER — SPIRONOLACTONE 25 MG/1
12.5 TABLET ORAL DAILY
Qty: 90 TABLET | Refills: 3 | Status: SHIPPED | OUTPATIENT
Start: 2022-12-13

## 2022-12-13 RX ORDER — WARFARIN SODIUM 4 MG/1
TABLET ORAL
Qty: 90 TABLET | Refills: 4 | Status: SHIPPED | OUTPATIENT
Start: 2022-12-13

## 2022-12-13 RX ORDER — ALLOPURINOL 100 MG/1
100 TABLET ORAL DAILY
Qty: 90 TABLET | Refills: 3 | Status: SHIPPED | OUTPATIENT
Start: 2022-12-13

## 2022-12-13 RX ORDER — GLIPIZIDE 5 MG/1
5 TABLET ORAL
Qty: 180 TABLET | Refills: 0 | Status: SHIPPED | OUTPATIENT
Start: 2022-12-13

## 2022-12-13 RX ORDER — METOPROLOL SUCCINATE 200 MG/1
200 TABLET, EXTENDED RELEASE ORAL DAILY
Qty: 90 TABLET | Refills: 3 | Status: SHIPPED | OUTPATIENT
Start: 2022-12-13

## 2022-12-13 RX ORDER — ATORVASTATIN CALCIUM 40 MG/1
40 TABLET, FILM COATED ORAL DAILY
Qty: 90 TABLET | Refills: 3 | Status: SHIPPED | OUTPATIENT
Start: 2022-12-13

## 2022-12-13 RX ORDER — AMIODARONE HYDROCHLORIDE 200 MG/1
200 TABLET ORAL DAILY
Qty: 90 TABLET | Refills: 3 | Status: SHIPPED | OUTPATIENT
Start: 2022-12-13

## 2023-02-16 ENCOUNTER — TELEPHONE (OUTPATIENT)
Dept: INTERNAL MEDICINE CLINIC | Facility: CLINIC | Age: 83
End: 2023-02-16

## 2023-02-16 NOTE — TELEPHONE ENCOUNTER
Pt in lobby-wife having testing done     Doing weight watchers and wants to know where his weight should be     Once he reaches goal weight only has to go for weigh ins 1 x per month

## 2023-03-22 ENCOUNTER — LAB ENCOUNTER (OUTPATIENT)
Dept: LAB | Age: 83
End: 2023-03-22
Attending: INTERNAL MEDICINE
Payer: MEDICARE

## 2023-03-22 DIAGNOSIS — N18.4 STAGE 4 CHRONIC KIDNEY DISEASE (HCC): ICD-10-CM

## 2023-03-22 LAB
ANION GAP SERPL CALC-SCNC: 7 MMOL/L (ref 0–18)
BASOPHILS # BLD AUTO: 0.05 X10(3) UL (ref 0–0.2)
BASOPHILS NFR BLD AUTO: 0.9 %
BILIRUB UR QL STRIP.AUTO: NEGATIVE
BUN BLD-MCNC: 43 MG/DL (ref 7–18)
CALCIUM BLD-MCNC: 9.4 MG/DL (ref 8.5–10.1)
CHLORIDE SERPL-SCNC: 107 MMOL/L (ref 98–112)
CLARITY UR REFRACT.AUTO: CLEAR
CO2 SERPL-SCNC: 26 MMOL/L (ref 21–32)
CREAT BLD-MCNC: 2.47 MG/DL
EOSINOPHIL # BLD AUTO: 0.15 X10(3) UL (ref 0–0.7)
EOSINOPHIL NFR BLD AUTO: 2.6 %
ERYTHROCYTE [DISTWIDTH] IN BLOOD BY AUTOMATED COUNT: 14.6 %
FASTING STATUS PATIENT QL REPORTED: NO
GFR SERPLBLD BASED ON 1.73 SQ M-ARVRAT: 25 ML/MIN/1.73M2 (ref 60–?)
GLUCOSE BLD-MCNC: 187 MG/DL (ref 70–99)
GLUCOSE UR STRIP.AUTO-MCNC: 50 MG/DL
HCT VFR BLD AUTO: 37.6 %
HGB BLD-MCNC: 12.2 G/DL
IMM GRANULOCYTES # BLD AUTO: 0.02 X10(3) UL (ref 0–1)
IMM GRANULOCYTES NFR BLD: 0.3 %
KETONES UR STRIP.AUTO-MCNC: NEGATIVE MG/DL
LEUKOCYTE ESTERASE UR QL STRIP.AUTO: NEGATIVE
LYMPHOCYTES # BLD AUTO: 1.29 X10(3) UL (ref 1–4)
LYMPHOCYTES NFR BLD AUTO: 22 %
MAGNESIUM SERPL-MCNC: 1.8 MG/DL (ref 1.6–2.6)
MCH RBC QN AUTO: 30.3 PG (ref 26–34)
MCHC RBC AUTO-ENTMCNC: 32.4 G/DL (ref 31–37)
MCV RBC AUTO: 93.5 FL
MONOCYTES # BLD AUTO: 0.6 X10(3) UL (ref 0.1–1)
MONOCYTES NFR BLD AUTO: 10.2 %
NEUTROPHILS # BLD AUTO: 3.76 X10 (3) UL (ref 1.5–7.7)
NEUTROPHILS # BLD AUTO: 3.76 X10(3) UL (ref 1.5–7.7)
NEUTROPHILS NFR BLD AUTO: 64 %
NITRITE UR QL STRIP.AUTO: NEGATIVE
OSMOLALITY SERPL CALC.SUM OF ELEC: 306 MOSM/KG (ref 275–295)
PH UR STRIP.AUTO: 6 [PH] (ref 5–8)
PHOSPHATE SERPL-MCNC: 3.1 MG/DL (ref 2.5–4.9)
PLATELET # BLD AUTO: 167 10(3)UL (ref 150–450)
POTASSIUM SERPL-SCNC: 4.7 MMOL/L (ref 3.5–5.1)
PROT UR STRIP.AUTO-MCNC: NEGATIVE MG/DL
RBC # BLD AUTO: 4.02 X10(6)UL
RBC UR QL AUTO: NEGATIVE
SODIUM SERPL-SCNC: 140 MMOL/L (ref 136–145)
SP GR UR STRIP.AUTO: 1.01 (ref 1–1.03)
UROBILINOGEN UR STRIP.AUTO-MCNC: <2 MG/DL
VIT D+METAB SERPL-MCNC: 41.2 NG/ML (ref 30–100)
WBC # BLD AUTO: 5.9 X10(3) UL (ref 4–11)

## 2023-03-22 PROCEDURE — 84100 ASSAY OF PHOSPHORUS: CPT

## 2023-03-22 PROCEDURE — 82306 VITAMIN D 25 HYDROXY: CPT

## 2023-03-22 PROCEDURE — 36415 COLL VENOUS BLD VENIPUNCTURE: CPT

## 2023-03-22 PROCEDURE — 80048 BASIC METABOLIC PNL TOTAL CA: CPT

## 2023-03-22 PROCEDURE — 81003 URINALYSIS AUTO W/O SCOPE: CPT

## 2023-03-22 PROCEDURE — 83735 ASSAY OF MAGNESIUM: CPT

## 2023-03-22 PROCEDURE — 85025 COMPLETE CBC W/AUTO DIFF WBC: CPT

## 2023-04-06 ENCOUNTER — OFFICE VISIT (OUTPATIENT)
Dept: NEPHROLOGY | Facility: CLINIC | Age: 83
End: 2023-04-06
Payer: MEDICARE

## 2023-04-06 VITALS — SYSTOLIC BLOOD PRESSURE: 112 MMHG | DIASTOLIC BLOOD PRESSURE: 54 MMHG | BODY MASS INDEX: 29 KG/M2 | WEIGHT: 201.5 LBS

## 2023-04-06 DIAGNOSIS — N18.4 STAGE 4 CHRONIC KIDNEY DISEASE (HCC): Primary | ICD-10-CM

## 2023-04-06 PROCEDURE — 99213 OFFICE O/P EST LOW 20 MIN: CPT | Performed by: INTERNAL MEDICINE

## 2023-04-11 DIAGNOSIS — I10 ESSENTIAL HYPERTENSION: ICD-10-CM

## 2023-04-11 RX ORDER — SPIRONOLACTONE 25 MG/1
TABLET ORAL
Qty: 90 TABLET | Refills: 3 | OUTPATIENT
Start: 2023-04-11

## 2023-04-25 DIAGNOSIS — N18.4 TYPE 2 DIABETES MELLITUS WITH STAGE 4 CHRONIC KIDNEY DISEASE, WITHOUT LONG-TERM CURRENT USE OF INSULIN (HCC): ICD-10-CM

## 2023-04-25 DIAGNOSIS — E11.22 TYPE 2 DIABETES MELLITUS WITH STAGE 4 CHRONIC KIDNEY DISEASE, WITHOUT LONG-TERM CURRENT USE OF INSULIN (HCC): ICD-10-CM

## 2023-04-25 RX ORDER — GLIPIZIDE 5 MG/1
5 TABLET ORAL
Qty: 180 TABLET | Refills: 0 | Status: SHIPPED | OUTPATIENT
Start: 2023-04-25

## 2023-04-25 NOTE — TELEPHONE ENCOUNTER
Refill req     glipiZIDE 5 MG Oral Tab  180 tablet     [facility] [address] [place] [address], [address] [place] [address] RD AT [place], [phone], [phone]    Last OV 04/06/2023  Future Appointments   Date Time Provider Waldemar Whitley   6/29/2023  1:00 PM William Shields MD [facility] [address]   10/6/2023 11:00 AM Richard Brown MD [facility] [place]

## 2023-06-13 DIAGNOSIS — E11.22 TYPE 2 DIABETES MELLITUS WITH STAGE 4 CHRONIC KIDNEY DISEASE, WITHOUT LONG-TERM CURRENT USE OF INSULIN (HCC): ICD-10-CM

## 2023-06-13 DIAGNOSIS — N18.4 TYPE 2 DIABETES MELLITUS WITH STAGE 4 CHRONIC KIDNEY DISEASE, WITHOUT LONG-TERM CURRENT USE OF INSULIN (HCC): ICD-10-CM

## 2023-06-26 DIAGNOSIS — I10 ESSENTIAL HYPERTENSION: ICD-10-CM

## 2023-06-26 RX ORDER — LOSARTAN POTASSIUM 25 MG/1
25 TABLET ORAL DAILY
Qty: 90 TABLET | Refills: 0 | Status: SHIPPED | OUTPATIENT
Start: 2023-06-26

## 2023-06-26 NOTE — TELEPHONE ENCOUNTER
Last time medication was refilled 12/13/22  Quantity and # of refills 90 w 1  Last OV 12/13/22  Next OV 7/18/23  Failed protocol.  Sent to Dr. Ishaan Kern for approval.

## 2023-07-05 ENCOUNTER — TELEPHONE (OUTPATIENT)
Dept: INTERNAL MEDICINE CLINIC | Facility: CLINIC | Age: 83
End: 2023-07-05

## 2023-07-05 DIAGNOSIS — I10 ESSENTIAL HYPERTENSION: ICD-10-CM

## 2023-07-05 DIAGNOSIS — I25.10 CORONARY ARTERY DISEASE INVOLVING NATIVE CORONARY ARTERY OF NATIVE HEART WITHOUT ANGINA PECTORIS: ICD-10-CM

## 2023-07-05 DIAGNOSIS — N18.4 TYPE 2 DIABETES MELLITUS WITH STAGE 4 CHRONIC KIDNEY DISEASE, WITHOUT LONG-TERM CURRENT USE OF INSULIN (HCC): Primary | ICD-10-CM

## 2023-07-05 DIAGNOSIS — E11.22 TYPE 2 DIABETES MELLITUS WITH STAGE 4 CHRONIC KIDNEY DISEASE, WITHOUT LONG-TERM CURRENT USE OF INSULIN (HCC): Primary | ICD-10-CM

## 2023-07-05 DIAGNOSIS — Z12.5 ENCOUNTER FOR PROSTATE CANCER SCREENING: ICD-10-CM

## 2023-07-05 NOTE — TELEPHONE ENCOUNTER
Preferred Lab: Jona Coppola: 12/13/22  Next OV & Reason:  7/18/23 HRA   Patient was notified to fast 8-10 hours drinking only water/black coffee prior to having labs drawn and may need to submit urine sample. Hemoglobin A1C will be ordered if patient has diabetes or prediabetes.   Lab orders will be placed by end of day:  yes  Patient requesting return call:  no Enbrel Counseling:  I discussed with the patient the risks of etanercept including but not limited to myelosuppression, immunosuppression, autoimmune hepatitis, demyelinating diseases, lymphoma, and infections.  The patient understands that monitoring is required including a PPD at baseline and must alert us or the primary physician if symptoms of infection or other concerning signs are noted.

## 2023-07-18 ENCOUNTER — LAB ENCOUNTER (OUTPATIENT)
Dept: LAB | Age: 83
End: 2023-07-18
Attending: INTERNAL MEDICINE
Payer: MEDICARE

## 2023-07-18 ENCOUNTER — OFFICE VISIT (OUTPATIENT)
Dept: INTERNAL MEDICINE CLINIC | Facility: CLINIC | Age: 83
End: 2023-07-18
Payer: MEDICARE

## 2023-07-18 VITALS
BODY MASS INDEX: 29.2 KG/M2 | RESPIRATION RATE: 20 BRPM | OXYGEN SATURATION: 100 % | HEART RATE: 53 BPM | SYSTOLIC BLOOD PRESSURE: 110 MMHG | TEMPERATURE: 98 F | HEIGHT: 70 IN | WEIGHT: 204 LBS | DIASTOLIC BLOOD PRESSURE: 74 MMHG

## 2023-07-18 DIAGNOSIS — E11.22 TYPE 2 DIABETES MELLITUS WITH STAGE 4 CHRONIC KIDNEY DISEASE, WITHOUT LONG-TERM CURRENT USE OF INSULIN (HCC): ICD-10-CM

## 2023-07-18 DIAGNOSIS — I42.0 ISCHEMIC DILATED CARDIOMYOPATHY (HCC): Chronic | ICD-10-CM

## 2023-07-18 DIAGNOSIS — I50.22 CHRONIC SYSTOLIC HEART FAILURE (HCC): ICD-10-CM

## 2023-07-18 DIAGNOSIS — N18.4 TYPE 2 DIABETES MELLITUS WITH STAGE 4 CHRONIC KIDNEY DISEASE, WITHOUT LONG-TERM CURRENT USE OF INSULIN (HCC): ICD-10-CM

## 2023-07-18 DIAGNOSIS — I25.10 CORONARY ARTERY DISEASE INVOLVING NATIVE CORONARY ARTERY OF NATIVE HEART WITHOUT ANGINA PECTORIS: ICD-10-CM

## 2023-07-18 DIAGNOSIS — Z00.00 ANNUAL PHYSICAL EXAM: Primary | ICD-10-CM

## 2023-07-18 DIAGNOSIS — E11.59 HYPERTENSION ASSOCIATED WITH DIABETES: ICD-10-CM

## 2023-07-18 DIAGNOSIS — Z95.1 S/P CABG (CORONARY ARTERY BYPASS GRAFT): ICD-10-CM

## 2023-07-18 DIAGNOSIS — I25.5 ISCHEMIC DILATED CARDIOMYOPATHY (HCC): Chronic | ICD-10-CM

## 2023-07-18 DIAGNOSIS — E78.5 HYPERLIPIDEMIA ASSOCIATED WITH TYPE 2 DIABETES MELLITUS: ICD-10-CM

## 2023-07-18 DIAGNOSIS — I48.0 PAROXYSMAL A-FIB (HCC): ICD-10-CM

## 2023-07-18 DIAGNOSIS — Z12.5 ENCOUNTER FOR PROSTATE CANCER SCREENING: ICD-10-CM

## 2023-07-18 DIAGNOSIS — Z95.810 ICD (IMPLANTABLE CARDIOVERTER-DEFIBRILLATOR) IN PLACE: ICD-10-CM

## 2023-07-18 DIAGNOSIS — Z00.00 ENCOUNTER FOR ANNUAL HEALTH EXAMINATION: ICD-10-CM

## 2023-07-18 DIAGNOSIS — I10 ESSENTIAL HYPERTENSION: ICD-10-CM

## 2023-07-18 DIAGNOSIS — I77.9 CAROTID ARTERY DISEASE, UNSPECIFIED LATERALITY, UNSPECIFIED TYPE (HCC): ICD-10-CM

## 2023-07-18 DIAGNOSIS — I65.21 RIGHT-SIDED CAROTID ARTERY OCCLUSION WITHOUT CEREBRAL INFARCTION: ICD-10-CM

## 2023-07-18 DIAGNOSIS — E11.69 HYPERLIPIDEMIA ASSOCIATED WITH TYPE 2 DIABETES MELLITUS: ICD-10-CM

## 2023-07-18 DIAGNOSIS — D68.4 ACQUIRED COAGULATION FACTOR INHIBITOR DISORDER (HCC): ICD-10-CM

## 2023-07-18 DIAGNOSIS — I15.2 HYPERTENSION ASSOCIATED WITH DIABETES: ICD-10-CM

## 2023-07-18 LAB
ALBUMIN SERPL-MCNC: 3.5 G/DL (ref 3.4–5)
ALBUMIN/GLOB SERPL: 1 {RATIO} (ref 1–2)
ALP LIVER SERPL-CCNC: 71 U/L
ALT SERPL-CCNC: 52 U/L
ANION GAP SERPL CALC-SCNC: 6 MMOL/L (ref 0–18)
AST SERPL-CCNC: 34 U/L (ref 15–37)
BASOPHILS # BLD AUTO: 0.06 X10(3) UL (ref 0–0.2)
BASOPHILS NFR BLD AUTO: 1.1 %
BILIRUB SERPL-MCNC: 0.4 MG/DL (ref 0.1–2)
BILIRUB UR QL STRIP.AUTO: NEGATIVE
BUN BLD-MCNC: 53 MG/DL (ref 7–18)
CALCIUM BLD-MCNC: 9 MG/DL (ref 8.5–10.1)
CHLORIDE SERPL-SCNC: 112 MMOL/L (ref 98–112)
CHOLEST SERPL-MCNC: 120 MG/DL (ref ?–200)
CLARITY UR REFRACT.AUTO: CLEAR
CO2 SERPL-SCNC: 23 MMOL/L (ref 21–32)
COLOR UR AUTO: YELLOW
COMPLEXED PSA SERPL-MCNC: 6.09 NG/ML (ref ?–4)
CREAT BLD-MCNC: 2.76 MG/DL
CREAT UR-SCNC: 77.7 MG/DL
EOSINOPHIL # BLD AUTO: 0.15 X10(3) UL (ref 0–0.7)
EOSINOPHIL NFR BLD AUTO: 2.8 %
ERYTHROCYTE [DISTWIDTH] IN BLOOD BY AUTOMATED COUNT: 13.9 %
EST. AVERAGE GLUCOSE BLD GHB EST-MCNC: 157 MG/DL (ref 68–126)
FASTING PATIENT LIPID ANSWER: NO
FASTING STATUS PATIENT QL REPORTED: NO
GFR SERPLBLD BASED ON 1.73 SQ M-ARVRAT: 22 ML/MIN/1.73M2 (ref 60–?)
GLOBULIN PLAS-MCNC: 3.5 G/DL (ref 2.8–4.4)
GLUCOSE BLD-MCNC: 201 MG/DL (ref 70–99)
GLUCOSE UR STRIP.AUTO-MCNC: >=500 MG/DL
HBA1C MFR BLD: 7.1 % (ref ?–5.7)
HCT VFR BLD AUTO: 35.4 %
HDLC SERPL-MCNC: 52 MG/DL (ref 40–59)
HGB BLD-MCNC: 11.5 G/DL
IMM GRANULOCYTES # BLD AUTO: 0.02 X10(3) UL (ref 0–1)
IMM GRANULOCYTES NFR BLD: 0.4 %
KETONES UR STRIP.AUTO-MCNC: NEGATIVE MG/DL
LDLC SERPL CALC-MCNC: 49 MG/DL (ref ?–100)
LEUKOCYTE ESTERASE UR QL STRIP.AUTO: NEGATIVE
LYMPHOCYTES # BLD AUTO: 1.11 X10(3) UL (ref 1–4)
LYMPHOCYTES NFR BLD AUTO: 20.6 %
MCH RBC QN AUTO: 31.6 PG (ref 26–34)
MCHC RBC AUTO-ENTMCNC: 32.5 G/DL (ref 31–37)
MCV RBC AUTO: 97.3 FL
MICROALBUMIN UR-MCNC: 1.72 MG/DL
MICROALBUMIN/CREAT 24H UR-RTO: 22.1 UG/MG (ref ?–30)
MONOCYTES # BLD AUTO: 0.43 X10(3) UL (ref 0.1–1)
MONOCYTES NFR BLD AUTO: 8 %
NEUTROPHILS # BLD AUTO: 3.61 X10 (3) UL (ref 1.5–7.7)
NEUTROPHILS # BLD AUTO: 3.61 X10(3) UL (ref 1.5–7.7)
NEUTROPHILS NFR BLD AUTO: 67.1 %
NITRITE UR QL STRIP.AUTO: NEGATIVE
NONHDLC SERPL-MCNC: 68 MG/DL (ref ?–130)
OSMOLALITY SERPL CALC.SUM OF ELEC: 312 MOSM/KG (ref 275–295)
PH UR STRIP.AUTO: 5 [PH] (ref 5–8)
PLATELET # BLD AUTO: 176 10(3)UL (ref 150–450)
POTASSIUM SERPL-SCNC: 4.7 MMOL/L (ref 3.5–5.1)
PROT SERPL-MCNC: 7 G/DL (ref 6.4–8.2)
PROT UR STRIP.AUTO-MCNC: NEGATIVE MG/DL
RBC # BLD AUTO: 3.64 X10(6)UL
RBC UR QL AUTO: NEGATIVE
SODIUM SERPL-SCNC: 141 MMOL/L (ref 136–145)
SP GR UR STRIP.AUTO: 1.01 (ref 1–1.03)
TRIGL SERPL-MCNC: 102 MG/DL (ref 30–149)
UROBILINOGEN UR STRIP.AUTO-MCNC: <2 MG/DL
VLDLC SERPL CALC-MCNC: 15 MG/DL (ref 0–30)
WBC # BLD AUTO: 5.4 X10(3) UL (ref 4–11)

## 2023-07-18 PROCEDURE — 1159F MED LIST DOCD IN RCRD: CPT | Performed by: INTERNAL MEDICINE

## 2023-07-18 PROCEDURE — 36415 COLL VENOUS BLD VENIPUNCTURE: CPT

## 2023-07-18 PROCEDURE — 1170F FXNL STATUS ASSESSED: CPT | Performed by: INTERNAL MEDICINE

## 2023-07-18 PROCEDURE — 96160 PT-FOCUSED HLTH RISK ASSMT: CPT | Performed by: INTERNAL MEDICINE

## 2023-07-18 PROCEDURE — 80053 COMPREHEN METABOLIC PANEL: CPT

## 2023-07-18 PROCEDURE — 3074F SYST BP LT 130 MM HG: CPT | Performed by: INTERNAL MEDICINE

## 2023-07-18 PROCEDURE — 1160F RVW MEDS BY RX/DR IN RCRD: CPT | Performed by: INTERNAL MEDICINE

## 2023-07-18 PROCEDURE — 81003 URINALYSIS AUTO W/O SCOPE: CPT

## 2023-07-18 PROCEDURE — 1126F AMNT PAIN NOTED NONE PRSNT: CPT | Performed by: INTERNAL MEDICINE

## 2023-07-18 PROCEDURE — 82570 ASSAY OF URINE CREATININE: CPT

## 2023-07-18 PROCEDURE — 3078F DIAST BP <80 MM HG: CPT | Performed by: INTERNAL MEDICINE

## 2023-07-18 PROCEDURE — 83036 HEMOGLOBIN GLYCOSYLATED A1C: CPT

## 2023-07-18 PROCEDURE — G0439 PPPS, SUBSEQ VISIT: HCPCS | Performed by: INTERNAL MEDICINE

## 2023-07-18 PROCEDURE — 3008F BODY MASS INDEX DOCD: CPT | Performed by: INTERNAL MEDICINE

## 2023-07-18 PROCEDURE — 85025 COMPLETE CBC W/AUTO DIFF WBC: CPT

## 2023-07-18 PROCEDURE — 82043 UR ALBUMIN QUANTITATIVE: CPT

## 2023-07-18 PROCEDURE — 80061 LIPID PANEL: CPT

## 2023-07-23 DIAGNOSIS — N18.4 TYPE 2 DIABETES MELLITUS WITH STAGE 4 CHRONIC KIDNEY DISEASE, WITHOUT LONG-TERM CURRENT USE OF INSULIN (HCC): ICD-10-CM

## 2023-07-23 DIAGNOSIS — E11.22 TYPE 2 DIABETES MELLITUS WITH STAGE 4 CHRONIC KIDNEY DISEASE, WITHOUT LONG-TERM CURRENT USE OF INSULIN (HCC): ICD-10-CM

## 2023-07-24 RX ORDER — GLIPIZIDE 5 MG/1
5 TABLET ORAL
Qty: 180 TABLET | Refills: 0 | Status: SHIPPED | OUTPATIENT
Start: 2023-07-24

## 2023-08-22 ENCOUNTER — HOSPITAL ENCOUNTER (OUTPATIENT)
Age: 83
Discharge: HOME | End: 2023-08-22
Payer: MEDICARE

## 2023-08-22 VITALS
DIASTOLIC BLOOD PRESSURE: 79 MMHG | BODY MASS INDEX: 29 KG/M2 | RESPIRATION RATE: 20 BRPM | WEIGHT: 199 LBS | OXYGEN SATURATION: 100 % | TEMPERATURE: 97 F | SYSTOLIC BLOOD PRESSURE: 115 MMHG | HEART RATE: 60 BPM

## 2023-08-22 DIAGNOSIS — S51.019A SKIN TEAR OF ELBOW WITHOUT COMPLICATION, INITIAL ENCOUNTER: Primary | ICD-10-CM

## 2023-08-22 DIAGNOSIS — I10 ESSENTIAL HYPERTENSION: ICD-10-CM

## 2023-08-22 PROCEDURE — 90471 IMMUNIZATION ADMIN: CPT | Performed by: NURSE PRACTITIONER

## 2023-08-22 PROCEDURE — 99203 OFFICE O/P NEW LOW 30 MIN: CPT | Performed by: NURSE PRACTITIONER

## 2023-08-22 PROCEDURE — 90715 TDAP VACCINE 7 YRS/> IM: CPT | Performed by: NURSE PRACTITIONER

## 2023-08-22 RX ORDER — FUROSEMIDE 20 MG/1
TABLET ORAL
Qty: 45 TABLET | Refills: 2 | Status: SHIPPED | OUTPATIENT
Start: 2023-08-22

## 2023-08-22 NOTE — TELEPHONE ENCOUNTER
Last time medication was refilled 12/13/2022  Quantity and # of refills 45 w 2  Last OV 07/18/2023  Next OV 01/16/2024    Protocol failed     Sent to Dr. Connor Severe for approval

## 2023-08-23 ENCOUNTER — TELEPHONE (OUTPATIENT)
Dept: INTERNAL MEDICINE CLINIC | Facility: CLINIC | Age: 83
End: 2023-08-23

## 2023-08-23 NOTE — TELEPHONE ENCOUNTER
furosemide 20 MG Oral Tab 45 tablet 2 8/22/2023    Sig:   TAKE 1 TABLET BY MOUTH EVERY OTHER DAY.  TAKE 2 TABLETS ON EVEN DAYS, 1 TABLET ON ODD DAYS       Fax sent back with directions clarified \"Patient is to take two tablets equaling 40 mg daily on even days of month (2nd, 4th, 6th etc) and to take one 20 mg tablet on odd days of the month (1st, 3rd,5th etc.)

## 2023-09-11 ENCOUNTER — HOSPITAL ENCOUNTER (OUTPATIENT)
Age: 83
Discharge: HOME | End: 2023-09-11
Payer: MEDICARE

## 2023-09-11 VITALS
DIASTOLIC BLOOD PRESSURE: 66 MMHG | TEMPERATURE: 98 F | SYSTOLIC BLOOD PRESSURE: 104 MMHG | WEIGHT: 199.06 LBS | RESPIRATION RATE: 20 BRPM | BODY MASS INDEX: 29 KG/M2 | HEART RATE: 60 BPM

## 2023-09-11 DIAGNOSIS — S60.419A ABRASION OF FINGER OF LEFT HAND, INITIAL ENCOUNTER: Primary | ICD-10-CM

## 2023-09-11 PROCEDURE — 99212 OFFICE O/P EST SF 10 MIN: CPT | Performed by: PHYSICIAN ASSISTANT

## 2023-09-18 ENCOUNTER — HOSPITAL ENCOUNTER (OUTPATIENT)
Age: 83
Discharge: HOME | End: 2023-09-18
Payer: MEDICARE

## 2023-09-18 VITALS
OXYGEN SATURATION: 97 % | RESPIRATION RATE: 18 BRPM | DIASTOLIC BLOOD PRESSURE: 72 MMHG | SYSTOLIC BLOOD PRESSURE: 106 MMHG | TEMPERATURE: 98 F | HEART RATE: 59 BPM

## 2023-09-18 DIAGNOSIS — T14.8XXA BLEEDING FROM WOUND: Primary | ICD-10-CM

## 2023-09-18 DIAGNOSIS — T14.8XXA SKIN AVULSION: ICD-10-CM

## 2023-09-18 PROCEDURE — 99213 OFFICE O/P EST LOW 20 MIN: CPT | Performed by: PHYSICIAN ASSISTANT

## 2023-09-18 RX ORDER — TRANEXAMIC ACID 100 MG/ML
5 INJECTION, SOLUTION INTRAVENOUS ONCE
Status: COMPLETED | OUTPATIENT
Start: 2023-09-18 | End: 2023-09-18

## 2023-09-18 NOTE — DISCHARGE INSTRUCTIONS
Please return to the ER/clinic if symptoms worsen. Follow-up with your PCP in 24-48 hours as needed. Keep the dressing on tonight. The Gelfoam will absorb to the area . If symptoms persist or worsening i.e. increased bleeding redness swelling etc. go directly to the ER. Otherwise follow-up with your PCP for further evaluation and treatment.

## 2023-09-25 DIAGNOSIS — I10 ESSENTIAL HYPERTENSION: ICD-10-CM

## 2023-09-25 RX ORDER — LOSARTAN POTASSIUM 25 MG/1
25 TABLET ORAL DAILY
Qty: 90 TABLET | Refills: 0 | Status: SHIPPED | OUTPATIENT
Start: 2023-09-25

## 2023-09-25 NOTE — TELEPHONE ENCOUNTER
losartan 25 MG Oral Tab   Last time medication was refilled 6/26/23  Quantity and # of refills 90 tab  Last OV 7/18/23  Next OV 1/16/24

## 2023-10-03 ENCOUNTER — LAB ENCOUNTER (OUTPATIENT)
Dept: LAB | Age: 83
End: 2023-10-03
Attending: INTERNAL MEDICINE
Payer: MEDICARE

## 2023-10-03 DIAGNOSIS — N18.4 STAGE 4 CHRONIC KIDNEY DISEASE (HCC): ICD-10-CM

## 2023-10-03 LAB
ANION GAP SERPL CALC-SCNC: 8 MMOL/L (ref 0–18)
BASOPHILS # BLD AUTO: 0.06 X10(3) UL (ref 0–0.2)
BASOPHILS NFR BLD AUTO: 1 %
BUN BLD-MCNC: 50 MG/DL (ref 7–18)
CALCIUM BLD-MCNC: 9.1 MG/DL (ref 8.5–10.1)
CHLORIDE SERPL-SCNC: 109 MMOL/L (ref 98–112)
CO2 SERPL-SCNC: 21 MMOL/L (ref 21–32)
CREAT BLD-MCNC: 2.65 MG/DL
EGFRCR SERPLBLD CKD-EPI 2021: 23 ML/MIN/1.73M2 (ref 60–?)
EOSINOPHIL # BLD AUTO: 0.13 X10(3) UL (ref 0–0.7)
EOSINOPHIL NFR BLD AUTO: 2.1 %
ERYTHROCYTE [DISTWIDTH] IN BLOOD BY AUTOMATED COUNT: 13.8 %
FASTING STATUS PATIENT QL REPORTED: YES
GLUCOSE BLD-MCNC: 196 MG/DL (ref 70–99)
HCT VFR BLD AUTO: 34.2 %
HGB BLD-MCNC: 11.3 G/DL
IMM GRANULOCYTES # BLD AUTO: 0.01 X10(3) UL (ref 0–1)
IMM GRANULOCYTES NFR BLD: 0.2 %
LYMPHOCYTES # BLD AUTO: 1.41 X10(3) UL (ref 1–4)
LYMPHOCYTES NFR BLD AUTO: 23.3 %
MAGNESIUM SERPL-MCNC: 1.7 MG/DL (ref 1.6–2.6)
MCH RBC QN AUTO: 31.5 PG (ref 26–34)
MCHC RBC AUTO-ENTMCNC: 33 G/DL (ref 31–37)
MCV RBC AUTO: 95.3 FL
MONOCYTES # BLD AUTO: 0.69 X10(3) UL (ref 0.1–1)
MONOCYTES NFR BLD AUTO: 11.4 %
NEUTROPHILS # BLD AUTO: 3.75 X10 (3) UL (ref 1.5–7.7)
NEUTROPHILS # BLD AUTO: 3.75 X10(3) UL (ref 1.5–7.7)
NEUTROPHILS NFR BLD AUTO: 62 %
OSMOLALITY SERPL CALC.SUM OF ELEC: 305 MOSM/KG (ref 275–295)
PHOSPHATE SERPL-MCNC: 3.1 MG/DL (ref 2.5–4.9)
PLATELET # BLD AUTO: 178 10(3)UL (ref 150–450)
POTASSIUM SERPL-SCNC: 4.6 MMOL/L (ref 3.5–5.1)
PTH-INTACT SERPL-MCNC: 134.4 PG/ML (ref 18.5–88)
RBC # BLD AUTO: 3.59 X10(6)UL
SODIUM SERPL-SCNC: 138 MMOL/L (ref 136–145)
VIT D+METAB SERPL-MCNC: 39.2 NG/ML (ref 30–100)
WBC # BLD AUTO: 6.1 X10(3) UL (ref 4–11)

## 2023-10-03 PROCEDURE — 36415 COLL VENOUS BLD VENIPUNCTURE: CPT

## 2023-10-03 PROCEDURE — 85025 COMPLETE CBC W/AUTO DIFF WBC: CPT

## 2023-10-03 PROCEDURE — 80048 BASIC METABOLIC PNL TOTAL CA: CPT

## 2023-10-03 PROCEDURE — 83970 ASSAY OF PARATHORMONE: CPT

## 2023-10-03 PROCEDURE — 84100 ASSAY OF PHOSPHORUS: CPT

## 2023-10-03 PROCEDURE — 83735 ASSAY OF MAGNESIUM: CPT

## 2023-10-03 PROCEDURE — 82306 VITAMIN D 25 HYDROXY: CPT

## 2023-10-06 ENCOUNTER — OFFICE VISIT (OUTPATIENT)
Dept: NEPHROLOGY | Facility: CLINIC | Age: 83
End: 2023-10-06
Payer: MEDICARE

## 2023-10-06 VITALS — SYSTOLIC BLOOD PRESSURE: 112 MMHG | BODY MASS INDEX: 30 KG/M2 | DIASTOLIC BLOOD PRESSURE: 60 MMHG | WEIGHT: 208 LBS

## 2023-10-06 DIAGNOSIS — N18.4 STAGE 4 CHRONIC KIDNEY DISEASE (HCC): Primary | ICD-10-CM

## 2023-10-06 PROCEDURE — 99213 OFFICE O/P EST LOW 20 MIN: CPT | Performed by: INTERNAL MEDICINE

## 2023-10-16 DIAGNOSIS — E11.22 TYPE 2 DIABETES MELLITUS WITH STAGE 4 CHRONIC KIDNEY DISEASE, WITHOUT LONG-TERM CURRENT USE OF INSULIN (HCC): ICD-10-CM

## 2023-10-16 DIAGNOSIS — N18.4 TYPE 2 DIABETES MELLITUS WITH STAGE 4 CHRONIC KIDNEY DISEASE, WITHOUT LONG-TERM CURRENT USE OF INSULIN (HCC): ICD-10-CM

## 2023-10-16 RX ORDER — GLIPIZIDE 5 MG/1
5 TABLET ORAL
Qty: 180 TABLET | Refills: 1 | Status: SHIPPED | OUTPATIENT
Start: 2023-10-16

## 2023-10-16 NOTE — TELEPHONE ENCOUNTER
Last time medication was refilled 7/24/23  Quantity and # of refills 180 w 0  Last OV 7/18/23  Next OV 1/16/24  Passed protocol, Rx sent.

## 2023-12-11 DIAGNOSIS — N18.4 TYPE 2 DIABETES MELLITUS WITH STAGE 4 CHRONIC KIDNEY DISEASE, WITHOUT LONG-TERM CURRENT USE OF INSULIN (HCC): ICD-10-CM

## 2023-12-11 DIAGNOSIS — I10 ESSENTIAL HYPERTENSION: ICD-10-CM

## 2023-12-11 DIAGNOSIS — E11.22 TYPE 2 DIABETES MELLITUS WITH STAGE 4 CHRONIC KIDNEY DISEASE, WITHOUT LONG-TERM CURRENT USE OF INSULIN (HCC): ICD-10-CM

## 2023-12-11 RX ORDER — METOPROLOL SUCCINATE 200 MG/1
200 TABLET, EXTENDED RELEASE ORAL DAILY
Qty: 90 TABLET | Refills: 2 | Status: SHIPPED | OUTPATIENT
Start: 2023-12-11

## 2023-12-11 NOTE — TELEPHONE ENCOUNTER
METFORMIN 500 MG Oral Tab   Last time medication was refilled 06/13/2023  Quantity and # of refills 180 w 1  Last OV 07/18/2023  Next OV 01/16/2024  Passed protocol, Rx sent. Metoprolol Succinate  MG Oral Tablet 24 Hr   Last time medication was refilled 12/13/2022  Quantity and # of refills 90 w 3  Last OV 07/18/2023  Next OV 01/16/2024    Failed protocol.      Sent to Dr. Pily Jimenez for approval

## 2023-12-20 ENCOUNTER — HOSPITAL ENCOUNTER (OUTPATIENT)
Dept: GENERAL RADIOLOGY | Age: 83
Discharge: HOME | End: 2023-12-20
Payer: MEDICARE

## 2023-12-20 ENCOUNTER — OFFICE VISIT (OUTPATIENT)
Dept: INTERNAL MEDICINE CLINIC | Facility: CLINIC | Age: 83
End: 2023-12-20
Payer: MEDICARE

## 2023-12-20 VITALS
SYSTOLIC BLOOD PRESSURE: 130 MMHG | RESPIRATION RATE: 16 BRPM | OXYGEN SATURATION: 98 % | HEIGHT: 68 IN | BODY MASS INDEX: 31.07 KG/M2 | DIASTOLIC BLOOD PRESSURE: 80 MMHG | HEART RATE: 80 BPM | TEMPERATURE: 97 F | WEIGHT: 205 LBS

## 2023-12-20 DIAGNOSIS — N18.4 TYPE 2 DIABETES MELLITUS WITH STAGE 4 CHRONIC KIDNEY DISEASE, WITHOUT LONG-TERM CURRENT USE OF INSULIN (HCC): ICD-10-CM

## 2023-12-20 DIAGNOSIS — R05.2 SUBACUTE COUGH: Primary | ICD-10-CM

## 2023-12-20 DIAGNOSIS — R05.2 SUBACUTE COUGH: ICD-10-CM

## 2023-12-20 DIAGNOSIS — E11.22 TYPE 2 DIABETES MELLITUS WITH STAGE 4 CHRONIC KIDNEY DISEASE, WITHOUT LONG-TERM CURRENT USE OF INSULIN (HCC): ICD-10-CM

## 2023-12-20 DIAGNOSIS — J40 BRONCHITIS: ICD-10-CM

## 2023-12-20 DIAGNOSIS — I50.22 CHRONIC SYSTOLIC HEART FAILURE (HCC): ICD-10-CM

## 2023-12-20 PROCEDURE — 71046 X-RAY EXAM CHEST 2 VIEWS: CPT

## 2023-12-20 RX ORDER — PREDNISONE 20 MG/1
40 TABLET ORAL DAILY
Qty: 10 TABLET | Refills: 0 | Status: SHIPPED | OUTPATIENT
Start: 2023-12-20 | End: 2023-12-25

## 2023-12-20 RX ORDER — ALBUTEROL SULFATE 90 UG/1
2 AEROSOL, METERED RESPIRATORY (INHALATION) EVERY 6 HOURS PRN
Qty: 1 EACH | Refills: 0 | Status: SHIPPED | OUTPATIENT
Start: 2023-12-20

## 2023-12-24 DIAGNOSIS — I10 ESSENTIAL HYPERTENSION: ICD-10-CM

## 2023-12-26 RX ORDER — LOSARTAN POTASSIUM 25 MG/1
25 TABLET ORAL DAILY
Qty: 90 TABLET | Refills: 0 | Status: SHIPPED | OUTPATIENT
Start: 2023-12-26

## 2023-12-26 NOTE — TELEPHONE ENCOUNTER
Last time medication was refilled 09/25/2023  Quantity and # of refills 90 w 0  Last OV 12/20/2023  Next OV   Future Appointments   Date Time Provider Waldemar Whitley   1/16/2024 11:00 AM Tracy Godoy MD [facility] [address]   4/17/2024 11:00 AM Kali Torres MD [facility] [place]       Failed protocol.      Sent to CR [place] for approval

## 2023-12-28 ENCOUNTER — TELEPHONE (OUTPATIENT)
Dept: INTERNAL MEDICINE CLINIC | Facility: CLINIC | Age: 83
End: 2023-12-28

## 2023-12-28 DIAGNOSIS — R05.2 SUBACUTE COUGH: Primary | ICD-10-CM

## 2023-12-28 DIAGNOSIS — J40 BRONCHITIS: ICD-10-CM

## 2023-12-28 RX ORDER — AMOXICILLIN AND CLAVULANATE POTASSIUM 875; 125 MG/1; MG/1
1 TABLET, FILM COATED ORAL 2 TIMES DAILY
Qty: 20 TABLET | Refills: 0 | Status: SHIPPED | OUTPATIENT
Start: 2023-12-28 | End: 2024-01-07

## 2023-12-28 NOTE — TELEPHONE ENCOUNTER
Discussed with patient and patient's daughter, Fermin Fortune. Patient has lingering cough and sinus congestion. Still using Albuterol inhaler as needed. Per Dr. Shannon Guerra: Add on Augmentin x 10 days.

## 2023-12-28 NOTE — TELEPHONE ENCOUNTER
Finished prednisone and still having cough    No other symptoms    Please advise    [facility] [address] [place] [address] AT [address] [place] [address],  [address], [phone], [phone]

## 2023-12-29 ENCOUNTER — LAB ENCOUNTER (OUTPATIENT)
Dept: LAB | Age: 83
End: 2023-12-29
Attending: INTERNAL MEDICINE
Payer: MEDICARE

## 2023-12-29 DIAGNOSIS — I48.0 PAROXYSMAL ATRIAL FIBRILLATION (HCC): Primary | ICD-10-CM

## 2023-12-29 DIAGNOSIS — N18.4 STAGE 4 CHRONIC KIDNEY DISEASE (HCC): ICD-10-CM

## 2023-12-29 LAB
ALBUMIN SERPL-MCNC: 3.2 G/DL (ref 3.4–5)
ALBUMIN/GLOB SERPL: 0.8 {RATIO} (ref 1–2)
ALP LIVER SERPL-CCNC: 75 U/L
ALT SERPL-CCNC: 97 U/L
ANION GAP SERPL CALC-SCNC: 6 MMOL/L (ref 0–18)
AST SERPL-CCNC: 80 U/L (ref 15–37)
BASOPHILS # BLD AUTO: 0.03 X10(3) UL (ref 0–0.2)
BASOPHILS NFR BLD AUTO: 0.3 %
BILIRUB SERPL-MCNC: 0.6 MG/DL (ref 0.1–2)
BUN BLD-MCNC: 39 MG/DL (ref 9–23)
CALCIUM BLD-MCNC: 8.6 MG/DL (ref 8.5–10.1)
CHLORIDE SERPL-SCNC: 103 MMOL/L (ref 98–112)
CO2 SERPL-SCNC: 27 MMOL/L (ref 21–32)
CREAT BLD-MCNC: 2.69 MG/DL
EGFRCR SERPLBLD CKD-EPI 2021: 23 ML/MIN/1.73M2 (ref 60–?)
EOSINOPHIL # BLD AUTO: 0.3 X10(3) UL (ref 0–0.7)
EOSINOPHIL NFR BLD AUTO: 3.4 %
ERYTHROCYTE [DISTWIDTH] IN BLOOD BY AUTOMATED COUNT: 13.6 %
FASTING STATUS PATIENT QL REPORTED: YES
GLOBULIN PLAS-MCNC: 3.8 G/DL (ref 2.8–4.4)
GLUCOSE BLD-MCNC: 165 MG/DL (ref 70–99)
HCT VFR BLD AUTO: 37.4 %
HGB BLD-MCNC: 12.4 G/DL
IMM GRANULOCYTES # BLD AUTO: 0.03 X10(3) UL (ref 0–1)
IMM GRANULOCYTES NFR BLD: 0.3 %
LYMPHOCYTES # BLD AUTO: 0.96 X10(3) UL (ref 1–4)
LYMPHOCYTES NFR BLD AUTO: 10.9 %
MAGNESIUM SERPL-MCNC: 1.7 MG/DL (ref 1.6–2.6)
MCH RBC QN AUTO: 31.6 PG (ref 26–34)
MCHC RBC AUTO-ENTMCNC: 33.2 G/DL (ref 31–37)
MCV RBC AUTO: 95.2 FL
MONOCYTES # BLD AUTO: 0.97 X10(3) UL (ref 0.1–1)
MONOCYTES NFR BLD AUTO: 11 %
NEUTROPHILS # BLD AUTO: 6.53 X10 (3) UL (ref 1.5–7.7)
NEUTROPHILS # BLD AUTO: 6.53 X10(3) UL (ref 1.5–7.7)
NEUTROPHILS NFR BLD AUTO: 74.1 %
OSMOLALITY SERPL CALC.SUM OF ELEC: 295 MOSM/KG (ref 275–295)
PHOSPHATE SERPL-MCNC: 2.6 MG/DL (ref 2.5–4.9)
PLATELET # BLD AUTO: 153 10(3)UL (ref 150–450)
POTASSIUM SERPL-SCNC: 4.4 MMOL/L (ref 3.5–5.1)
PROT SERPL-MCNC: 7 G/DL (ref 6.4–8.2)
RBC # BLD AUTO: 3.93 X10(6)UL
SODIUM SERPL-SCNC: 136 MMOL/L (ref 136–145)
TSI SER-ACNC: 0.84 MIU/ML (ref 0.36–3.74)
VIT D+METAB SERPL-MCNC: 38.4 NG/ML (ref 30–100)
WBC # BLD AUTO: 8.8 X10(3) UL (ref 4–11)

## 2023-12-29 PROCEDURE — 82306 VITAMIN D 25 HYDROXY: CPT

## 2023-12-29 PROCEDURE — 84100 ASSAY OF PHOSPHORUS: CPT

## 2023-12-29 PROCEDURE — 85025 COMPLETE CBC W/AUTO DIFF WBC: CPT

## 2023-12-29 PROCEDURE — 36415 COLL VENOUS BLD VENIPUNCTURE: CPT

## 2023-12-29 PROCEDURE — 80053 COMPREHEN METABOLIC PANEL: CPT

## 2023-12-29 PROCEDURE — 83735 ASSAY OF MAGNESIUM: CPT

## 2023-12-29 PROCEDURE — 84443 ASSAY THYROID STIM HORMONE: CPT

## 2024-01-08 ENCOUNTER — TELEPHONE (OUTPATIENT)
Dept: INTERNAL MEDICINE CLINIC | Facility: CLINIC | Age: 84
End: 2024-01-08

## 2024-01-08 DIAGNOSIS — E11.22 TYPE 2 DIABETES MELLITUS WITH STAGE 4 CHRONIC KIDNEY DISEASE, WITHOUT LONG-TERM CURRENT USE OF INSULIN (HCC): ICD-10-CM

## 2024-01-08 DIAGNOSIS — N18.4 TYPE 2 DIABETES MELLITUS WITH STAGE 4 CHRONIC KIDNEY DISEASE, WITHOUT LONG-TERM CURRENT USE OF INSULIN (HCC): ICD-10-CM

## 2024-01-08 RX ORDER — ATORVASTATIN CALCIUM 40 MG/1
40 TABLET, FILM COATED ORAL DAILY
Qty: 90 TABLET | Refills: 3 | Status: SHIPPED | OUTPATIENT
Start: 2024-01-08

## 2024-01-08 NOTE — TELEPHONE ENCOUNTER
Last time medication was refilled 12/13/2022  Quantity and # of refills 90 w 3  Last OV 12/20/2023  Next OV   Future Appointments   Date Time Provider Department Center   1/16/2024 11:00 AM Abhi Arellano MD [facility] [address]   4/17/2024 11:00 AM Rhett Jones MD [facility] [place]     Failed protocol.     Sent to Dr. Arellano for approval

## 2024-01-08 NOTE — TELEPHONE ENCOUNTER
Medication requested: atorvastatin 40 MG Oral Tab     Is patient requesting 30 or 90 day supply:  90    Ph[facility]  [id number] [place] - [facility] [address] AT [address], [phone], [phone]     LOV:  12/20/2023-Dilcia    Is the patient due for appointment: no  (if so, please schedule)    Additional notes:  no

## 2024-01-08 NOTE — TELEPHONE ENCOUNTER
Medication requested: atorvastatin 40 MG Oral Tab     Is patient requesting 30 or 90 day supply:  90    Pharm[facility]  [id number] - [place] - [facility] [address] AT [address], [phone], [phone] [[id number]]     LOV: 12/20/2023-Dilcia       Is the patient due for appointment: No  (if so, please schedule)    Additional notes:  no

## 2024-01-16 ENCOUNTER — OFFICE VISIT (OUTPATIENT)
Dept: INTERNAL MEDICINE CLINIC | Facility: CLINIC | Age: 84
End: 2024-01-16
Payer: MEDICARE

## 2024-01-16 ENCOUNTER — LAB ENCOUNTER (OUTPATIENT)
Dept: LAB | Age: 84
End: 2024-01-16
Attending: INTERNAL MEDICINE
Payer: COMMERCIAL

## 2024-01-16 VITALS
DIASTOLIC BLOOD PRESSURE: 78 MMHG | BODY MASS INDEX: 30.01 KG/M2 | HEIGHT: 68 IN | HEART RATE: 80 BPM | TEMPERATURE: 98 F | WEIGHT: 198 LBS | SYSTOLIC BLOOD PRESSURE: 128 MMHG | OXYGEN SATURATION: 98 % | RESPIRATION RATE: 16 BRPM

## 2024-01-16 DIAGNOSIS — I48.0 PAROXYSMAL A-FIB (HCC): ICD-10-CM

## 2024-01-16 DIAGNOSIS — N18.4 TYPE 2 DIABETES MELLITUS WITH STAGE 4 CHRONIC KIDNEY DISEASE, WITHOUT LONG-TERM CURRENT USE OF INSULIN (HCC): Primary | ICD-10-CM

## 2024-01-16 DIAGNOSIS — E11.22 TYPE 2 DIABETES MELLITUS WITH STAGE 4 CHRONIC KIDNEY DISEASE, WITHOUT LONG-TERM CURRENT USE OF INSULIN (HCC): ICD-10-CM

## 2024-01-16 DIAGNOSIS — N18.4 TYPE 2 DIABETES MELLITUS WITH STAGE 4 CHRONIC KIDNEY DISEASE, WITHOUT LONG-TERM CURRENT USE OF INSULIN (HCC): ICD-10-CM

## 2024-01-16 DIAGNOSIS — I10 ESSENTIAL HYPERTENSION: ICD-10-CM

## 2024-01-16 DIAGNOSIS — E11.22 TYPE 2 DIABETES MELLITUS WITH STAGE 4 CHRONIC KIDNEY DISEASE, WITHOUT LONG-TERM CURRENT USE OF INSULIN (HCC): Primary | ICD-10-CM

## 2024-01-16 LAB
ALBUMIN SERPL-MCNC: 3.3 G/DL (ref 3.4–5)
ALBUMIN/GLOB SERPL: 0.8 {RATIO} (ref 1–2)
ALP LIVER SERPL-CCNC: 82 U/L
ALT SERPL-CCNC: 50 U/L
ANION GAP SERPL CALC-SCNC: 1 MMOL/L (ref 0–18)
AST SERPL-CCNC: 40 U/L (ref 15–37)
BILIRUB SERPL-MCNC: 0.4 MG/DL (ref 0.1–2)
BUN BLD-MCNC: 40 MG/DL (ref 9–23)
CALCIUM BLD-MCNC: 9 MG/DL (ref 8.5–10.1)
CHLORIDE SERPL-SCNC: 113 MMOL/L (ref 98–112)
CHOLEST SERPL-MCNC: 131 MG/DL (ref ?–200)
CO2 SERPL-SCNC: 26 MMOL/L (ref 21–32)
CREAT BLD-MCNC: 2.55 MG/DL
CREAT UR-SCNC: 63.8 MG/DL
EGFRCR SERPLBLD CKD-EPI 2021: 24 ML/MIN/1.73M2 (ref 60–?)
EST. AVERAGE GLUCOSE BLD GHB EST-MCNC: 157 MG/DL (ref 68–126)
FASTING PATIENT LIPID ANSWER: YES
FASTING STATUS PATIENT QL REPORTED: YES
GLOBULIN PLAS-MCNC: 3.9 G/DL (ref 2.8–4.4)
GLUCOSE BLD-MCNC: 202 MG/DL (ref 70–99)
HBA1C MFR BLD: 7.1 % (ref ?–5.7)
HDLC SERPL-MCNC: 48 MG/DL (ref 40–59)
LDLC SERPL CALC-MCNC: 58 MG/DL (ref ?–100)
MICROALBUMIN UR-MCNC: 1.57 MG/DL
MICROALBUMIN/CREAT 24H UR-RTO: 24.6 UG/MG (ref ?–30)
NONHDLC SERPL-MCNC: 83 MG/DL (ref ?–130)
OSMOLALITY SERPL CALC.SUM OF ELEC: 306 MOSM/KG (ref 275–295)
POTASSIUM SERPL-SCNC: 4.5 MMOL/L (ref 3.5–5.1)
PROT SERPL-MCNC: 7.2 G/DL (ref 6.4–8.2)
SODIUM SERPL-SCNC: 140 MMOL/L (ref 136–145)
TRIGL SERPL-MCNC: 144 MG/DL (ref 30–149)
VLDLC SERPL CALC-MCNC: 21 MG/DL (ref 0–30)

## 2024-01-16 PROCEDURE — 1126F AMNT PAIN NOTED NONE PRSNT: CPT | Performed by: INTERNAL MEDICINE

## 2024-01-16 PROCEDURE — 83036 HEMOGLOBIN GLYCOSYLATED A1C: CPT

## 2024-01-16 PROCEDURE — 36415 COLL VENOUS BLD VENIPUNCTURE: CPT

## 2024-01-16 PROCEDURE — 80053 COMPREHEN METABOLIC PANEL: CPT

## 2024-01-16 PROCEDURE — 99214 OFFICE O/P EST MOD 30 MIN: CPT | Performed by: INTERNAL MEDICINE

## 2024-01-16 PROCEDURE — 3074F SYST BP LT 130 MM HG: CPT | Performed by: INTERNAL MEDICINE

## 2024-01-16 PROCEDURE — 82570 ASSAY OF URINE CREATININE: CPT

## 2024-01-16 PROCEDURE — 3008F BODY MASS INDEX DOCD: CPT | Performed by: INTERNAL MEDICINE

## 2024-01-16 PROCEDURE — 82043 UR ALBUMIN QUANTITATIVE: CPT

## 2024-01-16 PROCEDURE — 1159F MED LIST DOCD IN RCRD: CPT | Performed by: INTERNAL MEDICINE

## 2024-01-16 PROCEDURE — 3078F DIAST BP <80 MM HG: CPT | Performed by: INTERNAL MEDICINE

## 2024-01-16 PROCEDURE — 1160F RVW MEDS BY RX/DR IN RCRD: CPT | Performed by: INTERNAL MEDICINE

## 2024-01-16 PROCEDURE — 80061 LIPID PANEL: CPT

## 2024-01-16 PROCEDURE — 1170F FXNL STATUS ASSESSED: CPT | Performed by: INTERNAL MEDICINE

## 2024-01-16 NOTE — PROGRESS NOTES
Subjective:   Patient ID: Kyler Haji is a 83 year old male.    Diabetes      HPI:   Kyler Haji is a 83 year old male who presents for recheck of his diabetes. Patient’s FBS have been at goal. Last visit with ophthalmologist was at goal.  Pt has been checking his feet on a regular basis. Pt denies any tingling of the feet. Pt denies any issues with depression. Pt complains of nothing    PAST MEDICAL, SOCIAL, FAMILY HISTORIES REVIEWED WITH PT  .    Wt Readings from Last 6 Encounters:   01/16/24 198 lb (89.8 kg)   12/20/23 205 lb (93 kg)   10/06/23 208 lb (94.3 kg)   09/11/23 199 lb 1.2 oz (90.3 kg)   08/22/23 199 lb (90.3 kg)   07/18/23 204 lb (92.5 kg)     Body mass index is 30.11 kg/m².     Lab Results   Component Value Date    A1C 7.1 (H) 07/18/2023    A1C 6.5 (H) 12/13/2022    A1C 7.2 (H) 06/23/2022     Lab Results   Component Value Date    CHOLEST 120 07/18/2023    CHOLEST 142 06/23/2022    CHOLEST 160 12/14/2021     Lab Results   Component Value Date    HDL 52 07/18/2023    HDL 51 06/23/2022    HDL 64 (H) 12/14/2021     Lab Results   Component Value Date    LDL 49 07/18/2023    LDL 57 06/23/2022    LDL 65 12/14/2021     Lab Results   Component Value Date    TRIG 102 07/18/2023    TRIG 210 (H) 06/23/2022    TRIG 153 (H) 12/14/2021     Lab Results   Component Value Date    AST 80 (H) 12/29/2023    AST 34 07/18/2023    AST 26 12/13/2022     Lab Results   Component Value Date    ALT 97 (H) 12/29/2023    ALT 52 07/18/2023    ALT 39 12/13/2022     Malb/Cre Calc   Date Value Ref Range Status   07/18/2023 22.1 <=30.0 ug/mg Final     Comment:     <30 ug/mg creatinine       Normal     ug/mg creatinine   Microalbuminuria   >300 ug/mg creatinine      Albuminuria       06/23/2022 20.6 <=30.0 ug/mg Final     Comment:     <30 ug/mg creatinine       Normal     ug/mg creatinine   Microalbuminuria   >300 ug/mg creatinine      Albuminuria       12/14/2021   Final     Comment:     Unable to calculate due to  Urine Creatinine <13.00 mg/dL         Current Outpatient Medications   Medication Sig Dispense Refill    atorvastatin 40 MG Oral Tab Take 1 tablet (40 mg total) by mouth daily. 90 tablet 3    LOSARTAN 25 MG Oral Tab TAKE 1 TABLET(25 MG) BY MOUTH DAILY 90 tablet 0    albuterol 108 (90 Base) MCG/ACT Inhalation Aero Soln Inhale 2 puffs into the lungs every 6 (six) hours as needed. 1 each 0    METFORMIN 500 MG Oral Tab TAKE 1 TABLET(500 MG) BY MOUTH TWICE DAILY WITH MEALS 180 tablet 1    Metoprolol Succinate  MG Oral Tablet 24 Hr Take 1 tablet (200 mg total) by mouth daily. 90 tablet 2    glipiZIDE 5 MG Oral Tab TAKE 1 TABLET(5 MG) BY MOUTH TWICE DAILY BEFORE MEALS 180 tablet 1    furosemide 20 MG Oral Tab TAKE 1 TABLET BY MOUTH EVERY OTHER DAY. TAKE 2 TABLETS ON EVEN DAYS, 1 TABLET ON ODD DAYS 45 tablet 2    allopurinol 100 MG Oral Tab Take 1 tablet (100 mg total) by mouth daily. 90 tablet 3    furosemide 40 MG Oral Tab Take 1 tablet (40 mg total) by mouth every other day. 45 tablet 3    spironolactone 25 MG Oral Tab Take 0.5 tablets (12.5 mg total) by mouth daily. 90 tablet 3    warfarin 4 MG Oral Tab TAKE 1 TABLET BY MOUTH DAILY OR AS DIRECTED BY ANTICOAGULATION CLINIC 90 tablet 4    amiodarone 200 MG Oral Tab Take 1 tablet (200 mg total) by mouth daily. 90 tablet 3      Past Medical History:   Diagnosis Date    Abdominal hernia     Actinic keratosis     Chest pain, unspecified     Cough     Diabetes (HCC)     Diabetes mellitus (HCC)     Dysmetabolic syndrome X     Easy bruising     Essential hypertension     Essential hypertension, malignant     Flatulence/gas pain/belching     Hemorrhoids     High cholesterol     Inguinal hernia without mention of obstruction or gangrene, unilateral or unspecified, (not specified as recurrent)     Myalgia and myositis, unspecified     Obesity, unspecified     Special screening for malignant neoplasm of prostate     Type II or unspecified type diabetes mellitus without mention  of complication, uncontrolled     Undiagnosed cardiac murmurs     Unspecified sleep apnea EDWARD PSG 13    AHI 21.7 SaO2 yashira 79.9 %     Wears glasses       Past Surgical History:   Procedure Laterality Date    CABG  2012    CABG x 3    CATH TRANSCATHETER AORTIC VALVE REPLACEMENT  2012    COLONOSCOPY  2014    Procedure: COLONOSCOPY;  Surgeon: Ton Oliveira MD;  Location:  ENDOSCOPY    HERNIA SURGERY      VALVE REPAIR        Social History:   Social History     Socioeconomic History    Marital status:    Tobacco Use    Smoking status: Former     Years: 20     Types: Cigarettes     Quit date: 1975     Years since quittin.6     Passive exposure: Never    Smokeless tobacco: Never   Vaping Use    Vaping Use: Never used   Substance and Sexual Activity    Alcohol use: Yes     Comment: 8-10 drinks per week    Drug use: Never   Other Topics Concern    Caffeine Concern Yes    Exercise No     Exercise: once per week,  twice per week.  Diet: watches fats closely and watches sugar closely     REVIEW OF SYSTEMS:   GENERAL HEALTH: feels well otherwise  SKIN: denies any unusual skin lesions or rashes  RESPIRATORY: denies shortness of breath with exertion  CARDIOVASCULAR: denies chest pain on exertion  GI: denies abdominal pain and denies heartburn  NEURO: denies headaches    EXAM:   /78   Pulse 80   Temp 97.8 °F (36.6 °C)   Resp 16   Ht 5' 8\" (1.727 m)   Wt 198 lb (89.8 kg)   SpO2 98%   BMI 30.11 kg/m²   GENERAL: well developed, well nourished,in no apparent distress  SKIN: no rashes,no suspicious lesions  NECK: supple,no adenopathy,no bruits  LUNGS: clear to auscultation  CARDIO: RRR without murmur  GI: good BS's,no masses, HSM or tenderness  EXTREMITIES: no cyanosis, clubbing or edema  NEURO: Bilateral barefoot skin diabetic exam is normal, visualized feet and the appearance is normal.  Bilateral monofilament/sensation of both feet is normal.  Pulsation pedal pulse exam of both  lower legs/feet is normal as well.         ASSESSMENT AND PLAN:   yKler Haji is a 83 year old male who presents for a recheck of his diabetes. HTN and PAF. Diabetic control is at goal  HTN is controlled. PAF is rate controlled.  Recommendations are: continue present meds, check HgbA1C, check fasting lipids and CMP, lose wgt with carbohydrate controlled diet and exercise, , check feet daily.  The patient indicates understanding of these issues and agrees to the plan.  The patient is asked to return in 6 m.    History/Other:   Review of Systems  Current Outpatient Medications   Medication Sig Dispense Refill    atorvastatin 40 MG Oral Tab Take 1 tablet (40 mg total) by mouth daily. 90 tablet 3    LOSARTAN 25 MG Oral Tab TAKE 1 TABLET(25 MG) BY MOUTH DAILY 90 tablet 0    albuterol 108 (90 Base) MCG/ACT Inhalation Aero Soln Inhale 2 puffs into the lungs every 6 (six) hours as needed. 1 each 0    METFORMIN 500 MG Oral Tab TAKE 1 TABLET(500 MG) BY MOUTH TWICE DAILY WITH MEALS 180 tablet 1    Metoprolol Succinate  MG Oral Tablet 24 Hr Take 1 tablet (200 mg total) by mouth daily. 90 tablet 2    glipiZIDE 5 MG Oral Tab TAKE 1 TABLET(5 MG) BY MOUTH TWICE DAILY BEFORE MEALS 180 tablet 1    furosemide 20 MG Oral Tab TAKE 1 TABLET BY MOUTH EVERY OTHER DAY. TAKE 2 TABLETS ON EVEN DAYS, 1 TABLET ON ODD DAYS 45 tablet 2    allopurinol 100 MG Oral Tab Take 1 tablet (100 mg total) by mouth daily. 90 tablet 3    furosemide 40 MG Oral Tab Take 1 tablet (40 mg total) by mouth every other day. 45 tablet 3    spironolactone 25 MG Oral Tab Take 0.5 tablets (12.5 mg total) by mouth daily. 90 tablet 3    warfarin 4 MG Oral Tab TAKE 1 TABLET BY MOUTH DAILY OR AS DIRECTED BY ANTICOAGULATION CLINIC 90 tablet 4    amiodarone 200 MG Oral Tab Take 1 tablet (200 mg total) by mouth daily. 90 tablet 3     Allergies:  Allergies   Allergen Reactions    Ace Inhibitors Coughing       Objective:   Physical Exam    Assessment & Plan:   1. Type 2  diabetes mellitus with stage 4 chronic kidney disease, without long-term current use of insulin (HCC)    2. Essential hypertension    3. Paroxysmal A-fib (HCC)        Orders Placed This Encounter   Procedures    Comp Metabolic Panel (14)    Hemoglobin A1C    Lipid Panel    Microalb/Creat Ratio, Random Urine       Meds This Visit:  Requested Prescriptions      No prescriptions requested or ordered in this encounter       Imaging & Referrals:  None

## 2024-01-23 ENCOUNTER — TELEPHONE (OUTPATIENT)
Dept: INTERNAL MEDICINE CLINIC | Facility: CLINIC | Age: 84
End: 2024-01-23

## 2024-01-23 NOTE — TELEPHONE ENCOUNTER
PT's Wife would like one of the nurses to go over Kyler's meds because she don't know exactly what she needs to give him     Ph: [phone]

## 2024-01-23 NOTE — TELEPHONE ENCOUNTER
Wife has questions regarding patient's medications. Would like to know if patient needs to continue Amiodarone. Informed spouse she will need to reach out to his cardiologist- Dr. Fagan to discuss that. Requesting medication list be mailed to their home. Confirmed address with spouse. Med list printed and placed to mail.

## 2024-02-05 NOTE — PROGRESS NOTES
6/12/2018  2:30 PM    Joseph Treadwell MD     [facility]  6/14/2018  9:00 AM    [facility]         [facility]          [facility]  6/22/2018  8:30 AM    [facility]       Vikas Rater  7/31/2018  2:40 PM    An
Discussed results with pt.   Understanding expressed
DISPLAY PLAN FREE TEXT

## 2024-03-17 DIAGNOSIS — I10 ESSENTIAL HYPERTENSION: ICD-10-CM

## 2024-03-17 RX ORDER — LOSARTAN POTASSIUM 25 MG/1
25 TABLET ORAL DAILY
Qty: 90 TABLET | Refills: 0 | Status: SHIPPED | OUTPATIENT
Start: 2024-03-17

## 2024-04-12 ENCOUNTER — LAB ENCOUNTER (OUTPATIENT)
Dept: LAB | Facility: HOSPITAL | Age: 84
End: 2024-04-12
Attending: INTERNAL MEDICINE
Payer: MEDICARE

## 2024-04-12 DIAGNOSIS — N18.4 STAGE 4 CHRONIC KIDNEY DISEASE (HCC): ICD-10-CM

## 2024-04-12 LAB
CREAT UR-SCNC: 136 MG/DL
PROT UR-MCNC: 30.3 MG/DL

## 2024-04-12 PROCEDURE — 82570 ASSAY OF URINE CREATININE: CPT

## 2024-04-12 PROCEDURE — 84156 ASSAY OF PROTEIN URINE: CPT

## 2024-04-16 DIAGNOSIS — J40 BRONCHITIS: ICD-10-CM

## 2024-04-16 RX ORDER — ALBUTEROL SULFATE 90 UG/1
2 AEROSOL, METERED RESPIRATORY (INHALATION) EVERY 6 HOURS PRN
Qty: 1 EACH | Refills: 0 | Status: SHIPPED | OUTPATIENT
Start: 2024-04-16

## 2024-04-16 NOTE — TELEPHONE ENCOUNTER
Medication requested: albuterol 108 (90 Base) MCG/ACT Inhalation Aero Soln [[id number]] (Order [id number])   Dose: SEE ABOVE    Is patient requesting 30 or 90 day supply:  90    Pharmacy name/location:    [id number] - [place] [address] AT [address], [phone], [phone]   [address] [facility] [address]   Phone: [phone] Fax: [phone]       LOV:  1/16/24    Is the patient due for appointment: NO (if so, please schedule)    Additional notes:  NA

## 2024-04-16 NOTE — TELEPHONE ENCOUNTER
Patient requesting refill     Last time medication was refilled 12/20/2023  Last OV 01/16/2024  Next OV due/scheduled   Future Appointments   Date Time Provider Department Center   4/17/2024 11:00 AM Rhett Jones MD [facility]   7/16/2024 11:30 AM Abhi Arellano MD [facility]       Failed protocol.     Sent to Dr. Arellano for approval

## 2024-04-17 ENCOUNTER — OFFICE VISIT (OUTPATIENT)
Dept: NEPHROLOGY | Facility: CLINIC | Age: 84
End: 2024-04-17
Payer: MEDICARE

## 2024-04-17 VITALS — DIASTOLIC BLOOD PRESSURE: 70 MMHG | WEIGHT: 203.5 LBS | SYSTOLIC BLOOD PRESSURE: 128 MMHG | BODY MASS INDEX: 31 KG/M2

## 2024-04-17 DIAGNOSIS — M10.9 GOUT, UNSPECIFIED CAUSE, UNSPECIFIED CHRONICITY, UNSPECIFIED SITE: Primary | ICD-10-CM

## 2024-04-17 DIAGNOSIS — N18.4 CKD (CHRONIC KIDNEY DISEASE) STAGE 4, GFR 15-29 ML/MIN (HCC): Primary | ICD-10-CM

## 2024-04-17 PROCEDURE — 99213 OFFICE O/P EST LOW 20 MIN: CPT | Performed by: INTERNAL MEDICINE

## 2024-04-17 RX ORDER — ALLOPURINOL 100 MG/1
100 TABLET ORAL DAILY
Qty: 90 TABLET | Refills: 3 | Status: SHIPPED | OUTPATIENT
Start: 2024-04-17

## 2024-04-17 NOTE — PROGRESS NOTES
Nephrology Clinic Note      Kyler Haji is a 83 year old male.    HPI:   No chief complaint on file.    82 y/o male with hxof HTN, DM, Afib, CKD 4 here for follow up.    Here with his wife    Doing great;       Denies any gout problems       NO LE edema  No dysuria  No hematuria      HISTORY:  Past Medical History:    Abdominal hernia    Actinic keratosis    Chest pain, unspecified    Cough    Diabetes (HCC)    Diabetes mellitus (HCC)    Dysmetabolic syndrome X    Easy bruising    Essential hypertension    Essential hypertension, malignant    Flatulence/gas pain/belching    Hemorrhoids    High cholesterol    Inguinal hernia without mention of obstruction or gangrene, unilateral or unspecified, (not specified as recurrent)    Myalgia and myositis, unspecified    Obesity, unspecified    Special screening for malignant neoplasm of prostate    Type II or unspecified type diabetes mellitus without mention of complication, uncontrolled    Undiagnosed cardiac murmurs    Unspecified sleep apnea    AHI 21.7 SaO2 yashira 79.9 %     Wears glasses      Past Surgical History:   Procedure Laterality Date    Cabg  2012    CABG x 3    Cath transcatheter aortic valve replacement  2012    Colonoscopy  2014    Procedure: COLONOSCOPY;  Surgeon: Ton Oliveira MD;  Location:  ENDOSCOPY    Hernia surgery      Valve repair        Family History   Problem Relation Age of Onset    Prostate Cancer Brother     Diabetes Father     Diabetes Mother       Social History:   Social History     Socioeconomic History    Marital status:    Tobacco Use    Smoking status: Former     Current packs/day: 0.00     Types: Cigarettes     Start date: 1955     Quit date: 1975     Years since quittin.8     Passive exposure: Never    Smokeless tobacco: Never   Vaping Use    Vaping status: Never Used   Substance and Sexual Activity    Alcohol use: Yes     Comment: 8-10 drinks per week    Drug use: Never   Other Topics Concern     Caffeine Concern Yes    Exercise No        Medications (Active prior to today's visit):  Current Outpatient Medications   Medication Sig Dispense Refill    allopurinol 100 MG Oral Tab Take 1 tablet (100 mg total) by mouth daily. 90 tablet 3    albuterol 108 (90 Base) MCG/ACT Inhalation Aero Soln Inhale 2 puffs into the lungs every 6 (six) hours as needed. 1 each 0    LOSARTAN 25 MG Oral Tab TAKE 1 TABLET(25 MG) BY MOUTH DAILY 90 tablet 0    atorvastatin 40 MG Oral Tab Take 1 tablet (40 mg total) by mouth daily. 90 tablet 3    METFORMIN 500 MG Oral Tab TAKE 1 TABLET(500 MG) BY MOUTH TWICE DAILY WITH MEALS 180 tablet 1    Metoprolol Succinate  MG Oral Tablet 24 Hr Take 1 tablet (200 mg total) by mouth daily. 90 tablet 2    glipiZIDE 5 MG Oral Tab TAKE 1 TABLET(5 MG) BY MOUTH TWICE DAILY BEFORE MEALS 180 tablet 1    furosemide 20 MG Oral Tab TAKE 1 TABLET BY MOUTH EVERY OTHER DAY. TAKE 2 TABLETS ON EVEN DAYS, 1 TABLET ON ODD DAYS 45 tablet 2    furosemide 40 MG Oral Tab Take 1 tablet (40 mg total) by mouth every other day. 45 tablet 3    spironolactone 25 MG Oral Tab Take 0.5 tablets (12.5 mg total) by mouth daily. 90 tablet 3    warfarin 4 MG Oral Tab TAKE 1 TABLET BY MOUTH DAILY OR AS DIRECTED BY ANTICOAGULATION CLINIC 90 tablet 4    amiodarone 200 MG Oral Tab Take 1 tablet (200 mg total) by mouth daily. 90 tablet 3       Allergies:  Allergies   Allergen Reactions    Ace Inhibitors Coughing         ROS:     Denies fever/chills  Denies wt loss/gain  Denies HA or visual changes  Denies CP or palpitations  Denies SOB/cough/hemoptysis  Denies abd or flank pain  Denies N/V/D  Denies change in urinary habits or gross hematuria  Denies LE edema  Denies skin rashes/myalgias/arthralgias      PHYSICAL EXAM:   There were no vitals taken for this visit.  Wt Readings from Last 6 Encounters:   01/16/24 198 lb (89.8 kg)   12/20/23 205 lb (93 kg)   10/06/23 208 lb (94.3 kg)   09/11/23 199 lb 1.2 oz (90.3 kg)   08/22/23 199  lb (90.3 kg)   07/18/23 204 lb (92.5 kg)        General: Alert and oriented in no apparent distress.    HEENT: No scleral icterus, MMM  Neck: Supple, no TAYLER or thyromegaly  Cardiac: Regular rate and rhythm, S1, S2 normal, no murmur or rub  Lungs: Clear without wheezes, rales, rhonchi.    Abdomen: Soft, non-tender. + bowel sounds, no palpable organomegaly  Extremities: Without clubbing, cyanosis or edema.  Neurologic: Alert and oriented, normal affect, cranial nerves grossly intact, moving all extremities  Skin: Warm and dry, no rashes     Latest Reference Range & Units 01/16/24 11:32 04/12/24 08:41   Glucose 70 - 99 mg/dL 202 (H)    HEMOGLOBIN A1c <5.7 % 7.1 (H)    ESTIMATED AVERAGE GLUCOSE 68 - 126 mg/dL 157 (H)    Sodium 136 - 145 mmol/L 140    Potassium 3.5 - 5.1 mmol/L 4.5    Chloride 98 - 112 mmol/L 113 (H)    Carbon Dioxide, Total 21.0 - 32.0 mmol/L 26.0    BUN 9 - 23 mg/dL 40 (H)    CREATININE 0.70 - 1.30 mg/dL 2.55 (H)    CALCIUM 8.5 - 10.1 mg/dL 9.0    EGFR >=60 mL/min/1.73m2 24 (L)    ANION GAP 0 - 18 mmol/L 1    CALCULATED OSMOLALITY 275 - 295 mOsm/kg 306 (H)    ALKALINE PHOSPHATASE 45 - 117 U/L 82    AST (SGOT) 15 - 37 U/L 40 (H)    ALT (SGPT) 16 - 61 U/L 50    Total Bilirubin 0.1 - 2.0 mg/dL 0.4    Globulin 2.8 - 4.4 g/dL 3.9    Cholesterol, Total <200 mg/dL 131    Triglycerides 30 - 149 mg/dL 144    HDL Cholesterol 40 - 59 mg/dL 48    VLDL 0 - 30 mg/dL 21    NON-HDL CHOLESTEROL <130 mg/dL 83    LDL Cholesterol Calc <100 mg/dL 58    A/G Ratio 1.0 - 2.0  0.8 (L)    PROTEIN, TOTAL 6.4 - 8.2 g/dL 7.2    Albumin 3.4 - 5.0 g/dL 3.3 (L)    Patient Fasting for CMP?  Yes    Patient Fasting for Lipid?  Yes    TOTAL PROTEIN URINE RANDOM mg/dL  30.3   CREATININE UR RANDOM mg/dL 63.80 136.00   MALB URINE mg/dL 1.57    MALB/CRE CALC <=30.0 ug/mg 24.6    (H): Data is abnormally high  (L): Data is abnormally low    Pth 134.4    ASSESSMENT/PLAN:       1. CKD-related to HTN/DM; UA is fairly bland most recently. Volume  status stable.  - encourage hydration  - cpm; will monitor CKD profile  - have discussed RRT/transplant options in past   - GFR too low for sglt2i; discussed kerendia - not interested     2. Gout - no further flares. Continue allopurinol     3. HTN- controlled; cpm    4. DM; controlled     5. Afib- controlled; on AC     6. BMD-  otc vit D daily; monitor ; PTH @ goal for CKD stage    F/U 4-6 mos    Rhett Jones  4/17/2024

## 2024-04-17 NOTE — TELEPHONE ENCOUNTER
Medication requested: allopurinol 100 MG Oral Tab     Is patient requesting 30 or 90 day supply:  90 + 3 refills    Pharmacy name/location:  [id number] [place] [address] AT [address], [phone], [phone]     LOV:  01/16/2024    Is the patient due for appointment: NO    Additional notes:  Second request

## 2024-04-17 NOTE — TELEPHONE ENCOUNTER
Last time medication was refilled 12/133/22  Last OV 1/16/24  Next OV due/scheduled 7/16/24  Medication not on protocol

## 2024-04-22 DIAGNOSIS — E11.22 TYPE 2 DIABETES MELLITUS WITH STAGE 4 CHRONIC KIDNEY DISEASE, WITHOUT LONG-TERM CURRENT USE OF INSULIN (HCC): ICD-10-CM

## 2024-04-22 DIAGNOSIS — N18.4 TYPE 2 DIABETES MELLITUS WITH STAGE 4 CHRONIC KIDNEY DISEASE, WITHOUT LONG-TERM CURRENT USE OF INSULIN (HCC): ICD-10-CM

## 2024-04-22 RX ORDER — GLIPIZIDE 5 MG/1
5 TABLET ORAL
Qty: 180 TABLET | Refills: 1 | Status: SHIPPED | OUTPATIENT
Start: 2024-04-22

## 2024-04-22 NOTE — TELEPHONE ENCOUNTER
Last time medication was refilled 10/16/2023  Last OV 01/16/2024  Next OV due/scheduled   Future Appointments   Date Time Provider Department Center   7/16/2024 11:30 AM Abhi Arellano MD [facility]   10/30/2024 11:00 AM Rhett Jones MD [facility]       Failed protocol. .    Sent to Dr. Arellano for approval

## 2024-04-28 DIAGNOSIS — I10 ESSENTIAL HYPERTENSION: ICD-10-CM

## 2024-04-28 RX ORDER — FUROSEMIDE 20 MG/1
TABLET ORAL
Qty: 45 TABLET | Refills: 2 | Status: SHIPPED | OUTPATIENT
Start: 2024-04-28

## 2024-06-10 DIAGNOSIS — N18.4 TYPE 2 DIABETES MELLITUS WITH STAGE 4 CHRONIC KIDNEY DISEASE, WITHOUT LONG-TERM CURRENT USE OF INSULIN (HCC): ICD-10-CM

## 2024-06-10 DIAGNOSIS — E11.22 TYPE 2 DIABETES MELLITUS WITH STAGE 4 CHRONIC KIDNEY DISEASE, WITHOUT LONG-TERM CURRENT USE OF INSULIN (HCC): ICD-10-CM

## 2024-06-10 NOTE — TELEPHONE ENCOUNTER
Last time medication was refilled 12/11/2023  Last office visit  01/16/2024  Next office visit due/scheduled   Future Appointments   Date Time Provider Department Center   7/16/2024 11:30 AM Abhi Arellano MD [facility]   10/30/2024 11:00 AM Rhett Jones MD [facility]       Failed protocol.     Sent to Doctor Arellano for approval

## 2024-06-16 DIAGNOSIS — I10 ESSENTIAL HYPERTENSION: ICD-10-CM

## 2024-06-16 RX ORDER — LOSARTAN POTASSIUM 25 MG/1
25 TABLET ORAL DAILY
Qty: 90 TABLET | Refills: 1 | Status: SHIPPED | OUTPATIENT
Start: 2024-06-16

## 2024-06-19 DIAGNOSIS — I10 ESSENTIAL HYPERTENSION: ICD-10-CM

## 2024-06-19 RX ORDER — FUROSEMIDE 40 MG/1
40 TABLET ORAL EVERY OTHER DAY
Qty: 45 TABLET | Refills: 3 | Status: SHIPPED | OUTPATIENT
Start: 2024-06-19

## 2024-06-19 RX ORDER — SPIRONOLACTONE 25 MG/1
12.5 TABLET ORAL DAILY
Qty: 90 TABLET | Refills: 3 | Status: SHIPPED | OUTPATIENT
Start: 2024-06-19

## 2024-06-19 NOTE — TELEPHONE ENCOUNTER
furosemide 40 MG Oral Tab   Last time medication was refilled 12/13/2022  Last office visit  01/16/2024  Next office visit due/scheduled   Future Appointments   Date Time Provider Department Center   7/16/2024 11:30 AM Abhi Arlelano MD [facility]            Medication failed protocol, please review. Routed to Doctor Arellano    spironolactone 25 MG Oral Tab   Last time medication was refilled 12/13/2022  Last office visit  01/16/2024  Next office visit due/scheduled   Future Appointments   Date Time Provider Department Center   7/16/2024 11:30 AM Abhi Arellano MD [facility]            Medication failed protocol, please review. Routed to Doctor Arellano

## 2024-06-19 NOTE — TELEPHONE ENCOUNTER
Medication requested: furosemide 40 MG Oral Tab   And: spironolactone 25 MG Oral Tab     Is patient requesting 30 or 90 day supply:  90 + 3 refills     Pharmacy name/location:  [id number] [place] [address] AT [address], [phone], [phone] [[id number]]     LOV:  01/16/2024    Is the patient due for appointment: NO (if so, please schedule)    Additional notes:  NO

## 2024-06-20 DIAGNOSIS — U07.1 COVID: Primary | ICD-10-CM

## 2024-06-20 NOTE — TELEPHONE ENCOUNTER
PATIENT TESTED POSITIVE FOR COVID THIS MORNING    PATIENT REQUESTING PAXLOVID DUE TO HEART CONDITION    PATIENTS SYMPTOMS:    RUNNY NOSE; NO FEVER; NO HEADACHES  NO OTHER SYMPTOMS      PLEASE ADVISE   [phone]

## 2024-06-20 NOTE — TELEPHONE ENCOUNTER
Symptoms of cough and running nose started yesterday. Tested positive today. Interested in Paxlovid. Reviewed medication list with patient. Confirmed he is taking Coumadin, Amiodarone and Atorvastatin. Notified would discuss with Dr. Arellano.

## 2024-06-20 NOTE — TELEPHONE ENCOUNTER
Per Dr. Arellano. Patient can continue Coumadin but monitor for signs of bleeding. Hold statin and Amiodarone for five days of treatment with renal dose Paxlovid.   Patient also advised to notify his cardiologist that Paxlovid is being prescribed and of provider recommendations. Patient verbalized understanding. Prescription sent to Dr. Arellano for approval.

## 2024-06-21 ENCOUNTER — TELEPHONE (OUTPATIENT)
Dept: INTERNAL MEDICINE CLINIC | Facility: CLINIC | Age: 84
End: 2024-06-21

## 2024-06-21 NOTE — TELEPHONE ENCOUNTER
Medication requested: nirmatrelvir-ritonavir 150-100 MG Oral Tablet Therapy Pack     Problem: Clarification    Directions:  PAXLOVID is not recommended in patients with severe renal impairment.  THIS IS RENAL DOSING PAXLOVID.     Additional notes:  Pharmacy would like to know if you want to dispense or not

## 2024-06-21 NOTE — TELEPHONE ENCOUNTER
Spoke with [facility] pharmacist he is clarifying directions for paxlovid.  Shazia informed patient needs to take renal dosing of paxlovid.  Also, hold atorvastatin and amiodarone while taking and speak with cardiologist to ensure it is ok to stop amiodarone.  He expressed understanding.

## 2024-07-16 ENCOUNTER — OFFICE VISIT (OUTPATIENT)
Dept: INTERNAL MEDICINE CLINIC | Facility: CLINIC | Age: 84
End: 2024-07-16
Payer: MEDICARE

## 2024-07-16 ENCOUNTER — LAB ENCOUNTER (OUTPATIENT)
Dept: LAB | Age: 84
End: 2024-07-16
Attending: INTERNAL MEDICINE
Payer: MEDICARE

## 2024-07-16 VITALS
SYSTOLIC BLOOD PRESSURE: 122 MMHG | TEMPERATURE: 98 F | RESPIRATION RATE: 16 BRPM | DIASTOLIC BLOOD PRESSURE: 74 MMHG | OXYGEN SATURATION: 98 % | HEIGHT: 68 IN | WEIGHT: 201 LBS | HEART RATE: 72 BPM | BODY MASS INDEX: 30.46 KG/M2

## 2024-07-16 DIAGNOSIS — Z00.00 ENCOUNTER FOR ANNUAL HEALTH EXAMINATION: ICD-10-CM

## 2024-07-16 DIAGNOSIS — Z95.810 ICD (IMPLANTABLE CARDIOVERTER-DEFIBRILLATOR) IN PLACE: ICD-10-CM

## 2024-07-16 DIAGNOSIS — E78.5 HYPERLIPIDEMIA ASSOCIATED WITH TYPE 2 DIABETES MELLITUS (HCC): ICD-10-CM

## 2024-07-16 DIAGNOSIS — I15.2 HYPERTENSION ASSOCIATED WITH DIABETES (HCC): ICD-10-CM

## 2024-07-16 DIAGNOSIS — D68.4 ACQUIRED COAGULATION FACTOR INHIBITOR DISORDER (HCC): ICD-10-CM

## 2024-07-16 DIAGNOSIS — E11.69 HYPERLIPIDEMIA ASSOCIATED WITH TYPE 2 DIABETES MELLITUS (HCC): ICD-10-CM

## 2024-07-16 DIAGNOSIS — Z95.2 H/O AORTIC VALVE REPLACEMENT: ICD-10-CM

## 2024-07-16 DIAGNOSIS — Z00.00 ANNUAL PHYSICAL EXAM: Primary | ICD-10-CM

## 2024-07-16 DIAGNOSIS — Z95.1 S/P CABG (CORONARY ARTERY BYPASS GRAFT): ICD-10-CM

## 2024-07-16 DIAGNOSIS — I42.0 ISCHEMIC DILATED CARDIOMYOPATHY (HCC): Chronic | ICD-10-CM

## 2024-07-16 DIAGNOSIS — I25.5 ISCHEMIC DILATED CARDIOMYOPATHY (HCC): Chronic | ICD-10-CM

## 2024-07-16 DIAGNOSIS — I25.10 CORONARY ARTERY DISEASE INVOLVING NATIVE CORONARY ARTERY OF NATIVE HEART WITHOUT ANGINA PECTORIS: ICD-10-CM

## 2024-07-16 DIAGNOSIS — E11.22 TYPE 2 DIABETES MELLITUS WITH STAGE 4 CHRONIC KIDNEY DISEASE, WITHOUT LONG-TERM CURRENT USE OF INSULIN (HCC): ICD-10-CM

## 2024-07-16 DIAGNOSIS — I44.7 LBBB (LEFT BUNDLE BRANCH BLOCK): ICD-10-CM

## 2024-07-16 DIAGNOSIS — I77.9 CAROTID ARTERY DISEASE, UNSPECIFIED LATERALITY, UNSPECIFIED TYPE (HCC): ICD-10-CM

## 2024-07-16 DIAGNOSIS — I48.0 PAROXYSMAL A-FIB (HCC): ICD-10-CM

## 2024-07-16 DIAGNOSIS — E11.59 HYPERTENSION ASSOCIATED WITH DIABETES (HCC): ICD-10-CM

## 2024-07-16 DIAGNOSIS — I50.22 CHRONIC SYSTOLIC HEART FAILURE (HCC): ICD-10-CM

## 2024-07-16 DIAGNOSIS — N18.4 TYPE 2 DIABETES MELLITUS WITH STAGE 4 CHRONIC KIDNEY DISEASE, WITHOUT LONG-TERM CURRENT USE OF INSULIN (HCC): ICD-10-CM

## 2024-07-16 DIAGNOSIS — I65.21 RIGHT-SIDED CAROTID ARTERY OCCLUSION WITHOUT CEREBRAL INFARCTION: ICD-10-CM

## 2024-07-16 LAB
BASOPHILS # BLD AUTO: 0.05 X10(3) UL (ref 0–0.2)
BASOPHILS NFR BLD AUTO: 0.8 %
EOSINOPHIL # BLD AUTO: 0.15 X10(3) UL (ref 0–0.7)
EOSINOPHIL NFR BLD AUTO: 2.5 %
ERYTHROCYTE [DISTWIDTH] IN BLOOD BY AUTOMATED COUNT: 14.1 %
EST. AVERAGE GLUCOSE BLD GHB EST-MCNC: 140 MG/DL (ref 68–126)
HBA1C MFR BLD: 6.5 % (ref ?–5.7)
HCT VFR BLD AUTO: 34.9 %
HGB BLD-MCNC: 11.5 G/DL
IMM GRANULOCYTES # BLD AUTO: 0.01 X10(3) UL (ref 0–1)
IMM GRANULOCYTES NFR BLD: 0.2 %
LYMPHOCYTES # BLD AUTO: 1.13 X10(3) UL (ref 1–4)
LYMPHOCYTES NFR BLD AUTO: 19.2 %
MCH RBC QN AUTO: 31.2 PG (ref 26–34)
MCHC RBC AUTO-ENTMCNC: 33 G/DL (ref 31–37)
MCV RBC AUTO: 94.6 FL
MONOCYTES # BLD AUTO: 0.55 X10(3) UL (ref 0.1–1)
MONOCYTES NFR BLD AUTO: 9.3 %
NEUTROPHILS # BLD AUTO: 4 X10 (3) UL (ref 1.5–7.7)
NEUTROPHILS # BLD AUTO: 4 X10(3) UL (ref 1.5–7.7)
NEUTROPHILS NFR BLD AUTO: 68 %
PLATELET # BLD AUTO: 166 10(3)UL (ref 150–450)
RBC # BLD AUTO: 3.69 X10(6)UL
WBC # BLD AUTO: 5.9 X10(3) UL (ref 4–11)

## 2024-07-16 PROCEDURE — 85025 COMPLETE CBC W/AUTO DIFF WBC: CPT

## 2024-07-16 PROCEDURE — 83036 HEMOGLOBIN GLYCOSYLATED A1C: CPT

## 2024-07-16 PROCEDURE — 84443 ASSAY THYROID STIM HORMONE: CPT

## 2024-07-16 PROCEDURE — G0439 PPPS, SUBSEQ VISIT: HCPCS | Performed by: INTERNAL MEDICINE

## 2024-07-16 PROCEDURE — 3074F SYST BP LT 130 MM HG: CPT | Performed by: INTERNAL MEDICINE

## 2024-07-16 PROCEDURE — 80053 COMPREHEN METABOLIC PANEL: CPT

## 2024-07-16 PROCEDURE — 80061 LIPID PANEL: CPT

## 2024-07-16 PROCEDURE — 99499 UNLISTED E&M SERVICE: CPT | Performed by: INTERNAL MEDICINE

## 2024-07-16 PROCEDURE — 3008F BODY MASS INDEX DOCD: CPT | Performed by: INTERNAL MEDICINE

## 2024-07-16 PROCEDURE — 36415 COLL VENOUS BLD VENIPUNCTURE: CPT

## 2024-07-16 PROCEDURE — 82570 ASSAY OF URINE CREATININE: CPT

## 2024-07-16 PROCEDURE — 3078F DIAST BP <80 MM HG: CPT | Performed by: INTERNAL MEDICINE

## 2024-07-16 PROCEDURE — 82043 UR ALBUMIN QUANTITATIVE: CPT

## 2024-07-16 PROCEDURE — 96160 PT-FOCUSED HLTH RISK ASSMT: CPT | Performed by: INTERNAL MEDICINE

## 2024-07-16 NOTE — PROGRESS NOTES
Subjective:   Kyler Haji is a 83 year old male who presents for a MA AHA (Medicare Advantage Annual Health Assessment) and Subsequent Annual Wellness visit (Pt already had Initial Annual Wellness) and scheduled follow up of multiple significant but stable problems.   HPI  Normal state of health  Denies cp or sob  No hypoglycemia events  No side effects from meds    PAST MEDICAL, SOCIAL, FAMILY HISTORIES REVIEWED WITH PT        History/Other:   Fall Risk Assessment:   He has been screened for Falls and is low risk.      Cognitive Assessment:   He had a completely normal cognitive assessment - see flowsheet entries       Functional Ability/Status:   Kyler Haji has some abnormal functions as listed below:  He has Hearing problems based on screening of functional status.      Depression Screening (PHQ):  PHQ-2 SCORE: 0  , done 7/16/2024   If you checked off any problems, how difficult have these problems made it for you to do your work, take care of things at home, or get along with other people?: Not difficult at all             Advanced Directives:   He does NOT have a Living Will. [Do you have a living will?: No]  He does NOT have a Power of  for Health Care. [Do you have a healthcare power of ?: No]  Discussed Advance Care Planning with patient (and family/surrogate if present). Standard forms made available to patient in After Visit Summary.      Patient Active Problem List   Diagnosis    Paroxysmal A-fib (HCC)    S/P CABG (coronary artery bypass graft)    H/O aortic valve replacement    LBBB (left bundle branch block)    Monitoring for long-term anticoagulant use    Coronary artery disease involving native heart without angina pectoris    Type 2 diabetes mellitus with stage 4 chronic kidney disease, without long-term current use of insulin (HCC)    Right-sided carotid artery occlusion without cerebral infarction    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%    Hypertension associated  with diabetes (HCC)    Acquired coagulation factor inhibitor disorder (HCC)    Hyperlipidemia associated with type 2 diabetes mellitus (HCC)    Chronic systolic heart failure (HCC)    Carotid arterial disease (HCC)    ICD (implantable cardioverter-defibrillator) [facility]     Allergies:  He is allergic to ace inhibitors.    Current Medications:  Outpatient Medications Marked as Taking for the 7/16/24 encounter (Office Visit) with Abhi Arellano MD   Medication Sig    furosemide 40 MG Oral Tab Take 1 tablet (40 mg total) by mouth every other day.    spironolactone 25 MG Oral Tab Take 0.5 tablets (12.5 mg total) by mouth daily.    losartan 25 MG Oral Tab TAKE 1 TABLET(25 MG) BY MOUTH DAILY    METFORMIN 500 MG Oral Tab TAKE 1 TABLET(500 MG) BY MOUTH TWICE DAILY WITH MEALS    furosemide 20 MG Oral Tab TAKE 40MG ON EVEN DAYS, AND 20MG ON ODD DAYS    glipiZIDE 5 MG Oral Tab Take 1 tablet (5 mg total) by mouth 2 (two) times daily before meals.    allopurinol 100 MG Oral Tab Take 1 tablet (100 mg total) by mouth daily.    albuterol 108 (90 Base) MCG/ACT Inhalation Aero Soln Inhale 2 puffs into the lungs every 6 (six) hours as needed.    atorvastatin 40 MG Oral Tab Take 1 tablet (40 mg total) by mouth daily.    Metoprolol Succinate  MG Oral Tablet 24 Hr Take 1 tablet (200 mg total) by mouth daily.    warfarin 4 MG Oral Tab TAKE 1 TABLET BY MOUTH DAILY OR AS DIRECTED BY ANTICOAGULATION CLINIC    amiodarone 200 MG Oral Tab Take 1 tablet (200 mg total) by mouth daily.       Medical History:  He  has a past medical history of Abdominal hernia, Actinic keratosis, Chest pain, unspecified, Cough, Diabetes (HCC), Diabetes mellitus (HCC), Dysmetabolic syndrome X, Easy bruising, Essential hypertension, Essential hypertension, malignant, Flatulence/gas pain/belching, Hemorrhoids, High cholesterol, Inguinal hernia without mention of obstruction or gangrene, unilateral or unspecified, (not specified as recurrent), Myalgia and  myositis, unspecified, Obesity, unspecified, Special screening for malignant neoplasm of prostate, Type II or unspecified type diabetes mellitus without mention of complication, uncontrolled, Undiagnosed cardiac murmurs, Unspecified sleep apnea (EDWARD PSG 13), and Wears glasses.  Surgical History:  He  has a past surgical history that includes cabg (2012); cath transcatheter aortic valve replacement (2012); colonoscopy (2014); hernia surgery; and valve repair.   Family History:  His family history includes Diabetes in his father and mother; Prostate Cancer in his brother.  Social History:  He  reports that he quit smoking about 49 years ago. His smoking use included cigarettes. He started smoking about 69 years ago. He has never been exposed to tobacco smoke. He has never used smokeless tobacco. He reports current alcohol use. He reports that he does not use drugs.    Tobacco:  He smoked tobacco in the past but quit greater than 12 months ago.  Social History     Tobacco Use   Smoking Status Former    Current packs/day: 0.00    Types: Cigarettes    Start date: 1955    Quit date: 1975    Years since quittin.1    Passive exposure: Never   Smokeless Tobacco Never        CAGE Alcohol Screen:   CAGE screening score of 0 on 2024, showing low risk of alcohol abuse.      Patient Care Team:  Abhi Arellano MD as PCP - General (Internal Medicine)  [facility]/ Luis Miguel Washington MD (Cardiovascular Diseases)  Rhett Jones MD (NEPHROLOGY)    Review of Systems  A comprehensive 10 point review of systems was completed.     Pertinent positives and negatives noted in the HPI.      Objective:   Physical Exam  General: No acute distress. Alert and oriented x 3.  HEENT: Normocephalic atraumatic. Moist mucous membranes. EOM-I. PERRLA. Anicteric.  Neck: No lymphadenopathy.   Respiratory: Clear to auscultation bilaterally. No wheezes. No rhonchi.  Cardiovascular: S1, S2.  Regular rate and rhythm.   Abdomen: Soft,  no pain.  nontender, nondistended.  Positive bowel sounds. .  Extremities: No edema or cyanosis.  Bilateral barefoot skin diabetic exam is normal, visualized feet and the appearance is normal.  Bilateral monofilament/sensation of both feet is normal.  Pulsation pedal pulse exam of both lower legs/feet is normal as well.  Integument: No rashes or lesions.    Psychiatric: Appropriate mood and affect.    /74   Pulse 72   Temp 98.1 °F (36.7 °C)   Resp 16   Ht 5' 8\" (1.727 m)   Wt 201 lb (91.2 kg)   SpO2 98%   BMI 30.56 kg/m²  Estimated body mass index is 30.56 kg/m² as calculated from the following:    Height as of this encounter: 5' 8\" (1.727 m).    Weight as of this encounter: 201 lb (91.2 kg).    Medicare Hearing Assessment:   Hearing Screening    Screening Method: Whisper Test  Whisper Test Result: Pass         Visual Acuity:   Right Eye Visual Acuity: Corrected Right Eye Chart Acuity: 20/20   Left Eye Visual Acuity: Corrected Left Eye Chart Acuity: 20/20   Both Eyes Visual Acuity: Corrected Both Eyes Chart Acuity: 20/20   Able To Tolerate Visual Acuity: Yes        Assessment & Plan:   Kyler Haji is a 83 year old male who presents for a Medicare Assessment.     1. Annual physical exam (Primary)  2. Type 2 diabetes mellitus with stage 4 chronic kidney disease, without long-term current use of insulin ([facility])  3. S/P CABG (coronary artery bypass graft)  4. Right-sided carotid artery occlusion without cerebral infarction  5. Paroxysmal A-fib ([facility])  6. LBBB (left bundle branch block)  7. 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%  8. ICD (implantable cardioverter-defibrillator) [facility]  9. Hypertension associated with diabetes ([facility])  10. H/O aortic valve replacement  11. Coronary artery disease involving native coronary artery of native heart without angina pectoris  12. Chronic systolic heart failure (HCC)  13. Carotid artery disease, unspecified  laterality, unspecified type (HCC)  Overview:  CONCLUSION: There is complete occlusion of right internal carotid        artery at its origin.               There is irregular, calcified plaque in the left carotid bulb with        Doppler findings compatible with 50-69% narrowing of luminal        diameter.  14. Acquired coagulation factor inhibitor disorder (HCC)  15. Hyperlipidemia associated with type 2 diabetes mellitus (HCC)  16. Encounter for annual health examination    Paroxysmal A-fib (HCC)-rate controlled. Cont anticoagulation     S/P CABG (coronary artery bypass graft)-stable. continue control of cad risk factors     H/O aortic valve replacement- stable. Sxs. monitor     LBBB (left bundle branch block)- stable. monitor     Coronary artery disease involving native heart without angina pectoris-stable. continue control of cad risk factors    Type 2 diabetes mellitus with stage 4 chronic kidney disease, without long-term current use of insulin (HCC) - diabetes controlled for age. CKD followed by renal service. Suspect diuretic contributing to slightly worse CKD. Pt seeing [facility] for eye care     Right-sided carotid artery occlusion without cerebral infarction- no new sxs.  continue control of cad risk factors     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%-stable. continue control of cad risk factors     Hypertension associated with diabetes (HCC)-controlled. Cont current med therapy     Acquired coagulation factor inhibitor disorder (HCC)- stable. monitor     Hyperlipidemia associated with type 2 diabetes mellitus (HCC)-controlled. Cont current med therapy     Chronic systolic heart failure (HCC)- compensated . Cont ARB, aldactone, BB     Carotid arterial disease (HCC)-stable. continue control of cad risk factors     ICD (implantable cardioverter-defibrillator) [facility]- stable     The patient indicates understanding of these issues and agrees to the plan.  Continue with current treatment  plan.  Lab work ordered.  Reinforced healthy diet, lifestyle, and exercise.      Return in about 6 months (around 1/16/2025).     Abhi Arellano MD, 7/15/2024     Supplementary Documentation:   General Health:  In the past six months, have you lost more than 10 pounds without trying?: 2 - No  Has your appetite been poor?: No  Type of Diet: Balanced  How does the patient maintain a good energy level?: Appropriate Exercise  How would you describe your daily physical activity?: Light  How would you describe your current health state?: Good  How do you maintain positive mental well-being?: Social Interaction;Visiting Family  On a scale of 0 to 10, with 0 being no pain and 10 being severe pain, what is your pain level?: 0 - (None)  In the past six months, have you experienced urine leakage?: 0-No  At any time do you feel concerned for the safety/well-being of yourself and/or your children, in your home or elsewhere?: No  Have you had any immunizations at another office such as Influenza, Hepatitis B, Tetanus, or Pneumococcal?: No    Health Maintenance   Topic Date Due    MA Annual Health Assessment  01/01/2024    COVID-19 Vaccine (6 - 2023-24 season) 02/16/2024    Diabetes Care A1C  07/16/2024    Influenza Vaccine (1) 10/01/2024    Diabetes Care Dilated Eye Exam  10/31/2024    Diabetes Care Foot Exam  01/16/2025    Diabetes Care: GFR  01/16/2025    Diabetes Care: Microalb/Creat Ratio  01/16/2025    Annual Depression Screening  Completed    Fall Risk Screening (Annual)  Completed    Pneumococcal Vaccine: 65+ Years  Completed    Zoster Vaccines  Completed    Colonoscopy  Discontinued

## 2024-07-17 LAB
ALBUMIN SERPL-MCNC: 4.3 G/DL (ref 3.2–4.8)
ALBUMIN/GLOB SERPL: 1.6 {RATIO} (ref 1–2)
ALP LIVER SERPL-CCNC: 76 U/L
ALT SERPL-CCNC: 29 U/L
ANION GAP SERPL CALC-SCNC: 7 MMOL/L (ref 0–18)
AST SERPL-CCNC: 29 U/L (ref ?–34)
BILIRUB SERPL-MCNC: 0.4 MG/DL (ref 0.2–1.1)
BUN BLD-MCNC: 41 MG/DL (ref 9–23)
CALCIUM BLD-MCNC: 9.1 MG/DL (ref 8.7–10.4)
CHLORIDE SERPL-SCNC: 106 MMOL/L (ref 98–112)
CHOLEST SERPL-MCNC: 137 MG/DL (ref ?–200)
CO2 SERPL-SCNC: 24 MMOL/L (ref 21–32)
CREAT BLD-MCNC: 2.47 MG/DL
CREAT UR-SCNC: 25.6 MG/DL
EGFRCR SERPLBLD CKD-EPI 2021: 25 ML/MIN/1.73M2 (ref 60–?)
FASTING PATIENT LIPID ANSWER: NO
FASTING STATUS PATIENT QL REPORTED: NO
GLOBULIN PLAS-MCNC: 2.7 G/DL (ref 2.8–4.4)
GLUCOSE BLD-MCNC: 181 MG/DL (ref 70–99)
HDLC SERPL-MCNC: 58 MG/DL (ref 40–59)
LDLC SERPL CALC-MCNC: 59 MG/DL (ref ?–100)
MICROALBUMIN UR-MCNC: 0.6 MG/DL
MICROALBUMIN/CREAT 24H UR-RTO: 23.4 UG/MG (ref ?–30)
NONHDLC SERPL-MCNC: 79 MG/DL (ref ?–130)
OSMOLALITY SERPL CALC.SUM OF ELEC: 299 MOSM/KG (ref 275–295)
POTASSIUM SERPL-SCNC: 4.5 MMOL/L (ref 3.5–5.1)
PROT SERPL-MCNC: 7 G/DL (ref 5.7–8.2)
SODIUM SERPL-SCNC: 137 MMOL/L (ref 136–145)
TRIGL SERPL-MCNC: 110 MG/DL (ref 30–149)
TSI SER-ACNC: 2.01 MIU/ML (ref 0.55–4.78)
VLDLC SERPL CALC-MCNC: 16 MG/DL (ref 0–30)

## 2024-09-01 DIAGNOSIS — I10 ESSENTIAL HYPERTENSION: ICD-10-CM

## 2024-09-02 RX ORDER — METOPROLOL SUCCINATE 200 MG/1
200 TABLET, EXTENDED RELEASE ORAL DAILY
Qty: 90 TABLET | Refills: 2 | Status: SHIPPED | OUTPATIENT
Start: 2024-09-02

## 2024-09-02 NOTE — TELEPHONE ENCOUNTER
Last time medication was refilled: 12/11/23  Next office visit due/scheduled: 1/20/25  Last office visit: 7/16/24  Last Labs: 7/16/24

## 2024-10-20 DIAGNOSIS — E11.22 TYPE 2 DIABETES MELLITUS WITH STAGE 4 CHRONIC KIDNEY DISEASE, WITHOUT LONG-TERM CURRENT USE OF INSULIN (HCC): ICD-10-CM

## 2024-10-20 DIAGNOSIS — N18.4 TYPE 2 DIABETES MELLITUS WITH STAGE 4 CHRONIC KIDNEY DISEASE, WITHOUT LONG-TERM CURRENT USE OF INSULIN (HCC): ICD-10-CM

## 2024-10-20 RX ORDER — GLIPIZIDE 5 MG/1
5 TABLET ORAL
Qty: 180 TABLET | Refills: 1 | Status: SHIPPED | OUTPATIENT
Start: 2024-10-20

## 2024-10-20 NOTE — TELEPHONE ENCOUNTER
Last time medication was refilled: 4/22/24  Next office visit due/scheduled: 1/20/25  Last office visit: 7/16/24  Last Labs: 7/16/24

## 2024-10-28 ENCOUNTER — LAB ENCOUNTER (OUTPATIENT)
Dept: LAB | Age: 84
End: 2024-10-28
Attending: INTERNAL MEDICINE
Payer: MEDICARE

## 2024-10-28 DIAGNOSIS — N18.4 CKD (CHRONIC KIDNEY DISEASE) STAGE 4, GFR 15-29 ML/MIN (HCC): ICD-10-CM

## 2024-10-28 LAB
ANION GAP SERPL CALC-SCNC: 7 MMOL/L (ref 0–18)
BASOPHILS # BLD AUTO: 0.04 X10(3) UL (ref 0–0.2)
BASOPHILS NFR BLD AUTO: 0.7 %
BILIRUB UR QL STRIP.AUTO: NEGATIVE
BUN BLD-MCNC: 51 MG/DL (ref 9–23)
CALCIUM BLD-MCNC: 9.7 MG/DL (ref 8.7–10.4)
CHLORIDE SERPL-SCNC: 109 MMOL/L (ref 98–112)
CLARITY UR REFRACT.AUTO: CLEAR
CO2 SERPL-SCNC: 22 MMOL/L (ref 21–32)
COLOR UR AUTO: COLORLESS
CREAT BLD-MCNC: 2.94 MG/DL
EGFRCR SERPLBLD CKD-EPI 2021: 20 ML/MIN/1.73M2 (ref 60–?)
EOSINOPHIL # BLD AUTO: 0.21 X10(3) UL (ref 0–0.7)
EOSINOPHIL NFR BLD AUTO: 3.7 %
ERYTHROCYTE [DISTWIDTH] IN BLOOD BY AUTOMATED COUNT: 15 %
FASTING STATUS PATIENT QL REPORTED: YES
GLUCOSE BLD-MCNC: 174 MG/DL (ref 70–99)
GLUCOSE UR STRIP.AUTO-MCNC: 50 MG/DL
HCT VFR BLD AUTO: 31.6 %
HGB BLD-MCNC: 9.9 G/DL
IMM GRANULOCYTES # BLD AUTO: 0.02 X10(3) UL (ref 0–1)
IMM GRANULOCYTES NFR BLD: 0.4 %
KETONES UR STRIP.AUTO-MCNC: NEGATIVE MG/DL
LEUKOCYTE ESTERASE UR QL STRIP.AUTO: NEGATIVE
LYMPHOCYTES # BLD AUTO: 1.19 X10(3) UL (ref 1–4)
LYMPHOCYTES NFR BLD AUTO: 21 %
MAGNESIUM SERPL-MCNC: 1.7 MG/DL (ref 1.6–2.6)
MCH RBC QN AUTO: 30.7 PG (ref 26–34)
MCHC RBC AUTO-ENTMCNC: 31.3 G/DL (ref 31–37)
MCV RBC AUTO: 97.8 FL
MONOCYTES # BLD AUTO: 0.55 X10(3) UL (ref 0.1–1)
MONOCYTES NFR BLD AUTO: 9.7 %
NEUTROPHILS # BLD AUTO: 3.67 X10 (3) UL (ref 1.5–7.7)
NEUTROPHILS # BLD AUTO: 3.67 X10(3) UL (ref 1.5–7.7)
NEUTROPHILS NFR BLD AUTO: 64.5 %
NITRITE UR QL STRIP.AUTO: NEGATIVE
OSMOLALITY SERPL CALC.SUM OF ELEC: 304 MOSM/KG (ref 275–295)
PH UR STRIP.AUTO: 5 [PH] (ref 5–8)
PHOSPHATE SERPL-MCNC: 3.9 MG/DL (ref 2.4–5.1)
PLATELET # BLD AUTO: 174 10(3)UL (ref 150–450)
POTASSIUM SERPL-SCNC: 4.9 MMOL/L (ref 3.5–5.1)
PROT UR STRIP.AUTO-MCNC: NEGATIVE MG/DL
PTH-INTACT SERPL-MCNC: 158.2 PG/ML (ref 18.5–88)
RBC # BLD AUTO: 3.23 X10(6)UL
RBC UR QL AUTO: NEGATIVE
SODIUM SERPL-SCNC: 138 MMOL/L (ref 136–145)
SP GR UR STRIP.AUTO: 1.01 (ref 1–1.03)
UROBILINOGEN UR STRIP.AUTO-MCNC: NORMAL MG/DL
VIT D+METAB SERPL-MCNC: 34.6 NG/ML (ref 30–100)
WBC # BLD AUTO: 5.7 X10(3) UL (ref 4–11)

## 2024-10-28 PROCEDURE — 36415 COLL VENOUS BLD VENIPUNCTURE: CPT

## 2024-10-28 PROCEDURE — 85025 COMPLETE CBC W/AUTO DIFF WBC: CPT

## 2024-10-28 PROCEDURE — 81003 URINALYSIS AUTO W/O SCOPE: CPT

## 2024-10-28 PROCEDURE — 84100 ASSAY OF PHOSPHORUS: CPT

## 2024-10-28 PROCEDURE — 83970 ASSAY OF PARATHORMONE: CPT

## 2024-10-28 PROCEDURE — 82306 VITAMIN D 25 HYDROXY: CPT

## 2024-10-28 PROCEDURE — 83735 ASSAY OF MAGNESIUM: CPT

## 2024-10-28 PROCEDURE — 80048 BASIC METABOLIC PNL TOTAL CA: CPT

## 2024-10-30 ENCOUNTER — OFFICE VISIT (OUTPATIENT)
Dept: NEPHROLOGY | Facility: CLINIC | Age: 84
End: 2024-10-30
Payer: MEDICARE

## 2024-10-30 VITALS — DIASTOLIC BLOOD PRESSURE: 60 MMHG | BODY MASS INDEX: 31 KG/M2 | WEIGHT: 202.38 LBS | SYSTOLIC BLOOD PRESSURE: 96 MMHG

## 2024-10-30 DIAGNOSIS — N18.4 CKD (CHRONIC KIDNEY DISEASE) STAGE 4, GFR 15-29 ML/MIN (HCC): Primary | ICD-10-CM

## 2024-10-30 PROCEDURE — 99213 OFFICE O/P EST LOW 20 MIN: CPT | Performed by: INTERNAL MEDICINE

## 2024-10-30 NOTE — PROGRESS NOTES
Nephrology Clinic Note      Kyler Haji is a 84 year old male.    HPI:     Chief Complaint   Patient presents with    Chronic Kidney Disease    Hypertension     85 y/o male with hxof HTN, DM, Afib, CKD 4 here for follow up.    Here with his wife    Doing great;       Denies any gout problems       NO LE edema  No dysuria  No hematuria      HISTORY:  Past Medical History:    Abdominal hernia    Actinic keratosis    Chest pain, unspecified    Cough    Diabetes (HCC)    Diabetes mellitus (HCC)    Dysmetabolic syndrome X    Easy bruising    Essential hypertension    Essential hypertension, malignant    Flatulence/gas pain/belching    Hemorrhoids    High cholesterol    Inguinal hernia without mention of obstruction or gangrene, unilateral or unspecified, (not specified as recurrent)    Myalgia and myositis, unspecified    Obesity, unspecified    Special screening for malignant neoplasm of prostate    Type II or unspecified type diabetes mellitus without mention of complication, uncontrolled    Undiagnosed cardiac murmurs    Unspecified sleep apnea    AHI 21.7 SaO2 yashira 79.9 %     Wears glasses      Past Surgical History:   Procedure Laterality Date    Cabg  2012    CABG x 3    Cath transcatheter aortic valve replacement  2012    Colonoscopy  2014    Procedure: COLONOSCOPY;  Surgeon: Ton Oliveira MD;  Location:  ENDOSCOPY    Hernia surgery      Valve repair        Family History   Problem Relation Age of Onset    Prostate Cancer Brother     Diabetes Father     Diabetes Mother       Social History:   Social History     Socioeconomic History    Marital status:    Tobacco Use    Smoking status: Former     Current packs/day: 0.00     Types: Cigarettes     Start date: 1955     Quit date: 1975     Years since quittin.3     Passive exposure: Never    Smokeless tobacco: Never   Vaping Use    Vaping status: Never Used   Substance and Sexual Activity    Alcohol use: Yes     Comment: 8-10  drinks per week    Drug use: Never   Other Topics Concern    Caffeine Concern Yes    Exercise No     Social Drivers of Health      [facility]    Housing Stability        Medications (Active prior to today's visit):  Current Outpatient Medications   Medication Sig Dispense Refill    glipiZIDE 5 MG Oral Tab Take 1 tablet (5 mg total) by mouth 2 (two) times daily before meals. 180 tablet 1    METOPROLOL SUCCINATE  MG Oral Tablet 24 Hr TAKE 1 TABLET(200 MG) BY MOUTH DAILY 90 tablet 2    furosemide 40 MG Oral Tab Take 1 tablet (40 mg total) by mouth every other day. 45 tablet 3    spironolactone 25 MG Oral Tab Take 0.5 tablets (12.5 mg total) by mouth daily. 90 tablet 3    losartan 25 MG Oral Tab TAKE 1 TABLET(25 MG) BY MOUTH DAILY 90 tablet 1    METFORMIN 500 MG Oral Tab TAKE 1 TABLET(500 MG) BY MOUTH TWICE DAILY WITH MEALS 180 tablet 1    furosemide 20 MG Oral Tab TAKE 40MG ON EVEN DAYS, AND 20MG ON ODD DAYS 45 tablet 2    allopurinol 100 MG Oral Tab Take 1 tablet (100 mg total) by mouth daily. 90 tablet 3    albuterol 108 (90 Base) MCG/ACT Inhalation Aero Soln Inhale 2 puffs into the lungs every 6 (six) hours as needed. 1 each 0    atorvastatin 40 MG Oral Tab Take 1 tablet (40 mg total) by mouth daily. 90 tablet 3    warfarin 4 MG Oral Tab TAKE 1 TABLET BY MOUTH DAILY OR AS DIRECTED BY ANTICOAGULATION CLINIC 90 tablet 4    amiodarone 200 MG Oral Tab Take 1 tablet (200 mg total) by mouth daily. 90 tablet 3       Allergies:  Allergies   Allergen Reactions    Ace Inhibitors Coughing         ROS:     Denies fever/chills  Denies wt loss/gain  Denies HA or visual changes  Denies CP or palpitations  Denies SOB/cough/hemoptysis  Denies abd or flank pain  Denies N/V/D  Denies change in urinary habits or gross hematuria  Denies LE edema  Denies skin rashes/myalgias/arthralgias      PHYSICAL EXAM:   There were no vitals taken for this visit.  Wt Readings from Last 6 Encounters:   07/16/24  201 lb (91.2 kg)   04/17/24 203 lb 8 oz (92.3 kg)   01/16/24 198 lb (89.8 kg)   12/20/23 205 lb (93 kg)   10/06/23 208 lb (94.3 kg)   09/11/23 199 lb 1.2 oz (90.3 kg)        General: Alert and oriented in no apparent distress.    HEENT: No scleral icterus, MMM  Neck: Supple, no TAYLER or thyromegaly  Cardiac: Regular rate and rhythm, S1, S2 normal, no murmur or rub  Lungs: Clear without wheezes, rales, rhonchi.    Abdomen: Soft, non-tender. + bowel sounds, no palpable organomegaly  Extremities: Without clubbing, cyanosis or edema.  Neurologic: Alert and oriented, normal affect, cranial nerves grossly intact, moving all extremities  Skin: Warm and dry, no rashes    Labs reviewed      ASSESSMENT/PLAN:       1. CKD-related to HTN/DM; UA is fairly bland most recently. Volume status stable.  - encourage hydration  - cpm; will monitor CKD profile  - have discussed RRT/transplant options in past   - GFR too low for sglt2i; discussed kerendia - not interested     2. Gout - no further flares. Continue allopurinol     3. HTN- controlled; cpm    4. DM; controlled     5. Afib- controlled; on AC     6. BMD-  otc vit D daily; monitor ; PTH @ goal for CKD stage    F/U 4-6 mos    Rhett Jones  10/30/2024

## 2024-11-17 ENCOUNTER — HOSPITAL ENCOUNTER (OUTPATIENT)
Age: 84
Discharge: HOME | End: 2024-11-17
Payer: MEDICARE

## 2024-11-17 VITALS
OXYGEN SATURATION: 98 % | TEMPERATURE: 98 F | SYSTOLIC BLOOD PRESSURE: 156 MMHG | DIASTOLIC BLOOD PRESSURE: 86 MMHG | RESPIRATION RATE: 20 BRPM | HEART RATE: 60 BPM | BODY MASS INDEX: 31 KG/M2 | WEIGHT: 200.63 LBS

## 2024-11-17 DIAGNOSIS — S61.412A SKIN TEAR OF LEFT HAND WITHOUT COMPLICATION, INITIAL ENCOUNTER: Primary | ICD-10-CM

## 2024-11-17 NOTE — ED PROVIDER NOTES
Patient Seen in: [facility]      History     Chief Complaint   Patient presents with    Laceration/Abrasion     Stated Complaint: bleeding on hand    Subjective:   HPI      84-year-old male presents to the IC for evaluation of skin tear to left hand.  On warfarin.  Bumped it against a chair while trying to carry his wife's walker yesterday. Denies any pain.    Objective:     No pertinent past medical history.            No pertinent past surgical history.              No pertinent social history.            Review of Systems    Positive for stated complaint: bleeding on hand  Other systems are as noted in HPI.  Constitutional and vital signs reviewed.      All other systems reviewed and negative except as noted above.    Physical Exam     ED Triage Vitals   BP 11/17/24 0806 156/86   Pulse 11/17/24 0806 60   Resp 11/17/24 0806 20   Temp 11/17/24 0806 98 °F (36.7 °C)   Temp src 11/17/24 0806 Oral   SpO2 11/17/24 0842 98 %   O2 Device 11/17/24 0842 None (Room air)       Current Vitals:   Vital Signs  BP: 156/86  Pulse: 60  Resp: 20  Temp: 98 °F (36.7 °C)  Temp src: Oral    Oxygen Therapy  SpO2: 98 %  O2 Device: None (Room air)        Physical Exam  Vitals and nursing note reviewed.   Constitutional:       Appearance: He is well-developed.   HENT:      Head: Atraumatic.      Right Ear: External ear normal.      Left Ear: External ear normal.   Eyes:      Conjunctiva/sclera: Conjunctivae normal.   Cardiovascular:      Rate and Rhythm: Normal rate.   Pulmonary:      Effort: Pulmonary effort is normal.   Musculoskeletal:      Cervical back: Normal range of motion and neck supple.      Comments:  2+ radial pulse. Left hand/wrist with normal ROM.   Skin:     General: Skin is warm and dry.      Capillary Refill: Capillary refill takes less than 2 seconds.      Findings: No erythema.      Comments: 2cm skin tear to left hand, dorum   Neurological:      Mental Status: He is alert.   Psychiatric:          Mood and Affect: Mood normal.             ED Course   Labs Reviewed - No data to display                MDM      84-year-old male presents to the IC with skin tear to left hand.    Differential diagnosis reflecting the complexity of care include: Skin tear, skin avulsion, laceration    Comorbidities that add complexity to management include: On warfarin    History obtained by an independent source was from: Patient and wife    Diagnostic tests and medications considered but not ordered were: No bony tenderness, no suspicion for fracture      Wound care performed  Hemostasis is achieved with Gelfoam dressing.  Advised patient to keep the area clean and dry.  All questions answered.      Medical Decision Making      Disposition and Plan     Clinical Impression:  1. Skin tear of left hand without complication, initial encounter         Disposition:  Discharge  11/17/2024  8:29 am    Follow-up:  Abhi Arellano MD  [address] [place] [id number]  [phone]                Medications Prescribed:  Discharge Medication List as of 11/17/2024  8:30 AM              Supplementary Documentation:

## 2024-11-19 ENCOUNTER — TELEPHONE (OUTPATIENT)
Dept: INTERNAL MEDICINE CLINIC | Facility: CLINIC | Age: 84
End: 2024-11-19

## 2024-11-19 NOTE — TELEPHONE ENCOUNTER
Pt was seen in IC for laceration, recommendation to follow up for wound care on Wednesday,(nothing avail) please call patient to see if appt needed

## 2024-11-19 NOTE — TELEPHONE ENCOUNTER
Spoke with Patient's spouse, stated Patient has skin tear and was advised to come to PCP for dressing removal  Advised to soak the dressing and try to slowly remove, if easily removable but still stuck to skin after soaking we can get him in but spouse insisted to have it removed at PCP office  Appointment given per spouse request  Future Appointments   Date Time Provider Department Center   11/20/2024  8:15 AM Brittany Ramires APRN [facility]   1/20/2025  1:30 PM Abhi Arellano MD [facility]   4/30/2025 11:00 AM Rhett Jones MD [facility]

## 2024-11-20 ENCOUNTER — OFFICE VISIT (OUTPATIENT)
Dept: INTERNAL MEDICINE CLINIC | Facility: CLINIC | Age: 84
End: 2024-11-20
Payer: MEDICARE

## 2024-11-20 VITALS
OXYGEN SATURATION: 100 % | SYSTOLIC BLOOD PRESSURE: 130 MMHG | TEMPERATURE: 97 F | WEIGHT: 200 LBS | HEIGHT: 68 IN | BODY MASS INDEX: 30.31 KG/M2 | RESPIRATION RATE: 16 BRPM | HEART RATE: 76 BPM | DIASTOLIC BLOOD PRESSURE: 74 MMHG

## 2024-11-20 DIAGNOSIS — S51.012D SKIN TEAR OF LEFT ELBOW WITHOUT COMPLICATION, SUBSEQUENT ENCOUNTER: Primary | ICD-10-CM

## 2024-11-20 DIAGNOSIS — Z79.01 LONG TERM (CURRENT) USE OF ANTICOAGULANTS: ICD-10-CM

## 2024-11-20 PROCEDURE — 99214 OFFICE O/P EST MOD 30 MIN: CPT | Performed by: NURSE PRACTITIONER

## 2024-11-20 PROCEDURE — 1159F MED LIST DOCD IN RCRD: CPT | Performed by: NURSE PRACTITIONER

## 2024-11-20 PROCEDURE — 1160F RVW MEDS BY RX/DR IN RCRD: CPT | Performed by: NURSE PRACTITIONER

## 2024-11-20 PROCEDURE — 1170F FXNL STATUS ASSESSED: CPT | Performed by: NURSE PRACTITIONER

## 2024-11-20 NOTE — PROGRESS NOTES
HPI:    Patient ID: Kyler Haji is a 84 year old male.    Chief Complaint   Patient presents with    Wound Recheck     Wound on left hand        Seen at  for bleeding to left hand. He had patch applied to area 2 days ago and no further bleeding. Presents today for wound care. He was not instructed to hold Warfarin         Review of Systems   Constitutional: Negative.  Negative for fever.   Respiratory: Negative.     Cardiovascular: Negative.    Gastrointestinal: Negative.    Skin:  Positive for wound.         Past Medical History:    Abdominal hernia    Actinic keratosis    Chest pain, unspecified    Cough    Diabetes (HCC)    Diabetes mellitus (HCC)    Dysmetabolic syndrome X    Easy bruising    Essential hypertension    Essential hypertension, malignant    Flatulence/gas pain/belching    Hemorrhoids    High cholesterol    Inguinal hernia without mention of obstruction or gangrene, unilateral or unspecified, (not specified as recurrent)    Myalgia and myositis, unspecified    Obesity, unspecified    Special screening for malignant neoplasm of prostate    Type II or unspecified type diabetes mellitus without mention of complication, uncontrolled    Undiagnosed cardiac murmurs    Unspecified sleep apnea    AHI 21.7 SaO2 yashira 79.9 %     Wears glasses     Past Surgical History:   Procedure Laterality Date    Cabg  2012    CABG x 3    Cath transcatheter aortic valve replacement  2012    Colonoscopy  2014    Procedure: COLONOSCOPY;  Surgeon: Ton Oliveira MD;  Location:  ENDOSCOPY    Hernia surgery      Valve repair       Family History   Problem Relation Age of Onset    Prostate Cancer Brother     Diabetes Father     Diabetes Mother      Social History     Socioeconomic History    Marital status:    Tobacco Use    Smoking status: Former     Current packs/day: 0.00     Types: Cigarettes     Start date: 1955     Quit date: 1975     Years since quittin.4     Passive exposure:  Never    Smokeless tobacco: Never   Vaping Use    Vaping status: Never Used   Substance and Sexual Activity    Alcohol use: Yes     Comment: 8-10 drinks per week    Drug use: Never   Other Topics Concern    Caffeine Concern Yes    Exercise No          Current Outpatient Medications   Medication Sig Dispense Refill    glipiZIDE 5 MG Oral Tab Take 1 tablet (5 mg total) by mouth 2 (two) times daily before meals. 180 tablet 1    METOPROLOL SUCCINATE  MG Oral Tablet 24 Hr TAKE 1 TABLET(200 MG) BY MOUTH DAILY 90 tablet 2    furosemide 40 MG Oral Tab Take 1 tablet (40 mg total) by mouth every other day. 45 tablet 3    spironolactone 25 MG Oral Tab Take 0.5 tablets (12.5 mg total) by mouth daily. 90 tablet 3    losartan 25 MG Oral Tab TAKE 1 TABLET(25 MG) BY MOUTH DAILY 90 tablet 1    METFORMIN 500 MG Oral Tab TAKE 1 TABLET(500 MG) BY MOUTH TWICE DAILY WITH MEALS 180 tablet 1    furosemide 20 MG Oral Tab TAKE 40MG ON EVEN DAYS, AND 20MG ON ODD DAYS 45 tablet 2    allopurinol 100 MG Oral Tab Take 1 tablet (100 mg total) by mouth daily. 90 tablet 3    albuterol 108 (90 Base) MCG/ACT Inhalation Aero Soln Inhale 2 puffs into the lungs every 6 (six) hours as needed. 1 each 0    atorvastatin 40 MG Oral Tab Take 1 tablet (40 mg total) by mouth daily. 90 tablet 3    warfarin 4 MG Oral Tab TAKE 1 TABLET BY MOUTH DAILY OR AS DIRECTED BY ANTICOAGULATION CLINIC 90 tablet 4    amiodarone 200 MG Oral Tab Take 1 tablet (200 mg total) by mouth daily. 90 tablet 3     Allergies:Allergies[1]    Lab Results   Component Value Date     (H) 10/28/2024    BUN 51 (H) 10/28/2024    BUNCREA 20.6 (H) 06/20/2021    CREATSERUM 2.94 (H) 10/28/2024    ANIONGAP 7 10/28/2024    GFR 77 02/05/2018    GFRNAA 21 (L) 07/11/2022    GFRAA 24 (L) 07/11/2022    CA 9.7 10/28/2024    OSMOCALC 304 (H) 10/28/2024    ALKPHO 76 07/16/2024    AST 29 07/16/2024    ALT 29 07/16/2024    BILT 0.4 07/16/2024    TP 7.0 07/16/2024    ALB 4.3 07/16/2024    GLOBULIN  2.7 (L) 07/16/2024    AGRATIO 1.7 05/30/2013     10/28/2024    K 4.9 10/28/2024     10/28/2024    CO2 22.0 10/28/2024     Lab Results   Component Value Date    WBC 5.7 10/28/2024    RBC 3.23 (L) 10/28/2024    HGB 9.9 (L) 10/28/2024    HCT 31.6 (L) 10/28/2024    MCV 97.8 10/28/2024    MCH 30.7 10/28/2024    MCHC 31.3 10/28/2024    RDW 15.0 10/28/2024    .0 10/28/2024    MPV 10.7 11/02/2012     Lab Results   Component Value Date    CHOLEST 137 07/16/2024    TRIG 110 07/16/2024    HDL 58 07/16/2024    LDL 59 07/16/2024    VLDL 16 07/16/2024    TCHDLRATIO 2.84 04/17/2017    NONHDLC 79 07/16/2024     Lab Results   Component Value Date     (H) 07/16/2024    A1C 6.5 (H) 07/16/2024     Lab Results   Component Value Date    TSH 2.013 07/16/2024     Lab Results   Component Value Date    VITD 34.6 10/28/2024     No results found for: \"MIGUEL ANGEL\"  No results found for: \"FOLIC\", \"FOLATESER\", \"FOLATE\"  No results found for: \"IRON\", \"IRONTOT\"  No results found for: \"B12\", \"VITB12\"  Lab Results   Component Value Date    PHOS 3.9 10/28/2024     Lab Results   Component Value Date    MG 1.7 10/28/2024        PHYSICAL EXAM:   /74   Pulse 76   Temp 97 °F (36.1 °C) (Temporal)   Resp 16   Ht 5' 8\" (1.727 m)   Wt 200 lb (90.7 kg)   SpO2 100%   BMI 30.41 kg/m²   Physical Exam  Vitals and nursing note reviewed.   Constitutional:       Appearance: Normal appearance.   Cardiovascular:      Rate and Rhythm: Normal rate.      Pulses: Normal pulses.   Pulmonary:      Effort: Pulmonary effort is normal.   Skin:     Findings: Bruising (scattered) present.      Comments: Left hand skin tear, removal of patch with saline. Non-adherent gauze and ace wrap applied. Tolerated well     Neurological:      Mental Status: He is alert.              ASSESSMENT/PLAN:   Diagnoses and all orders for this visit:    Skin tear of left elbow without complication, subsequent encounter    Long term (current) use of  anticoagulants    Instructed to hold warfarin for 3 days, call coumadin clinic to not complete INR tomorrow. Educated on wound care daily, follow up on Monday for reassessment. Wife educated as well        CR Rivers            [1]   Allergies  Allergen Reactions    Ace Inhibitors Coughing

## 2024-11-24 DIAGNOSIS — I10 ESSENTIAL HYPERTENSION: ICD-10-CM

## 2024-11-24 DIAGNOSIS — N18.4 TYPE 2 DIABETES MELLITUS WITH STAGE 4 CHRONIC KIDNEY DISEASE, WITHOUT LONG-TERM CURRENT USE OF INSULIN (HCC): ICD-10-CM

## 2024-11-24 DIAGNOSIS — E11.22 TYPE 2 DIABETES MELLITUS WITH STAGE 4 CHRONIC KIDNEY DISEASE, WITHOUT LONG-TERM CURRENT USE OF INSULIN (HCC): ICD-10-CM

## 2024-11-25 ENCOUNTER — OFFICE VISIT (OUTPATIENT)
Dept: INTERNAL MEDICINE CLINIC | Facility: CLINIC | Age: 84
End: 2024-11-25
Payer: MEDICARE

## 2024-11-25 VITALS
TEMPERATURE: 97 F | HEIGHT: 68 IN | HEART RATE: 74 BPM | OXYGEN SATURATION: 100 % | WEIGHT: 200 LBS | SYSTOLIC BLOOD PRESSURE: 130 MMHG | DIASTOLIC BLOOD PRESSURE: 66 MMHG | RESPIRATION RATE: 18 BRPM | BODY MASS INDEX: 30.31 KG/M2

## 2024-11-25 DIAGNOSIS — S61.412S: Primary | ICD-10-CM

## 2024-11-25 DIAGNOSIS — Z79.01 LONG TERM (CURRENT) USE OF ANTICOAGULANTS: ICD-10-CM

## 2024-11-25 RX ORDER — LOSARTAN POTASSIUM 25 MG/1
25 TABLET ORAL DAILY
Qty: 90 TABLET | Refills: 1 | Status: SHIPPED | OUTPATIENT
Start: 2024-11-25

## 2024-11-25 NOTE — PROGRESS NOTES
HPI:    Patient ID: Kyler Haji is a 84 year old male.    Chief Complaint   Patient presents with    Wound Recheck       Healing well, no pain erythema or discharge. He has restarted coumadin on Thursday.         Review of Systems   Constitutional: Negative.    Respiratory: Negative.     Cardiovascular: Negative.    Skin:  Positive for wound.         Past Medical History:    Abdominal hernia    Actinic keratosis    Chest pain, unspecified    Cough    Diabetes (HCC)    Diabetes mellitus (HCC)    Dysmetabolic syndrome X    Easy bruising    Essential hypertension    Essential hypertension, malignant    Flatulence/gas pain/belching    Hemorrhoids    High cholesterol    Inguinal hernia without mention of obstruction or gangrene, unilateral or unspecified, (not specified as recurrent)    Myalgia and myositis, unspecified    Obesity, unspecified    Special screening for malignant neoplasm of prostate    Type II or unspecified type diabetes mellitus without mention of complication, uncontrolled    Undiagnosed cardiac murmurs    Unspecified sleep apnea    AHI 21.7 SaO2 yashira 79.9 %     Wears glasses     Past Surgical History:   Procedure Laterality Date    Cabg  2012    CABG x 3    Cath transcatheter aortic valve replacement  2012    Colonoscopy  2014    Procedure: COLONOSCOPY;  Surgeon: Ton Oliveira MD;  Location:  ENDOSCOPY    Hernia surgery      Valve repair       Family History   Problem Relation Age of Onset    Prostate Cancer Brother     Diabetes Father     Diabetes Mother      Social History     Socioeconomic History    Marital status:    Tobacco Use    Smoking status: Former     Current packs/day: 0.00     Types: Cigarettes     Start date: 1955     Quit date: 1975     Years since quittin.4     Passive exposure: Never    Smokeless tobacco: Never   Vaping Use    Vaping status: Never Used   Substance and Sexual Activity    Alcohol use: Yes     Comment: 8-10 drinks per week     Drug use: Never   Other Topics Concern    Caffeine Concern Yes    Exercise No          Current Outpatient Medications   Medication Sig Dispense Refill    glipiZIDE 5 MG Oral Tab Take 1 tablet (5 mg total) by mouth 2 (two) times daily before meals. 180 tablet 1    METOPROLOL SUCCINATE  MG Oral Tablet 24 Hr TAKE 1 TABLET(200 MG) BY MOUTH DAILY 90 tablet 2    furosemide 40 MG Oral Tab Take 1 tablet (40 mg total) by mouth every other day. 45 tablet 3    spironolactone 25 MG Oral Tab Take 0.5 tablets (12.5 mg total) by mouth daily. 90 tablet 3    losartan 25 MG Oral Tab TAKE 1 TABLET(25 MG) BY MOUTH DAILY 90 tablet 1    METFORMIN 500 MG Oral Tab TAKE 1 TABLET(500 MG) BY MOUTH TWICE DAILY WITH MEALS 180 tablet 1    furosemide 20 MG Oral Tab TAKE 40MG ON EVEN DAYS, AND 20MG ON ODD DAYS 45 tablet 2    allopurinol 100 MG Oral Tab Take 1 tablet (100 mg total) by mouth daily. 90 tablet 3    albuterol 108 (90 Base) MCG/ACT Inhalation Aero Soln Inhale 2 puffs into the lungs every 6 (six) hours as needed. 1 each 0    atorvastatin 40 MG Oral Tab Take 1 tablet (40 mg total) by mouth daily. 90 tablet 3    warfarin 4 MG Oral Tab TAKE 1 TABLET BY MOUTH DAILY OR AS DIRECTED BY ANTICOAGULATION CLINIC 90 tablet 4    amiodarone 200 MG Oral Tab Take 1 tablet (200 mg total) by mouth daily. 90 tablet 3     Allergies:Allergies[1]    Lab Results   Component Value Date     (H) 10/28/2024    BUN 51 (H) 10/28/2024    BUNCREA 20.6 (H) 06/20/2021    CREATSERUM 2.94 (H) 10/28/2024    ANIONGAP 7 10/28/2024    GFR 77 02/05/2018    GFRNAA 21 (L) 07/11/2022    GFRAA 24 (L) 07/11/2022    CA 9.7 10/28/2024    OSMOCALC 304 (H) 10/28/2024    ALKPHO 76 07/16/2024    AST 29 07/16/2024    ALT 29 07/16/2024    BILT 0.4 07/16/2024    TP 7.0 07/16/2024    ALB 4.3 07/16/2024    GLOBULIN 2.7 (L) 07/16/2024    AGRATIO 1.7 05/30/2013     10/28/2024    K 4.9 10/28/2024     10/28/2024    CO2 22.0 10/28/2024     Lab Results   Component Value  Date    WBC 5.7 10/28/2024    RBC 3.23 (L) 10/28/2024    HGB 9.9 (L) 10/28/2024    HCT 31.6 (L) 10/28/2024    MCV 97.8 10/28/2024    MCH 30.7 10/28/2024    MCHC 31.3 10/28/2024    RDW 15.0 10/28/2024    .0 10/28/2024    MPV 10.7 11/02/2012     Lab Results   Component Value Date    CHOLEST 137 07/16/2024    TRIG 110 07/16/2024    HDL 58 07/16/2024    LDL 59 07/16/2024    VLDL 16 07/16/2024    TCHDLRATIO 2.84 04/17/2017    NONHDLC 79 07/16/2024     Lab Results   Component Value Date     (H) 07/16/2024    A1C 6.5 (H) 07/16/2024     Lab Results   Component Value Date    TSH 2.013 07/16/2024     Lab Results   Component Value Date    VITD 34.6 10/28/2024     No results found for: \"MIGUEL ANGEL\"  No results found for: \"FOLIC\", \"FOLATESER\", \"FOLATE\"  No results found for: \"IRON\", \"IRONTOT\"  No results found for: \"B12\", \"VITB12\"  Lab Results   Component Value Date    PHOS 3.9 10/28/2024     Lab Results   Component Value Date    MG 1.7 10/28/2024        PHYSICAL EXAM:   /66   Pulse 74   Temp 97 °F (36.1 °C) (Temporal)   Resp 18   Ht 5' 8\" (1.727 m)   Wt 200 lb (90.7 kg)   SpO2 100%   BMI 30.41 kg/m²   Physical Exam  Vitals and nursing note reviewed.   Constitutional:       Appearance: Normal appearance.   Cardiovascular:      Rate and Rhythm: Normal rate.   Pulmonary:      Effort: Pulmonary effort is normal.   Skin:     General: Skin is warm.      Comments: Skin tear healing well with no signs of infection     Neurological:      Mental Status: He is alert.   Psychiatric:         Mood and Affect: Mood normal.              ASSESSMENT/PLAN:   Diagnoses and all orders for this visit:    Skin tear of left hand without complication, sequela- keep open to air     Long term (current) use of anticoagulants- check INR as directed by coumadin clinic       CR Rivers            [1]   Allergies  Allergen Reactions    Ace Inhibitors Coughing

## 2024-11-25 NOTE — TELEPHONE ENCOUNTER
Last time medication was refilled 06/10/2024  Last office visit  11/25/2024  Next office visit due/scheduled   Future Appointments   Date Time Provider Department Center   1/20/2025  1:30 PM Abhi Arellano MD [facility]   4/30/2025 11:00 AM Rhett Jones MD [facility]     Medication failed protocol.

## 2024-11-25 NOTE — TELEPHONE ENCOUNTER
Last time medication was refilled 06/19/2024  Last office visit  11/25/2024  Next office visit due/scheduled   Future Appointments   Date Time Provider Department Center   1/20/2025  1:30 PM Abhi Arellano MD [facility]   4/30/2025 11:00 AM Rhett Jones MD [facility]     Medication failed protocol.

## 2024-12-22 DIAGNOSIS — N18.4 TYPE 2 DIABETES MELLITUS WITH STAGE 4 CHRONIC KIDNEY DISEASE, WITHOUT LONG-TERM CURRENT USE OF INSULIN (HCC): ICD-10-CM

## 2024-12-22 DIAGNOSIS — E11.22 TYPE 2 DIABETES MELLITUS WITH STAGE 4 CHRONIC KIDNEY DISEASE, WITHOUT LONG-TERM CURRENT USE OF INSULIN (HCC): ICD-10-CM

## 2024-12-22 RX ORDER — ATORVASTATIN CALCIUM 40 MG/1
40 TABLET, FILM COATED ORAL DAILY
Qty: 90 TABLET | Refills: 3 | Status: SHIPPED | OUTPATIENT
Start: 2024-12-22

## 2024-12-23 NOTE — TELEPHONE ENCOUNTER
Last time medication was refilled: 1/8/24  Next office visit due/scheduled: 1/20/25  Last office visit: 1/25/24  Last Labs: 7/16/24

## 2025-01-20 ENCOUNTER — OFFICE VISIT (OUTPATIENT)
Dept: INTERNAL MEDICINE CLINIC | Facility: CLINIC | Age: 85
End: 2025-01-20
Payer: MEDICARE

## 2025-01-20 ENCOUNTER — LAB ENCOUNTER (OUTPATIENT)
Dept: LAB | Age: 85
End: 2025-01-20
Attending: INTERNAL MEDICINE
Payer: MEDICARE

## 2025-01-20 VITALS
TEMPERATURE: 97 F | BODY MASS INDEX: 30.31 KG/M2 | DIASTOLIC BLOOD PRESSURE: 70 MMHG | WEIGHT: 200 LBS | RESPIRATION RATE: 16 BRPM | OXYGEN SATURATION: 98 % | HEIGHT: 68 IN | HEART RATE: 63 BPM | SYSTOLIC BLOOD PRESSURE: 126 MMHG

## 2025-01-20 DIAGNOSIS — E11.22 TYPE 2 DIABETES MELLITUS WITH STAGE 4 CHRONIC KIDNEY DISEASE, WITHOUT LONG-TERM CURRENT USE OF INSULIN (HCC): ICD-10-CM

## 2025-01-20 DIAGNOSIS — Z00.00 ENCOUNTER FOR ANNUAL HEALTH EXAMINATION: ICD-10-CM

## 2025-01-20 DIAGNOSIS — N18.4 TYPE 2 DIABETES MELLITUS WITH STAGE 4 CHRONIC KIDNEY DISEASE, WITHOUT LONG-TERM CURRENT USE OF INSULIN (HCC): ICD-10-CM

## 2025-01-20 DIAGNOSIS — E11.69 HYPERLIPIDEMIA ASSOCIATED WITH TYPE 2 DIABETES MELLITUS (HCC): ICD-10-CM

## 2025-01-20 DIAGNOSIS — E11.59 HYPERTENSION ASSOCIATED WITH DIABETES (HCC): ICD-10-CM

## 2025-01-20 DIAGNOSIS — Z95.810 ICD (IMPLANTABLE CARDIOVERTER-DEFIBRILLATOR) IN PLACE: ICD-10-CM

## 2025-01-20 DIAGNOSIS — I25.5 ISCHEMIC DILATED CARDIOMYOPATHY (HCC): Chronic | ICD-10-CM

## 2025-01-20 DIAGNOSIS — I15.2 HYPERTENSION ASSOCIATED WITH DIABETES (HCC): ICD-10-CM

## 2025-01-20 DIAGNOSIS — E78.5 HYPERLIPIDEMIA ASSOCIATED WITH TYPE 2 DIABETES MELLITUS (HCC): ICD-10-CM

## 2025-01-20 DIAGNOSIS — D68.4 ACQUIRED COAGULATION FACTOR INHIBITOR DISORDER (HCC): ICD-10-CM

## 2025-01-20 DIAGNOSIS — I48.0 PAROXYSMAL A-FIB (HCC): ICD-10-CM

## 2025-01-20 DIAGNOSIS — I65.21 RIGHT-SIDED CAROTID ARTERY OCCLUSION WITHOUT CEREBRAL INFARCTION: ICD-10-CM

## 2025-01-20 DIAGNOSIS — I42.0 ISCHEMIC DILATED CARDIOMYOPATHY (HCC): Chronic | ICD-10-CM

## 2025-01-20 DIAGNOSIS — Z95.2 H/O AORTIC VALVE REPLACEMENT: ICD-10-CM

## 2025-01-20 DIAGNOSIS — I25.10 CORONARY ARTERY DISEASE INVOLVING NATIVE CORONARY ARTERY OF NATIVE HEART WITHOUT ANGINA PECTORIS: ICD-10-CM

## 2025-01-20 DIAGNOSIS — Z95.1 S/P CABG (CORONARY ARTERY BYPASS GRAFT): ICD-10-CM

## 2025-01-20 DIAGNOSIS — Z00.00 ANNUAL PHYSICAL EXAM: Primary | ICD-10-CM

## 2025-01-20 DIAGNOSIS — I50.22 CHRONIC SYSTOLIC HEART FAILURE (HCC): ICD-10-CM

## 2025-01-20 DIAGNOSIS — I44.7 LBBB (LEFT BUNDLE BRANCH BLOCK): ICD-10-CM

## 2025-01-20 DIAGNOSIS — I77.9 CAROTID ARTERY DISEASE, UNSPECIFIED LATERALITY, UNSPECIFIED TYPE (HCC): ICD-10-CM

## 2025-01-20 LAB
ALBUMIN SERPL-MCNC: 4.5 G/DL (ref 3.2–4.8)
ALBUMIN/GLOB SERPL: 1.9 {RATIO} (ref 1–2)
ALP LIVER SERPL-CCNC: 75 U/L
ALT SERPL-CCNC: 31 U/L
ANION GAP SERPL CALC-SCNC: 8 MMOL/L (ref 0–18)
AST SERPL-CCNC: 33 U/L (ref ?–34)
BASOPHILS # BLD AUTO: 0.04 X10(3) UL (ref 0–0.2)
BASOPHILS NFR BLD AUTO: 0.8 %
BILIRUB SERPL-MCNC: 0.4 MG/DL (ref 0.2–1.1)
BUN BLD-MCNC: 41 MG/DL (ref 9–23)
CALCIUM BLD-MCNC: 9.6 MG/DL (ref 8.7–10.6)
CHLORIDE SERPL-SCNC: 108 MMOL/L (ref 98–112)
CHOLEST SERPL-MCNC: 129 MG/DL (ref ?–200)
CO2 SERPL-SCNC: 22 MMOL/L (ref 21–32)
CREAT BLD-MCNC: 2.73 MG/DL
CREAT UR-SCNC: 38.7 MG/DL
EGFRCR SERPLBLD CKD-EPI 2021: 22 ML/MIN/1.73M2 (ref 60–?)
EOSINOPHIL # BLD AUTO: 0.11 X10(3) UL (ref 0–0.7)
EOSINOPHIL NFR BLD AUTO: 2.1 %
ERYTHROCYTE [DISTWIDTH] IN BLOOD BY AUTOMATED COUNT: 14.1 %
EST. AVERAGE GLUCOSE BLD GHB EST-MCNC: 160 MG/DL (ref 68–126)
FASTING PATIENT LIPID ANSWER: NO
FASTING STATUS PATIENT QL REPORTED: NO
GLOBULIN PLAS-MCNC: 2.4 G/DL (ref 2–3.5)
GLUCOSE BLD-MCNC: 240 MG/DL (ref 70–99)
HBA1C MFR BLD: 7.2 % (ref ?–5.7)
HCT VFR BLD AUTO: 26.3 %
HDLC SERPL-MCNC: 49 MG/DL (ref 40–59)
HGB BLD-MCNC: 8.3 G/DL
IMM GRANULOCYTES # BLD AUTO: 0.02 X10(3) UL (ref 0–1)
IMM GRANULOCYTES NFR BLD: 0.4 %
LDLC SERPL CALC-MCNC: 57 MG/DL (ref ?–100)
LYMPHOCYTES # BLD AUTO: 0.94 X10(3) UL (ref 1–4)
LYMPHOCYTES NFR BLD AUTO: 17.6 %
MCH RBC QN AUTO: 29.2 PG (ref 26–34)
MCHC RBC AUTO-ENTMCNC: 31.6 G/DL (ref 31–37)
MCV RBC AUTO: 92.6 FL
MICROALBUMIN UR-MCNC: 1.4 MG/DL
MICROALBUMIN/CREAT 24H UR-RTO: 36.2 UG/MG (ref ?–30)
MONOCYTES # BLD AUTO: 0.5 X10(3) UL (ref 0.1–1)
MONOCYTES NFR BLD AUTO: 9.4 %
NEUTROPHILS # BLD AUTO: 3.72 X10 (3) UL (ref 1.5–7.7)
NEUTROPHILS # BLD AUTO: 3.72 X10(3) UL (ref 1.5–7.7)
NEUTROPHILS NFR BLD AUTO: 69.7 %
NONHDLC SERPL-MCNC: 80 MG/DL (ref ?–130)
OSMOLALITY SERPL CALC.SUM OF ELEC: 304 MOSM/KG (ref 275–295)
PLATELET # BLD AUTO: 205 10(3)UL (ref 150–450)
POTASSIUM SERPL-SCNC: 4.6 MMOL/L (ref 3.5–5.1)
PROT SERPL-MCNC: 6.9 G/DL (ref 5.7–8.2)
RBC # BLD AUTO: 2.84 X10(6)UL
SODIUM SERPL-SCNC: 138 MMOL/L (ref 136–145)
TRIGL SERPL-MCNC: 134 MG/DL (ref 30–149)
TSI SER-ACNC: 2.11 UIU/ML (ref 0.55–4.78)
VLDLC SERPL CALC-MCNC: 20 MG/DL (ref 0–30)
WBC # BLD AUTO: 5.3 X10(3) UL (ref 4–11)

## 2025-01-20 PROCEDURE — 80053 COMPREHEN METABOLIC PANEL: CPT

## 2025-01-20 PROCEDURE — 84443 ASSAY THYROID STIM HORMONE: CPT

## 2025-01-20 PROCEDURE — 82570 ASSAY OF URINE CREATININE: CPT

## 2025-01-20 PROCEDURE — 82043 UR ALBUMIN QUANTITATIVE: CPT

## 2025-01-20 PROCEDURE — 80061 LIPID PANEL: CPT

## 2025-01-20 PROCEDURE — 83036 HEMOGLOBIN GLYCOSYLATED A1C: CPT

## 2025-01-20 PROCEDURE — 85025 COMPLETE CBC W/AUTO DIFF WBC: CPT

## 2025-01-20 PROCEDURE — 36415 COLL VENOUS BLD VENIPUNCTURE: CPT

## 2025-01-20 NOTE — PROGRESS NOTES
Subjective:   Kyler Haji is a 84 year old male who presents for a MA AHA (Medicare Advantage Annual Health Assessment) and Subsequent Annual Wellness visit (Pt already had Initial Annual Wellness) and scheduled follow up of multiple significant but stable problems.   HPI:  No new issues. No complaints  Denies cp or sob  No edema    PAST MEDICAL, SOCIAL, FAMILY HISTORIES REVIEWED WITH PT      History/Other:   Fall Risk Assessment:   He has been screened for Falls and is low risk.      Cognitive Assessment:   Abnormal  What day of the week is this?: Correct  What month is it?: Correct  What year is it?: Correct  Recall \"Ball\": Correct  Recall \"Flag\": Incorrect  Recall \"Tree\": Correct      Functional Ability/Status:   Kyler Haji has some abnormal functions as listed below:  He has Hearing problems based on screening of functional status.      Depression Screening (PHQ):  PHQ-2 SCORE: 0  , done 1/20/2025            Advanced Directives:   He does NOT have a Living Will. [Do you have a living will?: No]  He does NOT have a Power of  for Health Care. [Do you have a healthcare power of ?: No]  Discussed Advance Care Planning with patient (and family/surrogate if present). Standard forms made available to patient in After Visit Summary.      Patient Active Problem List   Diagnosis    Paroxysmal A-fib (HCC)    S/P CABG (coronary artery bypass graft)    H/O aortic valve replacement    LBBB (left bundle branch block)    Monitoring for long-term anticoagulant use    Coronary artery disease involving native heart without angina pectoris    Type 2 diabetes mellitus with stage 4 chronic kidney disease, without long-term current use of insulin (HCC)    Right-sided carotid artery occlusion without cerebral infarction    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%    Hypertension associated with diabetes (HCC)    Acquired coagulation factor inhibitor disorder (HCC)    Hyperlipidemia associated with type 2  diabetes mellitus (HCC)    Chronic systolic heart failure (HCC)    Carotid arterial disease (HCC)    ICD (implantable cardioverter-defibrillator) [facility]     Allergies:  He is allergic to ace inhibitors.    Current Medications:  Outpatient Medications Marked as Taking for the 1/20/25 encounter (Office Visit) with Abhi Arellano MD   Medication Sig    atorvastatin 40 MG Oral Tab Take 1 tablet (40 mg total) by mouth daily.    metFORMIN 500 MG Oral Tab Take 1 tablet (500 mg total) by mouth 2 (two) times daily with meals.    losartan 25 MG Oral Tab Take 1 tablet (25 mg total) by mouth daily.    glipiZIDE 5 MG Oral Tab Take 1 tablet (5 mg total) by mouth 2 (two) times daily before meals.    METOPROLOL SUCCINATE  MG Oral Tablet 24 Hr TAKE 1 TABLET(200 MG) BY MOUTH DAILY    furosemide 40 MG Oral Tab Take 1 tablet (40 mg total) by mouth every other day.    spironolactone 25 MG Oral Tab Take 0.5 tablets (12.5 mg total) by mouth daily.    furosemide 20 MG Oral Tab TAKE 40MG ON EVEN DAYS, AND 20MG ON ODD DAYS    allopurinol 100 MG Oral Tab Take 1 tablet (100 mg total) by mouth daily.    albuterol 108 (90 Base) MCG/ACT Inhalation Aero Soln Inhale 2 puffs into the lungs every 6 (six) hours as needed.    warfarin 4 MG Oral Tab TAKE 1 TABLET BY MOUTH DAILY OR AS DIRECTED BY ANTICOAGULATION CLINIC    amiodarone 200 MG Oral Tab Take 1 tablet (200 mg total) by mouth daily.       Medical History:  He  has a past medical history of Abdominal hernia, Actinic keratosis, Chest pain, unspecified, Cough, Diabetes (HCC), Diabetes mellitus (HCC), Dysmetabolic syndrome X, Easy bruising, Essential hypertension, Essential hypertension, malignant, Flatulence/gas pain/belching, Hemorrhoids, High cholesterol, Inguinal hernia without mention of obstruction or gangrene, unilateral or unspecified, (not specified as recurrent), Myalgia and myositis, unspecified, Obesity, unspecified, Special screening for malignant neoplasm of prostate, Type  II or unspecified type diabetes mellitus without mention of complication, uncontrolled, Undiagnosed cardiac murmurs, Unspecified sleep apnea (EDWARD PSG 13), and Wears glasses.  Surgical History:  He  has a past surgical history that includes cabg (2012); cath transcatheter aortic valve replacement (2012); colonoscopy (2014); hernia surgery; and valve repair.   Family History:  His family history includes Diabetes in his father and mother; Prostate Cancer in his brother.  Social History:  He  reports that he quit smoking about 49 years ago. His smoking use included cigarettes. He started smoking about 69 years ago. He has never been exposed to tobacco smoke. He has never used smokeless tobacco. He reports current alcohol use. He reports that he does not use drugs.    Tobacco:  He smoked tobacco in the past but quit greater than 12 months ago.  Social History     Tobacco Use   Smoking Status Former    Current packs/day: 0.00    Types: Cigarettes    Start date: 1955    Quit date: 1975    Years since quittin.6    Passive exposure: Never   Smokeless Tobacco Never        CAGE Alcohol Screen:   CAGE screening score of 0 on 2025, showing low risk of alcohol abuse.      Patient Care Team:  Abhi Arellano MD as PCP - General (Internal Medicine)  [facility]/ Rhett Townsend MD (NEPHROLOGY)  Marc Walker MD (CARDIOLOGY)    Review of Systems  A comprehensive 10 point review of systems was completed.     Pertinent positives and negatives noted in the HPI.      Objective:   Physical Exam  General: No acute distress. Alert and oriented x 3.  HEENT: Normocephalic atraumatic. Moist mucous membranes. EOM-I. PERRLA. Anicteric.  Neck: No lymphadenopathy.   Respiratory: Clear to auscultation bilaterally.   Cardiovascular: S1, S2. Regular rate and rhythm. No murmers  Abdomen: Soft,  no pain.  nontender, nondistended.  Positive bowel sounds. .  Extremities: No edema or  cyanosis.  Bilateral barefoot skin diabetic exam is normal, visualized feet and the appearance is normal.  Bilateral monofilament/sensation of both feet is normal.  Pulsation pedal pulse exam of both lower legs/feet is normal as well.  Integument: No rashes or lesions.    Psychiatric: Appropriate mood and affect.    /70   Pulse 63   Temp 97.1 °F (36.2 °C)   Resp 16   Ht 5' 8\" (1.727 m)   Wt 200 lb (90.7 kg)   SpO2 98%   BMI 30.41 kg/m²  Estimated body mass index is 30.41 kg/m² as calculated from the following:    Height as of this encounter: 5' 8\" (1.727 m).    Weight as of this encounter: 200 lb (90.7 kg).    Medicare Hearing Assessment:   Hearing Screening    Screening Method: Whisper Test  Whisper Test Result: Pass         Visual Acuity:   Right Eye Visual Acuity: Corrected Right Eye Chart Acuity: 20/25   Left Eye Visual Acuity: Corrected Left Eye Chart Acuity: 20/25   Both Eyes Visual Acuity: Corrected Both Eyes Chart Acuity: 20/25   Able To Tolerate Visual Acuity: Yes        Assessment & Plan:   Kyler Haji is a 84 year old male who presents for a Medicare Assessment.     1. Annual physical exam (Primary)  2. Type 2 diabetes mellitus with stage 4 chronic kidney disease, without long-term current use of insulin (HCC)  -     CBC With Differential With Platelet; Future; Expected date: 01/20/2025  -     Comp Metabolic Panel (14); Future; Expected date: 01/20/2025  -     Hemoglobin A1C; Future; Expected date: 01/20/2025  -     Lipid Panel; Future; Expected date: 01/20/2025  -     TSH W Reflex To Free T4; Future; Expected date: 01/20/2025  -     Microalb/Creat Ratio, Random Urine; Future; Expected date: 01/20/2025  3. S/P CABG (coronary artery bypass graft)  4. Right-sided carotid artery occlusion without cerebral infarction  5. Paroxysmal A-fib (HCC)  6. LBBB (left bundle branch block)  7. 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%  8. ICD (implantable cardioverter-defibrillator) [facility]  9.  Hypertension associated with diabetes (HCC)  -     CBC With Differential With Platelet; Future; Expected date: 01/20/2025  -     Comp Metabolic Panel (14); Future; Expected date: 01/20/2025  -     Lipid Panel; Future; Expected date: 01/20/2025  -     TSH W Reflex To Free T4; Future; Expected date: 01/20/2025  10. Hyperlipidemia associated with type 2 diabetes mellitus (HCC)  -     Comp Metabolic Panel (14); Future; Expected date: 01/20/2025  -     TSH W Reflex To Free T4; Future; Expected date: 01/20/2025  11. H/O aortic valve replacement  12. Coronary artery disease involving native coronary artery of native heart without angina pectoris  13. Chronic systolic heart failure (HCC)  14. Carotid artery disease, unspecified laterality, unspecified type ([facility])  Overview:  CONCLUSION: There is complete occlusion of right internal carotid        artery at its origin.               There is irregular, calcified plaque in the left carotid bulb with        Doppler findings compatible with 50-69% narrowing of luminal        diameter.  15. Acquired coagulation factor inhibitor disorder (HCC)  16. Encounter for annual health examination          Paroxysmal A-fib (HCC)-rate controlled. Cont anticoagulation     S/P CABG (coronary artery bypass graft)-stable. continue control of cad risk factors     H/O aortic valve replacement- stable. Sxs. monitor     LBBB (left bundle branch block)- stable. monitor     Coronary artery disease involving native heart without angina pectoris-stable. continue control of cad risk factors    Type 2 diabetes mellitus with stage 4 chronic kidney disease, without long-term current use of insulin (HCC) - diabetes controlled for age. CKD followed by renal service. Suspect diuretic contributing to slightly worse CKD. Pt seeing [facility] for [facility] care     Right-sided carotid artery occlusion without cerebral infarction- no new sxs.  continue control of cad risk factors     Ischemic dilated  cardiomyopathy (HCC) EF 40%-stable. continue control of cad risk factors     Hypertension associated with diabetes (HCC)-controlled. Cont current med therapy     Acquired coagulation factor inhibitor disorder (HCC)- stable. monitor     Hyperlipidemia associated with type 2 diabetes mellitus (HCC)-controlled. Cont current med therapy     Chronic systolic heart failure (HCC)- compensated . Cont ARB, aldactone, BB     Carotid arterial disease (HCC)-stable. continue control of cad risk factors     ICD (implantable cardioverter-defibrillator) [facility]- stable     The patient indicates understanding of these issues and agrees to the plan.  Continue with current treatment plan.  Lab work ordered.  Reinforced healthy diet, lifestyle, and exercise.      Return in about 6 months (around 7/20/2025).     Abhi Arellano MD, 1/19/2025     Supplementary Documentation:   General Health:  In the past six months, have you lost more than 10 pounds without trying?: 2 - No  Has your appetite been poor?: No  Type of Diet: Balanced  How does the patient maintain a good energy level?: Other  How would you describe your daily physical activity?: Light  How would you describe your current health state?: Fair  How do you maintain positive mental well-being?: Visiting Friends  On a scale of 0 to 10, with 0 being no pain and 10 being severe pain, what is your pain level?: 0 - (None)  In the past six months, have you experienced urine leakage?: 0-No  At any time do you feel concerned for the safety/well-being of yourself and/or your children, in your home or elsewhere?: No  Have you had any immunizations at another office such as Influenza, Hepatitis B, Tetanus, or Pneumococcal?: No    Health Maintenance   Topic Date Due    Diabetes Care Dilated Eye Exam  10/31/2024    Annual Well Visit  01/01/2025    Annual Depression Screening  01/01/2025    Fall Risk Screening (Annual)  01/01/2025    Diabetes Care: Foot Exam (Annual)  01/01/2025    Diabetes  Care: Microalb/Creat Ratio (Annual)  01/01/2025    Diabetes Care A1C  01/16/2025    Diabetes Care: GFR  10/28/2025    Influenza Vaccine  Completed    Pneumococcal Vaccine: 50+ Years  Completed    Zoster Vaccines  Completed    COVID-19 Vaccine  Completed    Meningococcal B Vaccine  Aged Out    Colonoscopy  Discontinued

## 2025-02-06 ENCOUNTER — HOSPITAL ENCOUNTER (INPATIENT)
Facility: HOSPITAL | Age: 85
LOS: 5 days | Discharge: HOME | End: 2025-02-11
Attending: EMERGENCY MEDICINE | Admitting: INTERNAL MEDICINE
Payer: MEDICARE

## 2025-02-06 DIAGNOSIS — N28.9 ACUTE ON CHRONIC RENAL INSUFFICIENCY: ICD-10-CM

## 2025-02-06 DIAGNOSIS — D64.9 ANEMIA, UNSPECIFIED TYPE: Primary | ICD-10-CM

## 2025-02-06 DIAGNOSIS — N18.9 ACUTE ON CHRONIC RENAL INSUFFICIENCY: ICD-10-CM

## 2025-02-06 DIAGNOSIS — K92.2 GASTROINTESTINAL HEMORRHAGE, UNSPECIFIED GASTROINTESTINAL HEMORRHAGE TYPE: ICD-10-CM

## 2025-02-06 LAB
ALBUMIN SERPL-MCNC: 4.5 G/DL (ref 3.2–4.8)
ALBUMIN/GLOB SERPL: 1.7 {RATIO} (ref 1–2)
ALP LIVER SERPL-CCNC: 83 U/L
ALT SERPL-CCNC: 31 U/L
ANION GAP SERPL CALC-SCNC: 10 MMOL/L (ref 0–18)
APTT PPP: 37.2 SECONDS (ref 23–36)
AST SERPL-CCNC: 27 U/L (ref ?–34)
BASOPHILS # BLD AUTO: 0.04 X10(3) UL (ref 0–0.2)
BASOPHILS NFR BLD AUTO: 0.7 %
BILIRUB SERPL-MCNC: 0.3 MG/DL (ref 0.2–1.1)
BUN BLD-MCNC: 51 MG/DL (ref 9–23)
CALCIUM BLD-MCNC: 9.1 MG/DL (ref 8.7–10.6)
CHLORIDE SERPL-SCNC: 107 MMOL/L (ref 98–112)
CO2 SERPL-SCNC: 20 MMOL/L (ref 21–32)
CREAT BLD-MCNC: 3.42 MG/DL
EGFRCR SERPLBLD CKD-EPI 2021: 17 ML/MIN/1.73M2 (ref 60–?)
EOSINOPHIL # BLD AUTO: 0.08 X10(3) UL (ref 0–0.7)
EOSINOPHIL NFR BLD AUTO: 1.4 %
ERYTHROCYTE [DISTWIDTH] IN BLOOD BY AUTOMATED COUNT: 14.6 %
GLOBULIN PLAS-MCNC: 2.7 G/DL (ref 2–3.5)
GLUCOSE BLD-MCNC: 137 MG/DL (ref 70–99)
HCT VFR BLD AUTO: 24.9 %
HGB BLD-MCNC: 7.9 G/DL
IMM GRANULOCYTES # BLD AUTO: 0.01 X10(3) UL (ref 0–1)
IMM GRANULOCYTES NFR BLD: 0.2 %
INR BLD: 3.55 (ref 0.8–1.2)
LYMPHOCYTES # BLD AUTO: 1.39 X10(3) UL (ref 1–4)
LYMPHOCYTES NFR BLD AUTO: 24.6 %
MCH RBC QN AUTO: 28.6 PG (ref 26–34)
MCHC RBC AUTO-ENTMCNC: 31.7 G/DL (ref 31–37)
MCV RBC AUTO: 90.2 FL
MONOCYTES # BLD AUTO: 0.6 X10(3) UL (ref 0.1–1)
MONOCYTES NFR BLD AUTO: 10.6 %
NEUTROPHILS # BLD AUTO: 3.52 X10 (3) UL (ref 1.5–7.7)
NEUTROPHILS # BLD AUTO: 3.52 X10(3) UL (ref 1.5–7.7)
NEUTROPHILS NFR BLD AUTO: 62.5 %
OSMOLALITY SERPL CALC.SUM OF ELEC: 300 MOSM/KG (ref 275–295)
PLATELET # BLD AUTO: 208 10(3)UL (ref 150–450)
POTASSIUM SERPL-SCNC: 4.6 MMOL/L (ref 3.5–5.1)
PROT SERPL-MCNC: 7.2 G/DL (ref 5.7–8.2)
PROTHROMBIN TIME: 35 SECONDS (ref 11.6–14.8)
RBC # BLD AUTO: 2.76 X10(6)UL
SODIUM SERPL-SCNC: 137 MMOL/L (ref 136–145)
TROPONIN I SERPL HS-MCNC: 12 NG/L
WBC # BLD AUTO: 5.6 X10(3) UL (ref 4–11)

## 2025-02-06 PROCEDURE — 99223 1ST HOSP IP/OBS HIGH 75: CPT | Performed by: STUDENT IN AN ORGANIZED HEALTH CARE EDUCATION/TRAINING PROGRAM

## 2025-02-06 RX ORDER — METOCLOPRAMIDE HYDROCHLORIDE 5 MG/ML
5 INJECTION INTRAMUSCULAR; INTRAVENOUS EVERY 8 HOURS PRN
Status: DISCONTINUED | OUTPATIENT
Start: 2025-02-06 | End: 2025-02-11

## 2025-02-06 RX ORDER — ONDANSETRON 2 MG/ML
4 INJECTION INTRAMUSCULAR; INTRAVENOUS EVERY 6 HOURS PRN
Status: DISCONTINUED | OUTPATIENT
Start: 2025-02-06 | End: 2025-02-07

## 2025-02-06 RX ORDER — ACETAMINOPHEN 500 MG
500 TABLET ORAL EVERY 4 HOURS PRN
Status: DISCONTINUED | OUTPATIENT
Start: 2025-02-06 | End: 2025-02-11

## 2025-02-06 RX ORDER — NICOTINE POLACRILEX 4 MG
30 LOZENGE BUCCAL
Status: DISCONTINUED | OUTPATIENT
Start: 2025-02-06 | End: 2025-02-11

## 2025-02-06 RX ORDER — SODIUM CHLORIDE 9 MG/ML
125 INJECTION, SOLUTION INTRAVENOUS CONTINUOUS
Status: DISCONTINUED | OUTPATIENT
Start: 2025-02-06 | End: 2025-02-06

## 2025-02-06 RX ORDER — SODIUM CHLORIDE 9 MG/ML
INJECTION, SOLUTION INTRAVENOUS CONTINUOUS
Status: DISCONTINUED | OUTPATIENT
Start: 2025-02-07 | End: 2025-02-07

## 2025-02-06 RX ORDER — NICOTINE POLACRILEX 4 MG
15 LOZENGE BUCCAL
Status: DISCONTINUED | OUTPATIENT
Start: 2025-02-06 | End: 2025-02-11

## 2025-02-06 RX ORDER — DEXTROSE MONOHYDRATE 25 G/50ML
50 INJECTION, SOLUTION INTRAVENOUS
Status: DISCONTINUED | OUTPATIENT
Start: 2025-02-06 | End: 2025-02-11

## 2025-02-06 RX ORDER — SODIUM CHLORIDE 9 MG/ML
INJECTION, SOLUTION INTRAVENOUS CONTINUOUS
Status: DISCONTINUED | OUTPATIENT
Start: 2025-02-06 | End: 2025-02-06

## 2025-02-06 RX ORDER — ASPIRIN 81 MG/1
TABLET, CHEWABLE ORAL DAILY
COMMUNITY

## 2025-02-06 RX ORDER — ECHINACEA PURPUREA EXTRACT 125 MG
1 TABLET ORAL
Status: DISCONTINUED | OUTPATIENT
Start: 2025-02-06 | End: 2025-02-11

## 2025-02-06 RX ORDER — BENZONATATE 100 MG/1
200 CAPSULE ORAL 3 TIMES DAILY PRN
Status: DISCONTINUED | OUTPATIENT
Start: 2025-02-06 | End: 2025-02-11

## 2025-02-07 ENCOUNTER — APPOINTMENT (OUTPATIENT)
Dept: ULTRASOUND IMAGING | Facility: HOSPITAL | Age: 85
End: 2025-02-07
Attending: STUDENT IN AN ORGANIZED HEALTH CARE EDUCATION/TRAINING PROGRAM
Payer: MEDICARE

## 2025-02-07 PROBLEM — D64.9 ANEMIA: Status: ACTIVE | Noted: 2025-02-07

## 2025-02-07 PROBLEM — D64.9 SYMPTOMATIC ANEMIA: Status: ACTIVE | Noted: 2025-02-06

## 2025-02-07 LAB
ANION GAP SERPL CALC-SCNC: 12 MMOL/L (ref 0–18)
ANTIBODY SCREEN: NEGATIVE
ATRIAL RATE: 64 BPM
BASOPHILS # BLD AUTO: 0.04 X10(3) UL (ref 0–0.2)
BASOPHILS NFR BLD AUTO: 0.8 %
BILIRUB UR QL STRIP.AUTO: NEGATIVE
BUN BLD-MCNC: 57 MG/DL (ref 9–23)
CALCIUM BLD-MCNC: 8.8 MG/DL (ref 8.7–10.6)
CHLORIDE SERPL-SCNC: 109 MMOL/L (ref 98–112)
CLARITY UR REFRACT.AUTO: CLEAR
CO2 SERPL-SCNC: 20 MMOL/L (ref 21–32)
COLOR UR AUTO: COLORLESS
CREAT BLD-MCNC: 3.11 MG/DL
CREAT UR-SCNC: 61.6 MG/DL
DEPRECATED HBV CORE AB SER IA-ACNC: 17 NG/ML
EGFRCR SERPLBLD CKD-EPI 2021: 19 ML/MIN/1.73M2 (ref 60–?)
EOSINOPHIL # BLD AUTO: 0.08 X10(3) UL (ref 0–0.7)
EOSINOPHIL NFR BLD AUTO: 1.6 %
ERYTHROCYTE [DISTWIDTH] IN BLOOD BY AUTOMATED COUNT: 14.3 %
GLUCOSE BLD-MCNC: 119 MG/DL (ref 70–99)
GLUCOSE BLD-MCNC: 148 MG/DL (ref 70–99)
GLUCOSE BLD-MCNC: 163 MG/DL (ref 70–99)
GLUCOSE BLD-MCNC: 204 MG/DL (ref 70–99)
GLUCOSE BLD-MCNC: 90 MG/DL (ref 70–99)
GLUCOSE BLD-MCNC: 99 MG/DL (ref 70–99)
GLUCOSE UR STRIP.AUTO-MCNC: NORMAL MG/DL
HCT VFR BLD AUTO: 20.9 %
HGB BLD-MCNC: 6.8 G/DL
HGB BLD-MCNC: 8.3 G/DL
IMM GRANULOCYTES # BLD AUTO: 0.02 X10(3) UL (ref 0–1)
IMM GRANULOCYTES NFR BLD: 0.4 %
INR BLD: 2.9 (ref 0.8–1.2)
IRON SATN MFR SERPL: 7 %
IRON SERPL-MCNC: 32 UG/DL
KETONES UR STRIP.AUTO-MCNC: NEGATIVE MG/DL
LEUKOCYTE ESTERASE UR QL STRIP.AUTO: NEGATIVE
LYMPHOCYTES # BLD AUTO: 1.07 X10(3) UL (ref 1–4)
LYMPHOCYTES NFR BLD AUTO: 21.3 %
MAGNESIUM SERPL-MCNC: 1.6 MG/DL (ref 1.6–2.6)
MCH RBC QN AUTO: 28.6 PG (ref 26–34)
MCHC RBC AUTO-ENTMCNC: 32.5 G/DL (ref 31–37)
MCV RBC AUTO: 87.8 FL
MONOCYTES # BLD AUTO: 0.47 X10(3) UL (ref 0.1–1)
MONOCYTES NFR BLD AUTO: 9.4 %
NEUTROPHILS # BLD AUTO: 3.34 X10 (3) UL (ref 1.5–7.7)
NEUTROPHILS # BLD AUTO: 3.34 X10(3) UL (ref 1.5–7.7)
NEUTROPHILS NFR BLD AUTO: 66.5 %
NITRITE UR QL STRIP.AUTO: NEGATIVE
OSMOLALITY SERPL CALC.SUM OF ELEC: 308 MOSM/KG (ref 275–295)
P-R INTERVAL: 190 MS
PH UR STRIP.AUTO: 5.5 [PH] (ref 5–8)
PLATELET # BLD AUTO: 173 10(3)UL (ref 150–450)
POTASSIUM SERPL-SCNC: 4.3 MMOL/L (ref 3.5–5.1)
PROT UR STRIP.AUTO-MCNC: NEGATIVE MG/DL
PROTHROMBIN TIME: 30.5 SECONDS (ref 11.6–14.8)
Q-T INTERVAL: 492 MS
QRS DURATION: 130 MS
QTC CALCULATION (BEZET): 492 MS
R AXIS: 248 DEGREES
RBC # BLD AUTO: 2.38 X10(6)UL
RBC UR QL AUTO: NEGATIVE
RH BLOOD TYPE: NEGATIVE
SODIUM SERPL-SCNC: 141 MMOL/L (ref 136–145)
SODIUM SERPL-SCNC: 86 MMOL/L
SP GR UR STRIP.AUTO: 1.01 (ref 1–1.03)
T AXIS: 82 DEGREES
TOTAL IRON BINDING CAPACITY: 430 UG/DL (ref 250–425)
TRANSFERRIN SERPL-MCNC: 334 MG/DL (ref 215–365)
URATE SERPL-MCNC: 7.2 MG/DL
UROBILINOGEN UR STRIP.AUTO-MCNC: NORMAL MG/DL
VENTRICULAR RATE: 60 BPM
WBC # BLD AUTO: 5 X10(3) UL (ref 4–11)

## 2025-02-07 PROCEDURE — 99232 SBSQ HOSP IP/OBS MODERATE 35: CPT | Performed by: INTERNAL MEDICINE

## 2025-02-07 PROCEDURE — 93880 EXTRACRANIAL BILAT STUDY: CPT | Performed by: STUDENT IN AN ORGANIZED HEALTH CARE EDUCATION/TRAINING PROGRAM

## 2025-02-07 PROCEDURE — 99223 1ST HOSP IP/OBS HIGH 75: CPT | Performed by: INTERNAL MEDICINE

## 2025-02-07 PROCEDURE — 30233N1 TRANSFUSION OF NONAUTOLOGOUS RED BLOOD CELLS INTO PERIPHERAL VEIN, PERCUTANEOUS APPROACH: ICD-10-PCS | Performed by: STUDENT IN AN ORGANIZED HEALTH CARE EDUCATION/TRAINING PROGRAM

## 2025-02-07 RX ORDER — ATORVASTATIN CALCIUM 40 MG/1
40 TABLET, FILM COATED ORAL DAILY
Status: DISCONTINUED | OUTPATIENT
Start: 2025-02-07 | End: 2025-02-11

## 2025-02-07 RX ORDER — MAGNESIUM OXIDE 400 MG/1
400 TABLET ORAL ONCE
Status: COMPLETED | OUTPATIENT
Start: 2025-02-07 | End: 2025-02-07

## 2025-02-07 RX ORDER — ALBUTEROL SULFATE 90 UG/1
2 INHALANT RESPIRATORY (INHALATION) EVERY 6 HOURS PRN
Status: DISCONTINUED | OUTPATIENT
Start: 2025-02-07 | End: 2025-02-11

## 2025-02-07 RX ORDER — ASPIRIN 81 MG/1
81 TABLET, CHEWABLE ORAL DAILY
Status: DISCONTINUED | OUTPATIENT
Start: 2025-02-07 | End: 2025-02-11

## 2025-02-07 RX ORDER — SODIUM CHLORIDE 9 MG/ML
INJECTION, SOLUTION INTRAVENOUS ONCE
Status: COMPLETED | OUTPATIENT
Start: 2025-02-07 | End: 2025-02-07

## 2025-02-07 RX ORDER — AMIODARONE HYDROCHLORIDE 200 MG/1
200 TABLET ORAL DAILY
Status: DISCONTINUED | OUTPATIENT
Start: 2025-02-07 | End: 2025-02-11

## 2025-02-07 RX ORDER — METOPROLOL SUCCINATE 100 MG/1
200 TABLET, EXTENDED RELEASE ORAL
Status: DISCONTINUED | OUTPATIENT
Start: 2025-02-07 | End: 2025-02-11

## 2025-02-07 RX ORDER — SODIUM CHLORIDE, SODIUM LACTATE, POTASSIUM CHLORIDE, CALCIUM CHLORIDE 600; 310; 30; 20 MG/100ML; MG/100ML; MG/100ML; MG/100ML
INJECTION, SOLUTION INTRAVENOUS CONTINUOUS
Status: DISCONTINUED | OUTPATIENT
Start: 2025-02-07 | End: 2025-02-08

## 2025-02-07 NOTE — ED PROVIDER NOTES
Patient Seen in: [facility] In [place]      History     Chief Complaint   Patient presents with    Dizziness     Stated Complaint: weakness, dizziness for a week    Subjective:   HPI      Patient is an 84-year-old male who presents with progressive weakness for about the last week.  He states last couple days he has for example had to hold onto the grocery cart at the store because his legs were so shaky and weak he fell he has been a fall down.  Not lightheaded and not presyncopal, just weak.  No falls or injuries.  He denies any recent illnesses like fever, cough, congestion.  No vomiting or diarrhea.  Feels his appetite has been good.    Objective:     Past Medical History:    Abdominal hernia    Actinic keratosis    Chest pain, unspecified    Cough    Diabetes (HCC)    Diabetes mellitus (HCC)    Dysmetabolic syndrome X    Easy bruising    Essential hypertension    Essential hypertension, malignant    Flatulence/gas pain/belching    Hemorrhoids    High cholesterol    Inguinal hernia without mention of obstruction or gangrene, unilateral or unspecified, (not specified as recurrent)    Myalgia and myositis, unspecified    Obesity, unspecified    Special screening for malignant neoplasm of prostate    Type II or unspecified type diabetes mellitus without mention of complication, uncontrolled    Undiagnosed cardiac murmurs    Unspecified sleep apnea    AHI 21.7 SaO2 yashira 79.9 %     Wears glasses              Past Surgical History:   Procedure Laterality Date    Cabg  2/2012    CABG x 3    Cath transcatheter aortic valve replacement  2/2012    Colonoscopy  6/27/2014    Procedure: COLONOSCOPY;  Surgeon: Ton Oliveira MD;  Location:  ENDOSCOPY    Hernia surgery      Valve repair                  Social History     Socioeconomic History    Marital status:    Tobacco Use    Smoking status: Former     Current packs/day: 0.00     Types: Cigarettes     Start date: 6/20/1955     Quit date:  1975     Years since quittin.6     Passive exposure: Never    Smokeless tobacco: Never   Vaping Use    Vaping status: Never Used   Substance and Sexual Activity    Alcohol use: Yes     Comment: 8-10 drinks per week    Drug use: Never   Other Topics Concern    Caffeine Concern Yes    Exercise No     Social Drivers of Health      Received from [facility]    Housing Stability                  Physical Exam     ED Triage Vitals [25 1935]   /65   Pulse 63   Resp 20   Temp 97.5 °F (36.4 °C)   Temp src Oral   SpO2 99 %   O2 Device None (Room air)       Current Vitals:   Vital Signs  BP: 135/64  Pulse: 60  Resp: 14  Temp: 98 °F (36.7 °C)  Temp src: Oral    Oxygen Therapy  SpO2: 99 %  O2 Device: None (Room air)        Physical Exam  Vitals and nursing note reviewed.   Constitutional:       Appearance: He is well-developed.   HENT:      Head: Normocephalic and atraumatic.   Eyes:      Conjunctiva/sclera: Conjunctivae normal.      Pupils: Pupils are equal, round, and reactive to light.   Cardiovascular:      Rate and Rhythm: Normal rate and regular rhythm.      Heart sounds: Normal heart sounds.   Pulmonary:      Effort: Pulmonary effort is normal.      Breath sounds: Normal breath sounds.   Abdominal:      General: Bowel sounds are normal.      Palpations: Abdomen is soft.      Comments: Mari RN as chaperone.  Brown stool, very strongly guaiac positive.  No melena or hematochezia.   Musculoskeletal:         General: Normal range of motion.      Cervical back: Normal range of motion and neck supple.   Skin:     General: Skin is warm and dry.   Neurological:      Mental Status: He is alert and oriented to person, place, and time.             ED Course     Labs Reviewed   COMP METABOLIC PANEL (14) - Abnormal; Notable for the following components:       Result Value    Glucose 137 (*)     CO2 20.0 (*)     BUN 51 (*)     Creatinine 3.42 (*)     Calculated Osmolality 300 (*)     eGFR-Cr  17 (*)     All other components within normal limits   CBC WITH DIFFERENTIAL WITH PLATELET - Abnormal; Notable for the following components:    RBC 2.76 (*)     HGB 7.9 (*)     HCT 24.9 (*)     All other components within normal limits   PROTHROMBIN TIME (PT) - Abnormal; Notable for the following components:    PT 35.0 (*)     INR 3.55 (*)     All other components within normal limits   PTT, ACTIVATED - Abnormal; Notable for the following components:    PTT 37.2 (*)     All other components within normal limits   TROPONIN I HIGH SENSITIVITY - Normal   URINALYSIS WITH CULTURE REFLEX     EKG    Rate, intervals and axes as noted on EKG Report.  Rate: 60  Rhythm: Paced  Reading: AV paced rhythm.  No ST segment or T wave changes.                       MDM      84-year-old male presenting with weakness over about the last week.  Not lightheaded or syncopal, just has no strength.  No falls or injuries.  He is awake and alert here and comfortable in the room.  Normal vitals.  He looks little pale.  Labs ordered in triage we will follow-up on those results.  Differential includes infection, dehydration, trite abnormality, ILIR, anemia.    Admission disposition: 2/6/2025  9:32 PM       Update at 9:30 PM.  Hemoglobin 7.9 is low, upon review of old labs has been trending down over the last few months, was 11.5 6 months ago.  Patient reports he noticed 1 dark stool several days ago but otherwise has not noticed any red blood or black stools.  However he does have brown guaiac positive stool here.  Likely slow GI bleed.  Protonix ordered.  BUN and creatinine elevated from his baseline as well which is probably explained by this.  Will hydrate him gently.  His INR is a little supratherapeutic but his vitals are completely normal, he is comfortable, no distress does not need to be actively reversed.  He will need to be admitted for further treatment.  Discussed with hospitalist, GI, nephrology.      Past Medical  History-hypertension, CAD, atrial fibrillation, CKD    Differential diagnosis before testing included infection, dehydration, anemia    Co-morbidities that add to the complexity of management include: None    Testing ordered during this visit included labs      External chart review showed reviewed prior outpatient lab values    History obtained by an independent source included from family at bedside    Discussion of management with hospitalist, nephrology, GI            Disposition:    Admission  I have discussed with the patient the results of test, differential diagnosis, and treatment plan. They expressed clear understanding of these instructions and agrees to the plan provided.           Medical Decision Making      Disposition and Plan     Clinical Impression:  1. Anemia, unspecified type    2. Gastrointestinal hemorrhage, unspecified gastrointestinal hemorrhage type    3. Acute on chronic renal insufficiency         Disposition:  Admit  2/6/2025  9:32 pm    Follow-up:  No follow-up provider specified.        Medications Prescribed:  Current Discharge Medication List              Supplementary Documentation:         Hospital Problems       Present on Admission  Date Reviewed: 1/20/2025            ICD-10-CM Noted POA    * (Principal) Anemia, unspecified type D64.9 2/6/2025 Unknown

## 2025-02-07 NOTE — ED QUICK NOTES
Orders for admission, patient is aware of plan and ready to go upstairs. Any questions, please call ED RN Mari at extension [phone].     Patient Covid vaccination status: Fully vaccinated     COVID Test Ordered in ED: None    COVID Suspicion at Admission: N/A    Running Infusions:    sodium chloride 125 mL/hr (02/06/25 2222)        Mental Status/LOC at time of transport: A&OX4    Other pertinent information:   CIWA score: N/A   NIH score:  N/A

## 2025-02-07 NOTE — CONSULTS
[facility]                       Gastroenterology Consultation-[facility]    Kyler Haji Patient Status:  Inpatient    1940 MRN [id number]   Location [facility] 4NW-A Attending Kevyn Barry, DO   Hosp Day # 1 PCP Abhi Arellano MD     Reason for consultation: Acute on chronic anemia, + occult stool  HPI: This is an 84 yr-old male with PMhx that includes CAD s/p CABG, AF (Coumadin), s/p AVR, HF s/p ICD, DM, CKD, and prostate cancer (no RTX) who presented to ER yesterday with weakness in the setting of black stools found to have Hgb 7.9 (MCV 90; iron 32, sat 7, ferritin 17) from Hgb 8 last month however steadily declining from 9.9 in 10/2024 and 11.5 in 2024.   Pt reports ~2 days of black soft stools twice daily last week with stools returning to a normal brown color. No abd pain, nausea, vomiting, heartburn, dysphagia, or odynophagia. No increase in BM frequency and no hematochezia. He is on a daily ASA in addition to Coumadin and denies additional NSAID use. No recent fluctuations in wt or appetite.   Endoscopic History   Colonoscopy 3/2018 (Dr Oliveira) for hx of adenomatous colon polyps revealed three colon polyps (2-20 mm) and internal hemorrhoids. Recall 3 yrs  Screening colonoscopy 2014 (Dr Oliveira) revealed four colon polyps (1-5 mm) and internal hemorrhoids.  Recall 3 yrs  No hx of undergoing an EGD  Since arrival, pt with no BMs or overt GI bleeding. Hgb this AM 6.8 with pt to be receiving 1 PRBC, INR 3.55 on arrival (no recheck; no reversal with last night's dose of Coumadin held), ILIR/CKD (creat >3 from 2.7 baseline). Pt has remained hemodynamically stable and denies chest pain or dyspnea.   PMHx:   Past Medical History:    Abdominal hernia    Actinic keratosis    Chest pain, unspecified    Cough    Diabetes (HCC)    Diabetes mellitus (HCC)    Dysmetabolic syndrome X    Easy bruising    Essential hypertension    Essential hypertension, malignant    Flatulence/gas  pain/belching    Hemorrhoids    High cholesterol    Inguinal hernia without mention of obstruction or gangrene, unilateral or unspecified, (not specified as recurrent)    Myalgia and myositis, unspecified    Obesity, unspecified    Special screening for malignant neoplasm of prostate    Type II or unspecified type diabetes mellitus without mention of complication, uncontrolled    Undiagnosed cardiac murmurs    Unspecified sleep apnea    AHI 21.7 SaO2 yashira 79.9 %     Wears glasses                PSHx:   Past Surgical History:   Procedure Laterality Date    Cabg  2/2012    CABG x 3    Cath transcatheter aortic valve replacement  2/2012    Colonoscopy  6/27/2014    Procedure: COLONOSCOPY;  Surgeon: Ton Oliveira MD;  Location:  ENDOSCOPY    Hernia surgery      Valve repair       Medications:    sodium chloride 0.9% infusion   Intravenous Once    albuterol (Ventolin HFA) 108 (90 Base) MCG/ACT inhaler 2 puff  2 puff Inhalation Q6H PRN    amiodarone (Pacerone) tab 200 mg  200 mg Oral Daily    aspirin chewable tab 81 mg  81 mg Oral Daily    atorvastatin (Lipitor) tab 40 mg  40 mg Oral Daily    metoprolol succinate ER (Toprol XL) 24 hr tab 200 mg  200 mg Oral Daily Beta Blocker    [COMPLETED] magnesium oxide (Mag-Ox) tab 400 mg  400 mg Oral Once    [COMPLETED] pantoprazole (Protonix) 40 mg in sodium chloride 0.9% PF 10 mL IV push  40 mg Intravenous Once    glucose (Dex4) 15 GM/59ML oral liquid 15 g  15 g Oral Q15 Min PRN    Or    glucose (Glutose) 40% oral gel 15 g  15 g Oral Q15 Min PRN    Or    glucose-vitamin C (Dex-4) chewable tab 4 tablet  4 tablet Oral Q15 Min PRN    Or    dextrose 50% injection 50 mL  50 mL Intravenous Q15 Min PRN    Or    glucose (Dex4) 15 GM/59ML oral liquid 30 g  30 g Oral Q15 Min PRN    Or    glucose (Glutose) 40% oral gel 30 g  30 g Oral Q15 Min PRN    Or    glucose-vitamin C (Dex-4) chewable tab 8 tablet  8 tablet Oral Q15 Min PRN    sodium chloride 0.9% infusion   Intravenous Continuous     acetaminophen (Tylenol Extra Strength) tab 500 mg  500 mg Oral Q4H PRN    melatonin tab 3 mg  3 mg Oral Nightly PRN    metoclopramide (Reglan) 5 mg/mL injection 5 mg  5 mg Intravenous Q8H PRN    insulin aspart (NovoLOG) 100 Units/mL FlexPen 2-10 Units  2-10 Units Subcutaneous TID AC and HS    glycerin-hypromellose- (Artificial Tears) 0.2-0.2-1 % ophthalmic solution 1 drop  1 drop Both Eyes QID PRN    sodium chloride (Saline Mist) 0.65 % nasal solution 1 spray  1 spray Each Nare Q3H PRN    benzonatate (Tessalon) cap 200 mg  200 mg Oral TID PRN     Allergies: Allergies[1]  Social HX:   Social History     Socioeconomic History    Marital status:    Tobacco Use    Smoking status: Former     Current packs/day: 0.00     Types: Cigarettes     Start date: 1955     Quit date: 1975     Years since quittin.6     Passive exposure: Never    Smokeless tobacco: Never   Vaping Use    Vaping status: Never Used   Substance and Sexual Activity    Alcohol use: Yes     Comment: 8-10 drinks per week    Drug use: Never   Other Topics Concern    Caffeine Concern Yes    Exercise No     Social Drivers of Health     Food Insecurity: No Food Insecurity (2025)    NCSS - Food Insecurity     Worried About Running Out of Food in the Last Year: No     Ran Out of Food in the Last Year: No   Transportation Needs: No Transportation Needs (2025)    NCSS - Transportation     Lack of Transportation: No   Housing Stability: Not At Risk (2025)    NCSS - Housing/Utilities     Has Housing: Yes     Worried About Losing Housing: No     Unable to Get Utilities: No      FamHx: The patient has no family history of colon cancer or other gastrointestinal malignancies;  No family history of ulcer disease, or inflammatory bowel disease  ROS:  In addition to the pertinent positives described above:            Infectious Disease:  No chronic infections or recent fevers prior to the acute illness            Cardiovascular: +  HTN, AF (Coumadin), CAD s/p CABG, s/p AVR, HF s/p ICD            Respiratory: + MILES            Hematologic: The patient reports no easy bruising, frequent gum bleeding or nose bleeding; + anemia            Dermatologic: The patient reports no recent rashes or chronic skin disorders            Rheumatologic: The patient reports no history of chronic arthritis, myalgias, arthralgias            Genitourinary:  + CKD           Psychiatric: The patient reports no history of depression, anxiety, suicidal ideation, or homicidal ideation           Oncologic: + prostate cancer            ENT: The patient reports no hoarseness of voice, hearing loss, sinus congestion, tinnitus           Neurologic: The patient reports no history of seizure, stroke, or frequent headaches  PE: /60 (BP Location: Left arm)   Pulse 60   Temp 97.4 °F (36.3 °C) (Oral)   Resp 18   Ht 5' 11\" (1.803 m)   Wt 196 lb (88.9 kg)   SpO2 95%   BMI 27.34 kg/m²   Gen: AAO x 3, able to speak in complete sentences  HENT: EOMI, PERRL, oropharynx is clear with moist mucosal membranes  Eyes: Sclerae are anicteric  Neck:  Supple without nuchal rigidity  CV: Regular rate and rhythm, with normal S1 and S2  Resp: Clear to auscultation bilaterally without wheezes; rubs, rhonchi, or rales  Abdomen: Soft, non-tender, non-distended with the presence of bowel sounds; No hepatosplenomegaly; no rebound or guarding; No ascites is clinically apparent; no tympany to percussion  Ext: No peripheral edema or cyanosis  Skin: Warm and dry  Psychiatric: Appropriate mood and congruent affect without obvious depression or anxiety  Labs:   Lab Results   Component Value Date    WBC 5.0 02/07/2025    HGB 6.8 02/07/2025    HCT 20.9 02/07/2025    .0 02/07/2025    CREATSERUM 3.11 02/07/2025    BUN 57 02/07/2025     02/07/2025    K 4.3 02/07/2025     02/07/2025    CO2 20.0 02/07/2025    GLU 99 02/07/2025    CA 8.8 02/07/2025    ALB 4.5 02/06/2025    ALKPHO 83  02/06/2025    BILT 0.3 02/06/2025    AST 27 02/06/2025    ALT 31 02/06/2025    PTT 37.2 02/06/2025    INR 3.55 02/06/2025    PTP 35.0 02/06/2025    MG 1.6 02/07/2025     Recent Labs   Lab 02/06/25 2005 02/07/25  0559   * 99   BUN 51* 57*   CREATSERUM 3.42* 3.11*   CA 9.1 8.8    141   K 4.6 4.3    109   CO2 20.0* 20.0*     Recent Labs   Lab 02/07/25  0559   RBC 2.38*   HGB 6.8*   HCT 20.9*   MCV 87.8   MCH 28.6   MCHC 32.5   RDW 14.3   NEPRELIM 3.34   WBC 5.0   .0       Recent Labs   Lab 02/06/25 2005   ALT 31   AST 27       Impression:  84 yr-old male with hx of CAD s/p CABG, AF (Coumadin), s/p AVR, HF s/p ICD, DM, CKD, and prostate cancer (no RTX) admitted yesterday with weakness in the setting of black stools found to have Hgb 7.9 (MCV 90; iron 32, sat 7, ferritin 17) from Hgb 8 last month however steadily declining from 9.9 in 10/2024 and 11.5 in 7/2024. No hx of EGD and last colonoscopy 3/2018 with large colon polyp (20 mm)  Recommend bi-directional endoscopy to assess for sources of anemia including PUD, AVMs, IBD, and neoplasm. Pt without brisk bleeding and has remained hemodynamically stable, OK to wait for INR to drift down without reversal agent--can reconsider if brisk bleeding develops, hemodynamic instability develops, or if anemia become unresponsive to PRBC transfusion   The risks, benefits, alternatives of the procedure including the risks of anesthesia, bleeding, perforation, missed lesions, need for surgery, and infection were discussed with the patient. He expressed understanding of the risks and was agreeable to proceed.  Recommendations:     Plan for EGD and colonoscopy under MAC once INR normalizes   Protonix 40 mg IV BID  Please hold Coumadin if the risk remains acceptable; ok to continue ASA if needed   Repeat INR this afternoon and then daily   Continue to transfuse PRBC as needed to maintain Hgb >7 (higher if needed from a cardiac perspective with diuretics as  needed per cardiology/Hospitalist recommendations)  Monitor for overt GI bleeding; NO need for occult stool testing   OK to continue solid food diet at this time awaiting INR to drift down     Thank you for the consultation, we will follow the patient with you.  Attending addendum (Dr Maloney) to follow later today and provide formal, final recommendations at that time   CR Hdz  8:26 AM  2/7/2025  [facility]  [phone]    GI attending addendum:    I have personally seen and examined this patient and agree with above nurse practitioner's assessment and recommendations. 83 y/o male with a h/o CABG, A.fib on Coumadin presenting with weakness, drop in hemoglobin in the setting of supra-therapeutic INR. On exam, appears alert, comfortable, family at bedside. Denies n/v. Abdomen soft non-distended.   Recommend continuing to hold Coumadin ,goal INR < 1.5  Monitor for signs of bleeding  Tentative plans for scopes on Monday as INR allows    Javi Maloney MD  9:23 PM  2/7/2025  [facility]  [phone]           [1]   Allergies  Allergen Reactions    Ace Inhibitors Coughing

## 2025-02-07 NOTE — PAYOR COMM NOTE
--------------  ADMISSION REVIEW     Payor: [employer]  Subscriber #:  [id number]  Authorization Number: [id number]    Admit date: 2/6/25  Admit time: 11:36 PM       REVIEW DOCUMENTATION:    Patient Seen in: [facility] In [place]      History     Chief Complaint   Patient presents with    Dizziness     Stated Complaint: weakness, dizziness for a week    Subjective:   HPI      Patient is an 84-year-old male who presents with progressive weakness for about the last week.  He states last couple days he has for example had to hold onto the grocery cart at the store because his legs were so shaky and weak he fell he has been a fall down.  Not lightheaded and not presyncopal, just weak.  No falls or injuries.  He denies any recent illnesses like fever, cough, congestion.  No vomiting or diarrhea.  Feels his appetite has been good.    Objective:     Past Medical History:    Abdominal hernia    Actinic keratosis    Chest pain, unspecified    Cough    Diabetes (HCC)    Diabetes mellitus (HCC)    Dysmetabolic syndrome X    Easy bruising    Essential hypertension    Essential hypertension, malignant    Flatulence/gas pain/belching    Hemorrhoids    High cholesterol    Inguinal hernia without mention of obstruction or gangrene, unilateral or unspecified, (not specified as recurrent)    Myalgia and myositis, unspecified    Obesity, unspecified    Special screening for malignant neoplasm of prostate    Type II or unspecified type diabetes mellitus without mention of complication, uncontrolled    Undiagnosed cardiac murmurs    Unspecified sleep apnea    AHI 21.7 SaO2 yashira 79.9 %     Wears glasses     Past Surgical History:   Procedure Laterality Date    Cabg  2/2012    CABG x 3    Cath transcatheter aortic valve replacement  2/2012    Colonoscopy  6/27/2014    Procedure: COLONOSCOPY;  Surgeon: Ton Oliveira MD;  Location:  ENDOSCOPY    Hernia surgery      Valve repair         Physical Exam     ED  Triage Vitals [02/06/25 1935]   /65   Pulse 63   Resp 20   Temp 97.5 °F (36.4 °C)   Temp src Oral   SpO2 99 %   O2 Device None (Room air)       Current Vitals:   Vital Signs  BP: 135/64  Pulse: 60  Resp: 14  Temp: 98 °F (36.7 °C)  Temp src: Oral    Oxygen Therapy  SpO2: 99 %  O2 Device: None (Room air)        Physical Exam  Vitals and nursing note reviewed.   Constitutional:       Appearance: He is well-developed.   HENT:      Head: Normocephalic and atraumatic.   Eyes:      Conjunctiva/sclera: Conjunctivae normal.      Pupils: Pupils are equal, round, and reactive to light.   Cardiovascular:      Rate and Rhythm: Normal rate and regular rhythm.      Heart sounds: Normal heart sounds.   Pulmonary:      Effort: Pulmonary effort is normal.      Breath sounds: Normal breath sounds.   Abdominal:      General: Bowel sounds are normal.      Palpations: Abdomen is soft.      Comments: Mari RN as chaperone.  Brown stool, very strongly guaiac positive.  No melena or hematochezia.   Musculoskeletal:         General: Normal range of motion.      Cervical back: Normal range of motion and neck supple.   Skin:     General: Skin is warm and dry.   Neurological:      Mental Status: He is alert and oriented to person, place, and time.         ED Course     Labs Reviewed   COMP METABOLIC PANEL (14) - Abnormal; Notable for the following components:       Result Value    Glucose 137 (*)     CO2 20.0 (*)     BUN 51 (*)     Creatinine 3.42 (*)     Calculated Osmolality 300 (*)     eGFR-Cr 17 (*)     All other components within normal limits   CBC WITH DIFFERENTIAL WITH PLATELET - Abnormal; Notable for the following components:    RBC 2.76 (*)     HGB 7.9 (*)     HCT 24.9 (*)     All other components within normal limits   PROTHROMBIN TIME (PT) - Abnormal; Notable for the following components:    PT 35.0 (*)     INR 3.55 (*)     All other components within normal limits   PTT, ACTIVATED - Abnormal; Notable for the following  components:    PTT 37.2 (*)     All other components within normal limits   TROPONIN I HIGH SENSITIVITY - Normal   URINALYSIS WITH CULTURE REFLEX     EKG    Rate, intervals and axes as noted on EKG Report.  Rate: 60  Rhythm: Paced  Reading: AV paced rhythm.  No ST segment or T wave changes.    Disposition and Plan     Clinical Impression:  1. Anemia, unspecified type    2. Gastrointestinal hemorrhage, unspecified gastrointestinal hemorrhage type    3. Acute on chronic renal insufficiency         Disposition:  Admit  2/6/2025  9:32 pm      Signed by Andres Willis MD on 2/6/2025  9:32 PM           HISTORY AND PHYSICAL     He was out grocery shopping when his legs felt shaky and he was worried he was going to fall. He denied any syncope or falls. He does note black stools over the weekend without abdominal pain but did not think much of it. He had normal BM since then. He reports good PO intake.     Assessment & Plan:  #Dizziness  -EKG showing AV paced rhythm @60 bpm  -may be 2/2 volume depletion vs bleeding vs other  -check orthostatics  -recheck carotid dopplers given hx carotid stenosis  -monitor on  telemetry  -work-up for anemia as below  -PT/OT     #Heme positive stool  -black stools several days ago, now brown heme +  -cont to monitor CBC and transfuse for hgb <7  -GI c/s in ED     #NAGMA  -bicarb 20 with AG 10  -cont to monitor BMP     #CKD 4  -Cr 3.42 with BUN 51: baseline 2.73 with GFR 22  -less than 1.5 x elevation  -on gentle IVF given hx HFrEF  -strict I/O, avoid nephrotoxins  -nephrology c/s in ED     #Afib with supratherapeutic INR  -INR 3.55: goal 2-3  -holding coumadin for possible GIB, then coumadin dosing per pharmacy when  able to restart     #DM  -SSI, QID checks, hypoglycemia protocol  -hold home metformin, glipizide     #HLD  -cont home statin     #HFrEF  -hold home furosemide, spironolactone 2/2 ILIR     #gout  -hhold home allopurinol 2/2 ILIR          MEDICATIONS ADMINISTERED IN LAST 1  DAY:  amiodarone (Pacerone) tab 200 mg       Date Action Dose Route User    2/7/2025 0822 Given 200 mg Oral Tamika Gamble RN          aspirin chewable tab 81 mg       Date Action Dose Route User    2/7/2025 0822 Given 81 mg Oral Tamika Gamble RN          atorvastatin (Lipitor) tab 40 mg       Date Action Dose Route User    2/7/2025 0822 Given 40 mg Oral Tamika Gamble RN          magnesium oxide (Mag-Ox) tab 400 mg       Date Action Dose Route User    2/7/2025 0822 Given 400 mg Oral Tamika Gamble RN          metoprolol succinate ER (Toprol XL) 24 hr tab 200 mg       Date Action Dose Route User    2/7/2025 0822 Given 200 mg Oral Tamika Gamble RN          pantoprazole (Protonix) 40 mg in sodium chloride 0.9% PF 10 mL IV push       Date Action Dose Route User    2/6/2025 2218 Given 40 mg Intravenous Mari Swenson RN          sodium chloride 0.9% infusion 60 ML HR        Date Action Dose Route User    2/6/2025 2222 New Bag 125 mL/hr Intravenous Mari Swenson RN          sodium chloride 0.9% infusion       Date Action Dose Route User    2/7/2025 0000 New Bag (none) Intravenous Madelyn Espinal RN            Vitals (last day)       Date/Time Temp Pulse Resp BP SpO2 Weight O2 Device O2 Flow Rate (L/min) [facility]    02/07/25 0425 97.4 °F (36.3 °C) 60 18 138/60 95 % -- None (Room air) --     02/07/25 0038 -- -- -- -- -- 196 lb (88.9 kg) -- -- SO    02/06/25 2352 97.9 °F (36.6 °C) 60 20 147/66 100 % -- None (Room air) --     02/06/25 2224 -- 60 17 121/65 96 % -- None (Room air) --     02/06/25 2058 98 °F (36.7 °C) 60 14 135/64 99 % -- None (Room air) -- CP    02/06/25 1935 97.5 °F (36.4 °C) 63 20 120/65 99 % 196 lb (88.9 kg) None (Room air) -- MM            Component  Ref Range & Units 2/7/25 0559   WBC  4.0 - 11.0 x10(3) uL 5.0   RBC  3.80 - 5.80 x10(6)uL 2.38 Low    HGB  13.0 - 17.5 g/dL 6.8 Low Panic    HCT  39.0 - 53.0 % 20.9 Low    PLT  150.0 - 450.0 10(3)uL 173.0       Blood  Transfusion Record       Product Unit Status Volume Start End            Transfuse RBC     Not assigned Ordered

## 2025-02-07 NOTE — PLAN OF CARE
Problem: PAIN - ADULT  Goal: Verbalizes/displays adequate comfort level or patient's stated pain goal  Description: INTERVENTIONS:  - Encourage pt to monitor pain and request assistance  - Assess pain using appropriate pain scale  - Administer analgesics based on type and severity of pain and evaluate response  - Implement non-pharmacological measures as appropriate and evaluate response  - Consider cultural and social influences on pain and pain management  - Manage/alleviate anxiety  - Utilize distraction and/or relaxation techniques  - Monitor for opioid side effects  - Notify MD/LIP if interventions unsuccessful or patient reports new pain  - Anticipate increased pain with activity and pre-medicate as appropriate  Outcome: Progressing     Problem: RISK FOR INFECTION - ADULT  Goal: Absence of fever/infection during anticipated neutropenic period  Description: INTERVENTIONS  - Monitor WBC  - Administer growth factors as ordered  - Implement neutropenic guidelines  Outcome: Progressing     Problem: SAFETY ADULT - FALL  Goal: Free from fall injury  Description: INTERVENTIONS:  - Assess pt frequently for physical needs  - Identify cognitive and physical deficits and behaviors that affect risk of falls.  - [place] fall precautions as indicated by assessment.  - Educate pt/family on patient safety including physical limitations  - Instruct pt to call for assistance with activity based on assessment  - Modify environment to reduce risk of injury  - Provide assistive devices as appropriate  - Consider OT/PT consult to assist with strengthening/mobility  - Encourage toileting schedule  Outcome: Progressing     Problem: DISCHARGE PLANNING  Goal: Discharge to home or other facility with appropriate resources  Description: INTERVENTIONS:  - Identify barriers to discharge w/pt and caregiver  - Include patient/family/discharge partner in discharge planning  - Arrange for needed discharge resources and transportation as  appropriate  - Identify discharge learning needs (meds, wound care, etc)  - Arrange for interpreters to assist at discharge as needed  - Consider post-discharge preferences of patient/family/discharge partner  - Complete POLST form as appropriate  - Assess patient's ability to be responsible for managing their own health  - Refer to Case Management Department for coordinating discharge planning if the patient needs post-hospital services based on physician/LIP order or complex needs related to functional status, cognitive ability or social support system  Outcome: Progressing   Patient received one unit of prbc this am that was effective. Patient has been up ambulating in room. Family at bedside and explained the plan of care and questions answered. Patient has no c/o pain.

## 2025-02-07 NOTE — PROGRESS NOTES
[facility]   part of [facility]     Hospitalist Progress Note     Kyler Haji Patient Status:  Inpatient    1940 MRN [id number]   Location [facility] 4NW-A Attending Kevyn Barry,    Hosp Day # 1 PCP Abhi Arellano MD     Chief Complaint: Dizziness    Subjective:     Patient denies nausea, vomiting or pain. No further dizziness even with dizziness. Hgb 6.8 this morning and getting one unit blood. He noticed dark stool last weekend, but none since.    Objective:    Review of Systems:   A comprehensive review of systems was completed; pertinent positive and negatives stated in subjective.    Vital signs:  Temp:  [97.4 °F (36.3 °C)-98.1 °F (36.7 °C)] 98.1 °F (36.7 °C)  Pulse:  [60-72] 60  Resp:  [14-20] 16  BP: (114-152)/(57-69) 152/68  SpO2:  [95 %-100 %] 98 %    Physical Exam:    General: No acute distress, awake and alert  Respiratory: No wheezes, no rhonchi  Cardiovascular: S1, S2, regular rate and rhythm  Abdomen: Soft, Non-tender, non-distended, positive bowel sounds  Extremities: No edema    Diagnostic Data:    Labs:  Recent Labs   Lab 25  0559   WBC 5.6 5.0   HGB 7.9* 6.8*   MCV 90.2 87.8   .0 173.0   INR 3.55*  --      Recent Labs   Lab 25  0559   * 99   BUN 51* 57*   CREATSERUM 3.42* 3.11*   CA 9.1 8.8   ALB 4.5  --     141   K 4.6 4.3    109   CO2 20.0* 20.0*   ALKPHO 83  --    AST 27  --    ALT 31  --    BILT 0.3  --    TP 7.2  --      Estimated Glomerular Filtration Rate: 18.9 mL/min/1.73m2 (A) (by CKD-EPI based on SCr of 3.11 mg/dL (H)).    Recent Labs   Lab 25   TROPHS 12     Recent Labs   Lab 25   PTP 35.0*   INR 3.55*     Microbiology  No results found for this visit on 25.    Imaging: Reviewed in Epic.    Medications:    amiodarone  200 mg Oral Daily    aspirin  81 mg Oral Daily    atorvastatin  40 mg Oral Daily    Metoprolol Succinate ER  200 mg Oral Daily Beta Blocker     pantoprazole  40 mg Intravenous Q12H    insulin aspart  2-10 Units Subcutaneous TID AC and HS       Assessment & Plan:      #Dizziness  -EKG showing AV paced rhythm  -May be 2/2 volume depletion vs bleeding vs other  -Recheck carotid dopplers given hx carotid stenosis  -Monitor on  telemetry  -Work-up for anemia as below  -PT/OT     #Heme positive stool  -Black stools several days ago, now brown heme +  -Cont to monitor CBC and transfuse for hgb <7. So far has received 1 unit blood  -GI consulted      #CKD 4  -Less than 1.5 x elevation, now closer to baseline  -Stop IVF  -Nephrology c/s in ED     #Afib with supratherapeutic INR  -Monitor INR  -Holding coumadin for possible GIB, then coumadin dosing per pharmacy when able to restart  -Amio, BB     #DM with A1c 7.5  -SSI, QID checks, hypoglycemia protocol  -Hold home metformin, glipizide     #HLD  -Cont home statin    #HTN  -BB  -Hold diuretics and losartan     #HFrEF  -Hold home furosemide, spironolactone 2/2 ILIR     #Gout  -Hold home allopurinol 2/2 ILIR      Kevyn Barry,     Supplementary Documentation:     Quality:  DVT Mechanical Prophylaxis:   SCDs,    DVT Pharmacologic Prophylaxis   Medication   None      DVT Pharmacologic prophylaxis: Aspirin 162 mg       Code Status: Full Code  Pike: No urinary catheter in place  TAMIKA: TBD    Discharge is dependent on: Clinical state, consultant recs  At this point Mr. Haji is expected to be discharge to: Home      The 21st Century Cures Act makes medical notes like these available to patients in the interest of transparency. Please be advised this is a medical document. Medical documents are intended to carry relevant information, facts as evident, and the clinical opinion of the practitioner. The medical note is intended as peer to peer communication and may appear blunt or direct. It is written in medical language and may contain abbreviations or verbiage that are unfamiliar.              **Certification    PHYSICIAN  Certification of Need for Inpatient Hospitalization - Initial Certification    Patient will require inpatient services that will reasonably be expected to span two midnight's based on the clinical documentation in H+P.   Based on patients current state of illness, I anticipate that, after discharge, patient will require TBD.

## 2025-02-07 NOTE — ED INITIAL ASSESSMENT (HPI)
Patient complains of weakness and \"dizziness sometimes\" for approx. 1 week. Patient ambulatory in triage, no signs of hypoperfusion.

## 2025-02-07 NOTE — CONSULTS
[facility]  Report of Consultation    Kyler Haji Patient Status:  Inpatient    1940 MRN [id number]   Location [facility] 4NW-A Attending Kevyn Barry,    Hosp Day # 1 PCP Abhi Arellano MD     Reason for Consultation:  ILIR/CKD     History of Present Illness:  Kyler Haji is a a(n) 84 year old male with hx of CKD 4, DM, HTN, HFrEF, who presented to hospital w/ complain of LH/dizziness. + dark stools  Denies an n/v  No f/c    On admit labs pt noted to have Cr up to 3.2 on admit; hgb 7.9 ; on repeat labs 6.8  Noted to have heme positive stools on admit eval    On fluids overnight; labs stable/ improved  Denies any abd pain again  No f/c      History:  Past Medical History:    Abdominal hernia    Actinic keratosis    Chest pain, unspecified    Cough    Diabetes (HCC)    Diabetes mellitus (HCC)    Dysmetabolic syndrome X    Easy bruising    Essential hypertension    Essential hypertension, malignant    Flatulence/gas pain/belching    Hemorrhoids    High cholesterol    Inguinal hernia without mention of obstruction or gangrene, unilateral or unspecified, (not specified as recurrent)    Myalgia and myositis, unspecified    Obesity, unspecified    Special screening for malignant neoplasm of prostate    Type II or unspecified type diabetes mellitus without mention of complication, uncontrolled    Undiagnosed cardiac murmurs    Unspecified sleep apnea    AHI 21.7 SaO2 yashira 79.9 %     Wears glasses     Past Surgical History:   Procedure Laterality Date    Cabg  2012    CABG x 3    Cath transcatheter aortic valve replacement  2012    Colonoscopy  2014    Procedure: COLONOSCOPY;  Surgeon: Ton Oliveira MD;  Location:  ENDOSCOPY    Hernia surgery      Valve repair       Family History   Problem Relation Age of Onset    Prostate Cancer Brother     Diabetes Father     Diabetes Mother       reports that he quit smoking about 49 years ago. His smoking use included cigarettes. He started  smoking about 69 years ago. He has never been exposed to tobacco smoke. He has never used smokeless tobacco. He reports current alcohol use. He reports that he does not use drugs.    Allergies:  Allergies[1]    Current Medications:    Current Facility-Administered Medications:     albuterol (Ventolin HFA) 108 (90 Base) MCG/ACT inhaler 2 puff, 2 puff, Inhalation, Q6H PRN    amiodarone (Pacerone) tab 200 mg, 200 mg, Oral, Daily    aspirin chewable tab 81 mg, 81 mg, Oral, Daily    atorvastatin (Lipitor) tab 40 mg, 40 mg, Oral, Daily    metoprolol succinate ER (Toprol XL) 24 hr tab 200 mg, 200 mg, Oral, Daily Beta Blocker    pantoprazole (Protonix) 40 mg in sodium chloride 0.9% PF 10 mL IV push, 40 mg, Intravenous, Q12H    glucose (Dex4) 15 GM/59ML oral liquid 15 g, 15 g, Oral, Q15 Min PRN **OR** glucose (Glutose) 40% oral gel 15 g, 15 g, Oral, Q15 Min PRN **OR** glucose-vitamin C (Dex-4) chewable tab 4 tablet, 4 tablet, Oral, Q15 Min PRN **OR** dextrose 50% injection 50 mL, 50 mL, Intravenous, Q15 Min PRN **OR** glucose (Dex4) 15 GM/59ML oral liquid 30 g, 30 g, Oral, Q15 Min PRN **OR** glucose (Glutose) 40% oral gel 30 g, 30 g, Oral, Q15 Min PRN **OR** glucose-vitamin C (Dex-4) chewable tab 8 tablet, 8 tablet, Oral, Q15 Min PRN    acetaminophen (Tylenol Extra Strength) tab 500 mg, 500 mg, Oral, Q4H PRN    melatonin tab 3 mg, 3 mg, Oral, Nightly PRN    metoclopramide (Reglan) 5 mg/mL injection 5 mg, 5 mg, Intravenous, Q8H PRN    insulin aspart (NovoLOG) 100 Units/mL FlexPen 2-10 Units, 2-10 Units, Subcutaneous, TID AC and HS    glycerin-hypromellose- (Artificial Tears) 0.2-0.2-1 % ophthalmic solution 1 drop, 1 drop, Both Eyes, QID PRN    sodium chloride (Saline Mist) 0.65 % nasal solution 1 spray, 1 spray, Each Nare, Q3H PRN    benzonatate (Tessalon) cap 200 mg, 200 mg, Oral, TID PRN  Home Medications:  No current outpatient medications on file.       Review of Systems:  See HPI; A total of 12 systems reviewed  and otherwise unremarkable.    Physical Exam:  Vital signs: Blood pressure 144/62, pulse 59, temperature 97.7 °F (36.5 °C), temperature source Oral, resp. rate 20, height 5' 11\" (1.803 m), weight 196 lb (88.9 kg), SpO2 99%.  General: No acute distress. Alert and oriented x 3.  HEENT: Moist mucous membranes. EOM-I. PERRL  Neck: No lymphadenopathy.  No JVD. No carotid bruits.  Respiratory: Clear to auscultation bilaterally.  No wheezes. No rhonchi.  Cardiovascular: S1, S2.  Regular rate and rhythm.  No murmurs. Equal pulses   Abdomen: Soft, nontender, nondistended.  Positive bowel sounds. No rebound tenderness   Neurologic: No focal neurological deficits.   Musculoskeletal: Full range of motion of all extremities.  No swelling noted.   Integument: No lesions. No erythema.  Psychiatric: Appropriate mood and affect.    Laboratory:  Lab Results   Component Value Date    WBC 5.0 02/07/2025    HGB 8.3 02/07/2025    HCT 20.9 02/07/2025    .0 02/07/2025    CREATSERUM 3.11 02/07/2025    BUN 57 02/07/2025     02/07/2025    K 4.3 02/07/2025     02/07/2025    CO2 20.0 02/07/2025    GLU 99 02/07/2025    CA 8.8 02/07/2025    ALB 4.5 02/06/2025    ALKPHO 83 02/06/2025    BILT 0.3 02/06/2025    TP 7.2 02/06/2025    AST 27 02/06/2025    ALT 31 02/06/2025    PTT 37.2 02/06/2025    INR 2.90 02/07/2025    PTP 30.5 02/07/2025    MG 1.6 02/07/2025    PGLU 204 02/07/2025     Lab Results   Component Value Date    COLORUR Colorless 02/07/2025    CLARITY Clear 02/07/2025    SPECGRAVITY 1.014 02/07/2025    GLUUR Normal 02/07/2025    BILUR Negative 02/07/2025    KETUR Negative 02/07/2025    BLOODURINE Negative 02/07/2025    PHURINE 5.5 02/07/2025    PROUR Negative 02/07/2025    UROBILINOGEN Normal 02/07/2025    NITRITE Negative 02/07/2025    LEUUR Negative 02/07/2025       BUN (mg/dL)   Date Value   02/07/2025 57 (H)   02/06/2025 51 (H)   01/20/2025 41 (H)   04/23/2014 18   10/22/2013 26 (H)   11/02/2012 21 (H)     Blood Urea  Nitrogen (mg/dL)   Date Value   05/31/2019 29.0 (H)   05/07/2018 26 (H)   04/25/2018 22 (H)     UREA NITROGEN (BUN) (mg/dL)   Date Value   05/30/2013 18   04/26/2012 26 (H)   09/30/2011 22     CREATININE (mg/dL)   Date Value   04/23/2014 0.59 (L)   10/22/2013 0.88   05/30/2013 0.99   11/02/2012 1.0   04/26/2012 1.05   09/30/2011 1.00     Creatinine (mg/dL)   Date Value   02/07/2025 3.11 (H)   02/06/2025 3.42 (H)   01/20/2025 2.73 (H)   05/31/2019 1.81 (H)   05/07/2018 1.33 (H)   04/25/2018 1.40 (H)         Imaging:  Reviewed     Impression:  ILIR/CKD- stg 4 - related to DM/HTN; ILIR in setting of symptomatic anemia  - transfuse prn  - monitor labs  - continue fluids  - diuretics/BP meds held  GIB/symptomatic anemia- as above; hold AC; GI evaluation  Afib; holding AC; Amio/BB- stable  HFrEF- appears compensated; holding diuretics  Metabolic acidosis - fluids adjusted; NAGMA         Thank you for allowing me to participate in this patient's care.  Please feel free to call me with any questions or concerns.    Rhett Jones MD  2/7/2025  1:27 PM         [1]   Allergies  Allergen Reactions    Ace Inhibitors Coughing

## 2025-02-07 NOTE — PROGRESS NOTES
OT order received and chart reviewed, pt with critical hgb 6.8 this AM, will defer therapy and attempt again as able and appropriate.     2nd attempt at 1330: pt off unit for US

## 2025-02-07 NOTE — PROGRESS NOTES
NURSING ADMISSION NOTE      Patient admitted via cart from [place] [facility]  Oriented to room.  Safety precautions initiated.  Bed in low position.  Call light in reach.    Hb is 6.8 this am. No bleeding overnight. Vitals stable. MD notified

## 2025-02-08 PROBLEM — N17.9 AKI (ACUTE KIDNEY INJURY): Status: ACTIVE | Noted: 2018-10-15

## 2025-02-08 LAB
ANION GAP SERPL CALC-SCNC: 11 MMOL/L (ref 0–18)
BASOPHILS # BLD AUTO: 0.04 X10(3) UL (ref 0–0.2)
BASOPHILS NFR BLD AUTO: 0.7 %
BLOOD TYPE BARCODE: 9500
BUN BLD-MCNC: 47 MG/DL (ref 9–23)
CALCIUM BLD-MCNC: 8.8 MG/DL (ref 8.7–10.6)
CHLORIDE SERPL-SCNC: 107 MMOL/L (ref 98–112)
CO2 SERPL-SCNC: 21 MMOL/L (ref 21–32)
CREAT BLD-MCNC: 2.48 MG/DL
EGFRCR SERPLBLD CKD-EPI 2021: 25 ML/MIN/1.73M2 (ref 60–?)
EOSINOPHIL # BLD AUTO: 0.28 X10(3) UL (ref 0–0.7)
EOSINOPHIL NFR BLD AUTO: 4.7 %
ERYTHROCYTE [DISTWIDTH] IN BLOOD BY AUTOMATED COUNT: 15.3 %
GLUCOSE BLD-MCNC: 133 MG/DL (ref 70–99)
GLUCOSE BLD-MCNC: 137 MG/DL (ref 70–99)
GLUCOSE BLD-MCNC: 162 MG/DL (ref 70–99)
GLUCOSE BLD-MCNC: 166 MG/DL (ref 70–99)
GLUCOSE BLD-MCNC: 187 MG/DL (ref 70–99)
HCT VFR BLD AUTO: 24.5 %
HGB BLD-MCNC: 8.1 G/DL
IMM GRANULOCYTES # BLD AUTO: 0.01 X10(3) UL (ref 0–1)
IMM GRANULOCYTES NFR BLD: 0.2 %
INR BLD: 2.68 (ref 0.8–1.2)
LYMPHOCYTES # BLD AUTO: 0.9 X10(3) UL (ref 1–4)
LYMPHOCYTES NFR BLD AUTO: 15.1 %
MAGNESIUM SERPL-MCNC: 1.6 MG/DL (ref 1.6–2.6)
MCH RBC QN AUTO: 28.4 PG (ref 26–34)
MCHC RBC AUTO-ENTMCNC: 33.1 G/DL (ref 31–37)
MCV RBC AUTO: 86 FL
MONOCYTES # BLD AUTO: 0.49 X10(3) UL (ref 0.1–1)
MONOCYTES NFR BLD AUTO: 8.2 %
NEUTROPHILS # BLD AUTO: 4.24 X10 (3) UL (ref 1.5–7.7)
NEUTROPHILS # BLD AUTO: 4.24 X10(3) UL (ref 1.5–7.7)
NEUTROPHILS NFR BLD AUTO: 71.1 %
OSMOLALITY SERPL CALC.SUM OF ELEC: 302 MOSM/KG (ref 275–295)
PLATELET # BLD AUTO: 180 10(3)UL (ref 150–450)
POTASSIUM SERPL-SCNC: 4.6 MMOL/L (ref 3.5–5.1)
PROTHROMBIN TIME: 28.8 SECONDS (ref 11.6–14.8)
RBC # BLD AUTO: 2.85 X10(6)UL
SODIUM SERPL-SCNC: 139 MMOL/L (ref 136–145)
UNIT VOLUME: 350 ML
WBC # BLD AUTO: 6 X10(3) UL (ref 4–11)

## 2025-02-08 PROCEDURE — 99233 SBSQ HOSP IP/OBS HIGH 50: CPT | Performed by: INTERNAL MEDICINE

## 2025-02-08 PROCEDURE — 99232 SBSQ HOSP IP/OBS MODERATE 35: CPT | Performed by: INTERNAL MEDICINE

## 2025-02-08 RX ORDER — LOSARTAN POTASSIUM 25 MG/1
25 TABLET ORAL DAILY
Status: DISCONTINUED | OUTPATIENT
Start: 2025-02-08 | End: 2025-02-11

## 2025-02-08 RX ORDER — MAGNESIUM OXIDE 400 MG/1
400 TABLET ORAL ONCE
Status: COMPLETED | OUTPATIENT
Start: 2025-02-08 | End: 2025-02-08

## 2025-02-08 NOTE — PROGRESS NOTES
GI Note:     No active GI bleed. Hgb/HD stable. No need for urgent INR reversal in setting of AVR. Plan to continue to allow INR to drift down. Plan for EGD/Colon once INR acceptable.     JACQUE Lozano  Gastroenterology  [facility]

## 2025-02-08 NOTE — PROGRESS NOTES
[facility]   part of [facility]     Hospitalist Progress Note     Kyler Haji Patient Status:  Inpatient    1940 MRN [id number]   Location [facility] 4NW-A Attending Kevyn Barry,    Hosp Day # 2 PCP Abhi Arellano MD     Chief Complaint: Dizziness    Subjective:     Patient denies nausea, vomiting or pain. No dizziness. Hgb stable. No melena in the hospital.     Objective:    Review of Systems:   A comprehensive review of systems was completed; pertinent positive and negatives stated in subjective.    Vital signs:  Temp:  [97.3 °F (36.3 °C)-98.3 °F (36.8 °C)] 97.6 °F (36.4 °C)  Pulse:  [59-60] 60  Resp:  [16-18] 16  BP: (131-167)/(61-92) 131/61  SpO2:  [87 %-96 %] 96 %    Physical Exam:    General: No acute distress, awake and alert  Respiratory: No wheezes, no rhonchi  Cardiovascular: S1, S2, regular rate and rhythm  Abdomen: Soft, Non-tender, non-distended, positive bowel sounds  Extremities: No edema    Diagnostic Data:    Labs:  Recent Labs   Lab 25  1145 25  0643 25  0644   WBC 5.6 5.0  --  6.0  --    HGB 7.9* 6.8* 8.3* 8.1*  --    MCV 90.2 87.8  --  86.0  --    .0 173.0  --  180.0  --    INR 3.55*  --  2.90*  --  2.68*     Recent Labs   Lab 2543   * 99 133*   BUN 51* 57* 47*   CREATSERUM 3.42* 3.11* 2.48*   CA 9.1 8.8 8.8   ALB 4.5  --   --     141 139   K 4.6 4.3 4.6    109 107   CO2 20.0* 20.0* 21.0   ALKPHO 83  --   --    AST 27  --   --    ALT 31  --   --    BILT 0.3  --   --    TP 7.2  --   --      Estimated Glomerular Filtration Rate: 24.8 mL/min/1.73m2 (A) (by CKD-EPI based on SCr of 2.48 mg/dL (H)).    Recent Labs   Lab 25   TROPHS 12     Recent Labs   Lab 25  1145 25  0644   PTP 35.0* 30.5* 28.8*   INR 3.55* 2.90* 2.68*     Microbiology  No results found for this visit on 25.    Imaging: Reviewed in  Epic.    Medications:    losartan  25 mg Oral Daily    amiodarone  200 mg Oral Daily    aspirin  81 mg Oral Daily    atorvastatin  40 mg Oral Daily    Metoprolol Succinate ER  200 mg Oral Daily Beta Blocker    pantoprazole  40 mg Intravenous Q12H    insulin aspart  2-10 Units Subcutaneous TID AC and HS       Assessment & Plan:      #Dizziness, resolved  -EKG showing AV paced rhythm  -May be 2/2 volume depletion vs bleeding vs other  -Carotid dopplers given hx carotid stenosis > findings stable from 2012  -Monitor on  telemetry  -Work-up for anemia as below  -PT/OT     #Heme positive stool  -Black stools several days ago, now brown heme +  -Cont to monitor CBC and transfuse for hgb <7. So far has received 1 unit blood and hgb stable  -GI consulted, plan for EGD/colonoscopy once INR acceptable    #Aortic stenosis  -S/p surgical bioprosthetic valve      #CKD 4  -Stable  -Nephrology following     #Afib  -EKG with paced rhythm  -Holding coumadin  -Amio, BB     #DM with A1c 7.5  -SSI, QID checks, hypoglycemia protocol  -Hold home metformin, glipizide     #HLD  -Cont home statin    #HTN  -BB, losartan  -Hold diuretics     #HFrEF  -Hold home furosemide, spironolactone     #Gout  -Hold home allopurinol      Kevyn Barry DO    Supplementary Documentation:     Quality:  DVT Mechanical Prophylaxis:   SCDs,    DVT Pharmacologic Prophylaxis   Medication   None      DVT Pharmacologic prophylaxis: Aspirin 162 mg       Code Status: Full Code  Pike: No urinary catheter in place  TAMIKA: TBD    Discharge is dependent on: Clinical state, consultant recs  At this point Mr. Haji is expected to be discharge to: Home      The 21st Century Cures Act makes medical notes like these available to patients in the interest of transparency. Please be advised this is a medical document. Medical documents are intended to carry relevant information, facts as evident, and the clinical opinion of the practitioner. The medical note is intended as peer  to peer communication and may appear blunt or direct. It is written in medical language and may contain abbreviations or verbiage that are unfamiliar.              **Certification    PHYSICIAN Certification of Need for Inpatient Hospitalization - Initial Certification    Patient will require inpatient services that will reasonably be expected to span two midnight's based on the clinical documentation in H+P.   Based on patients current state of illness, I anticipate that, after discharge, patient will require TBD.

## 2025-02-08 NOTE — PLAN OF CARE
Patient denies abdominal discomfort, no bowel movement noted Patient's vital signs stable.   Problem: GASTROINTESTINAL - ADULT  Goal: Maintains or returns to baseline bowel function  Description: INTERVENTIONS:  - Assess bowel function  - Maintain adequate hydration with IV or PO as ordered and tolerated  - Evaluate effectiveness of GI medications  - Encourage mobilization and activity  - Obtain nutritional consult as needed  - Establish a toileting routine/schedule  - Consider collaborating with pharmacy to review patient's medication profile  Outcome: Progressing     Problem: METABOLIC/FLUID AND ELECTROLYTES - ADULT  Goal: Hemodynamic stability and optimal renal function maintained  Description: INTERVENTIONS:  - Monitor labs and assess for signs and symptoms of volume excess or deficit  - Monitor intake, output and patient weight  - Monitor urine specific gravity, serum osmolarity and serum sodium as indicated or ordered  - Monitor response to interventions for patient's volume status, including labs, urine output, blood pressure (other measures as available)  - Encourage oral intake as appropriate  - Instruct patient on fluid and nutrition restrictions as appropriate  Outcome: Progressing

## 2025-02-08 NOTE — PROGRESS NOTES
Alert and orientated x4.  No complaints of pain.  Ambulating with steady gait.  No bowel movement.  Did get up to bathroom and passed gas.  Abdomen soft.  No nasuea.

## 2025-02-08 NOTE — OCCUPATIONAL THERAPY NOTE
OCCUPATIONAL THERAPY EVALUATION - INPATIENT    Room Number: 420/420-A  Evaluation Date: 2/8/2025     Type of Evaluation: Initial  Presenting Problem: symptomatic anemia    Physician Order: IP Consult to Occupational Therapy  Reason for Therapy:  ADL/IADL Dysfunction and Discharge Planning    OCCUPATIONAL THERAPY ASSESSMENT   Patient is a 84 year old male admitted on 2/6/2025 with Presenting Problem: symptomatic anemia. Co-Morbidities : DM, HF, CAD, CABG, gout  Patient is currently functioning near baseline with toileting, lower body dressing, grooming, bed mobility, transfers, static standing balance, dynamic standing balance, and functional standing tolerance.  Prior to admission, patient's baseline is independent.  Patient met all OT goals at modified independent to supervision level.  Patient reports no further questions/concerns at this time.     Patient will benefit from continued mobilization with nursing staff to prevent deconditioning.  No skilled OT needs, will discharge form OT    Recommendations for nursing staff:   Transfers: 1 person, RW   Toileting location: Toilet    EVALUATION SESSION:  Patient at start of session: supine, HOB raised, watching basketball    FUNCTIONAL TRANSFER ASSESSMENT  Sit to Stand: Edge of Bed  Edge of Bed: Supervision    BED MOBILITY  Supine to Sit : Independent    BALANCE ASSESSMENT     FUNCTIONAL ADL ASSESSMENT  LB Dressing Seated: Independent    ACTIVITY TOLERANCE: no complaints                         O2 SATURATIONS       COGNITION  Overall Cognitive Status:  WFL - within functional limits    COGNITION ASSESSMENTS     Upper Extremity:   ROM: within functional limits   Strength: is within functional limits   Coordination:  Gross motor: wfl  Fine motor: wfl  Sensation:  no UE deficits noted or reported    EDUCATION PROVIDED  Patient Education : Role of Occupational Therapy; Plan of Care  Patient's Response to Education: Verbalized Understanding    Equipment used: rw, gait  belt  Demonstrates functional use    Therapist comments: OT educated patient on role and session goals. Patient had good tolerance for functional mobility in unit for simulated household distances using RW for energy conservation. Patient was able to demo lower body dressing with supervision in sitting. OT educated him to continue working with nursing staff to prevent weakness. UNderstanding voiced.     Patient End of Session: Hospital anti-slip socks;All patient questions and concerns addressed;RN aware of session/findings;Call light within reach;Needs met;Up in chair    OCCUPATIONAL PROFILE    HOME SITUATION  Type of Home: Townhouse  Home Layout: One level  Lives With: Spouse    Toilet and Equipment: Comfort height toilet  Shower/Tub and Equipment: Walk-in shower;Shower chair;Grab bar;Other (Comment) (WC accessible)       Occupation/Status: retired      Drives: Yes  Patient Regularly Uses: Reading glasses    Prior Level of Function: Independent , drives,  lives with spouse in a townhouse.    SUBJECTIVE  \"I don't think I'll be able to go home until Monday.\"    PAIN ASSESSMENT  Ratin          OBJECTIVE  Precautions: None  Fall Risk: High fall risk    WEIGHT BEARING RESTRICTION       AM-PAC ‘6-Clicks’ Inpatient Daily Activity Short Form  -   Putting on and taking off regular lower body clothing?: A Little  -   Bathing (including washing, rinsing, drying)?: A Little  -   Toileting, which includes using toilet, bedpan or urinal? : A Little  -   Putting on and taking off regular upper body clothing?: None  -   Taking care of personal grooming such as brushing teeth?: None  -   Eating meals?: None    AM-PAC Score:  Score: 21  Approx Degree of Impairment: 32.79%  Standardized Score (AM-PAC Scale): 44.27    ADDITIONAL TESTS     NEUROLOGICAL FINDINGS      PLAN   Patient has been evaluated and presents with no skilled Occupational Therapy needs at this time.  Patient discharged from Occupational Therapy  services.  Please re-order if a new functional limitation presents during this admission.    OT Device Recommendations: None    Patient Evaluation Complexity Level:   Occupational Profile/Medical History LOW - Brief history including review of medical or therapy records    Specific performance deficits impacting engagement in ADL/IADL LOW  1 - 3 performance deficits    Client Assessment/Performance Deficits LOW - No comorbidities nor modifications of tasks    Clinical Decision Making LOW - Analysis of occupational profile, problem-focused assessments, limited treatment options    Overall Complexity LOW     OT Session Time: 20 minutes    Therapeutic Activity:15 minutes

## 2025-02-08 NOTE — PROGRESS NOTES
[facility]   part of [facility]     Nephrology Progress Note    Kyler Haji Patient Status:  Inpatient    1940 MRN [id number]   Location [facility] 4NW-A Attending Kevyn Barry,    Hosp Day # 2 PCP Abhi Arellano MD       SUBJECTIVE:  Doing well this AM,  no c/o.  Has been ambulating in halls, po intake good        Physical Exam:   /63 (BP Location: Left arm)   Pulse 59   Temp 97.3 °F (36.3 °C) (Oral)   Resp 18   Ht 5' 11\" (1.803 m)   Wt 196 lb (88.9 kg)   SpO2 (!) 87%   BMI 27.34 kg/m²   Temp (24hrs), Av.9 °F (36.6 °C), Min:97.3 °F (36.3 °C), Max:98.3 °F (36.8 °C)       Intake/Output Summary (Last 24 hours) at 2025 0805  Last data filed at 2025 0719  Gross per 24 hour   Intake 427.5 ml   Output 750 ml   Net -322.5 ml     Last 3 Weights   25 0038 196 lb (88.9 kg)   25 1935 196 lb (88.9 kg)   25 1326 200 lb (90.7 kg)   24 1122 200 lb (90.7 kg)   24 0802 200 lb (90.7 kg)   24 0806 200 lb 9.9 oz (91 kg)     General: Alert and oriented in no apparent distress.  HEENT: No scleral icterus, MMM  Neck: Supple, no TAYLER or thyromegaly  Cardiac: Regular rate and rhythm, S1, S2 normal, no murmur or rub  Lungs: Clear without wheezes, rales, rhonchi.    Abdomen: Soft, non-tender. + bowel sounds, no palpable organomegaly  Extremities: Without clubbing, cyanosis or edema.  Neurologic: Alert and oriented, cranial nerves grossly intact, moving all extremities  Skin: Warm and dry, no rash        Labs:     Recent Labs   Lab 25  2005 25  0559 25  1145 25  0643 25  0644   WBC 5.6 5.0  --  6.0  --    HGB 7.9* 6.8* 8.3* 8.1*  --    MCV 90.2 87.8  --  86.0  --    .0 173.0  --  180.0  --    INR 3.55*  --  2.90*  --  2.68*       Recent Labs   Lab 25  0559 25  0643    141 139   K 4.6 4.3 4.6    109 107   CO2 20.0* 20.0* 21.0   BUN 51* 57* 47*   CREATSERUM 3.42* 3.11* 2.48*   CA 9.1  8.8 8.8   MG  --  1.6 1.6   * 99 133*       Recent Labs   Lab 02/06/25 2005   ALT 31   AST 27   ALB 4.5       Recent Labs   Lab 02/07/25  0512 02/07/25  1145 02/07/25  1711 02/07/25  2113 02/08/25  0516   PGLU 119* 204* 148* 163* 137*       Meds:   Current Facility-Administered Medications   Medication Dose Route Frequency    magnesium oxide (Mag-Ox) tab 400 mg  400 mg Oral Once    albuterol (Ventolin HFA) 108 (90 Base) MCG/ACT inhaler 2 puff  2 puff Inhalation Q6H PRN    amiodarone (Pacerone) tab 200 mg  200 mg Oral Daily    aspirin chewable tab 81 mg  81 mg Oral Daily    atorvastatin (Lipitor) tab 40 mg  40 mg Oral Daily    metoprolol succinate ER (Toprol XL) 24 hr tab 200 mg  200 mg Oral Daily Beta Blocker    pantoprazole (Protonix) 40 mg in sodium chloride 0.9% PF 10 mL IV push  40 mg Intravenous Q12H    lactated ringers infusion   Intravenous Continuous    glucose (Dex4) 15 GM/59ML oral liquid 15 g  15 g Oral Q15 Min PRN    Or    glucose (Glutose) 40% oral gel 15 g  15 g Oral Q15 Min PRN    Or    glucose-vitamin C (Dex-4) chewable tab 4 tablet  4 tablet Oral Q15 Min PRN    Or    dextrose 50% injection 50 mL  50 mL Intravenous Q15 Min PRN    Or    glucose (Dex4) 15 GM/59ML oral liquid 30 g  30 g Oral Q15 Min PRN    Or    glucose (Glutose) 40% oral gel 30 g  30 g Oral Q15 Min PRN    Or    glucose-vitamin C (Dex-4) chewable tab 8 tablet  8 tablet Oral Q15 Min PRN    acetaminophen (Tylenol Extra Strength) tab 500 mg  500 mg Oral Q4H PRN    melatonin tab 3 mg  3 mg Oral Nightly PRN    metoclopramide (Reglan) 5 mg/mL injection 5 mg  5 mg Intravenous Q8H PRN    insulin aspart (NovoLOG) 100 Units/mL FlexPen 2-10 Units  2-10 Units Subcutaneous TID AC and HS    glycerin-hypromellose- (Artificial Tears) 0.2-0.2-1 % ophthalmic solution 1 drop  1 drop Both Eyes QID PRN    sodium chloride (Saline Mist) 0.65 % nasal solution 1 spray  1 spray Each Nare Q3H PRN    benzonatate (Tessalon) cap 200 mg  200 mg Oral  TID PRN         Impression/Plan:        ILIR/CKD- stg 4 - CKD related to DM/HTN with b/l creat close to 2.5 mg/dl or so; ILIR in setting of symptomatic anemia/decr renal perfusion and has resolved with transfusion/IVF.  Antihypertensives and diuretics have been on hold.    GIB/symptomatic anemia- as above; hold AC; GI evaluation appreciated, scopes when INR acceptable  Afib; holding AC; Amio/BB- stable  HFrEF- appears compensated; holding diuretics- furosemide and aldactone  HTN- BP elevated, on usual BB.  Resume losartan at home dose.        Questions/concerns were discussed with patient and/or family by bedside.          Marc Grewal MD  2/8/2025  8:05 AM

## 2025-02-09 LAB
ANION GAP SERPL CALC-SCNC: 8 MMOL/L (ref 0–18)
BASOPHILS # BLD AUTO: 0.04 X10(3) UL (ref 0–0.2)
BASOPHILS NFR BLD AUTO: 0.6 %
BUN BLD-MCNC: 46 MG/DL (ref 9–23)
CALCIUM BLD-MCNC: 9.2 MG/DL (ref 8.7–10.6)
CHLORIDE SERPL-SCNC: 110 MMOL/L (ref 98–112)
CO2 SERPL-SCNC: 22 MMOL/L (ref 21–32)
CREAT BLD-MCNC: 2.55 MG/DL
EGFRCR SERPLBLD CKD-EPI 2021: 24 ML/MIN/1.73M2 (ref 60–?)
EOSINOPHIL # BLD AUTO: 0.24 X10(3) UL (ref 0–0.7)
EOSINOPHIL NFR BLD AUTO: 3.6 %
ERYTHROCYTE [DISTWIDTH] IN BLOOD BY AUTOMATED COUNT: 15.1 %
GLUCOSE BLD-MCNC: 142 MG/DL (ref 70–99)
GLUCOSE BLD-MCNC: 149 MG/DL (ref 70–99)
GLUCOSE BLD-MCNC: 152 MG/DL (ref 70–99)
GLUCOSE BLD-MCNC: 168 MG/DL (ref 70–99)
GLUCOSE BLD-MCNC: 201 MG/DL (ref 70–99)
HCT VFR BLD AUTO: 25.8 %
HGB BLD-MCNC: 8.3 G/DL
IMM GRANULOCYTES # BLD AUTO: 0.03 X10(3) UL (ref 0–1)
IMM GRANULOCYTES NFR BLD: 0.5 %
INR BLD: 2.12 (ref 0.8–1.2)
LYMPHOCYTES # BLD AUTO: 0.85 X10(3) UL (ref 1–4)
LYMPHOCYTES NFR BLD AUTO: 12.9 %
MAGNESIUM SERPL-MCNC: 1.8 MG/DL (ref 1.6–2.6)
MCH RBC QN AUTO: 28.2 PG (ref 26–34)
MCHC RBC AUTO-ENTMCNC: 32.2 G/DL (ref 31–37)
MCV RBC AUTO: 87.8 FL
MONOCYTES # BLD AUTO: 0.67 X10(3) UL (ref 0.1–1)
MONOCYTES NFR BLD AUTO: 10.2 %
NEUTROPHILS # BLD AUTO: 4.75 X10 (3) UL (ref 1.5–7.7)
NEUTROPHILS # BLD AUTO: 4.75 X10(3) UL (ref 1.5–7.7)
NEUTROPHILS NFR BLD AUTO: 72.2 %
OSMOLALITY SERPL CALC.SUM OF ELEC: 305 MOSM/KG (ref 275–295)
PLATELET # BLD AUTO: 199 10(3)UL (ref 150–450)
POTASSIUM SERPL-SCNC: 4.6 MMOL/L (ref 3.5–5.1)
PROTHROMBIN TIME: 24 SECONDS (ref 11.6–14.8)
RBC # BLD AUTO: 2.94 X10(6)UL
SODIUM SERPL-SCNC: 140 MMOL/L (ref 136–145)
WBC # BLD AUTO: 6.6 X10(3) UL (ref 4–11)

## 2025-02-09 PROCEDURE — 99232 SBSQ HOSP IP/OBS MODERATE 35: CPT | Performed by: INTERNAL MEDICINE

## 2025-02-09 PROCEDURE — 99233 SBSQ HOSP IP/OBS HIGH 50: CPT | Performed by: INTERNAL MEDICINE

## 2025-02-09 RX ORDER — MAGNESIUM OXIDE 400 MG/1
400 TABLET ORAL ONCE
Status: COMPLETED | OUTPATIENT
Start: 2025-02-09 | End: 2025-02-09

## 2025-02-09 RX ORDER — ALLOPURINOL 100 MG/1
100 TABLET ORAL DAILY
Status: DISCONTINUED | OUTPATIENT
Start: 2025-02-09 | End: 2025-02-11

## 2025-02-09 RX ORDER — SPIRONOLACTONE 25 MG/1
12.5 TABLET ORAL DAILY
Status: DISCONTINUED | OUTPATIENT
Start: 2025-02-09 | End: 2025-02-11

## 2025-02-09 RX ORDER — FUROSEMIDE 20 MG/1
20 TABLET ORAL DAILY
Status: DISCONTINUED | OUTPATIENT
Start: 2025-02-09 | End: 2025-02-11

## 2025-02-09 RX ORDER — PANTOPRAZOLE SODIUM 40 MG/1
40 TABLET, DELAYED RELEASE ORAL
Status: DISCONTINUED | OUTPATIENT
Start: 2025-02-09 | End: 2025-02-11

## 2025-02-09 NOTE — PLAN OF CARE
Patient alert and oriented x4. Afebrile. Room air. No SOB. No complaints of pain. No nausea and vomiting. Noted with dark brown formed stool, not bloody or tarry. Ambulatory. Up with assist. Fall precautions in place. Call light within reach. Hgb and INR monitored.     POC: EGD/Colonoscopy once INR acceptable.     Problem: GASTROINTESTINAL - ADULT  Goal: Maintains or returns to baseline bowel function  Description: INTERVENTIONS:  - Assess bowel function  - Maintain adequate hydration with IV or PO as ordered and tolerated  - Evaluate effectiveness of GI medications  - Encourage mobilization and activity  - Obtain nutritional consult as needed  - Establish a toileting routine/schedule  - Consider collaborating with pharmacy to review patient's medication profile  Outcome: Progressing     Problem: METABOLIC/FLUID AND ELECTROLYTES - ADULT  Goal: Hemodynamic stability and optimal renal function maintained  Description: INTERVENTIONS:  - Monitor labs and assess for signs and symptoms of volume excess or deficit  - Monitor intake, output and patient weight  - Monitor urine specific gravity, serum osmolarity and serum sodium as indicated or ordered  - Monitor response to interventions for patient's volume status, including labs, urine output, blood pressure (other measures as available)  - Encourage oral intake as appropriate  - Instruct patient on fluid and nutrition restrictions as appropriate  Outcome: Progressing     Problem: HEMATOLOGIC - ADULT  Goal: Maintains hematologic stability  Description: INTERVENTIONS  - Assess for signs and symptoms of bleeding or hemorrhage  - Monitor labs and vital signs for trends  - Administer supportive blood products/factors, fluids and medications as ordered and appropriate  - Administer supportive blood products/factors as ordered and appropriate  Outcome: Progressing

## 2025-02-09 NOTE — PROGRESS NOTES
[facility]   part of [facility]     Nephrology Progress Note    Kyler Haji Patient Status:  Inpatient    1940 MRN [id number]   Location [facility] 4NW-A Attending Kevyn Barry,    Hosp Day # 3 PCP Abhi Arellano MD       SUBJECTIVE:  Pt stable this AM,  no c/o.         Physical Exam:   /74 (BP Location: Left arm)   Pulse 59   Temp 98 °F (36.7 °C) (Oral)   Resp 16   Ht 5' 11\" (1.803 m)   Wt 196 lb (88.9 kg)   SpO2 100%   BMI 27.34 kg/m²   Temp (24hrs), Av.8 °F (36.6 °C), Min:97.5 °F (36.4 °C), Max:98 °F (36.7 °C)       Intake/Output Summary (Last 24 hours) at 2025 0737  Last data filed at 2025 0004  Gross per 24 hour   Intake 491 ml   Output 500 ml   Net -9 ml     Last 3 Weights   25 0038 196 lb (88.9 kg)   25 1935 196 lb (88.9 kg)   25 1326 200 lb (90.7 kg)   24 1122 200 lb (90.7 kg)   24 0802 200 lb (90.7 kg)   24 0806 200 lb 9.9 oz (91 kg)     General: Alert and oriented in no apparent distress.  HEENT: No scleral icterus, MMM  Neck: Supple, no TAYLER or thyromegaly  Cardiac: Regular rate and rhythm, S1, S2 normal, no murmur or rub  Lungs: Clear without wheezes, rales, rhonchi.    Abdomen: Soft, non-tender. + bowel sounds, no palpable organomegaly  Extremities: Without clubbing, cyanosis or edema.  Neurologic: Alert and oriented, cranial nerves grossly intact, moving all extremities  Skin: Warm and dry, no rash        Labs:     Recent Labs   Lab 25  0559 25  1145 25  0643 25  0644 25  0645   WBC 5.6 5.0  --  6.0  --  6.6   HGB 7.9* 6.8* 8.3* 8.1*  --  8.3*   MCV 90.2 87.8  --  86.0  --  87.8   .0 173.0  --  180.0  --  199.0   INR 3.55*  --  2.90*  --  2.68* 2.12*       Recent Labs   Lab 25  0559 25  0643 25  0645    141 139 140   K 4.6 4.3 4.6 4.6    109 107 110   CO2 20.0* 20.0* 21.0 22.0   BUN 51* 57* 47* 46*   CREATSERUM 3.42*  3.11* 2.48* 2.55*   CA 9.1 8.8 8.8 9.2   MG  --  1.6 1.6 1.8   * 99 133* 152*       Recent Labs   Lab 02/06/25 2005   ALT 31   AST 27   ALB 4.5       Recent Labs   Lab 02/08/25  0516 02/08/25  1200 02/08/25  1632 02/08/25  2059 02/09/25  0624   PGLU 137* 162* 187* 166* 168*       Meds:   Current Facility-Administered Medications   Medication Dose Route Frequency    magnesium oxide (Mag-Ox) tab 400 mg  400 mg Oral Once    losartan (Cozaar) tab 25 mg  25 mg Oral Daily    albuterol (Ventolin HFA) 108 (90 Base) MCG/ACT inhaler 2 puff  2 puff Inhalation Q6H PRN    amiodarone (Pacerone) tab 200 mg  200 mg Oral Daily    aspirin chewable tab 81 mg  81 mg Oral Daily    atorvastatin (Lipitor) tab 40 mg  40 mg Oral Daily    metoprolol succinate ER (Toprol XL) 24 hr tab 200 mg  200 mg Oral Daily Beta Blocker    pantoprazole (Protonix) 40 mg in sodium chloride 0.9% PF 10 mL IV push  40 mg Intravenous Q12H    glucose (Dex4) 15 GM/59ML oral liquid 15 g  15 g Oral Q15 Min PRN    Or    glucose (Glutose) 40% oral gel 15 g  15 g Oral Q15 Min PRN    Or    glucose-vitamin C (Dex-4) chewable tab 4 tablet  4 tablet Oral Q15 Min PRN    Or    dextrose 50% injection 50 mL  50 mL Intravenous Q15 Min PRN    Or    glucose (Dex4) 15 GM/59ML oral liquid 30 g  30 g Oral Q15 Min PRN    Or    glucose (Glutose) 40% oral gel 30 g  30 g Oral Q15 Min PRN    Or    glucose-vitamin C (Dex-4) chewable tab 8 tablet  8 tablet Oral Q15 Min PRN    acetaminophen (Tylenol Extra Strength) tab 500 mg  500 mg Oral Q4H PRN    melatonin tab 3 mg  3 mg Oral Nightly PRN    metoclopramide (Reglan) 5 mg/mL injection 5 mg  5 mg Intravenous Q8H PRN    insulin aspart (NovoLOG) 100 Units/mL FlexPen 2-10 Units  2-10 Units Subcutaneous TID AC and HS    glycerin-hypromellose- (Artificial Tears) 0.2-0.2-1 % ophthalmic solution 1 drop  1 drop Both Eyes QID PRN    sodium chloride (Saline Mist) 0.65 % nasal solution 1 spray  1 spray Each Nare Q3H PRN    benzonatate  (Tessalon) cap 200 mg  200 mg Oral TID PRN         Impression/Plan:        ILIR/CKD- stg 4 - CKD related to DM/HTN with b/l creat close to 2.5 mg/dl or so; ILIR in setting of symptomatic anemia/decr renal perfusion and has resolved with transfusion/IVF.    GIB/symptomatic anemia- as above; hold AC; GI evaluation appreciated, scopes when INR acceptable  Afib; holding AC; Amio/BB- stable  HFrEF- appears compensated; can resume usual med regimen (furosemide/aldactone)  HTN- BP stable on metoprolol and losartan      Questions/concerns were discussed with patient and/or family by bedside.        Marc Grewal MD  2/9/2025  7:38 AM

## 2025-02-09 NOTE — PROGRESS NOTES
[facility]   part of [facility]     Hospitalist Progress Note     Kyler Haji Patient Status:  Inpatient    1940 MRN [id number]   Location [facility] 4NW-A Attending Kevyn Barry,    Hosp Day # 3 PCP Ahbi Arellano MD     Chief Complaint: Dizziness    Subjective:     Patient denies nausea, vomiting or pain. Did have dark brown formed stool last night, not bloody or melena.     Objective:    Review of Systems:   A comprehensive review of systems was completed; pertinent positive and negatives stated in subjective.    Vital signs:  Temp:  [97.3 °F (36.3 °C)-98 °F (36.7 °C)] 98 °F (36.7 °C)  Pulse:  [59-60] 59  Resp:  [16] 16  BP: (115-167)/(57-84) 141/74  SpO2:  [95 %-100 %] 100 %    Physical Exam:    General: No acute distress, awake and alert  Respiratory: No wheezes, no rhonchi  Cardiovascular: S1, S2, regular rate and rhythm  Abdomen: Soft, Non-tender, non-distended, positive bowel sounds  Extremities: No edema    Diagnostic Data:    Labs:  Recent Labs   Lab 25  1145 25  0643 25  0644 25  0645   WBC 5.6 5.0  --  6.0  --  6.6   HGB 7.9* 6.8* 8.3* 8.1*  --  8.3*   MCV 90.2 87.8  --  86.0  --  87.8   .0 173.0  --  180.0  --  199.0   INR 3.55*  --  2.90*  --  2.68* 2.12*     Recent Labs   Lab 25  0643 25  0645   * 99 133* 152*   BUN 51* 57* 47* 46*   CREATSERUM 3.42* 3.11* 2.48* 2.55*   CA 9.1 8.8 8.8 9.2   ALB 4.5  --   --   --     141 139 140   K 4.6 4.3 4.6 4.6    109 107 110   CO2 20.0* 20.0* 21.0 22.0   ALKPHO 83  --   --   --    AST 27  --   --   --    ALT 31  --   --   --    BILT 0.3  --   --   --    TP 7.2  --   --   --      Estimated Glomerular Filtration Rate: 24 mL/min/1.73m2 (A) (by CKD-EPI based on SCr of 2.55 mg/dL (H)).    Recent Labs   Lab 25  2005   TROPHS 12     Recent Labs   Lab 25  1145 25  0644 25  0645   PTP 30.5* 28.8* 24.0*    INR 2.90* 2.68* 2.12*     Microbiology  No results found for this visit on 02/06/25.    Imaging: Reviewed in Epic.    Medications:    magnesium oxide  400 mg Oral Once    losartan  25 mg Oral Daily    amiodarone  200 mg Oral Daily    aspirin  81 mg Oral Daily    atorvastatin  40 mg Oral Daily    Metoprolol Succinate ER  200 mg Oral Daily Beta Blocker    pantoprazole  40 mg Intravenous Q12H    insulin aspart  2-10 Units Subcutaneous TID AC and HS       Assessment & Plan:      #Dizziness, resolved  -EKG showing AV paced rhythm  -May be 2/2 volume depletion vs bleeding vs other  -Carotid dopplers given hx carotid stenosis > findings stable from 2012  -Monitor on telemetry  -Work-up for anemia as below  -PT/OT     #Heme positive stool  -Black stools several days ago, now brown heme +  -Cont to monitor CBC and transfuse for hgb <7. So far has received 1 unit blood and hgb stable  -GI consulted, plan for EGD/colonoscopy once INR acceptable    #Aortic stenosis  -S/p surgical bioprosthetic valve      #CKD 4  -Stable  -Nephrology following     #Afib  -EKG with paced rhythm  -Holding coumadin  -Amio, BB     #DM with A1c 7.5  -SSI, QID checks, hypoglycemia protocol  -Hold home metformin, glipizide     #HLD  -Cont home statin    #HTN  -BB, losartan, lasix, aldactone     #HFrEF  -Resume home furosemide, spironolactone     #Gout  -Resume home allopurinol      Kevyn Barry DO    Supplementary Documentation:     Quality:  DVT Mechanical Prophylaxis:   SCDs,    DVT Pharmacologic Prophylaxis   Medication   None      DVT Pharmacologic prophylaxis: Aspirin 162 mg       Code Status: Full Code  Pike: No urinary catheter in place  TAMIKA: TBD    Discharge is dependent on: Clinical state, consultant recs  At this point Mr. Haji is expected to be discharge to: Home      The 21st Century Cures Act makes medical notes like these available to patients in the interest of transparency. Please be advised this is a medical document. Medical  documents are intended to carry relevant information, facts as evident, and the clinical opinion of the practitioner. The medical note is intended as peer to peer communication and may appear blunt or direct. It is written in medical language and may contain abbreviations or verbiage that are unfamiliar.              **Certification    PHYSICIAN Certification of Need for Inpatient Hospitalization - Initial Certification    Patient will require inpatient services that will reasonably be expected to span two midnight's based on the clinical documentation in H+P.   Based on patients current state of illness, I anticipate that, after discharge, patient will require TBD.

## 2025-02-09 NOTE — PLAN OF CARE
Patient denies abdominal discomfort, no bowel movement noted. Patient's vital signs stable  Problem: GASTROINTESTINAL - ADULT  Goal: Maintains or returns to baseline bowel function  Description: INTERVENTIONS:  - Assess bowel function  - Maintain adequate hydration with IV or PO as ordered and tolerated  - Evaluate effectiveness of GI medications  - Encourage mobilization and activity  - Obtain nutritional consult as needed  - Establish a toileting routine/schedule  - Consider collaborating with pharmacy to review patient's medication profile  Outcome: Progressing     Problem: HEMATOLOGIC - ADULT  Goal: Maintains hematologic stability  Description: INTERVENTIONS  - Assess for signs and symptoms of bleeding or hemorrhage  - Monitor labs and vital signs for trends  - Administer supportive blood products/factors, fluids and medications as ordered and appropriate  - Administer supportive blood products/factors as ordered and appropriate  Outcome: Progressing

## 2025-02-09 NOTE — PROGRESS NOTES
Gastroenterology Follow-Up Note      Kyler JASON Haji Patient Status:  Inpatient    1940 MRN [id number]   [facility] 4NW-A Attending Kevyn Barry,    Hosp Day # 3 PCP Abhi Arellano MD     Chief Complaint/Reason for Follow Up: 84 year-old male being seen in follow up for anemia.     HPI/Subjective:      No acute complaints today. No melena or hematochezia. No abdominal pain.     PMed/Soc/Family:  No change since initial consult note     ROS:  Constitutional: Negative unless specified in HPI  GI: See HPI    Objective:  Blood pressure 160/72, pulse 60, temperature 97.9 °F (36.6 °C), temperature source Oral, resp. rate 18, height 5' 11\" (1.803 m), weight 196 lb (88.9 kg), SpO2 98%.    Constitutional: Normal general appearance  Eyes: Normal conjunctiva   Ears and nose: Normal appearing ears and nose  Respiratory: Normal respiratory effort   Cardiovascular: Normal carotid pulses amplitude. No lower extremity edema  Gastrointestinal: Soft, non-tender, non-guarded, normal bowel sounds. No mass palpated. Normal liver edge and spleen.   Skin: No jaundice.   Psychiatric: alert and oriented to person, place and time. Normal insight and judgment.     Data:  Relevant labs, imaging and medications reviewed.     Lab Results   Component Value Date    WBC 6.6 2025    HGB 8.3 2025    HCT 25.8 2025    .0 2025    CREATSERUM 2.55 2025    BUN 46 2025     2025    K 4.6 2025     2025    CO2 22.0 2025     2025    CA 9.2 2025    INR 2.12 2025    PTP 24.0 2025    MG 1.8 2025    PGLU 168 2025           Assessment/Plan:      Iron Deficiency Anemia/Melena  -hgb stable, no further melena or hematochezia  -awaiting INR improvement allowing to drift without reversal agent given lack of urgent bleed in setting of Afin and AVR  -plan for EGD/Colon once INR allows   -no prior EGD, last colon  with  colon polyps        JACQUE Lozano  Gastroenterology/Advanced Endoscopy  [facility]

## 2025-02-10 ENCOUNTER — ANESTHESIA EVENT (OUTPATIENT)
Dept: ENDOSCOPY | Facility: HOSPITAL | Age: 85
End: 2025-02-10
Payer: MEDICARE

## 2025-02-10 LAB
ANION GAP SERPL CALC-SCNC: 11 MMOL/L (ref 0–18)
BASOPHILS # BLD AUTO: 0.04 X10(3) UL (ref 0–0.2)
BASOPHILS NFR BLD AUTO: 0.7 %
BUN BLD-MCNC: 46 MG/DL (ref 9–23)
CALCIUM BLD-MCNC: 8.8 MG/DL (ref 8.7–10.6)
CHLORIDE SERPL-SCNC: 106 MMOL/L (ref 98–112)
CO2 SERPL-SCNC: 22 MMOL/L (ref 21–32)
CREAT BLD-MCNC: 2.5 MG/DL
EGFRCR SERPLBLD CKD-EPI 2021: 25 ML/MIN/1.73M2 (ref 60–?)
EOSINOPHIL # BLD AUTO: 0.24 X10(3) UL (ref 0–0.7)
EOSINOPHIL NFR BLD AUTO: 4 %
ERYTHROCYTE [DISTWIDTH] IN BLOOD BY AUTOMATED COUNT: 15.1 %
GLUCOSE BLD-MCNC: 130 MG/DL (ref 70–99)
GLUCOSE BLD-MCNC: 142 MG/DL (ref 70–99)
GLUCOSE BLD-MCNC: 144 MG/DL (ref 70–99)
GLUCOSE BLD-MCNC: 194 MG/DL (ref 70–99)
GLUCOSE BLD-MCNC: 199 MG/DL (ref 70–99)
HCT VFR BLD AUTO: 24.6 %
HGB BLD-MCNC: 8 G/DL
IMM GRANULOCYTES # BLD AUTO: 0.03 X10(3) UL (ref 0–1)
IMM GRANULOCYTES NFR BLD: 0.5 %
INR BLD: 1.63 (ref 0.8–1.2)
LYMPHOCYTES # BLD AUTO: 1.04 X10(3) UL (ref 1–4)
LYMPHOCYTES NFR BLD AUTO: 17.2 %
MAGNESIUM SERPL-MCNC: 1.7 MG/DL (ref 1.6–2.6)
MCH RBC QN AUTO: 28.2 PG (ref 26–34)
MCHC RBC AUTO-ENTMCNC: 32.5 G/DL (ref 31–37)
MCV RBC AUTO: 86.6 FL
MONOCYTES # BLD AUTO: 0.78 X10(3) UL (ref 0.1–1)
MONOCYTES NFR BLD AUTO: 12.9 %
NEUTROPHILS # BLD AUTO: 3.93 X10 (3) UL (ref 1.5–7.7)
NEUTROPHILS # BLD AUTO: 3.93 X10(3) UL (ref 1.5–7.7)
NEUTROPHILS NFR BLD AUTO: 64.7 %
OSMOLALITY SERPL CALC.SUM OF ELEC: 302 MOSM/KG (ref 275–295)
PLATELET # BLD AUTO: 170 10(3)UL (ref 150–450)
POTASSIUM SERPL-SCNC: 4.4 MMOL/L (ref 3.5–5.1)
PROTHROMBIN TIME: 19.5 SECONDS (ref 11.6–14.8)
RBC # BLD AUTO: 2.84 X10(6)UL
SODIUM SERPL-SCNC: 139 MMOL/L (ref 136–145)
WBC # BLD AUTO: 6.1 X10(3) UL (ref 4–11)

## 2025-02-10 PROCEDURE — 99233 SBSQ HOSP IP/OBS HIGH 50: CPT | Performed by: INTERNAL MEDICINE

## 2025-02-10 PROCEDURE — 99232 SBSQ HOSP IP/OBS MODERATE 35: CPT | Performed by: INTERNAL MEDICINE

## 2025-02-10 RX ORDER — MAGNESIUM OXIDE 400 MG/1
400 TABLET ORAL ONCE
Status: COMPLETED | OUTPATIENT
Start: 2025-02-10 | End: 2025-02-10

## 2025-02-10 NOTE — PAYOR COMM NOTE
CONTINUED STAY REVIEW    Payor: [employer]  Subscriber #:  [id number]  Authorization Number: [id number]    Admit date: 2/6/25  Admit time: 11:36 PM    REVIEW DOCUMENTATION:          2/7 IM      Hgb 6.8 this morning and getting one unit blood. He noticed dark stool last weekend, but none since.       Vital signs:  Temp:  [97.4 °F (36.3 °C)-98.1 °F (36.7 °C)] 98.1 °F (36.7 °C)  Pulse:  [60-72] 60  Resp:  [14-20] 16  BP: (114-152)/(57-69) 152/68  SpO2:  [95 %-100 %] 98 %     Physical Exam:    General: No acute distress, awake and alert  Respiratory: No wheezes, no rhonchi  Cardiovascular: S1, S2, regular rate and rhythm  ssessment & Plan:  #Dizziness  -EKG showing AV paced rhythm  -May be 2/2 volume depletion vs bleeding vs other  -Recheck carotid dopplers given hx carotid stenosis  -Monitor on  telemetry  -Work-up for anemia as below  -PT/OT     #Heme positive stool  -Black stools several days ago, now brown heme +  -Cont to monitor CBC and transfuse for hgb <7. So far has received 1 unit blood  -GI consulted      #CKD 4  -Less than 1.5 x elevation, now closer to baseline  -Stop IVF  -Nephrology c/s in ED     #Afib with supratherapeutic INR  -Monitor INR  -Holding coumadin for possible GIB, then coumadin dosing per pharmacy when able to restart  -Amio, BB     #DM with A1c 7.5  -SSI, QID checks, hypoglycemia protocol  -Hold home metformin, glipizide     #HLD  -Cont home statin     #HTN  -BB  -Hold diuretics and losartan       #HFrEF  -Hold home furosemide, spironolactone 2/2 ILIR     #Gout  -Hold home allopurinol 2/2 ILIR         2/7 consult GI     Reason for consultation: Acute on chronic anemia, + occult stool  HPI: This is an 84 yr-old male with PMhx that includes CAD s/p CABG, AF (Coumadin), s/p AVR, HF s/p ICD, DM, CKD, and prostate cancer (no RTX) who presented to ER yesterday with weakness in the setting of black stools found to have Hgb 7.9 (MCV 90; iron 32, sat 7, ferritin 17) from Hgb 8 last month  however steadily declining from 9.9 in 10/2024 and 11.5 in 7/2024.   Pt reports ~2 days of black soft stools twice daily last week with stools returning to a normal brown color. No abd pain, nausea, vomiting, heartburn, dysphagia, or odynophagia. No increase in BM frequency and no hematochezia. He is on a daily ASA in addition to Coumadin and denies additional NSAID use. No recent fluctuations in wt or appetite.   E: /60 (BP Location: Left arm)   Pulse 60   Temp 97.4 °F (36.3 °C) (Oral)   Resp 18   Ht 5' 11\" (1.803 m)   Wt 196 lb (88.9 kg)   SpO2 95%   BMI 27.34 kg/m²   Gen: AAO x 3, able to speak in complete sentences  HENT: EOMI, PERRL, oropharynx is clear with moist mucosal membranes  Eyes: Sclerae are anicteric  Neck:  Supple without nuchal rigidity  CV: Regular rate and rhythm, with normal S1 and S2  Resp: Clear to auscultation bilaterally without wheezes; rubs, rhonchi, or rales  Abdomen: Soft, non-tender, non-distended with the presence of bowel sounds; No hepatosplenomegaly; no rebound or guarding; No ascites is clinically apparent; no tympany to percussion  Ext: No peripheral edema or cyanosis  Skin: Warm and dry    Impression:  84 yr-old male with hx of CAD s/p CABG, AF (Coumadin), s/p AVR, HF s/p ICD, DM, CKD, and prostate cancer (no RTX) admitted yesterday with weakness in the setting of black stools found to have Hgb 7.9 (MCV 90; iron 32, sat 7, ferritin 17) from Hgb 8 last month however steadily declining from 9.9 in 10/2024 and 11.5 in 7/2024. No hx of EGD and last colonoscopy 3/2018 with large colon polyp (20 mm)  Recommend bi-directional endoscopy to assess for sources of anemia including PUD, AVMs, IBD, and neoplasm. Pt without brisk bleeding and has remained hemodynamically stable, OK to wait for INR to drift down without reversal agent--can reconsider if brisk bleeding develops, hemodynamic instability develops, or if anemia become unresponsive to PRBC transfusion   The risks,  benefits, alternatives of the procedure including the risks of anesthesia, bleeding, perforation, missed lesions, need for surgery, and infection were discussed with the patient. He expressed understanding of the risks and was agreeable to proceed.  Recommendations:      Plan for EGD and colonoscopy under MAC once INR normalizes   Protonix 40 mg IV BID  Please hold Coumadin if the risk remains acceptable; ok to continue ASA if needed   Repeat INR this afternoon and then daily   Continue to transfuse PRBC as needed to maintain Hgb >7 (higher if needed from a cardiac perspective with diuretics as needed per cardiology/Hospitalist recommendations)  Monitor for overt GI bleeding; NO need for occult stool testing   OK to continue solid food diet at this time awaiting INR to drift down            2/8 IM    Chief Complaint: Dizziness          Assessment & Plan:  #Dizziness, resolved  -EKG showing AV paced rhythm  -May be 2/2 volume depletion vs bleeding vs other  -Carotid dopplers given hx carotid stenosis > findings stable from 2012  -Monitor on  telemetry  -Work-up for anemia as below  -PT/OT     #Heme positive stool  -Black stools several days ago, now brown heme +  -Cont to monitor CBC and transfuse for hgb <7. So far has received 1 unit blood and hgb stable  -GI consulted, plan for EGD/colonoscopy once INR acceptable     #Aortic stenosis  -S/p surgical bioprosthetic valve      #CKD 4  -Stable  -Nephrology following     #Afib  -EKG with paced rhythm  -Holding coumadin  -Amio, BB     #DM with A1c 7.5  -SSI, QID checks, hypoglycemia protocol  -Hold home metformin, glipizide     #HLD  -Cont home statin     #HTN  -BB, losartan  -Hold diuretics     #HFrEF  -Hold home furosemide, spironolactone     #Gout  -Hold home allopurinol       2/8 GI       Luis Lozano DO   Physician  Specialty: GASTROENTEROLOGY     Progress Notes     Signed     Date of Service: 2/8/2025  9:55 AM     Signed         GI Note:      No active GI  bleed. Hgb/HD stable. No need for urgent INR reversal in setting of AVR. Plan to continue to allow INR to drift down. Plan for EGD/Colon once INR acceptable.                        2/9 IM    Vital signs:  Temp:  [97.3 °F (36.3 °C)-98 °F (36.7 °C)] 98 °F (36.7 °C)  Pulse:  [59-60] 59  Resp:  [16] 16  BP: (115-167)/(57-84) 141/74  SpO2:  [95 %-100 %] 100 %                Recent Labs   Lab 02/06/25 2005 02/07/25 0559 02/07/25  1145 02/08/25  0643 02/08/25  0644 02/09/25  0645   WBC 5.6 5.0  --  6.0  --  6.6   HGB 7.9* 6.8* 8.3* 8.1*  --  8.3*   MCV 90.2 87.8  --  86.0  --  87.8   .0 173.0  --  180.0  --  199.0   INR 3.55*  --  2.90*  --  2.68* 2.12*             Recent Labs   Lab 02/06/25 2005 02/07/25 0559 02/08/25  0643 02/09/25  0645   * 99 133* 152*   BUN 51* 57* 47* 46*   CREATSERUM 3.42* 3.11* 2.48* 2.55*   CA 9.1 8.8 8.8 9.2   ALB 4.5  --   --   --     141 139 140   K 4.6 4.3 4.6 4.6    109 107 110   CO2 20.0* 20.0* 21.0 22.0   ALKPHO 83  --   --   --    AST 27  --   --   --    ALT 31  --   --   --    BILT 0.3  --   --   --    TP 7.2  --   --   --        Assessment & Plan:  #Dizziness, resolved  -EKG showing AV paced rhythm  -May be 2/2 volume depletion vs bleeding vs other  -Carotid dopplers given hx carotid stenosis > findings stable from 2012  -Monitor on telemetry  -Work-up for anemia as below  -PT/OT     #Heme positive stool  -Black stools several days ago, now brown heme +  -Cont to monitor CBC and transfuse for hgb <7. So far has received 1 unit blood and hgb stable  -GI consulted, plan for EGD/colonoscopy once INR acceptable     #Aortic stenosis  -S/p surgical bioprosthetic valve      #CKD 4  -Stable  -Nephrology following     #Afib  -EKG with paced rhythm  -Holding coumadin  -Amio, BB     #DM with A1c 7.5  -SSI, QID checks, hypoglycemia protocol  -Hold home metformin, glipizide     #HLD  -Cont home statin     #HTN  -BB, losartan, lasix, aldactone     #HFrEF  -Resume home  furosemide, spironolactone     #Gout  -Resume home allopurinol            2/9 GI      Objective:  Blood pressure 160/72, pulse 60, temperature 97.9 °F (36.6 °C), temperature source Oral, resp. rate 18, height 5' 11\" (1.803 m), weight 196 lb (88.9 kg), SpO2 98%.     Constitutional: Normal general appearance  Eyes: Normal conjunctiva   Ears and nose: Normal appearing ears and nose  Respiratory: Normal respiratory effort   Cardiovascular: Normal carotid pulses amplitude. No lower extremity edema  Gastrointestinal: Soft, non-tender, non-guarded, normal bowel sounds. No mass palpated. Normal liver edge and spleen.   Skin: No jaundice.   Psychiatric: alert and oriented to person, place and time. Normal insight and judgment.        Assessment/Plan:        Iron Deficiency Anemia/Melena  -hgb stable, no further melena or hematochezia  -awaiting INR improvement allowing to drift without reversal agent given lack of urgent bleed in setting of Afin and AVR  -plan for EGD/Colon once INR allows   -no prior EGD, last colon 2018 with colon polyps         2/10 IM     Vital signs:  Temp:  [97.4 °F (36.3 °C)-98.3 °F (36.8 °C)] 97.9 °F (36.6 °C)  Pulse:  [60] 60  Resp:  [13-18] 18  BP: (110-145)/(44-70) 145/66  SpO2:  [92 %-100 %] 99 %     Physical Exam:    General: No acute distress, awake and alert  Respiratory: No wheezes, no rhonchi  Cardiovascular: S1, S2, regular rate and rhythm  Abdomen: Soft, Non-tender, non-distended, positive bowel sounds       Assessment & Plan:  #Dizziness, resolved  -EKG showing AV paced rhythm  -May be 2/2 volume depletion vs bleeding vs other  -Carotid dopplers given hx carotid stenosis > findings stable from 2012  -Monitor on telemetry  -Work-up for anemia as below  -PT/OT     #Heme positive stool  -Black stools several days ago, now brown heme +  -Cont to monitor CBC and transfuse for hgb <7. So far has received 1 unit blood and hgb stable  -GI consulted, plan for EGD/colonoscopy tomorrow. CLD and NPO  at midnight     #Aortic stenosis  -S/p surgical bioprosthetic valve      #CKD 4  -Stable  -Nephrology following     #Afib  -EKG with paced rhythm  -Holding coumadin  -Amio, BB     #DM with A1c 7.5  -SSI, QID checks, hypoglycemia protocol  -Hold home metformin, glipizide     #HLD  -Cont home statin     #HTN  -BB, losartan, lasix, aldactone     #HFrEF  -PTA furosemide, spironolactone     #Gout  -Resume home allopurinol               MEDICATIONS ADMINISTERED IN LAST 1 DAY:  allopurinol (Zyloprim) tab 100 mg       Date Action Dose Route User    2/10/2025 0922 Given 100 mg Oral Tamika Gamble RN          amiodarone (Pacerone) tab 200 mg       Date Action Dose Route User    2/10/2025 0922 Given 200 mg Oral Tamika Gamble RN          aspirin chewable tab 81 mg       Date Action Dose Route User    2/10/2025 0922 Given 81 mg Oral Tamika Gamble RN          atorvastatin (Lipitor) tab 40 mg       Date Action Dose Route User    2/10/2025 0922 Given 40 mg Oral Tamika Gamble RN          furosemide (Lasix) tab 20 mg       Date Action Dose Route User    2/10/2025 0922 Given 20 mg Oral Tamika Gamble RN          insulin aspart (NovoLOG) 100 Units/mL FlexPen 2-10 Units       Date Action Dose Route User    2/10/2025 1130 Given 3 Units Subcutaneous (Left Lower Abdomen) Tamika Gamble RN    2/10/2025 0533 Given 2 Units Subcutaneous (Left Lower Abdomen) Mari Alexis RN    2/9/2025 2135 Given 2 Units Subcutaneous (Left Upper Arm) Mari Alexis RN    2/9/2025 1744 Given 2 Units Subcutaneous (Right Upper Arm) Sima Sparks RN          losartan (Cozaar) tab 25 mg       Date Action Dose Route User    2/10/2025 0922 Given 25 mg Oral Tamika Gamble RN          magnesium oxide (Mag-Ox) tab 400 mg       Date Action Dose Route User    2/10/2025 0922 Given 400 mg Oral Tamika Gamble RN          metoprolol succinate ER (Toprol XL) 24 hr tab 200 mg       Date Action Dose Route User    2/10/2025  0533 Given 200 mg Oral Mari Alexis RN          pantoprazole (Protonix) DR tab 40 mg       Date Action Dose Route User    2/10/2025 0533 Given 40 mg Oral Mari Alexis RN    2/9/2025 1743 Given 40 mg Oral Sima Sparks RN          sodium ferric gluconate (Ferrlecit) 125 mg in sodium chloride 0.9% 100mL IVPB premix       Date Action Dose Route User    2/10/2025 1123 New Bag 125 mg Intravenous Tamika Gamble RN          spironolactone (Aldactone) partial tab 12.5 mg       Date Action Dose Route User    2/10/2025 0922 Given 12.5 mg Oral Tamika Gamble RN            Vitals (last day)       Date/Time Temp Pulse Resp BP SpO2 Weight O2 Device O2 Flow Rate (L/min) [facility]    02/10/25 1246 97.9 °F (36.6 °C) -- 18 -- 99 % -- None (Room air) -- EH    02/10/25 0735 98.1 °F (36.7 °C) 60 18 145/66 95 % -- None (Room air) -- TS    02/10/25 0529 97.4 °F (36.3 °C) 60 16 110/44 92 % -- None (Room air) -- CD    02/10/25 0045 98.3 °F (36.8 °C) 60 -- 122/56 -- -- None (Room air) -- JW    02/09/25 2133 97.5 °F (36.4 °C) 60 13 140/70 95 % -- None (Room air) -- JW    02/09/25 1640 97.4 °F (36.3 °C) 60 18 135/66 100 % -- None (Room air) -- EM    02/09/25 1200 97.7 °F (36.5 °C) 59 18 106/59 100 % -- None (Room air) -- EM    02/09/25 0837 -- 60 -- 160/72 -- -- -- -- AJ    02/09/25 0816 97.9 °F (36.6 °C) 60 18 158/77 98 % -- None (Room air) -- EM    02/09/25 0451 98 °F (36.7 °C) 59 16 141/74 100 % -- None (Room air) -- JM    02/09/25 0004 98 °F (36.7 °C) 60 -- 135/84 95 % -- None (Room air) -- JW       02/08/25 0810 -- 60 -- -- -- -- -- -- SB   02/08/25 0753 -- -- -- 156/63 -- -- -- -- AJ   02/08/25 0734 97.3 °F (36.3 °C) 59 -- 167/65 Abnormal  -- -- -- -- EM   02/08/25 0500 97.3 °F (36.3 °C) 60 -- 154/70 87 % Abnormal  -- None (Room air) -- PW   02/08/25 0000 98.3 °F (36.8 °C) 59 18 142/92 Abnormal  96 % -- None (Room air) -- RF   02/07/25 2000 98.1 °F (36.7 °C) 59 18 144/73 95 % -- None (Room air) -- RF   02/07/25 1220  97.7 °F (36.5 °C) 59 20 144/62 99 % -- None (Room air) -- BJ   02/07/25 1132 98.1 °F (36.7 °C) 60 16 138/60 98 % -- None (Room air) -- HR                      CIWA Scores (since admission)       None          Blood Transfusion Record       Product Unit Status Volume Start End            Transfuse RBC       24  [id number]  [id number] Completed 02/07/25 1134 307.5 mL 02/07/25 0929 02/07/25 1132

## 2025-02-10 NOTE — PROGRESS NOTES
Gastroenterology Progress Note  Kyler Haji Patient Status:  Inpatient    1940 MRN [id number]   Location [facility] 4NW-A Attending Kevyn Barry,    Hosp Day # 4 PCP Abhi Arellano MD     Chief Complaint: Anemia   S: pt denies diarrhea, melena, and hematochezia. No abd pain. Pt denies chest pain and dyspnea   O: /66 (BP Location: Left arm)   Pulse 60   Temp 97.9 °F (36.6 °C) (Oral)   Resp 18   Ht 5' 11\" (1.803 m)   Wt 196 lb (88.9 kg)   SpO2 99%   BMI 27.34 kg/m²   Gen: AAOx3  CV: RRR with normal S1 / S2  Resp: CTA bilaterally; No increase in respiratory effort   Abd: (+)BS, soft, non-tender, non-distended; no rebound or guarding  Ext: No edema or cyanosis  Skin: Warm and dry; scattered bruises to bilateral arms   Laboratory Data:       Recent Labs   Lab 25  1208 25  1638 25  2128 02/10/25  0523 02/10/25  1128   PGLU 201* 149* 142* 142* 199*     Recent Labs   Lab 25  11425  0644 25  0645 02/10/25  0546   INR 3.55* 2.90* 2.68* 2.12* 1.63*         Recent Labs   Lab 25  1145 25  0643 25  0645 02/10/25  0546   WBC 5.6 5.0  --  6.0 6.6 6.1   HGB 7.9* 6.8* 8.3* 8.1* 8.3* 8.0*   .0 173.0  --  180.0 199.0 170.0       Recent Labs   Lab 25  0643 25  0645 02/10/25  0546    141 139 140 139   K 4.6 4.3 4.6 4.6 4.4    109 107 110 106   CO2 20.0* 20.0* 21.0 22.0 22.0   BUN 51* 57* 47* 46* 46*   CREATSERUM 3.42* 3.11* 2.48* 2.55* 2.50*       Recent Labs   Lab 25   ALT 31   AST 27     Assessment: 84 yr-old male with PMHx that includes CAD s/p CABG, AF (Coumadin), s/p AVR, HF s/p ICD, DM, CKD, and prostate cancer (no RTX) who was admitted 2 with progressive anemia found to have occult + stool. No overt GI bleeding since admission; INR has slowly drifted down.   Will plan for  bidirectional endoscopic eval in AM to assess for potential sources of anemia including AVM, PUD, IBD, and neoplasm.   The risks, benefits, alternatives of the procedure including the risks of anesthesia, bleeding, perforation, missed lesions, need for surgery, and infection were discussed with the patient. He expressed understanding of the risks and was agreeable to proceed.  Plan:   Plan for EGD and colonoscopy in AM under MAC with Dr Martin  Clear liquid diet today; NPO after midnight with sips of water for necessary medications   Golytely 4000 ml tonight, split prep (each dose to be completed in 2 hours)  Continue Protonix 40 mg PO BID  Iron supplementation per Hospitalist recommendations   Please continue to hold Coumadin if risk remains acceptable  CBC, BMP, Mg, INR in AM correcting electrolytes as needed per Hospitalist recommendations   Case discussed and reviewed with Dr Veronica Dyson, CR  2:13 PM  2/10/2025  [facility]  [phone]

## 2025-02-10 NOTE — PHYSICAL THERAPY NOTE
PHYSICAL THERAPY TREATMENT NOTE - INPATIENT    Room Number: 420/420-A     Session: 1     Number of Visits to Meet Established Goals: 3    Presenting Problem: Anemia, GI bleed  Co-Morbidities : DM, HF, CAD, CABG, gout    History related to current admission: Patient is a 84 year old male who presented to the ED on 2/6/2025 from home for progressive weakness x1 week with two near falls.  Pt diagnosed with anemia and GI bleed.     HOME SITUATION  Type of Home: [facility] [address]  Home Layout: One level  Stairs to Enter : 0        Stairs to Bedroom: 0         Lives With: Spouse    Drives: Yes   Patient Regularly Uses: Reading glasses       Prior Level of Bell: Pt is typically independent with ambulation and I/ADL's.  Pt reports 3 near falls: 1) In December, when adjusting/pulling curtains closed, 2) walking across the street last week, 3) Yesterday, standing in [place], episode of shakiness and leg weakness       PHYSICAL THERAPY ASSESSMENT   Patient demonstrates steady progress this session, goals  updated to reflect patient performance.      Patient is requiring contact guard assist as a result of the following impairments: decreased functional strength, impaired dynamic standing balance, and decreased muscular endurance.     Patient continues to function near baseline with bed mobility, transfers, and gait.  Next session anticipate patient to progress gait.  Physical Therapy will continue to follow patient for duration of hospitalization.    Patient continues to benefit from continued skilled PT services: at discharge to promote prior level of function.  Anticipate patient will return home with OP PT.    PLAN DURING HOSPITALIZATION  Nursing Mobility Recommendation : 1 Assist  PT Device Recommendation: Rolling walker  PT Treatment Plan: Bed mobility;Endurance;Patient education;Family education;Gait training;Neuromuscular re-educate;Transfer training;Strengthening  Frequency (Obs): 3-5x/week     CURRENT GOALS     Goal #1  Patient is able to demonstrate supine - sit EOB @ level: modified independent   Met 2/10   Goal #2 Patient is able to demonstrate transfers Sit to/from Stand at assistance level: modified independent - met 2/10      Goal #3 Patient is able to ambulate 150 feet with assist device: walker - rolling at assistance level: modified independent      Goal #4     Goal #5     Goal #6     Goal Comments: Goals established on 2025  2/10/2025 all goals ongoing or updated    SUBJECTIVE  \"I've been walking every day around the unit.  I haven't today b/c I have these tests coming up tomorrow.\"    OBJECTIVE  Precautions: None    WEIGHT BEARING RESTRICTION     PAIN ASSESSMENT   Ratin          BALANCE                                                                                                                       Static Sitting: Good  Dynamic Sitting: Good           Static Standing: Fair  Dynamic Standing: Fair -    ACTIVITY TOLERANCE                         O2 WALK       AM-PAC '6-Clicks' INPATIENT SHORT FORM - BASIC MOBILITY  How much difficulty does the patient currently have...  Patient Difficulty: Turning over in bed (including adjusting bedclothes, sheets and blankets)?: None   Patient Difficulty: Sitting down on and standing up from a chair with arms (e.g., wheelchair, bedside commode, etc.): None   Patient Difficulty: Moving from lying on back to sitting on the side of the bed?: None   How much help from another person does the patient currently need...   Help from Another: Moving to and from a bed to a chair (including a wheelchair)?: A Little   Help from Another: Need to walk in hospital room?: A Little   Help from Another: Climbing 3-5 steps with a railing?: A Little     AM-PAC Score:  Raw Score: 21   Approx Degree of Impairment: 28.97%   Standardized Score (AM-PAC Scale): 50.25   CMS Modifier (G-Code): CJ    FUNCTIONAL ABILITY STATUS  Gait Assessment   Functional Mobility/Gait Assessment  Gait Assistance:  Contact guard assist  Distance (ft): 400  Assistive Device: Rolling walker;None  Pattern: Within Functional Limits (Mildly kyphotic posture)    Skilled Therapy Provided    Bed Mobility:  Rolling: Right and Left - mod I   Supine<>Sit: Mod I w/ hob elevated   Sit<>Supine: Mod I w/ hob flat     Transfer Mobility:  Sit<>Stand: Using ue's to support transition, mod I   Stand<>Sit: Mod I w/ cues for safety technique and to take the walker the entire turn.   Gait: Pt completed gait 200'x1 w/ rw and supervision assist.  Removed rw and pt completed additional gait 200'x1 w/ CGA.  Did not note any lob and appeared to have improved from initial evaluation.    Therapist's Comments: Pt to have both colonscopy and EGD on .  Despite pt doing so well, will keep him on therapy list for one more session to insure he cont to look steady following those procedures.      THERAPEUTIC EXERCISES  Lower Extremity Alternating marching  Ankle pumps  Heel raises  Hip adduction squeezes  LAQ  Partial squats     Upper Extremity      Position Sitting & Standing     Repetitions   10   Sets   1     Patient End of Session: Needs met;RN aware of session/findings;All patient questions and concerns addressed;Call light within reach;In bed;Family present    PT Session Time: 31 minutes  Gait Trainin minutes  Therapeutic Activity: 5 minutes  Therapeutic Exercise: 8 minutes   Neuromuscular Re-education: 0 minutes

## 2025-02-10 NOTE — PROGRESS NOTES
[facility]   part of [facility]     Nephrology Progress Note    Kyler Haji Patient Status:  Inpatient    1940 MRN [id number]   Location [facility] 4NW-A Attending Kevyn Barry, DO   Hosp Day # 4 PCP Abhi Arellano MD       SUBJECTIVE:  No overnight events.  Denies c/o        Physical Exam:   /66 (BP Location: Left arm)   Pulse 60   Temp 98.1 °F (36.7 °C) (Oral)   Resp 18   Ht 5' 11\" (1.803 m)   Wt 196 lb (88.9 kg)   SpO2 95%   BMI 27.34 kg/m²   Temp (24hrs), Av.8 °F (36.6 °C), Min:97.4 °F (36.3 °C), Max:98.3 °F (36.8 °C)       Intake/Output Summary (Last 24 hours) at 2/10/2025 0808  Last data filed at 2025 1640  Gross per 24 hour   Intake 700 ml   Output 800 ml   Net -100 ml     Last 3 Weights   25 0038 196 lb (88.9 kg)   25 1935 196 lb (88.9 kg)   25 1326 200 lb (90.7 kg)   24 1122 200 lb (90.7 kg)   24 0802 200 lb (90.7 kg)   24 0806 200 lb 9.9 oz (91 kg)     General: Alert and oriented in no apparent distress.  HEENT: No scleral icterus, MMM  Neck: Supple, no TAYLER or thyromegaly  Cardiac: Regular rate and rhythm, S1, S2 normal, no murmur or rub  Lungs: Clear without wheezes, rales, rhonchi.    Abdomen: Soft, non-tender. + bowel sounds, no palpable organomegaly  Extremities: Without clubbing, cyanosis or edema.  Neurologic: Alert and oriented, cranial nerves grossly intact, moving all extremities  Skin: Warm and dry, no rash        Labs:     Recent Labs   Lab 25  2005 25  0559 25  1145 25  0643 25  0644 25  0645 02/10/25  0546   WBC 5.6 5.0  --  6.0  --  6.6 6.1   HGB 7.9* 6.8* 8.3* 8.1*  --  8.3* 8.0*   MCV 90.2 87.8  --  86.0  --  87.8 86.6   .0 173.0  --  180.0  --  199.0 170.0   INR 3.55*  --  2.90*  --  2.68* 2.12* 1.63*       Recent Labs   Lab 25  0559 25  0643 25  0645 02/10/25  0546    141 139 140 139   K 4.6 4.3 4.6 4.6 4.4    109 107  110 106   CO2 20.0* 20.0* 21.0 22.0 22.0   BUN 51* 57* 47* 46* 46*   CREATSERUM 3.42* 3.11* 2.48* 2.55* 2.50*   CA 9.1 8.8 8.8 9.2 8.8   MG  --  1.6 1.6 1.8 1.7   * 99 133* 152* 130*       Recent Labs   Lab 02/06/25 2005   ALT 31   AST 27   ALB 4.5       Recent Labs   Lab 02/09/25  0624 02/09/25  1208 02/09/25  1638 02/09/25 2128 02/10/25  0523   PGLU 168* 201* 149* 142* 142*       Meds:   Current Facility-Administered Medications   Medication Dose Route Frequency    spironolactone (Aldactone) partial tab 12.5 mg  12.5 mg Oral Daily    furosemide (Lasix) tab 20 mg  20 mg Oral Daily    allopurinol (Zyloprim) tab 100 mg  100 mg Oral Daily    pantoprazole (Protonix) DR tab 40 mg  40 mg Oral BID AC    losartan (Cozaar) tab 25 mg  25 mg Oral Daily    albuterol (Ventolin HFA) 108 (90 Base) MCG/ACT inhaler 2 puff  2 puff Inhalation Q6H PRN    amiodarone (Pacerone) tab 200 mg  200 mg Oral Daily    aspirin chewable tab 81 mg  81 mg Oral Daily    atorvastatin (Lipitor) tab 40 mg  40 mg Oral Daily    metoprolol succinate ER (Toprol XL) 24 hr tab 200 mg  200 mg Oral Daily Beta Blocker    glucose (Dex4) 15 GM/59ML oral liquid 15 g  15 g Oral Q15 Min PRN    Or    glucose (Glutose) 40% oral gel 15 g  15 g Oral Q15 Min PRN    Or    glucose-vitamin C (Dex-4) chewable tab 4 tablet  4 tablet Oral Q15 Min PRN    Or    dextrose 50% injection 50 mL  50 mL Intravenous Q15 Min PRN    Or    glucose (Dex4) 15 GM/59ML oral liquid 30 g  30 g Oral Q15 Min PRN    Or    glucose (Glutose) 40% oral gel 30 g  30 g Oral Q15 Min PRN    Or    glucose-vitamin C (Dex-4) chewable tab 8 tablet  8 tablet Oral Q15 Min PRN    acetaminophen (Tylenol Extra Strength) tab 500 mg  500 mg Oral Q4H PRN    melatonin tab 3 mg  3 mg Oral Nightly PRN    metoclopramide (Reglan) 5 mg/mL injection 5 mg  5 mg Intravenous Q8H PRN    insulin aspart (NovoLOG) 100 Units/mL FlexPen 2-10 Units  2-10 Units Subcutaneous TID AC and HS    glycerin-hypromellose-  (Artificial Tears) 0.2-0.2-1 % ophthalmic solution 1 drop  1 drop Both Eyes QID PRN    sodium chloride (Saline Mist) 0.65 % nasal solution 1 spray  1 spray Each Nare Q3H PRN    benzonatate (Tessalon) cap 200 mg  200 mg Oral TID PRN         Impression/Plan:      ILIR/CKD- stg 4 - longstanding CKD related to DM/HTN with b/l creat close to 2.5 mg/dl or so; ILIR in setting of symptomatic anemia/decr renal perfusion and has resolved with transfusion/IVF.  Renal fxn remains stable at b/l  GIB/symptomatic anemia- as above; hold AC; GI evaluation appreciated, scopes when INR acceptable  Afib; holding AC; Amio/BB- stable  HFrEF- appears compensated; cont usual med regimen (furosemide/aldactone)  HTN- BP stable on metoprolol and losartan      Questions/concerns were discussed with patient and/or family by bedside.      Marc Grewal MD  2/10/2025  8:09 AM

## 2025-02-10 NOTE — PROGRESS NOTES
[facility]   part of [facility]     Hospitalist Progress Note     Kyler Haji Patient Status:  Inpatient    1940 MRN [id number]   Location [facility] 4NW-A Attending Kevyn Barry,    Hosp Day # 4 PCP Abhi Arellano MD     Chief Complaint: Dizziness    Subjective:     Patient denies nausea, vomiting or pain. No overt GI bleeding. Having BM.    Objective:    Review of Systems:   A comprehensive review of systems was completed; pertinent positive and negatives stated in subjective.    Vital signs:  Temp:  [97.4 °F (36.3 °C)-98.3 °F (36.8 °C)] 97.9 °F (36.6 °C)  Pulse:  [60] 60  Resp:  [13-18] 18  BP: (110-145)/(44-70) 145/66  SpO2:  [92 %-100 %] 99 %    Physical Exam:    General: No acute distress, awake and alert  Respiratory: No wheezes, no rhonchi  Cardiovascular: S1, S2, regular rate and rhythm  Abdomen: Soft, Non-tender, non-distended, positive bowel sounds  Extremities: No edema    Diagnostic Data:    Labs:  Recent Labs   Lab 2543 25  0644 25  0645 02/10/25  0546   WBC 5.6 5.0  --  6.0  --  6.6 6.1   HGB 7.9* 6.8* 8.3* 8.1*  --  8.3* 8.0*   MCV 90.2 87.8  --  86.0  --  87.8 86.6   .0 173.0  --  180.0  --  199.0 170.0   INR 3.55*  --  2.90*  --  2.68* 2.12* 1.63*     Recent Labs   Lab 2543 25  0645 02/10/25  0546   *   < > 133* 152* 130*   BUN 51*   < > 47* 46* 46*   CREATSERUM 3.42*   < > 2.48* 2.55* 2.50*   CA 9.1   < > 8.8 9.2 8.8   ALB 4.5  --   --   --   --       < > 139 140 139   K 4.6   < > 4.6 4.6 4.4      < > 107 110 106   CO2 20.0*   < > 21.0 22.0 22.0   ALKPHO 83  --   --   --   --    AST 27  --   --   --   --    ALT 31  --   --   --   --    BILT 0.3  --   --   --   --    TP 7.2  --   --   --   --     < > = values in this interval not displayed.     Estimated Glomerular Filtration Rate: 24.6 mL/min/1.73m2 (A) (by CKD-EPI based on SCr of  2.5 mg/dL (H)).    Recent Labs   Lab 02/06/25  2005   TROPHS 12     Recent Labs   Lab 02/08/25  0644 02/09/25  0645 02/10/25  0546   PTP 28.8* 24.0* 19.5*   INR 2.68* 2.12* 1.63*     Microbiology  No results found for this visit on 02/06/25.    Imaging: Reviewed in Epic.    Medications:    sodium ferric gluconate  125 mg Intravenous Daily    polyethylene glycol-electrolyte  2,000 mL Oral Once    [START ON 2/11/2025] polyethylene glycol-electrolyte  2,000 mL Oral Once    spironolactone  12.5 mg Oral Daily    furosemide  20 mg Oral Daily    allopurinol  100 mg Oral Daily    pantoprazole  40 mg Oral BID AC    losartan  25 mg Oral Daily    amiodarone  200 mg Oral Daily    aspirin  81 mg Oral Daily    atorvastatin  40 mg Oral Daily    Metoprolol Succinate ER  200 mg Oral Daily Beta Blocker    insulin aspart  2-10 Units Subcutaneous TID AC and HS       Assessment & Plan:      #Dizziness, resolved  -EKG showing AV paced rhythm  -May be 2/2 volume depletion vs bleeding vs other  -Carotid dopplers given hx carotid stenosis > findings stable from 2012  -Monitor on telemetry  -Work-up for anemia as below  -PT/OT     #Heme positive stool  -Black stools several days ago, now brown heme +  -Cont to monitor CBC and transfuse for hgb <7. So far has received 1 unit blood and hgb stable  -GI consulted, plan for EGD/colonoscopy tomorrow. CLD and NPO at midnight    #Aortic stenosis  -S/p surgical bioprosthetic valve      #CKD 4  -Stable  -Nephrology following     #Afib  -EKG with paced rhythm  -Holding coumadin  -Amio, BB     #DM with A1c 7.5  -SSI, QID checks, hypoglycemia protocol  -Hold home metformin, glipizide     #HLD  -Cont home statin    #HTN  -BB, losartan, lasix, aldactone     #HFrEF  -PTA furosemide, spironolactone     #Gout  -Resume home allopurinol      Kevyn Barry DO    Supplementary Documentation:     Quality:  DVT Mechanical Prophylaxis:   SCDs,    DVT Pharmacologic Prophylaxis   Medication   None      DVT  Pharmacologic prophylaxis: Aspirin 162 mg       Code Status: Full Code  Pike: No urinary catheter in place  TAMIKA: TBD    Discharge is dependent on: Clinical state, consultant recs  At this point Mr. Haji is expected to be discharge to: Home      The 21st Century Cures Act makes medical notes like these available to patients in the interest of transparency. Please be advised this is a medical document. Medical documents are intended to carry relevant information, facts as evident, and the clinical opinion of the practitioner. The medical note is intended as peer to peer communication and may appear blunt or direct. It is written in medical language and may contain abbreviations or verbiage that are unfamiliar.

## 2025-02-10 NOTE — PLAN OF CARE
No bloody or black stool noted so far. Denies abdominal pain. No nausea and vomiting. Hgb and INR monitored. Plan for EGD/ Colonoscopy once INR is acceptable. Patient alert and oriented x4. Afebrile. Room air. No SOB. Blood glucose monitored, Insulin coverage per MAR. Ambulatory. Up with standby assist. Fall precautions in place. Needs attended. Will continue plan of care.     Problem: GASTROINTESTINAL - ADULT  Goal: Maintains or returns to baseline bowel function  Description: INTERVENTIONS:  - Assess bowel function  - Maintain adequate hydration with IV or PO as ordered and tolerated  - Evaluate effectiveness of GI medications  - Encourage mobilization and activity  - Obtain nutritional consult as needed  - Establish a toileting routine/schedule  - Consider collaborating with pharmacy to review patient's medication profile  Outcome: Progressing     Problem: METABOLIC/FLUID AND ELECTROLYTES - ADULT  Goal: Hemodynamic stability and optimal renal function maintained  Description: INTERVENTIONS:  - Monitor labs and assess for signs and symptoms of volume excess or deficit  - Monitor intake, output and patient weight  - Monitor urine specific gravity, serum osmolarity and serum sodium as indicated or ordered  - Monitor response to interventions for patient's volume status, including labs, urine output, blood pressure (other measures as available)  - Encourage oral intake as appropriate  - Instruct patient on fluid and nutrition restrictions as appropriate  Outcome: Progressing     Problem: HEMATOLOGIC - ADULT  Goal: Maintains hematologic stability  Description: INTERVENTIONS  - Assess for signs and symptoms of bleeding or hemorrhage  - Monitor labs and vital signs for trends  - Administer supportive blood products/factors, fluids and medications as ordered and appropriate  - Administer supportive blood products/factors as ordered and appropriate  Outcome: Progressing      yes yes

## 2025-02-11 ENCOUNTER — ANESTHESIA (OUTPATIENT)
Dept: ENDOSCOPY | Facility: HOSPITAL | Age: 85
End: 2025-02-11
Payer: MEDICARE

## 2025-02-11 VITALS
TEMPERATURE: 98 F | SYSTOLIC BLOOD PRESSURE: 148 MMHG | HEIGHT: 71 IN | RESPIRATION RATE: 15 BRPM | HEART RATE: 59 BPM | DIASTOLIC BLOOD PRESSURE: 67 MMHG | WEIGHT: 196 LBS | OXYGEN SATURATION: 97 % | BODY MASS INDEX: 27.44 KG/M2

## 2025-02-11 LAB
ANION GAP SERPL CALC-SCNC: 13 MMOL/L (ref 0–18)
BASOPHILS # BLD AUTO: 0.04 X10(3) UL (ref 0–0.2)
BASOPHILS NFR BLD AUTO: 0.7 %
BUN BLD-MCNC: 35 MG/DL (ref 9–23)
CALCIUM BLD-MCNC: 9.8 MG/DL (ref 8.7–10.6)
CHLORIDE SERPL-SCNC: 104 MMOL/L (ref 98–112)
CO2 SERPL-SCNC: 22 MMOL/L (ref 21–32)
CREAT BLD-MCNC: 2.54 MG/DL
EGFRCR SERPLBLD CKD-EPI 2021: 24 ML/MIN/1.73M2 (ref 60–?)
EOSINOPHIL # BLD AUTO: 0.2 X10(3) UL (ref 0–0.7)
EOSINOPHIL NFR BLD AUTO: 3.4 %
ERYTHROCYTE [DISTWIDTH] IN BLOOD BY AUTOMATED COUNT: 15.5 %
GLUCOSE BLD-MCNC: 138 MG/DL (ref 70–99)
GLUCOSE BLD-MCNC: 142 MG/DL (ref 70–99)
GLUCOSE BLD-MCNC: 152 MG/DL (ref 70–99)
GLUCOSE BLD-MCNC: 153 MG/DL (ref 70–99)
HCT VFR BLD AUTO: 28.2 %
HGB BLD-MCNC: 9.1 G/DL
IMM GRANULOCYTES # BLD AUTO: 0.01 X10(3) UL (ref 0–1)
IMM GRANULOCYTES NFR BLD: 0.2 %
INR BLD: 1.35 (ref 0.8–1.2)
LYMPHOCYTES # BLD AUTO: 0.9 X10(3) UL (ref 1–4)
LYMPHOCYTES NFR BLD AUTO: 15.3 %
MAGNESIUM SERPL-MCNC: 1.9 MG/DL (ref 1.6–2.6)
MCH RBC QN AUTO: 29 PG (ref 26–34)
MCHC RBC AUTO-ENTMCNC: 32.3 G/DL (ref 31–37)
MCV RBC AUTO: 89.8 FL
MONOCYTES # BLD AUTO: 0.55 X10(3) UL (ref 0.1–1)
MONOCYTES NFR BLD AUTO: 9.3 %
NEUTROPHILS # BLD AUTO: 4.2 X10 (3) UL (ref 1.5–7.7)
NEUTROPHILS # BLD AUTO: 4.2 X10(3) UL (ref 1.5–7.7)
NEUTROPHILS NFR BLD AUTO: 71.1 %
OSMOLALITY SERPL CALC.SUM OF ELEC: 299 MOSM/KG (ref 275–295)
PLATELET # BLD AUTO: 232 10(3)UL (ref 150–450)
POTASSIUM SERPL-SCNC: 4.4 MMOL/L (ref 3.5–5.1)
PROTHROMBIN TIME: 16.9 SECONDS (ref 11.6–14.8)
RBC # BLD AUTO: 3.14 X10(6)UL
SODIUM SERPL-SCNC: 139 MMOL/L (ref 136–145)
WBC # BLD AUTO: 5.9 X10(3) UL (ref 4–11)

## 2025-02-11 PROCEDURE — 0DJD8ZZ INSPECTION OF LOWER INTESTINAL TRACT, VIA NATURAL OR ARTIFICIAL OPENING ENDOSCOPIC: ICD-10-PCS | Performed by: STUDENT IN AN ORGANIZED HEALTH CARE EDUCATION/TRAINING PROGRAM

## 2025-02-11 PROCEDURE — 0DJ08ZZ INSPECTION OF UPPER INTESTINAL TRACT, VIA NATURAL OR ARTIFICIAL OPENING ENDOSCOPIC: ICD-10-PCS | Performed by: STUDENT IN AN ORGANIZED HEALTH CARE EDUCATION/TRAINING PROGRAM

## 2025-02-11 PROCEDURE — 99232 SBSQ HOSP IP/OBS MODERATE 35: CPT | Performed by: INTERNAL MEDICINE

## 2025-02-11 PROCEDURE — 99239 HOSP IP/OBS DSCHRG MGMT >30: CPT | Performed by: INTERNAL MEDICINE

## 2025-02-11 RX ORDER — WARFARIN SODIUM 2 MG/1
4 TABLET ORAL
Status: DISCONTINUED | OUTPATIENT
Start: 2025-02-11 | End: 2025-02-11

## 2025-02-11 RX ORDER — WARFARIN SODIUM 2 MG/1
2 TABLET ORAL
Status: DISCONTINUED | OUTPATIENT
Start: 2025-02-12 | End: 2025-02-11

## 2025-02-11 RX ORDER — SODIUM CHLORIDE, SODIUM LACTATE, POTASSIUM CHLORIDE, CALCIUM CHLORIDE 600; 310; 30; 20 MG/100ML; MG/100ML; MG/100ML; MG/100ML
INJECTION, SOLUTION INTRAVENOUS CONTINUOUS PRN
Status: DISCONTINUED | OUTPATIENT
Start: 2025-02-11 | End: 2025-02-11 | Stop reason: SURG

## 2025-02-11 RX ORDER — PANTOPRAZOLE SODIUM 40 MG/1
40 TABLET, DELAYED RELEASE ORAL
Qty: 60 TABLET | Refills: 1 | Status: SHIPPED | OUTPATIENT
Start: 2025-02-11 | End: 2025-02-25

## 2025-02-11 RX ORDER — PHENYLEPHRINE HCL 10 MG/ML
VIAL (ML) INJECTION AS NEEDED
Status: DISCONTINUED | OUTPATIENT
Start: 2025-02-11 | End: 2025-02-11 | Stop reason: SURG

## 2025-02-11 RX ORDER — LIDOCAINE HYDROCHLORIDE 10 MG/ML
INJECTION, SOLUTION EPIDURAL; INFILTRATION; INTRACAUDAL; PERINEURAL AS NEEDED
Status: DISCONTINUED | OUTPATIENT
Start: 2025-02-11 | End: 2025-02-11 | Stop reason: SURG

## 2025-02-11 RX ORDER — FERROUS SULFATE 325(65) MG
325 TABLET, DELAYED RELEASE (ENTERIC COATED) ORAL 2 TIMES DAILY WITH MEALS
Qty: 60 TABLET | Refills: 0 | Status: SHIPPED | OUTPATIENT
Start: 2025-02-11 | End: 2025-03-02

## 2025-02-11 RX ADMIN — LIDOCAINE HYDROCHLORIDE 50 MG: 10 INJECTION, SOLUTION EPIDURAL; INFILTRATION; INTRACAUDAL; PERINEURAL at 17:53:00

## 2025-02-11 RX ADMIN — SODIUM CHLORIDE, SODIUM LACTATE, POTASSIUM CHLORIDE, CALCIUM CHLORIDE: 600; 310; 30; 20 INJECTION, SOLUTION INTRAVENOUS at 17:50:00

## 2025-02-11 RX ADMIN — PHENYLEPHRINE HCL 100 MCG: 10 MG/ML VIAL (ML) INJECTION at 18:05:00

## 2025-02-11 RX ADMIN — PHENYLEPHRINE HCL 200 MCG: 10 MG/ML VIAL (ML) INJECTION at 18:10:00

## 2025-02-11 NOTE — PLAN OF CARE
Problem: PAIN - ADULT  Goal: Verbalizes/displays adequate comfort level or patient's stated pain goal  Description: INTERVENTIONS:  - Encourage pt to monitor pain and request assistance  - Assess pain using appropriate pain scale  - Administer analgesics based on type and severity of pain and evaluate response  - Implement non-pharmacological measures as appropriate and evaluate response  - Consider cultural and social influences on pain and pain management  - Manage/alleviate anxiety  - Utilize distraction and/or relaxation techniques  - Monitor for opioid side effects  - Notify MD/LIP if interventions unsuccessful or patient reports new pain  - Anticipate increased pain with activity and pre-medicate as appropriate  Outcome: Progressing     Problem: RISK FOR INFECTION - ADULT  Goal: Absence of fever/infection during anticipated neutropenic period  Description: INTERVENTIONS  - Monitor WBC  - Administer growth factors as ordered  - Implement neutropenic guidelines  Outcome: Progressing     Problem: SAFETY ADULT - FALL  Goal: Free from fall injury  Description: INTERVENTIONS:  - Assess pt frequently for physical needs  - Identify cognitive and physical deficits and behaviors that affect risk of falls.  - [place] fall precautions as indicated by assessment.  - Educate pt/family on patient safety including physical limitations  - Instruct pt to call for assistance with activity based on assessment  - Modify environment to reduce risk of injury  - Provide assistive devices as appropriate  - Consider OT/PT consult to assist with strengthening/mobility  - Encourage toileting schedule  Outcome: Progressing     Problem: DISCHARGE PLANNING  Goal: Discharge to home or other facility with appropriate resources  Description: INTERVENTIONS:  - Identify barriers to discharge w/pt and caregiver  - Include patient/family/discharge partner in discharge planning  - Arrange for needed discharge resources and transportation as  appropriate  - Identify discharge learning needs (meds, wound care, etc)  - Arrange for interpreters to assist at discharge as needed  - Consider post-discharge preferences of patient/family/discharge partner  - Complete POLST form as appropriate  - Assess patient's ability to be responsible for managing their own health  - Refer to Case Management Department for coordinating discharge planning if the patient needs post-hospital services based on physician/LIP order or complex needs related to functional status, cognitive ability or social support system  Outcome: Progressing     Problem: GASTROINTESTINAL - ADULT  Goal: Maintains or returns to baseline bowel function  Description: INTERVENTIONS:  - Assess bowel function  - Maintain adequate hydration with IV or PO as ordered and tolerated  - Evaluate effectiveness of GI medications  - Encourage mobilization and activity  - Obtain nutritional consult as needed  - Establish a toileting routine/schedule  - Consider collaborating with pharmacy to review patient's medication profile  Outcome: Progressing     Problem: METABOLIC/FLUID AND ELECTROLYTES - ADULT  Goal: Hemodynamic stability and optimal renal function maintained  Description: INTERVENTIONS:  - Monitor labs and assess for signs and symptoms of volume excess or deficit  - Monitor intake, output and patient weight  - Monitor urine specific gravity, serum osmolarity and serum sodium as indicated or ordered  - Monitor response to interventions for patient's volume status, including labs, urine output, blood pressure (other measures as available)  - Encourage oral intake as appropriate  - Instruct patient on fluid and nutrition restrictions as appropriate  Outcome: Progressing  Goal: Glucose maintained within prescribed range  Description: INTERVENTIONS:  - Monitor Blood Glucose as ordered  - Assess for signs and symptoms of hyperglycemia and hypoglycemia  - Administer ordered medications to maintain glucose within  target range  - Assess barriers to adequate nutritional intake and initiate nutrition consult as needed  - Instruct patient on self management of diabetes  Outcome: Progressing     Problem: HEMATOLOGIC - ADULT  Goal: Maintains hematologic stability  Description: INTERVENTIONS  - Assess for signs and symptoms of bleeding or hemorrhage  - Monitor labs and vital signs for trends  - Administer supportive blood products/factors, fluids and medications as ordered and appropriate  - Administer supportive blood products/factors as ordered and appropriate  Outcome: Progressing   Patient has been up ambulating with a steady gait. Was started on golyte prep and having large results. Has been tolerating clear liquids. No c/o pain.

## 2025-02-11 NOTE — ANESTHESIA PREPROCEDURE EVALUATION
PRE-OP EVALUATION    Patient Name: Kyler Haji    Admit Diagnosis: Acute on chronic renal insufficiency [N28.9, N18.9]  Gastrointestinal hemorrhage, unspecified gastrointestinal hemorrhage type [K92.2]  Anemia, unspecified type [D64.9]    Pre-op Diagnosis: ?    ESOPHAGOGASTRODUODENOSCOPY (EGD),  COLONOSCOPY    Anesthesia Procedure: ESOPHAGOGASTRODUODENOSCOPY (EGD), COLONOSCOPY  COLONOSCOPY    Surgeons and Role:     * Israel Martin MD - Primary    Pre-op vitals reviewed.  Temp: 97.9 °F (36.6 °C)  Pulse: 60  Resp: 14  BP: 159/62  SpO2: 96 %  Body mass index is 27.34 kg/m².    Current medications reviewed.  Hospital Medications:   [COMPLETED] magnesium oxide (Mag-Ox) tab 400 mg  400 mg Oral Once    sodium ferric gluconate (Ferrlecit) 125 mg in sodium chloride 0.9% 100mL IVPB premix  125 mg Intravenous Daily    [COMPLETED] polyethylene glycol-electrolyte (Golytely) 236 g oral solution 2,000 mL  2,000 mL Oral Once    [COMPLETED] polyethylene glycol-electrolyte (Golytely) 236 g oral solution 2,000 mL  2,000 mL Oral Once    [COMPLETED] magnesium oxide (Mag-Ox) tab 400 mg  400 mg Oral Once    spironolactone (Aldactone) partial tab 12.5 mg  12.5 mg Oral Daily    furosemide (Lasix) tab 20 mg  20 mg Oral Daily    allopurinol (Zyloprim) tab 100 mg  100 mg Oral Daily    pantoprazole (Protonix) DR tab 40 mg  40 mg Oral BID AC    [COMPLETED] magnesium oxide (Mag-Ox) tab 400 mg  400 mg Oral Once    losartan (Cozaar) tab 25 mg  25 mg Oral Daily    [COMPLETED] sodium chloride 0.9% infusion   Intravenous Once    albuterol (Ventolin HFA) 108 (90 Base) MCG/ACT inhaler 2 puff  2 puff Inhalation Q6H PRN    amiodarone (Pacerone) tab 200 mg  200 mg Oral Daily    aspirin chewable tab 81 mg  81 mg Oral Daily    atorvastatin (Lipitor) tab 40 mg  40 mg Oral Daily    metoprolol succinate ER (Toprol XL) 24 hr tab 200 mg  200 mg Oral Daily Beta Blocker    [COMPLETED] magnesium oxide (Mag-Ox) tab 400 mg  400 mg Oral Once    [COMPLETED]  pantoprazole (Protonix) 40 mg in sodium chloride 0.9% PF 10 mL IV push  40 mg Intravenous Once    glucose (Dex4) 15 GM/59ML oral liquid 15 g  15 g Oral Q15 Min PRN    Or    glucose (Glutose) 40% oral gel 15 g  15 g Oral Q15 Min PRN    Or    glucose-vitamin C (Dex-4) chewable tab 4 tablet  4 tablet Oral Q15 Min PRN    Or    dextrose 50% injection 50 mL  50 mL Intravenous Q15 Min PRN    Or    glucose (Dex4) 15 GM/59ML oral liquid 30 g  30 g Oral Q15 Min PRN    Or    glucose (Glutose) 40% oral gel 30 g  30 g Oral Q15 Min PRN    Or    glucose-vitamin C (Dex-4) chewable tab 8 tablet  8 tablet Oral Q15 Min PRN    acetaminophen (Tylenol Extra Strength) tab 500 mg  500 mg Oral Q4H PRN    melatonin tab 3 mg  3 mg Oral Nightly PRN    metoclopramide (Reglan) 5 mg/mL injection 5 mg  5 mg Intravenous Q8H PRN    insulin aspart (NovoLOG) 100 Units/mL FlexPen 2-10 Units  2-10 Units Subcutaneous TID AC and HS    glycerin-hypromellose- (Artificial Tears) 0.2-0.2-1 % ophthalmic solution 1 drop  1 drop Both Eyes QID PRN    sodium chloride (Saline Mist) 0.65 % nasal solution 1 spray  1 spray Each Nare Q3H PRN    benzonatate (Tessalon) cap 200 mg  200 mg Oral TID PRN       Outpatient Medications:   Prescriptions Prior to Admission[1]    Allergies: Ace inhibitors      Anesthesia Evaluation    Patient summary reviewed.    Anesthetic Complications  (-) history of anesthetic complications         GI/Hepatic/Renal             (+) chronic renal disease and CRI                   Cardiovascular        Exercise tolerance: good     MET: >4      (+) hypertension   (+) hyperlipidemia  (+) CAD    (+) CABG/stent  (+) pacemaker/AICD (ICD)  (+) valvular problems/murmurs (s/p AVR)     (+) dysrhythmias and atrial fibrillation  (+) CHF                Endo/Other      (+) diabetes  type 2,                          Pulmonary                    (+) sleep apnea       Neuro/Psych    Negative neuro/psych ROS.                                  Past Surgical  History:   Procedure Laterality Date    Cabg  2012    CABG x 3    Cath transcatheter aortic valve replacement  2012    Colonoscopy  2014    Procedure: COLONOSCOPY;  Surgeon: Ton Oliveira MD;  Location:  ENDOSCOPY    Hernia surgery      Valve repair       Social History     Socioeconomic History    Marital status:    Tobacco Use    Smoking status: Former     Current packs/day: 0.00     Types: Cigarettes     Start date: 1955     Quit date: 1975     Years since quittin.6     Passive exposure: Never    Smokeless tobacco: Never   Vaping Use    Vaping status: Never Used   Substance and Sexual Activity    Alcohol use: Yes     Comment: 8-10 drinks per week    Drug use: Never   Other Topics Concern    Caffeine Concern Yes    Exercise No     History   Drug Use Unknown     Available pre-op labs reviewed.  Lab Results   Component Value Date    WBC 5.9 2025    RBC 3.14 (L) 2025    HGB 9.1 (L) 2025    HCT 28.2 (L) 2025    MCV 89.8 2025    MCH 29.0 2025    MCHC 32.3 2025    RDW 15.5 2025    .0 2025     Lab Results   Component Value Date     2025    K 4.4 2025     2025    CO2 22.0 2025    BUN 35 (H) 2025    CREATSERUM 2.54 (H) 2025     (H) 2025    CA 9.8 2025     Lab Results   Component Value Date    INR 1.35 (H) 2025         Airway      Mallampati: II  Mouth opening: >3 FB  TM distance: > 6 cm  Neck ROM: full Cardiovascular    Cardiovascular exam normal.  Rhythm: regular  Rate: normal     Dental      Dental appliance(s): upper dentures and lower dentures       Pulmonary    Pulmonary exam normal.  Breath sounds clear to auscultation bilaterally.               Other findings              ASA: 3   Plan: MAC  NPO status verified and patient meets guidelines.  Patient has not taken beta blockers in last 24 hours.  Post-procedure pain management plan discussed with  surgeon and patient.      Plan/risks discussed with: patient and spouse                Present on Admission:   Essential hypertension   ILIR (acute kidney injury)   CKD (chronic kidney disease) stage 4, GFR 15-29 ml/min ([facility])             [1]   Medications Prior to Admission   Medication Sig Dispense Refill Last Dose/Taking    aspirin 81 MG Oral Chew Tab Chew by mouth daily.   2/6/2025 Morning    atorvastatin 40 MG Oral Tab Take 1 tablet (40 mg total) by mouth daily. 90 tablet 3 2/5/2025 Evening    metFORMIN 500 MG Oral Tab Take 1 tablet (500 mg total) by mouth 2 (two) times daily with meals. 180 tablet 1 2/6/2025 Evening    losartan 25 MG Oral Tab Take 1 tablet (25 mg total) by mouth daily. 90 tablet 1 2/6/2025 Morning    glipiZIDE 5 MG Oral Tab Take 1 tablet (5 mg total) by mouth 2 (two) times daily before meals. 180 tablet 1 2/6/2025 Morning    METOPROLOL SUCCINATE  MG Oral Tablet 24 Hr TAKE 1 TABLET(200 MG) BY MOUTH DAILY 90 tablet 2 2/6/2025 Morning    furosemide 40 MG Oral Tab Take 1 tablet (40 mg total) by mouth every other day. 45 tablet 3 2/6/2025 Morning    spironolactone 25 MG Oral Tab Take 0.5 tablets (12.5 mg total) by mouth daily. 90 tablet 3 2/6/2025 Morning    furosemide 20 MG Oral Tab TAKE 40MG ON EVEN DAYS, AND 20MG ON ODD DAYS 45 tablet 2 2/6/2025 Morning    allopurinol 100 MG Oral Tab Take 1 tablet (100 mg total) by mouth daily. 90 tablet 3 2/6/2025 Morning    warfarin 4 MG Oral Tab TAKE 1 TABLET BY MOUTH DAILY OR AS DIRECTED BY ANTICOAGULATION CLINIC (Patient taking differently: Take 1 tablet (4 mg total) by mouth daily. Takes 2mg Mon, Wed, Fri, takes 4 mg every other day(Tues,thur, sat, sun)) 90 tablet 4 Taking Differently    amiodarone 200 MG Oral Tab Take 1 tablet (200 mg total) by mouth daily. 90 tablet 3 2/6/2025 Morning    albuterol 108 (90 Base) MCG/ACT Inhalation Aero Soln Inhale 2 puffs into the lungs every 6 (six) hours as needed. 1 each 0 More than a month

## 2025-02-11 NOTE — PROGRESS NOTES
[facility]   part of [facility]     Nephrology Progress Note    Kyler Haji Patient Status:  Inpatient    1940 MRN [id number]   Location [facility] 4NW-A Attending Kevyn Barry,    Hosp Day # 5 PCP Abhi Arellano MD       SUBJECTIVE:  No overnight events.  Denies c/o  Awaiting endoscopy   No pain  Denies any breathing problems    Current Facility-Administered Medications   Medication Dose Route Frequency    sodium ferric gluconate (Ferrlecit) 125 mg in sodium chloride 0.9% 100mL IVPB premix  125 mg Intravenous Daily    spironolactone (Aldactone) partial tab 12.5 mg  12.5 mg Oral Daily    furosemide (Lasix) tab 20 mg  20 mg Oral Daily    allopurinol (Zyloprim) tab 100 mg  100 mg Oral Daily    pantoprazole (Protonix) DR tab 40 mg  40 mg Oral BID AC    losartan (Cozaar) tab 25 mg  25 mg Oral Daily    albuterol (Ventolin HFA) 108 (90 Base) MCG/ACT inhaler 2 puff  2 puff Inhalation Q6H PRN    amiodarone (Pacerone) tab 200 mg  200 mg Oral Daily    aspirin chewable tab 81 mg  81 mg Oral Daily    atorvastatin (Lipitor) tab 40 mg  40 mg Oral Daily    metoprolol succinate ER (Toprol XL) 24 hr tab 200 mg  200 mg Oral Daily Beta Blocker    glucose (Dex4) 15 GM/59ML oral liquid 15 g  15 g Oral Q15 Min PRN    Or    glucose (Glutose) 40% oral gel 15 g  15 g Oral Q15 Min PRN    Or    glucose-vitamin C (Dex-4) chewable tab 4 tablet  4 tablet Oral Q15 Min PRN    Or    dextrose 50% injection 50 mL  50 mL Intravenous Q15 Min PRN    Or    glucose (Dex4) 15 GM/59ML oral liquid 30 g  30 g Oral Q15 Min PRN    Or    glucose (Glutose) 40% oral gel 30 g  30 g Oral Q15 Min PRN    Or    glucose-vitamin C (Dex-4) chewable tab 8 tablet  8 tablet Oral Q15 Min PRN    acetaminophen (Tylenol Extra Strength) tab 500 mg  500 mg Oral Q4H PRN    melatonin tab 3 mg  3 mg Oral Nightly PRN    metoclopramide (Reglan) 5 mg/mL injection 5 mg  5 mg Intravenous Q8H PRN    insulin aspart (NovoLOG) 100 Units/mL FlexPen 2-10 Units  2-10  Units Subcutaneous TID AC and HS    glycerin-hypromellose- (Artificial Tears) 0.2-0.2-1 % ophthalmic solution 1 drop  1 drop Both Eyes QID PRN    sodium chloride (Saline Mist) 0.65 % nasal solution 1 spray  1 spray Each Nare Q3H PRN    benzonatate (Tessalon) cap 200 mg  200 mg Oral TID PRN           Physical Exam:   /69   Pulse 60   Temp 97.4 °F (36.3 °C) (Oral)   Resp 12   Ht 5' 11\" (1.803 m)   Wt 196 lb (88.9 kg)   SpO2 99%   BMI 27.34 kg/m²   Temp (24hrs), Av.9 °F (36.6 °C), Min:97.4 °F (36.3 °C), Max:98.4 °F (36.9 °C)       Intake/Output Summary (Last 24 hours) at 2025 1033  Last data filed at 2/10/2025 1245  Gross per 24 hour   Intake 200 ml   Output --   Net 200 ml     Last 3 Weights   25 0038 196 lb (88.9 kg)   25 1935 196 lb (88.9 kg)   25 1326 200 lb (90.7 kg)   24 1122 200 lb (90.7 kg)   24 0802 200 lb (90.7 kg)   24 0806 200 lb 9.9 oz (91 kg)     General: Alert and oriented in no apparent distress.  HEENT: No scleral icterus, MMM  Neck: Supple, no TAYLER or thyromegaly  Cardiac: Regular rate and rhythm, S1, S2 normal, no murmur or rub  Lungs: Clear without wheezes, rales, rhonchi.    Abdomen: Soft, non-tender. + bowel sounds, no palpable organomegaly  Extremities: Without clubbing, cyanosis or edema.  Neurologic: Alert and oriented, cranial nerves grossly intact, moving all extremities  Skin: Warm and dry, no rash      Recent Labs     25  0645 02/10/25  0546 25  0823   WBC 6.6 6.1 5.9   HGB 8.3* 8.0* 9.1*   MCV 87.8 86.6 89.8   .0 170.0 232.0   INR 2.12* 1.63* 1.35*       Recent Labs     25  0645 02/10/25  0546 25  0823    139 139   K 4.6 4.4 4.4    106 104   CO2 22.0 22.0 22.0   BUN 46* 46* 35*   CREATSERUM 2.55* 2.50* 2.54*   CA 9.2 8.8 9.8   MG 1.8 1.7 1.9       No results for input(s): \"ALT\", \"AST\", \"ALB\", \"AMYLASE\", \"LIPASE\", \"LDH\" in the last 72 hours.    Invalid input(s): \"ALPHOS\", \"TBIL\",  \"DBIL\", \"TPROT\"      Impression/Plan:      ILIR/CKD- stg 4 - longstanding CKD related to DM/HTN with b/l creat close to 2.5 mg/dl or so; ILIR in setting of symptomatic anemia/decr renal perfusion and has resolved with transfusion/IVF.  Renal fxn remains stable at b/l  GIB/symptomatic anemia- as above; hold AC; GI evaluation appreciated; endoscopy planned for today   Afib; holding AC; Amio/BB- stable  HFrEF- appears compensated; cont usual med regimen (furosemide/aldactone)  HTN- BP stable on metoprolol and losartan      Questions/concerns were discussed with patient and/or family by bedside.    Rhett Jones  2/11/2025

## 2025-02-11 NOTE — OPERATIVE REPORT
EGD operative report  Patient Name: Kyler Haji,  1940  Date: 2025  Procedure: Esophagogastroduodenoscopy   Pre-Op Diagnosis: anemia with iron def  Post-Op Diagnosis: hiatal hernia  Attending: Israel Martin M.D.  Consent:  The risks, benefits, and alternatives were discussed with the patient / POA.  Risks included, but were not limited to, bleeding, perforation, medication effects, cardiac arrhythmias, and aspiration.  After all questions were answered to their satisfaction, a signed, informed, and witnessed consent was obtained.  Sedation: Monitored Anesthesia Care  Monitoring:  Pulsoximetry, pulse, respirations, and blood pressure were monitored throughout the entire procedure  Procedure: After achieving adequate sedation and placing the patient in the left lateral decubitus position, the lubricated upper endoscope was introduced into the mouth and advanced to the descending duodenum.  The endoscope was then withdrawn into the gastric antrum and placed in a retroflexed position.  The endoscope was then righted, and air was suctioned from the stomach.  The endoscope was then withdrawn from the patient, with careful visual inspection of the mucosa revealing no additional pathologic findings.  The patient tolerated the procedure without apparent procedural complications.  The patient left the procedure room in stable condition for recovery.  Findings:   Esophagus: The mucosa was normal.    GE junction: The GE junction is located at 38 cm.  The Z line is regular.  The diaphragmatic impression is located at 43 cm, representing a 5 cm hiatal hernia.    Stomach:  The gastric body, antrum, fundus, cardia, and angularis were normal.                   Duodenum: The duodenal bulb, post-bulbar duodenum, and descending duodenum were normal.    Impression: Findings as above  Recommendations:   Only explanation for iron def anemia found on either exam is the large hiatal hernia.  OK to discharge home when cleared  by other services.  General diet.  PPI BID-ac.  OK to resume warfarin today.  Oral iron therapy as outpatient; if intolerant, then plan for IV iron therapy (patient has never been on oral iron therapy).  Follow-up in GI clinic for follow-up in 3-4 weeks.  CT enterography as outpatient.    Israel Martin MD

## 2025-02-11 NOTE — DISCHARGE INSTRUCTIONS
Please check your INR on 2/15.    Take iron supplement Ferrous Sulfate 325 mg twice a day for one month. Repeat Iron studies with your PCP at that time.

## 2025-02-11 NOTE — PROGRESS NOTES
[facility]   part of [facility]     Hospitalist Progress Note     Kyler Haji Patient Status:  Inpatient    1940 MRN [id number]   Location [facility] 4NW-A Attending Kevyn Barry,    Hosp Day # 5 PCP Abhi Arellano MD     Chief Complaint: Dizziness    Subjective:     Patient denies nausea, vomiting or pain. No overt GI bleeding. Hgb stable. Plan for EGD/colonoscopy at 4pm.     Objective:    Review of Systems:   A comprehensive review of systems was completed; pertinent positive and negatives stated in subjective.    Vital signs:  Temp:  [97.4 °F (36.3 °C)-98.4 °F (36.9 °C)] 97.6 °F (36.4 °C)  Pulse:  [60] 60  Resp:  [12-18] 12  BP: (127-150)/(58-69) 144/66  SpO2:  [96 %-100 %] 100 %    Physical Exam:    General: No acute distress, awake and alert  Respiratory: No wheezes, no rhonchi  Cardiovascular: S1, S2, regular rate and rhythm  Abdomen: Soft, Non-tender, non-distended, positive bowel sounds  Extremities: No edema    Diagnostic Data:    Labs:  Recent Labs   Lab 2559 25  11425  0643 25  0625  0645 02/10/25  0546 25  0823   WBC 5.0  --  6.0  --  6.6 6.1 5.9   HGB 6.8* 8.3* 8.1*  --  8.3* 8.0* 9.1*   MCV 87.8  --  86.0  --  87.8 86.6 89.8   .0  --  180.0  --  199.0 170.0 232.0   INR  --  2.90*  --  2.68* 2.12* 1.63* 1.35*     Recent Labs   Lab 25  0559 25  0645 02/10/25  0546 25  0823   *   < > 152* 130* 152*   BUN 51*   < > 46* 46* 35*   CREATSERUM 3.42*   < > 2.55* 2.50* 2.54*   CA 9.1   < > 9.2 8.8 9.8   ALB 4.5  --   --   --   --       < > 140 139 139   K 4.6   < > 4.6 4.4 4.4      < > 110 106 104   CO2 20.0*   < > 22.0 22.0 22.0   ALKPHO 83  --   --   --   --    AST 27  --   --   --   --    ALT 31  --   --   --   --    BILT 0.3  --   --   --   --    TP 7.2  --   --   --   --     < > = values in this interval not displayed.     Estimated Glomerular Filtration Rate: 24.1  mL/min/1.73m2 (A) (by CKD-EPI based on SCr of 2.54 mg/dL (H)).    Recent Labs   Lab 02/06/25  2005   TROPHS 12     Recent Labs   Lab 02/09/25  0645 02/10/25  0546 02/11/25  0823   PTP 24.0* 19.5* 16.9*   INR 2.12* 1.63* 1.35*     Microbiology  No results found for this visit on 02/06/25.    Imaging: Reviewed in Epic.    Medications:    sodium ferric gluconate  125 mg Intravenous Daily    spironolactone  12.5 mg Oral Daily    furosemide  20 mg Oral Daily    allopurinol  100 mg Oral Daily    pantoprazole  40 mg Oral BID AC    losartan  25 mg Oral Daily    amiodarone  200 mg Oral Daily    aspirin  81 mg Oral Daily    atorvastatin  40 mg Oral Daily    Metoprolol Succinate ER  200 mg Oral Daily Beta Blocker    insulin aspart  2-10 Units Subcutaneous TID AC and HS       Assessment & Plan:      #Dizziness, resolved  -EKG showing AV paced rhythm  -May be 2/2 volume depletion vs bleeding. Both were addressed (IVF, pRBC transfusion)  -Carotid dopplers given hx carotid stenosis > findings stable from 2012  -Monitor on telemetry  -Work-up for anemia as below  -PT/OT     #Heme positive stool  #Acute on chronic anemia, likely from GI blood loss  #Iron deficiency  -Black stools several days ago, now brown heme +  -Cont to monitor CBC and transfuse for hgb <7. So far has received 1 unit blood and hgb stable  -IV iron  -GI consulted, plan for EGD/colonoscopy today    #Aortic stenosis  -S/p surgical bioprosthetic valve      #CKD 4  -Stable  -Nephrology following     #Afib  -EKG with paced rhythm  -Holding coumadin  -Amio, BB     #DM with A1c 7.5  -SSI, QID checks, hypoglycemia protocol  -Hold home metformin, glipizide     #HLD  -Cont home statin    #HTN  -BB, losartan, lasix, aldactone     #HFpEF  -Patient does NOT have reduced EF. Echo seen on [facility] in July 2024 shows EF 55%  -PTA furosemide, spironolactone     #Gout  -Resume home allopurinol      EGD/colonoscopy today. Messaged patient's cardiologist, Dr. Walker. Once ok for  coumadin from GI standpoint, will resume and let INR rise, no bridging. Dr. Walker in agreement. Endoscopy is at 4pm but possible discharge today depending findings, how patient is doing and on how late discharge would be.      Kevyn Barry,     Supplementary Documentation:     Quality:  DVT Mechanical Prophylaxis:   SCDs,    DVT Pharmacologic Prophylaxis   Medication   None      DVT Pharmacologic prophylaxis: Aspirin 162 mg       Code Status: Full Code  Pike: No urinary catheter in place  TAMIKA: 0-1 day    Discharge is dependent on: Clinical state, consultant recs  At this point Mr. Haji is expected to be discharge to: Home      The 21st Century Cures Act makes medical notes like these available to patients in the interest of transparency. Please be advised this is a medical document. Medical documents are intended to carry relevant information, facts as evident, and the clinical opinion of the practitioner. The medical note is intended as peer to peer communication and may appear blunt or direct. It is written in medical language and may contain abbreviations or verbiage that are unfamiliar.

## 2025-02-12 ENCOUNTER — PATIENT OUTREACH (OUTPATIENT)
Dept: CASE MANAGEMENT | Age: 85
End: 2025-02-12

## 2025-02-12 NOTE — PROGRESS NOTES
Les regular diet. Took all pm meds. Iv removed . Discharge instructions given. Verbalized understanding. Discharged home with family

## 2025-02-12 NOTE — PAYOR COMM NOTE
--------------  DISCHARGE REVIEW    Payor: [employer]  Subscriber #:  [id number]  Authorization Number: [id number]    Admit date: 25  Admit time:  11:36 PM  Discharge Date: 2025  8:39 PM     Admitting Physician: Kendra Flynn MD  Attending Physician:  Yanci Vazquez DO  Primary Care Physician: Abhi Arellano MD          Discharge Summary Notes        Discharge Summary signed by Kevyn Barry DO at 2025  8:03 PM       Author: Kevyn Barry DO Specialty: HOSPITALIST, Internal Medicine Author Type: Physician    Filed: 2025  8:03 PM Date of Service: 2025  7:54 PM Status: Signed    : Kevyn Barry DO (Physician)           [facility]     Kyler Haji Patient Status:  Inpatient    1940 MRN [id number]   [facility] 4NW-A Attending Kevyn Barry DO   Hosp Day # 5 PCP Abhi Arellano MD     Date of Admission: 2025  Date of Discharge: 2025  Discharge Disposition: Home or Self Care    Discharge Diagnosis:   #Acute on chronic anemia, likely from GI blood loss  #Iron deficiency anemia  #Large hiatal hernia  #Aortic stenosis s/p surgical bioprosthetic valve  #CKD 4  #Afib  #DM with A1c 7.5  #HLD  #HTN  #HFpEF  #Gout    History of Present Illness: Kyler Haji is a 84 year old male with PMHx PAF/ CAD (s/p CABG)/ AVR/ DM/ HTN/ HLD/ HFrEF (s/p AICD)/ MILES who presented to the hospital for weakness.  He was out grocery shopping when his legs felt shaky and he was worried he was going to fall. He denied any syncope or falls. He does note black stools over the weekend without abdominal pain but did not think much of it. He had normal BM since then. He reports good PO intake.     Brief Synopsis: Patient presented with dizziness. EKG with paced rhythm. Carotid dopplers showed stable findings from . He was found to have acute on chronic anemia with heme positive stool. He received 1 unit blood in the hospital and hgb then  stabilized. He had EGD and colonoscopy done which was overall unremarkable but did show hiatal hernia. He will be on PPI BID on discharge. He received IV iron and will be on PO iron on discharge. He did have ILIR and was given IVF with improvement. Home diuretics were resumed and Cr stayed stable. He will resume coumadin on discharge and check INR outpatient. He was recommended to repeat iron studies in a month. He was discharged home in stable condition.     Lace+ Score: 75  59-90 High Risk  29-58 Medium Risk  0-28   Low Risk       TCM Follow-Up Recommendation:  LACE > 58: High Risk of readmission after discharge from the hospital.    Procedures during hospitalization:   EGD and Colonoscopy    Incidental or significant findings and recommendations (brief descriptions):  None    Lab/Test results pending at Discharge:   None    Consultants:  GI  Nephrology    Discharge Medication List:     Discharge Medications        START taking these medications        Instructions Prescription details   ferrous sulfate 325 (65 FE) MG Tbec      Take 1 tablet (325 mg total) by mouth 2 (two) times daily with meals.   Quantity: 60 tablet  Refills: 0     pantoprazole 40 MG Tbec  Commonly known as: Protonix      Take 1 tablet (40 mg total) by mouth 2 (two) times daily before meals.   Quantity: 60 tablet  Refills: 1            CHANGE how you take these medications        Instructions Prescription details   warfarin 4 MG Tabs  Commonly known as: Coumadin  What changed:   how much to take  how to take this  when to take this  additional instructions      Take as directed. If you are unsure how to take this medication, talk to your nurse or doctor.  Original instructions: TAKE 1 TABLET BY MOUTH DAILY OR AS DIRECTED BY ANTICOAGULATION CLINIC   Quantity: 90 tablet  Refills: 4            CONTINUE taking these medications        Instructions Prescription details   albuterol 108 (90 Base) MCG/ACT Aers  Commonly known as: Ventolin HFA      Inhale  2 puffs into the lungs every 6 (six) hours as needed.   Quantity: 1 each  Refills: 0     allopurinol 100 MG Tabs  Commonly known as: Zyloprim      Take 1 tablet (100 mg total) by mouth daily.   Quantity: 90 tablet  Refills: 3     amiodarone 200 MG Tabs  Commonly known as: Pacerone      Take 1 tablet (200 mg total) by mouth daily.   Quantity: 90 tablet  Refills: 3     aspirin 81 MG Chew      Chew by mouth daily.   Refills: 0     atorvastatin 40 MG Tabs  Commonly known as: Lipitor      Take 1 tablet (40 mg total) by mouth daily.   Quantity: 90 tablet  Refills: 3     furosemide 20 MG Tabs  Commonly known as: Lasix      TAKE 40MG ON EVEN DAYS, AND 20MG ON ODD DAYS   Quantity: 45 tablet  Refills: 2     furosemide 40 MG Tabs  Commonly known as: Lasix      Take 1 tablet (40 mg total) by mouth every other day.   Quantity: 45 tablet  Refills: 3     glipiZIDE 5 MG Tabs  Commonly known as: Glucotrol      Take 1 tablet (5 mg total) by mouth 2 (two) times daily before meals.   Quantity: 180 tablet  Refills: 1     losartan 25 MG Tabs  Commonly known as: Cozaar      Take 1 tablet (25 mg total) by mouth daily.   Quantity: 90 tablet  Refills: 1     metFORMIN 500 MG Tabs  Commonly known as: Glucophage      Take 1 tablet (500 mg total) by mouth 2 (two) times daily with meals.   Quantity: 180 tablet  Refills: 1     Metoprolol Succinate  MG Tb24  Commonly known as: TOPROL-XL      TAKE 1 TABLET(200 MG) BY MOUTH DAILY   Quantity: 90 tablet  Refills: 2     spironolactone 25 MG Tabs  Commonly known as: Aldactone      Take 0.5 tablets (12.5 mg total) by mouth daily.   Quantity: 90 tablet  Refills: 3               Where to Get Your Medications        These medications were sent to [id number] - [place], [place] - [address], [phone], [phone]  [address],  [address]      Phone: [phone]   ferrous sulfate 325 (65 FE) MG Tbec  pantoprazole 40 MG Tbec         [place] reviewed[place] No  controlled substances prescribed at discharge.     Follow-up appointment:   Abhi Arellano MD  [address] [place] [address] [address]  [facility] [address] [phone]    Follow up in 1 week(s)      Israel Martin MD  [address] [facility]   [facility] [address] [phone]    Follow up in 4 week(s)      Appointments for Next 30 Days 2/11/2025 - 3/13/2025      None            Vital signs:  Temp:  [97.4 °F (36.3 °C)-98.4 °F (36.9 °C)] 97.9 °F (36.6 °C)  Pulse:  [59-63] 59  Resp:  [12-20] 15  BP: (105-159)/(55-97) 148/67  SpO2:  [94 %-100 %] 97 %    Physical Exam:    See progress note dated same day.  -----------------------------------------------------------------------------------------------  PATIENT DISCHARGE INSTRUCTIONS: See electronic chart    Kevyn Barry DO    Time spent:  34 minutes    Electronically signed by Kevyn Barry DO on 2/11/2025  8:03 PM         REVIEWER COMMENTS

## 2025-02-12 NOTE — OPERATIVE REPORT
Colon operative report  Patient Name: Kyler Haji  Date or Service: 2/11/2025  Procedure: Colonoscopy   Pre-Op Diagnosis: Iron def anemia  Post-Op Diagnosis: tortuous colon, otherwise normal  Attending: Israel Martin M.D.  Consent: The risks, benefits, and alternatives were discussed with the patient / POA.  Risks included, but were not limited to, bleeding, perforation, medication effects, cardiac arrhythmias, missed polyps, and aspiration.  After all questions were answered to their satisfaction, a signed, informed, and witnessed consent was obtained.  Sedation: Monitored Anesthesia Care  Monitoring: Pulsoximetry, pulse, respirations, and blood pressure were monitored throughout the entire procedure    Preparation Quality: fair.  [place] Bowel Prep Score: Right 2 / Transverse 2  / Left 3   Procedure: After achieving adequate sedation, and placing the patient in the left lateral decubitus position, a digital rectal examination was performed.  The lubricated tip of the pediatric colonoscope was then introduced into the rectum and advanced to the terminal ileum.  The appendiceal orifice and ileocecal valve were clearly and distinctly visualized, thus verifying the cecum.  The terminal ileum was intubated and found to be normal to the extent examined.  The endoscope was then carefully withdrawn from the patient with careful visualization of the colonic mucosa revealing no additional pathologic findings.  Air was suctioned to the best of my ability, during withdrawal of the endoscope.  When the endoscope reached the rectum, it was placed in a retroflexed position, and the rectal bulb was thus visualized.  The endoscope was righted, and then removed from the patient.  The patient tolerated the procedure without apparent procedural complications.  The patient left the procedure room in stable condition for recovery.  Findings:    Terminal ileum briefly intubated, was grossly normal.  The colon is tortuous and  redundant.  There is residual stool throughout the right colon, lavaged to the best of my ability.  There are no polyps, lesions, AVMs, ulcers, or other findings anywhere in the colon.  Impression: Findings as above.    Recommendations:   No indication for repeat colonoscopy  See EGD report from same date for furthers willy Martin MD

## 2025-02-12 NOTE — ANESTHESIA POSTPROCEDURE EVALUATION
[facility]    Kyler Haji Patient Status:  Inpatient   Age/Gender 84 year old male MRN [id number]   Location [facility] Attending Kevyn Barry DO   Hosp Day # 5 PCP Abhi Arellano MD       Anesthesia Post-op Note    ESOPHAGOGASTRODUODENOSCOPY (EGD),  COLONOSCOPY    Procedure Summary       Date: 02/11/25 Room / Location:  ENDOSCOPY 03 /  ENDOSCOPY    Anesthesia Start: 1750 Anesthesia Stop: 1831    Procedures:       ESOPHAGOGASTRODUODENOSCOPY (EGD), COLONOSCOPY      COLONOSCOPY Diagnosis: (EGD: hiatal hernia COLON: normal)    Surgeons: Israel Martin MD Anesthesiologist: Paulie Dick MD    Anesthesia Type: MAC ASA Status: 3            Anesthesia Type: MAC    Vitals Value Taken Time   /63 02/11/25 1840        Pulse 60 02/11/25 1840   Resp 17 02/11/25 1840   SpO2 98 % 02/11/25 1840   Vitals shown include unfiled device data.        Patient Location: Endoscopy    Anesthesia Type: MAC    Airway Patency: patent    Postop Pain Control: adequate    Mental Status: mildly sedated but able to meaningfully participate in the post-anesthesia evaluation    Nausea/Vomiting: none    Cardiopulmonary/Hydration status: stable euvolemic    Complications: no apparent anesthesia related complications    Postop vital signs: stable    Dental Exam: Unchanged from Preop    Patient to be discharged from PACU when criteria met.

## 2025-02-12 NOTE — PROGRESS NOTES
Per TCM message :      (Discharged 2/11 [facility])      PCP Request :    Abhi Arellano MD  [address] [place] [id number] [phone]  Follow up in 1 week(s)      Spoke with pt's spouse who is requesting a call back to her  after 1pm today 2/12

## 2025-02-12 NOTE — PLAN OF CARE
Problem: RISK FOR INFECTION - ADULT  Goal: Absence of fever/infection during anticipated neutropenic period  Description: INTERVENTIONS  - Monitor WBC  - Administer growth factors as ordered  - Implement neutropenic guidelines  Outcome: Progressing     Problem: SAFETY ADULT - FALL  Goal: Free from fall injury  Description: INTERVENTIONS:  - Assess pt frequently for physical needs  - Identify cognitive and physical deficits and behaviors that affect risk of falls.  - [place] fall precautions as indicated by assessment.  - Educate pt/family on patient safety including physical limitations  - Instruct pt to call for assistance with activity based on assessment  - Modify environment to reduce risk of injury  - Provide assistive devices as appropriate  - Consider OT/PT consult to assist with strengthening/mobility  - Encourage toileting schedule  Outcome: Progressing     Problem: DISCHARGE PLANNING  Goal: Discharge to home or other facility with appropriate resources  Description: INTERVENTIONS:  - Identify barriers to discharge w/pt and caregiver  - Include patient/family/discharge partner in discharge planning  - Arrange for needed discharge resources and transportation as appropriate  - Identify discharge learning needs (meds, wound care, etc)  - Arrange for interpreters to assist at discharge as needed  - Consider post-discharge preferences of patient/family/discharge partner  - Complete POLST form as appropriate  - Assess patient's ability to be responsible for managing their own health  - Refer to Case Management Department for coordinating discharge planning if the patient needs post-hospital services based on physician/LIP order or complex needs related to functional status, cognitive ability or social support system  Outcome: Progressing     Problem: GASTROINTESTINAL - ADULT  Goal: Maintains or returns to baseline bowel function  Description: INTERVENTIONS:  - Assess bowel function  - Maintain adequate  hydration with IV or PO as ordered and tolerated  - Evaluate effectiveness of GI medications  - Encourage mobilization and activity  - Obtain nutritional consult as needed  - Establish a toileting routine/schedule  - Consider collaborating with pharmacy to review patient's medication profile  Outcome: Progressing     Problem: METABOLIC/FLUID AND ELECTROLYTES - ADULT  Goal: Hemodynamic stability and optimal renal function maintained  Description: INTERVENTIONS:  - Monitor labs and assess for signs and symptoms of volume excess or deficit  - Monitor intake, output and patient weight  - Monitor urine specific gravity, serum osmolarity and serum sodium as indicated or ordered  - Monitor response to interventions for patient's volume status, including labs, urine output, blood pressure (other measures as available)  - Encourage oral intake as appropriate  - Instruct patient on fluid and nutrition restrictions as appropriate  Outcome: Progressing  Goal: Glucose maintained within prescribed range  Description: INTERVENTIONS:  - Monitor Blood Glucose as ordered  - Assess for signs and symptoms of hyperglycemia and hypoglycemia  - Administer ordered medications to maintain glucose within target range  - Assess barriers to adequate nutritional intake and initiate nutrition consult as needed  - Instruct patient on self management of diabetes  Outcome: Progressing     Problem: HEMATOLOGIC - ADULT  Goal: Maintains hematologic stability  Description: INTERVENTIONS  - Assess for signs and symptoms of bleeding or hemorrhage  - Monitor labs and vital signs for trends  - Administer supportive blood products/factors, fluids and medications as ordered and appropriate  - Administer supportive blood products/factors as ordered and appropriate  Outcome: Progressing   Patient has been up ambulating in room gait steady. Patient completed colon prep stool has light yellow in color and watery. No c/o pain.family at bedside and questions  answered.

## 2025-02-12 NOTE — DISCHARGE SUMMARY
[facility] HOSPITALIST  DISCHARGE SUMMARY     Kyler Haji Patient Status:  Inpatient    1940 MRN [id number]   Location [facility] 4NW-A Attending Kevyn Barry,    Hosp Day # 5 PCP Abhi Arellano MD     Date of Admission: 2025  Date of Discharge: 2025  Discharge Disposition: Home or Self Care    Discharge Diagnosis:   #Acute on chronic anemia, likely from GI blood loss  #Iron deficiency anemia  #Large hiatal hernia  #Aortic stenosis s/p surgical bioprosthetic valve  #CKD 4  #Afib  #DM with A1c 7.5  #HLD  #HTN  #HFpEF  #Gout    History of Present Illness: Kyler Haji is a 84 year old male with PMHx PAF/ CAD (s/p CABG)/ AVR/ DM/ HTN/ HLD/ HFrEF (s/p AICD)/ MILES who presented to the hospital for weakness.  He was out grocery shopping when his legs felt shaky and he was worried he was going to fall. He denied any syncope or falls. He does note black stools over the weekend without abdominal pain but did not think much of it. He had normal BM since then. He reports good PO intake.     Brief Synopsis: Patient presented with dizziness. EKG with paced rhythm. Carotid dopplers showed stable findings from . He was found to have acute on chronic anemia with heme positive stool. He received 1 unit blood in the hospital and hgb then stabilized. He had EGD and colonoscopy done which was overall unremarkable but did show hiatal hernia. He will be on PPI BID on discharge. He received IV iron and will be on PO iron on discharge. He did have ILIR and was given IVF with improvement. Home diuretics were resumed and Cr stayed stable. He will resume coumadin on discharge and check INR outpatient. He was recommended to repeat iron studies in a month. He was discharged home in stable condition.     Lace+ Score: 75  59-90 High Risk  29-58 Medium Risk  0-28   Low Risk       TCM Follow-Up Recommendation:  LACE > 58: High Risk of readmission after discharge from the hospital.    Procedures during hospitalization:    EGD and Colonoscopy    Incidental or significant findings and recommendations (brief descriptions):  None    Lab/Test results pending at Discharge:   None    Consultants:  GI  Nephrology    Discharge Medication List:     Discharge Medications        START taking these medications        Instructions Prescription details   ferrous sulfate 325 (65 FE) MG Tbec      Take 1 tablet (325 mg total) by mouth 2 (two) times daily with meals.   Quantity: 60 tablet  Refills: 0     pantoprazole 40 MG Tbec  Commonly known as: Protonix      Take 1 tablet (40 mg total) by mouth 2 (two) times daily before meals.   Quantity: 60 tablet  Refills: 1            CHANGE how you take these medications        Instructions Prescription details   warfarin 4 MG Tabs  Commonly known as: Coumadin  What changed:   how much to take  how to take this  when to take this  additional instructions      Take as directed. If you are unsure how to take this medication, talk to your nurse or doctor.  Original instructions: TAKE 1 TABLET BY MOUTH DAILY OR AS DIRECTED BY ANTICOAGULATION CLINIC   Quantity: 90 tablet  Refills: 4            CONTINUE taking these medications        Instructions Prescription details   albuterol 108 (90 Base) MCG/ACT Aers  Commonly known as: Ventolin HFA      Inhale 2 puffs into the lungs every 6 (six) hours as needed.   Quantity: 1 each  Refills: 0     allopurinol 100 MG Tabs  Commonly known as: Zyloprim      Take 1 tablet (100 mg total) by mouth daily.   Quantity: 90 tablet  Refills: 3     amiodarone 200 MG Tabs  Commonly known as: Pacerone      Take 1 tablet (200 mg total) by mouth daily.   Quantity: 90 tablet  Refills: 3     aspirin 81 MG Chew      Chew by mouth daily.   Refills: 0     atorvastatin 40 MG Tabs  Commonly known as: Lipitor      Take 1 tablet (40 mg total) by mouth daily.   Quantity: 90 tablet  Refills: 3     furosemide 20 MG Tabs  Commonly known as: Lasix      TAKE 40MG ON EVEN DAYS, AND 20MG ON ODD DAYS    Quantity: 45 tablet  Refills: 2     furosemide 40 MG Tabs  Commonly known as: Lasix      Take 1 tablet (40 mg total) by mouth every other day.   Quantity: 45 tablet  Refills: 3     glipiZIDE 5 MG Tabs  Commonly known as: Glucotrol      Take 1 tablet (5 mg total) by mouth 2 (two) times daily before meals.   Quantity: 180 tablet  Refills: 1     losartan 25 MG Tabs  Commonly known as: Cozaar      Take 1 tablet (25 mg total) by mouth daily.   Quantity: 90 tablet  Refills: 1     metFORMIN 500 MG Tabs  Commonly known as: Glucophage      Take 1 tablet (500 mg total) by mouth 2 (two) times daily with meals.   Quantity: 180 tablet  Refills: 1     Metoprolol Succinate  MG Tb24  Commonly known as: TOPROL-XL      TAKE 1 TABLET(200 MG) BY MOUTH DAILY   Quantity: 90 tablet  Refills: 2     spironolactone 25 MG Tabs  Commonly known as: Aldactone      Take 0.5 tablets (12.5 mg total) by mouth daily.   Quantity: 90 tablet  Refills: 3               Where to Get Your Medications        These medications were sent to [id number] - [place], [place] - [facility] [address] AT [address], [phone], [phone] [address], [facility] [address]      Phone: [phone]   ferrous sulfate 325 (65 FE) MG Tbec  pantoprazole 40 MG Tbec         [place]MP reviewed[place] No controlled substances prescribed at discharge.     Follow-up appointment:   Abhi Arellano MD  [address] [place] [id number] [phone]    Follow up in 1 week(s)      Israel Martin MD  [address]  [facility] [address] [phone]    Follow up in 4 week(s)      Appointments for Next 30 Days 2/11/2025 - 3/13/2025      None            Vital signs:  Temp:  [97.4 °F (36.3 °C)-98.4 °F (36.9 °C)] 97.9 °F (36.6 °C)  Pulse:  [59-63] 59  Resp:  [12-20] 15  BP: (105-159)/(55-97) 148/67  SpO2:  [94 %-100 %] 97 %    Physical Exam:    See progress note dated same  day.  -----------------------------------------------------------------------------------------------  PATIENT DISCHARGE INSTRUCTIONS: See electronic chart    Kevyn Barry DO    Time spent:  34 minutes

## 2025-02-13 NOTE — PROGRESS NOTES
Per TCM message :        (Discharged [facility])        PCP Request :     Abhi Arellano MD  [address] [place] [id number] [phone]  Follow up in 1 week(s)  Friday 2/14 @1:30pm w/ Brittany HANKINS (existing appt)      Spoke with pt and his spouse Lelia who confirmed that their daughter called to schedule a HFU appt for pt.  No further assistance needed        Closing encounter

## 2025-02-14 ENCOUNTER — OFFICE VISIT (OUTPATIENT)
Dept: INTERNAL MEDICINE CLINIC | Facility: CLINIC | Age: 85
End: 2025-02-14
Payer: MEDICARE

## 2025-02-14 VITALS
BODY MASS INDEX: 28 KG/M2 | OXYGEN SATURATION: 100 % | RESPIRATION RATE: 16 BRPM | WEIGHT: 200 LBS | HEIGHT: 71 IN | DIASTOLIC BLOOD PRESSURE: 82 MMHG | HEART RATE: 62 BPM | TEMPERATURE: 97 F | SYSTOLIC BLOOD PRESSURE: 122 MMHG

## 2025-02-14 DIAGNOSIS — I48.0 PAROXYSMAL A-FIB (HCC): ICD-10-CM

## 2025-02-14 DIAGNOSIS — Z79.01 LONG TERM (CURRENT) USE OF ANTICOAGULANTS: ICD-10-CM

## 2025-02-14 DIAGNOSIS — N17.9 AKI (ACUTE KIDNEY INJURY): ICD-10-CM

## 2025-02-14 DIAGNOSIS — I15.2 HYPERTENSION ASSOCIATED WITH DIABETES (HCC): ICD-10-CM

## 2025-02-14 DIAGNOSIS — D62 ACUTE ON CHRONIC BLOOD LOSS ANEMIA: Primary | ICD-10-CM

## 2025-02-14 DIAGNOSIS — N18.4 CKD (CHRONIC KIDNEY DISEASE) STAGE 4, GFR 15-29 ML/MIN (HCC): ICD-10-CM

## 2025-02-14 DIAGNOSIS — R53.1 GENERALIZED WEAKNESS: ICD-10-CM

## 2025-02-14 DIAGNOSIS — E11.59 HYPERTENSION ASSOCIATED WITH DIABETES (HCC): ICD-10-CM

## 2025-02-14 PROBLEM — Z74.09 DECREASED STRENGTH, ENDURANCE, AND MOBILITY: Status: ACTIVE | Noted: 2025-02-14

## 2025-02-14 PROBLEM — D64.9 ANEMIA: Status: RESOLVED | Noted: 2025-02-07 | Resolved: 2025-02-14

## 2025-02-14 PROBLEM — Z74.09 DECREASED STRENGTH, ENDURANCE, AND MOBILITY: Status: RESOLVED | Noted: 2025-02-14 | Resolved: 2025-02-14

## 2025-02-14 PROBLEM — R68.89 DECREASED STRENGTH, ENDURANCE, AND MOBILITY: Status: ACTIVE | Noted: 2025-02-14

## 2025-02-14 PROBLEM — K92.2 GASTROINTESTINAL HEMORRHAGE, UNSPECIFIED GASTROINTESTINAL HEMORRHAGE TYPE: Status: RESOLVED | Noted: 2025-02-06 | Resolved: 2025-02-14

## 2025-02-14 PROBLEM — N28.9 ACUTE ON CHRONIC RENAL INSUFFICIENCY: Status: RESOLVED | Noted: 2025-02-06 | Resolved: 2025-02-14

## 2025-02-14 PROBLEM — D64.9 SYMPTOMATIC ANEMIA: Status: RESOLVED | Noted: 2025-02-06 | Resolved: 2025-02-14

## 2025-02-14 PROBLEM — R68.89 DECREASED STRENGTH, ENDURANCE, AND MOBILITY: Status: RESOLVED | Noted: 2025-02-14 | Resolved: 2025-02-14

## 2025-02-14 PROBLEM — N18.9 ACUTE ON CHRONIC RENAL INSUFFICIENCY: Status: RESOLVED | Noted: 2025-02-06 | Resolved: 2025-02-14

## 2025-02-14 PROCEDURE — 1170F FXNL STATUS ASSESSED: CPT | Performed by: NURSE PRACTITIONER

## 2025-02-14 PROCEDURE — 99496 TRANSJ CARE MGMT HIGH F2F 7D: CPT | Performed by: NURSE PRACTITIONER

## 2025-02-14 PROCEDURE — 1111F DSCHRG MED/CURRENT MED MERGE: CPT | Performed by: NURSE PRACTITIONER

## 2025-02-14 PROCEDURE — 1126F AMNT PAIN NOTED NONE PRSNT: CPT | Performed by: NURSE PRACTITIONER

## 2025-02-14 PROCEDURE — 1159F MED LIST DOCD IN RCRD: CPT | Performed by: NURSE PRACTITIONER

## 2025-02-14 NOTE — PROGRESS NOTES
Subjective:   Kyler Haji is a 84 year old male who presents for hospital follow up.   [facility]   Admit Date: 2/6/2025  Discharge Date: 2/11/2025  Hospital Discharge Diagnosis: acute on chronic anemia, iron deficiency anemia, large hiatal hernia, CKD, acute kidney injury, AFib, long term use of anticoagulation     Interactive contact within 2 business days post discharge first initiated on Date: 2/12/2025    During the visit, the following was completed:  Obtained and reviewed discharge summary, continuity of care documents, Hospitalist notes, and gastroenterology  Reviewed Labs (CBC, CMP), EKG, and Operative reports: GI    HPI: feeling better. No further weakness, fatigue. Taking medication as prescribed.     History/Other:   Current Medications:  Medication Reconciliation:  I am aware of an inpatient discharge within the last 30 days.  The discharge medication list has been reconciled with the patient's current medication list and reviewed by me. See medication list for additions of new medication, and changes to current doses of medications and discontinued medications.  Outpatient Medications Marked as Taking for the 2/14/25 encounter (Office Visit) with Brittany Ramires APRN   Medication Sig    pantoprazole 40 MG Oral Tab EC Take 1 tablet (40 mg total) by mouth 2 (two) times daily before meals.    ferrous sulfate 325 (65 FE) MG Oral Tab EC Take 1 tablet (325 mg total) by mouth 2 (two) times daily with meals.    aspirin 81 MG Oral Chew Tab Chew by mouth daily.    atorvastatin 40 MG Oral Tab Take 1 tablet (40 mg total) by mouth daily.    metFORMIN 500 MG Oral Tab Take 1 tablet (500 mg total) by mouth 2 (two) times daily with meals.    losartan 25 MG Oral Tab Take 1 tablet (25 mg total) by mouth daily.    glipiZIDE 5 MG Oral Tab Take 1 tablet (5 mg total) by mouth 2 (two) times daily before meals.    METOPROLOL SUCCINATE  MG Oral Tablet 24 Hr TAKE  1 TABLET(200 MG) BY MOUTH DAILY    furosemide 40 MG Oral Tab Take 1 tablet (40 mg total) by mouth every other day.    spironolactone 25 MG Oral Tab Take 0.5 tablets (12.5 mg total) by mouth daily.    furosemide 20 MG Oral Tab TAKE 40MG ON EVEN DAYS, AND 20MG ON ODD DAYS    allopurinol 100 MG Oral Tab Take 1 tablet (100 mg total) by mouth daily.    albuterol 108 (90 Base) MCG/ACT Inhalation Aero Soln Inhale 2 puffs into the lungs every 6 (six) hours as needed.    warfarin 4 MG Oral Tab TAKE 1 TABLET BY MOUTH DAILY OR AS DIRECTED BY ANTICOAGULATION CLINIC (Patient taking differently: Take 1 tablet (4 mg total) by mouth daily. Takes 2mg Mon, Wed, Fri, takes 4 mg every other day(Tues,thur, sat, sun))    amiodarone 200 MG Oral Tab Take 1 tablet (200 mg total) by mouth daily.       Review of Systems  GENERAL: feels well otherwise  SKIN: denies any unusual skin lesions, +brusing  EYES: denies blurred vision or double vision  HEENT: denies nasal congestion, sinus pain or ST  LUNGS: denies shortness of breath with exertion  CARDIOVASCULAR: denies chest pain on exertion  GI: denies abdominal pain, denies heartburn  : 1 per night nocturia, no complaint of urinary incontinence  MUSCULOSKELETAL: denies back pain  NEURO: denies headaches  PSYCHE: denies depression or anxiety  HEMATOLOGIC: hx of anemia  ENDOCRINE: denies thyroid history  ALL/ASTHMA: denies hx of allergy or asthma    Objective:   Physical Exam  General Appearance:  Alert, cooperative, no distress, appears stated age   Throat: Lips, mucosa, and tongue normal; teeth and gums normal   Lungs:   Clear to auscultation bilaterally, respirations unlabored   Heart:  Regular rate and irregular rhythm, S1, S2 normal, no murmur, rub or gallop   Abdomen:   Soft, non-tender, bowel sounds active all four quadrants,  no masses, no organomegaly   Skin: Skin color, texture, turgor normal, no rashes or lesions, bruising bilateral arms   Neurologic: Normal     /82   Pulse 62    Temp 97 °F (36.1 °C) (Temporal)   Resp 16   Ht 5' 11\" (1.803 m)   Wt 200 lb (90.7 kg)   SpO2 100%   BMI 27.89 kg/m²  Estimated body mass index is 27.89 kg/m² as calculated from the following:    Height as of this encounter: 5' 11\" (1.803 m).    Weight as of this encounter: 200 lb (90.7 kg).    Assessment & Plan:   1. Acute on chronic blood loss anemia (Primary)- stabilized, continue iron  2. Long term (current) use of anticoagulants- reminded to call coumadin clinic for INR check. Decline me placing an order  3. ILIR (acute kidney injury)- resolved, has appt with renal in 2 months  4. Hypertension associated with diabetes (HCC)- well controlled, CPM  5. Paroxysmal A-fib (HCC)- rate controlled, on AC  6. Generalized weakness- improved, continue to stay active and rest when needed  7. CKD (chronic kidney disease) stage 4, GFR 15-29 ml/min ([facility])- at baseline, follows with renal         Return in about 5 months (around 7/14/2025) for Follow-up.

## 2025-02-16 NOTE — PROGRESS NOTES
PHYSICIAN CLARIFICATION    Additional information related to patient's heart failure.    Chronic systolic heart failure with recovered EF confirmed     This note is part of the patient's medical record.

## 2025-02-16 NOTE — PROGRESS NOTES
PHYSICIAN CLARIFICATION    Additional information related to patient's acute on chronic anemia.    Acute blood loss anemia on chronic anemia     This note is part of the patient's medical record.

## 2025-03-02 RX ORDER — FERROUS SULFATE 325(65) MG
325 TABLET, DELAYED RELEASE (ENTERIC COATED) ORAL 2 TIMES DAILY WITH MEALS
Qty: 60 TABLET | Refills: 0 | Status: SHIPPED | OUTPATIENT
Start: 2025-03-02

## 2025-03-02 NOTE — TELEPHONE ENCOUNTER
Last time medication was refilled: 2/11/25  Next office visit due/scheduled: 7/14/25  Last office visit: 2/14/254  Last Labs: 1/20/25

## 2025-03-27 NOTE — PROGRESS NOTES
Kyler Haji is a 84 year old male.  HPI:   Pt presents today with c/o mid neck pain x few days, ongoing fatigue, and few episodes of shakiness.    Neck Pain   This is a new problem. The current episode started in the past 7 days. The problem occurs intermittently. The problem has been unchanged. The pain is associated with nothing. The pain is present in the midline. The quality of the pain is described as aching. The pain is mild. Nothing aggravates the symptoms. Pertinent negatives include no chest pain, fever, headaches, leg pain, numbness, pain with swallowing, paresis, photophobia, syncope, tingling, trouble swallowing, visual change, weakness or weight loss. He has tried acetaminophen for the symptoms. The treatment provided significant relief.      Pt has been experiencing fatigue since hospital discharge, states it is not worsening.  Pt had recent hospital admission due to acute on chronic anemia and black stools. Follows with GI. Recent EGD shows 5 cm hiatal hernia.  GI suspect his anemia is from hiatal hernia or possibly due to underlying CKD stage IV.  Colonoscopy did not explain the etiology of his ROVERTO. . Pt is taking twice a day Ferous sulfate with vitamin C. Pt states his stools have been black, but denies constipation/diarrhea.  Last Hgb last month was 9, ferritin 17  If no improvement with ROVERTO GI plans to refer to hematology.   Pt  denies SOB, weight gain, leg swelling.  Pt states he experienced shakiness few times the last few days. Was standing by the stove and felt shaky, had to sit down, after sitting for a while it resolved. Does not check blood sugars at home. Does not take Glipezide with food.   Current Outpatient Medications   Medication Sig Dispense Refill    ferrous sulfate 325 (65 FE) MG Oral Tab EC Take 1 tablet (325 mg total) by mouth 2 (two) times daily with meals. 60 tablet 0    pantoprazole 40 MG Oral Tab EC Take 1 tablet (40 mg total) by mouth 2 (two) times daily before meals.  180 tablet 3    Vitamin C 500 MG Oral Tab Take 1 tablet (500 mg total) by mouth 2 (two) times daily. With iron 30 tablet 3    aspirin 81 MG Oral Chew Tab Chew by mouth daily.      atorvastatin 40 MG Oral Tab Take 1 tablet (40 mg total) by mouth daily. 90 tablet 3    metFORMIN 500 MG Oral Tab Take 1 tablet (500 mg total) by mouth 2 (two) times daily with meals. 180 tablet 1    losartan 25 MG Oral Tab Take 1 tablet (25 mg total) by mouth daily. 90 tablet 1    glipiZIDE 5 MG Oral Tab Take 1 tablet (5 mg total) by mouth 2 (two) times daily before meals. 180 tablet 1    METOPROLOL SUCCINATE  MG Oral Tablet 24 Hr TAKE 1 TABLET(200 MG) BY MOUTH DAILY 90 tablet 2    furosemide 40 MG Oral Tab Take 1 tablet (40 mg total) by mouth every other day. 45 tablet 3    spironolactone 25 MG Oral Tab Take 0.5 tablets (12.5 mg total) by mouth daily. 90 tablet 3    furosemide 20 MG Oral Tab TAKE 40MG ON EVEN DAYS, AND 20MG ON ODD DAYS 45 tablet 2    allopurinol 100 MG Oral Tab Take 1 tablet (100 mg total) by mouth daily. 90 tablet 3    albuterol 108 (90 Base) MCG/ACT Inhalation Aero Soln Inhale 2 puffs into the lungs every 6 (six) hours as needed. 1 each 0    warfarin 4 MG Oral Tab TAKE 1 TABLET BY MOUTH DAILY OR AS DIRECTED BY ANTICOAGULATION CLINIC (Patient taking differently: Take 1 tablet (4 mg total) by mouth daily. Takes 2mg Mon, Wed, Fri, takes 4 mg every other day(Tues,thur, sat, sun)) 90 tablet 4    amiodarone 200 MG Oral Tab Take 1 tablet (200 mg total) by mouth daily. 90 tablet 3      Past Medical History:    Abdominal hernia    Actinic keratosis    Black stools    Chest pain, unspecified    Cough    Diabetes (HCC)    Diabetes mellitus (HCC)    Dysmetabolic syndrome X    Easy bruising    Essential hypertension    Essential hypertension, malignant    Flatulence/gas pain/belching    Hemorrhoids    High cholesterol    Inguinal hernia without mention of obstruction or gangrene, unilateral or unspecified, (not specified as  recurrent)    Myalgia and myositis, unspecified    Obesity, unspecified    Pacemaker    Special screening for malignant neoplasm of prostate    Type II or unspecified type diabetes mellitus without mention of complication, uncontrolled    Undiagnosed cardiac murmurs    Unspecified sleep apnea    AHI 21.7 SaO2 yashira 79.9 %     Wears glasses      Social History:  Social History     Socioeconomic History    Marital status:    Tobacco Use    Smoking status: Former     Current packs/day: 0.00     Types: Cigarettes     Start date: 1955     Quit date: 1975     Years since quittin.8     Passive exposure: Never    Smokeless tobacco: Never   Vaping Use    Vaping status: Never Used   Substance and Sexual Activity    Alcohol use: Yes     Alcohol/week: 1.0 standard drink of alcohol     Types: 1 Standard drinks or equivalent per week     Comment: 8-10 drinks per week    Drug use: Never   Other Topics Concern    Caffeine Concern Yes    Exercise No     Social Drivers of Health     Food Insecurity: No Food Insecurity (2025)    NCSS - Food Insecurity     Worried About Running Out of Food in the Last Year: No     Ran Out of Food in the Last Year: No   Transportation Needs: No Transportation Needs (2025)    NCSS - Transportation     Lack of Transportation: No   Housing Stability: Not At Risk (2025)    NCSS - Housing/Utilities     Has Housing: Yes     Worried About Losing Housing: No     Unable to Get Utilities: No        REVIEW OF SYSTEMS:   Review of Systems   Constitutional:  Positive for fatigue. Negative for activity change, appetite change, chills, diaphoresis, fever, unexpected weight change and weight loss.   HENT: Negative.  Negative for trouble swallowing.    Eyes:  Negative for photophobia.   Respiratory: Negative.     Cardiovascular:  Negative for chest pain and syncope.   Gastrointestinal:  Negative for abdominal distention, abdominal pain, anal bleeding, blood in stool, constipation,  diarrhea, nausea, rectal pain and vomiting.   Genitourinary: Negative.    Musculoskeletal:  Positive for neck pain. Negative for neck stiffness.   Skin: Negative.    Neurological: Negative.  Negative for dizziness, tingling, tremors, seizures, weakness, light-headedness, numbness and headaches.   Psychiatric/Behavioral: Negative.           EXAM:   /58   Pulse 62   Temp 97.5 °F (36.4 °C) (Temporal)   Resp 18   Ht 5' 11\" (1.803 m)   Wt 198 lb (89.8 kg)   SpO2 100%   BMI 27.62 kg/m²   Physical Exam  Vitals and nursing note reviewed.   Constitutional:       Appearance: Normal appearance. He is normal weight.   Cardiovascular:      Rate and Rhythm: Normal rate and regular rhythm.      Pulses: Normal pulses.      Heart sounds: Normal heart sounds.   Pulmonary:      Effort: Pulmonary effort is normal.      Breath sounds: Normal breath sounds.   Abdominal:      General: Abdomen is flat. Bowel sounds are normal. There is no distension.      Palpations: Abdomen is soft. There is no mass.      Tenderness: There is no abdominal tenderness. There is no guarding or rebound.      Hernia: No hernia is present.   Musculoskeletal:         General: No tenderness. Normal range of motion.      Cervical back: No swelling, edema, deformity, erythema, signs of trauma, lacerations, rigidity, spasms, torticollis, tenderness, bony tenderness or crepitus. No pain with movement. Normal range of motion.   Skin:     General: Skin is warm and dry.      Capillary Refill: Capillary refill takes less than 2 seconds.   Neurological:      General: No focal deficit present.      Mental Status: He is alert and oriented to person, place, and time. Mental status is at baseline.   Psychiatric:         Mood and Affect: Mood normal.         Behavior: Behavior normal.         Thought Content: Thought content normal.         Judgment: Judgment normal.          ASSESSMENT AND PLAN:   Diagnoses and all orders for this visit:    Other fatigue  Iron  deficiency anemia, unspecified iron deficiency anemia type  Chronic systolic heart failure (HCC)  CKD (chronic kidney disease) stage 4, GFR 15-29 ml/min (HCC)  Neck pain  Shakiness  Fatigue- discussed could be related due to iron deficiency anemia. Due to iron ferous stool is black, unable to tell if new GI bleed. Will check CBC for Hgb trend. CHF is stable- no weight gain, c/o leg swelling, shortness of breath.  Neck pain- due to tylenol helped recommend continue and showed neck stretches. If pain persist will contact office for possible steroid pack.  Shakiness- will check CMP. Discussed it could be due to hypoglycemia episodes. Advised pt that he needs to take DM medication with food.     Requested Prescriptions      No prescriptions requested or ordered in this encounter         The patient indicates understanding of these issues and agrees to the plan.  The patient is asked to return if symptoms persist or worsen.

## 2025-03-28 ENCOUNTER — LAB ENCOUNTER (OUTPATIENT)
Dept: LAB | Age: 85
End: 2025-03-28
Payer: MEDICARE

## 2025-03-28 ENCOUNTER — HOSPITAL ENCOUNTER (INPATIENT)
Facility: HOSPITAL | Age: 85
End: 2025-03-28
Attending: EMERGENCY MEDICINE | Admitting: HOSPITALIST
Payer: MEDICARE

## 2025-03-28 ENCOUNTER — HOSPITAL ENCOUNTER (INPATIENT)
Facility: HOSPITAL | Age: 85
LOS: 8 days | Discharge: HOME | End: 2025-04-05
Attending: EMERGENCY MEDICINE | Admitting: HOSPITALIST
Payer: MEDICARE

## 2025-03-28 ENCOUNTER — OFFICE VISIT (OUTPATIENT)
Dept: INTERNAL MEDICINE CLINIC | Facility: CLINIC | Age: 85
End: 2025-03-28
Payer: MEDICARE

## 2025-03-28 VITALS
DIASTOLIC BLOOD PRESSURE: 58 MMHG | OXYGEN SATURATION: 100 % | TEMPERATURE: 98 F | HEIGHT: 71 IN | BODY MASS INDEX: 27.72 KG/M2 | RESPIRATION RATE: 18 BRPM | WEIGHT: 198 LBS | SYSTOLIC BLOOD PRESSURE: 108 MMHG | HEART RATE: 62 BPM

## 2025-03-28 DIAGNOSIS — R53.83 OTHER FATIGUE: Primary | ICD-10-CM

## 2025-03-28 DIAGNOSIS — Z79.01 CURRENT USE OF LONG TERM ANTICOAGULATION: ICD-10-CM

## 2025-03-28 DIAGNOSIS — M54.2 NECK PAIN: ICD-10-CM

## 2025-03-28 DIAGNOSIS — K92.2 GASTROINTESTINAL HEMORRHAGE, UNSPECIFIED GASTROINTESTINAL HEMORRHAGE TYPE: ICD-10-CM

## 2025-03-28 DIAGNOSIS — N18.4 CKD (CHRONIC KIDNEY DISEASE) STAGE 4, GFR 15-29 ML/MIN (HCC): ICD-10-CM

## 2025-03-28 DIAGNOSIS — D50.9 IRON DEFICIENCY ANEMIA, UNSPECIFIED IRON DEFICIENCY ANEMIA TYPE: ICD-10-CM

## 2025-03-28 DIAGNOSIS — N18.9 CHRONIC KIDNEY DISEASE, UNSPECIFIED CKD STAGE: ICD-10-CM

## 2025-03-28 DIAGNOSIS — R25.1 SHAKINESS: ICD-10-CM

## 2025-03-28 DIAGNOSIS — D64.9 SYMPTOMATIC ANEMIA: Primary | ICD-10-CM

## 2025-03-28 DIAGNOSIS — R53.83 OTHER FATIGUE: ICD-10-CM

## 2025-03-28 DIAGNOSIS — I50.22 CHRONIC SYSTOLIC HEART FAILURE (HCC): ICD-10-CM

## 2025-03-28 LAB
ALBUMIN SERPL-MCNC: 4.2 G/DL (ref 3.2–4.8)
ALBUMIN SERPL-MCNC: 4.4 G/DL (ref 3.2–4.8)
ALBUMIN/GLOB SERPL: 1.9 {RATIO} (ref 1–2)
ALBUMIN/GLOB SERPL: 1.9 {RATIO} (ref 1–2)
ALP LIVER SERPL-CCNC: 63 U/L
ALP LIVER SERPL-CCNC: 73 U/L
ALT SERPL-CCNC: 31 U/L
ALT SERPL-CCNC: 34 U/L
ANION GAP SERPL CALC-SCNC: 13 MMOL/L (ref 0–18)
ANION GAP SERPL CALC-SCNC: 13 MMOL/L (ref 0–18)
ANTIBODY SCREEN: NEGATIVE
AST SERPL-CCNC: 31 U/L (ref ?–34)
AST SERPL-CCNC: 37 U/L (ref ?–34)
BASOPHILS # BLD AUTO: 0.03 X10(3) UL (ref 0–0.2)
BASOPHILS # BLD AUTO: 0.04 X10(3) UL (ref 0–0.2)
BASOPHILS NFR BLD AUTO: 0.4 %
BASOPHILS NFR BLD AUTO: 0.7 %
BILIRUB SERPL-MCNC: 0.3 MG/DL (ref 0.2–1.1)
BILIRUB SERPL-MCNC: 0.3 MG/DL (ref 0.2–1.1)
BUN BLD-MCNC: 71 MG/DL (ref 9–23)
BUN BLD-MCNC: 72 MG/DL (ref 9–23)
CALCIUM BLD-MCNC: 9.3 MG/DL (ref 8.7–10.6)
CALCIUM BLD-MCNC: 9.6 MG/DL (ref 8.7–10.6)
CHLORIDE SERPL-SCNC: 111 MMOL/L (ref 98–112)
CHLORIDE SERPL-SCNC: 111 MMOL/L (ref 98–112)
CO2 SERPL-SCNC: 18 MMOL/L (ref 21–32)
CO2 SERPL-SCNC: 18 MMOL/L (ref 21–32)
CREAT BLD-MCNC: 3.88 MG/DL
CREAT BLD-MCNC: 3.94 MG/DL
EGFRCR SERPLBLD CKD-EPI 2021: 14 ML/MIN/1.73M2 (ref 60–?)
EGFRCR SERPLBLD CKD-EPI 2021: 14 ML/MIN/1.73M2 (ref 60–?)
EOSINOPHIL # BLD AUTO: 0.08 X10(3) UL (ref 0–0.7)
EOSINOPHIL # BLD AUTO: 0.14 X10(3) UL (ref 0–0.7)
EOSINOPHIL NFR BLD AUTO: 1.4 %
EOSINOPHIL NFR BLD AUTO: 1.9 %
ERYTHROCYTE [DISTWIDTH] IN BLOOD BY AUTOMATED COUNT: 21.2 %
ERYTHROCYTE [DISTWIDTH] IN BLOOD BY AUTOMATED COUNT: 21.3 %
FASTING STATUS PATIENT QL REPORTED: NO
GLOBULIN PLAS-MCNC: 2.2 G/DL (ref 2–3.5)
GLOBULIN PLAS-MCNC: 2.3 G/DL (ref 2–3.5)
GLUCOSE BLD-MCNC: 222 MG/DL (ref 70–99)
GLUCOSE BLD-MCNC: 250 MG/DL (ref 70–99)
GLUCOSE BLD-MCNC: 282 MG/DL (ref 70–99)
HCT VFR BLD AUTO: 19.1 %
HCT VFR BLD AUTO: 20.7 %
HGB BLD-MCNC: 6.2 G/DL
HGB BLD-MCNC: 6.3 G/DL
IMM GRANULOCYTES # BLD AUTO: 0.02 X10(3) UL (ref 0–1)
IMM GRANULOCYTES # BLD AUTO: 0.03 X10(3) UL (ref 0–1)
IMM GRANULOCYTES NFR BLD: 0.3 %
IMM GRANULOCYTES NFR BLD: 0.4 %
INR BLD: 3.4 (ref 0.8–1.2)
LYMPHOCYTES # BLD AUTO: 0.83 X10(3) UL (ref 1–4)
LYMPHOCYTES # BLD AUTO: 1.08 X10(3) UL (ref 1–4)
LYMPHOCYTES NFR BLD AUTO: 14.3 %
LYMPHOCYTES NFR BLD AUTO: 15 %
MCH RBC QN AUTO: 30.1 PG (ref 26–34)
MCH RBC QN AUTO: 30.7 PG (ref 26–34)
MCHC RBC AUTO-ENTMCNC: 30.4 G/DL (ref 31–37)
MCHC RBC AUTO-ENTMCNC: 32.5 G/DL (ref 31–37)
MCV RBC AUTO: 94.6 FL
MCV RBC AUTO: 99 FL
MONOCYTES # BLD AUTO: 0.53 X10(3) UL (ref 0.1–1)
MONOCYTES # BLD AUTO: 0.63 X10(3) UL (ref 0.1–1)
MONOCYTES NFR BLD AUTO: 8.7 %
MONOCYTES NFR BLD AUTO: 9.1 %
NEUTROPHILS # BLD AUTO: 4.32 X10 (3) UL (ref 1.5–7.7)
NEUTROPHILS # BLD AUTO: 4.32 X10(3) UL (ref 1.5–7.7)
NEUTROPHILS # BLD AUTO: 5.31 X10 (3) UL (ref 1.5–7.7)
NEUTROPHILS # BLD AUTO: 5.31 X10(3) UL (ref 1.5–7.7)
NEUTROPHILS NFR BLD AUTO: 73.6 %
NEUTROPHILS NFR BLD AUTO: 74.2 %
OSMOLALITY SERPL CALC.SUM OF ELEC: 323 MOSM/KG (ref 275–295)
OSMOLALITY SERPL CALC.SUM OF ELEC: 325 MOSM/KG (ref 275–295)
PLATELET # BLD AUTO: 199 10(3)UL (ref 150–450)
PLATELET # BLD AUTO: 211 10(3)UL (ref 150–450)
POTASSIUM SERPL-SCNC: 5.1 MMOL/L (ref 3.5–5.1)
POTASSIUM SERPL-SCNC: 5.2 MMOL/L (ref 3.5–5.1)
PROT SERPL-MCNC: 6.4 G/DL (ref 5.7–8.2)
PROT SERPL-MCNC: 6.7 G/DL (ref 5.7–8.2)
PROTHROMBIN TIME: 34.6 SECONDS (ref 11.6–14.8)
RBC # BLD AUTO: 2.02 X10(6)UL
RBC # BLD AUTO: 2.09 X10(6)UL
RH BLOOD TYPE: NEGATIVE
SODIUM SERPL-SCNC: 142 MMOL/L (ref 136–145)
SODIUM SERPL-SCNC: 142 MMOL/L (ref 136–145)
WBC # BLD AUTO: 5.8 X10(3) UL (ref 4–11)
WBC # BLD AUTO: 7.2 X10(3) UL (ref 4–11)

## 2025-03-28 PROCEDURE — 99223 1ST HOSP IP/OBS HIGH 75: CPT | Performed by: HOSPITALIST

## 2025-03-28 PROCEDURE — 1170F FXNL STATUS ASSESSED: CPT

## 2025-03-28 PROCEDURE — 1160F RVW MEDS BY RX/DR IN RCRD: CPT

## 2025-03-28 PROCEDURE — 1159F MED LIST DOCD IN RCRD: CPT

## 2025-03-28 PROCEDURE — 99214 OFFICE O/P EST MOD 30 MIN: CPT

## 2025-03-28 PROCEDURE — 85025 COMPLETE CBC W/AUTO DIFF WBC: CPT

## 2025-03-28 PROCEDURE — 30233N1 TRANSFUSION OF NONAUTOLOGOUS RED BLOOD CELLS INTO PERIPHERAL VEIN, PERCUTANEOUS APPROACH: ICD-10-PCS | Performed by: EMERGENCY MEDICINE

## 2025-03-28 PROCEDURE — 36415 COLL VENOUS BLD VENIPUNCTURE: CPT

## 2025-03-28 PROCEDURE — G2211 COMPLEX E/M VISIT ADD ON: HCPCS

## 2025-03-28 PROCEDURE — 80053 COMPREHEN METABOLIC PANEL: CPT

## 2025-03-28 RX ORDER — ATORVASTATIN CALCIUM 40 MG/1
40 TABLET, FILM COATED ORAL NIGHTLY
Status: DISCONTINUED | OUTPATIENT
Start: 2025-03-28 | End: 2025-04-05

## 2025-03-28 RX ORDER — ONDANSETRON 2 MG/ML
4 INJECTION INTRAMUSCULAR; INTRAVENOUS EVERY 6 HOURS PRN
Status: DISCONTINUED | OUTPATIENT
Start: 2025-03-28 | End: 2025-03-28

## 2025-03-28 RX ORDER — ALBUTEROL SULFATE 90 UG/1
2 INHALANT RESPIRATORY (INHALATION) EVERY 6 HOURS PRN
Status: DISCONTINUED | OUTPATIENT
Start: 2025-03-28 | End: 2025-04-05

## 2025-03-28 RX ORDER — FEXOFENADINE HCL 180 MG/1
180 TABLET ORAL DAILY
COMMUNITY

## 2025-03-28 RX ORDER — METOPROLOL SUCCINATE 100 MG/1
200 TABLET, EXTENDED RELEASE ORAL DAILY
Status: DISCONTINUED | OUTPATIENT
Start: 2025-03-28 | End: 2025-04-05

## 2025-03-28 RX ORDER — DEXTROSE MONOHYDRATE 25 G/50ML
50 INJECTION, SOLUTION INTRAVENOUS
Status: DISCONTINUED | OUTPATIENT
Start: 2025-03-28 | End: 2025-04-05

## 2025-03-28 RX ORDER — ALLOPURINOL 100 MG/1
100 TABLET ORAL DAILY
Status: DISCONTINUED | OUTPATIENT
Start: 2025-03-28 | End: 2025-04-05

## 2025-03-28 RX ORDER — NICOTINE POLACRILEX 4 MG
15 LOZENGE BUCCAL
Status: DISCONTINUED | OUTPATIENT
Start: 2025-03-28 | End: 2025-04-05

## 2025-03-28 RX ORDER — ATORVASTATIN CALCIUM 40 MG/1
40 TABLET, FILM COATED ORAL DAILY
Status: DISCONTINUED | OUTPATIENT
Start: 2025-03-28 | End: 2025-03-28

## 2025-03-28 RX ORDER — ACETAMINOPHEN 500 MG
1000 TABLET ORAL EVERY 4 HOURS PRN
Status: DISCONTINUED | OUTPATIENT
Start: 2025-03-28 | End: 2025-04-05

## 2025-03-28 RX ORDER — NICOTINE POLACRILEX 4 MG
30 LOZENGE BUCCAL
Status: DISCONTINUED | OUTPATIENT
Start: 2025-03-28 | End: 2025-04-05

## 2025-03-28 RX ORDER — METOCLOPRAMIDE HYDROCHLORIDE 5 MG/ML
5 INJECTION INTRAMUSCULAR; INTRAVENOUS EVERY 8 HOURS PRN
Status: DISCONTINUED | OUTPATIENT
Start: 2025-03-28 | End: 2025-04-05

## 2025-03-28 RX ORDER — AMIODARONE HYDROCHLORIDE 200 MG/1
200 TABLET ORAL DAILY
Status: DISCONTINUED | OUTPATIENT
Start: 2025-03-28 | End: 2025-04-05

## 2025-03-28 RX ORDER — ACETAMINOPHEN 160 MG
2000 TABLET,DISINTEGRATING ORAL DAILY
COMMUNITY

## 2025-03-28 RX ORDER — ECHINACEA PURPUREA EXTRACT 125 MG
1 TABLET ORAL
Status: DISCONTINUED | OUTPATIENT
Start: 2025-03-28 | End: 2025-04-05

## 2025-03-28 NOTE — PATIENT INSTRUCTIONS
Take diabetes medication with food  Take Tylenol for neck pain. Could apply heat. Do neck stretches.  If pain persist contact office for possible steroid pack.

## 2025-03-29 LAB
ANION GAP SERPL CALC-SCNC: 12 MMOL/L (ref 0–18)
BASOPHILS # BLD AUTO: 0.02 X10(3) UL (ref 0–0.2)
BASOPHILS NFR BLD AUTO: 0.4 %
BUN BLD-MCNC: 60 MG/DL (ref 9–23)
CALCIUM BLD-MCNC: 9.5 MG/DL (ref 8.7–10.6)
CHLORIDE SERPL-SCNC: 114 MMOL/L (ref 98–112)
CO2 SERPL-SCNC: 19 MMOL/L (ref 21–32)
CREAT BLD-MCNC: 3.49 MG/DL
DEPRECATED HBV CORE AB SER IA-ACNC: 30 NG/ML
EGFRCR SERPLBLD CKD-EPI 2021: 16 ML/MIN/1.73M2 (ref 60–?)
EOSINOPHIL # BLD AUTO: 0.17 X10(3) UL (ref 0–0.7)
EOSINOPHIL NFR BLD AUTO: 3.3 %
ERYTHROCYTE [DISTWIDTH] IN BLOOD BY AUTOMATED COUNT: 20 %
GLUCOSE BLD-MCNC: 107 MG/DL (ref 70–99)
GLUCOSE BLD-MCNC: 119 MG/DL (ref 70–99)
GLUCOSE BLD-MCNC: 159 MG/DL (ref 70–99)
GLUCOSE BLD-MCNC: 164 MG/DL (ref 70–99)
GLUCOSE BLD-MCNC: 210 MG/DL (ref 70–99)
HCT VFR BLD AUTO: 21.5 %
HGB BLD-MCNC: 7 G/DL
HGB BLD-MCNC: 7.1 G/DL
HGB BLD-MCNC: 7.2 G/DL
HGB BLD-MCNC: 7.3 G/DL
IMM GRANULOCYTES # BLD AUTO: 0.02 X10(3) UL (ref 0–1)
IMM GRANULOCYTES NFR BLD: 0.4 %
INR BLD: 3.45 (ref 0.8–1.2)
LYMPHOCYTES # BLD AUTO: 0.93 X10(3) UL (ref 1–4)
LYMPHOCYTES NFR BLD AUTO: 18.1 %
MAGNESIUM SERPL-MCNC: 1.8 MG/DL (ref 1.6–2.6)
MCH RBC QN AUTO: 30.2 PG (ref 26–34)
MCHC RBC AUTO-ENTMCNC: 33 G/DL (ref 31–37)
MCV RBC AUTO: 91.5 FL
MONOCYTES # BLD AUTO: 0.49 X10(3) UL (ref 0.1–1)
MONOCYTES NFR BLD AUTO: 9.5 %
NEUTROPHILS # BLD AUTO: 3.51 X10 (3) UL (ref 1.5–7.7)
NEUTROPHILS # BLD AUTO: 3.51 X10(3) UL (ref 1.5–7.7)
NEUTROPHILS NFR BLD AUTO: 68.3 %
OSMOLALITY SERPL CALC.SUM OF ELEC: 317 MOSM/KG (ref 275–295)
PLATELET # BLD AUTO: 193 10(3)UL (ref 150–450)
POTASSIUM SERPL-SCNC: 4.5 MMOL/L (ref 3.5–5.1)
PROTHROMBIN TIME: 35 SECONDS (ref 11.6–14.8)
RBC # BLD AUTO: 2.35 X10(6)UL
SODIUM SERPL-SCNC: 145 MMOL/L (ref 136–145)
WBC # BLD AUTO: 5.1 X10(3) UL (ref 4–11)

## 2025-03-29 PROCEDURE — 99232 SBSQ HOSP IP/OBS MODERATE 35: CPT | Performed by: HOSPITALIST

## 2025-03-29 RX ORDER — MAGNESIUM OXIDE 400 MG/1
400 TABLET ORAL ONCE
Status: COMPLETED | OUTPATIENT
Start: 2025-03-29 | End: 2025-03-29

## 2025-03-29 NOTE — PLAN OF CARE
Pt. A&O x4. VSS. Afebrile. No c/o pain or dizziness. Pt. Able to ambulate to the bathroom with minimal staff assist. No BM on shift. Able to have regular diet per GI MD. XR of abdomen to happen Monday morning per XRAY. Hgb stable. Call light within reach.    Problem: HEMATOLOGIC - ADULT  Goal: Maintains hematologic stability  Description: INTERVENTIONS- Assess for signs and symptoms of bleeding or hemorrhage- Monitor labs and vital signs for trends- Administer supportive blood products/factors, fluids and medications as ordered and appropriate- Administer supportive blood products/factors as ordered and appropriate  Outcome: Progressing

## 2025-03-29 NOTE — PLAN OF CARE
NURSING ADMISSION NOTE      Patient admitted via cart.  Oriented to room.  Safety precautions initiated.  Bed in low position.  Call light in reach.    Admitted patient from ED due to Symptomatic Anemia. Patient reported dark stool, fatigue and SOB from home. Hgb 6.3 on admission, 1 PRBC started in ED. Patient alert and oriented x4. Afebrile. Room air at this time, no complaints of shortness of breath so far. O2 sats above 95%. No complaints of pain, no nausea and vomiting. No dark stool observed since arrival on the floor. NPO except sips with meds and Ice chips. Ambulatory. Fall precautions in place. Admission Navigators/ Med Rec completed with the  assistance of daughter Lela over the phone.     Received admitting orders from Dr. Reynolds. GI on consult.

## 2025-03-29 NOTE — H&P
[facility]     Kyler Haji Patient Status:  Emergency    1940 MRN [id number]   Location [facility] EMERGENCY DEPARTMENT Attending Qamar Pillai,    Hosp Day # 0 PCP Abhi Arellano MD     Chief Complaint:   Chief Complaint   Patient presents with    Abnormal Result       Subjective:    History of Present Illness:     Kyler Haji is a 84 year old male with a past medical history of HTN, HLD, CKD III, anemia on PO iron, DM2.  He was having featigue and lightheadedness.  He went to his PCP office and had blood work done. He has had dark bowel movements.  Blood work showed anemia so he was sent to the ED.      History/Other:    Past Medical History:  Past Medical History:    Abdominal hernia    Actinic keratosis    Black stools    Chest pain, unspecified    Cough    Diabetes (HCC)    Diabetes mellitus (HCC)    Dysmetabolic syndrome X    Easy bruising    Essential hypertension    Essential hypertension, malignant    Flatulence/gas pain/belching    Hemorrhoids    High cholesterol    Inguinal hernia without mention of obstruction or gangrene, unilateral or unspecified, (not specified as recurrent)    Myalgia and myositis, unspecified    Obesity, unspecified    Pacemaker    Special screening for malignant neoplasm of prostate    Type II or unspecified type diabetes mellitus without mention of complication, uncontrolled    Undiagnosed cardiac murmurs    Unspecified sleep apnea    AHI 21.7 SaO2 yashira 79.9 %     Wears glasses     Past Surgical History:   Past Surgical History:   Procedure Laterality Date    Cabg  2012    CABG x 3    Cath transcatheter aortic valve replacement  2012    Colonoscopy  2014    Procedure: COLONOSCOPY;  Surgeon: Ton Oliveira MD;  Location:  ENDOSCOPY    Colonoscopy N/A 2025    Procedure: COLONOSCOPY;  Surgeon: Israel Martin MD;  Location:  ENDOSCOPY    Hernia surgery      Valve repair        Family History:   Family  History   Problem Relation Age of Onset    Prostate Cancer Brother     Diabetes Father     Diabetes Mother      Social History:    reports that he quit smoking about 49 years ago. His smoking use included cigarettes. He started smoking about 69 years ago. He has never been exposed to tobacco smoke. He has never used smokeless tobacco. He reports current alcohol use of about 1.0 standard drink of alcohol per week. He reports that he does not use drugs.     Allergies: Allergies[1]    Medications:  Medications Ordered Prior to Encounter[2]    Review of Systems:   A comprehensive review of systems was completed.    Pertinent positives and negatives noted in the HPI.    Objective:   Physical Exam:    /54   Pulse 60   Temp 98.3 °F (36.8 °C) (Oral)   Resp 15   SpO2 100%   General: No acute distress, Alert  Respiratory: No rhonchi, no wheezes  Cardiovascular: S1, S2. +ALFONSO  Abdomen: Soft, Non-tender, Non-distended, Positive bowel sounds  Neuro: No new focal deficits  Extremities: No edema      Results:    Labs:      Labs Last 24 Hours:  Recent Labs   Lab 03/28/25  1445 03/28/25 2010   WBC 7.2 5.8   HGB 6.3* 6.2*   MCV 99.0 94.6   .0 199.0   INR  --  3.40*       Recent Labs   Lab 03/28/25  1445 03/28/25 2010   * 250*   BUN 72* 71*   CREATSERUM 3.94* 3.88*   CA 9.6 9.3   ALB 4.4 4.2    142   K 5.2* 5.1    111   CO2 18.0* 18.0*   ALKPHO 73 63   AST 37* 31   ALT 34 31   BILT 0.3 0.3   TP 6.7 6.4       Estimated Glomerular Filtration Rate: 14 mL/min/1.73m2 (A) (result from lab).    No results for input(s): \"TROP\", \"TROPHS\", \"CK\" in the last 168 hours.    Recent Labs   Lab 03/28/25 2010   PTP 34.6*   INR 3.40*       No results for input(s): \"TROP\", \"CK\" in the last 168 hours.      Imaging: Imaging data reviewed in Epic.    Assessment & Plan:      #Symptomatic anemia /presumptive GIB  Dark BM though has been on PO iron  Occult blood +ve  IV PPI BID  Transfuse and monitor hgb  No sig benefit of  BID over every day PO iron, likely can adjust on DC  Recent w/u unremarkable other than HH; may need repeat EGD +/- capsule    #Coumadin coagulopathy  Hold coumadin  No reversal indicated as vitals stable  Monitor INR    #Ao stenosis s/p bioprosthetic valve    #A. Fib  Cont. Rate control  Hold coumadin  Given recurrent bleeds should be evaluated for watchman  Sees [employer] cards    #HFrEF, EF: 40-45% s/p AICD  Compensated  Hold aldactone, lasix for now  Monitor volume closely    #HTN  Hold ARB, cont BB    #DM2  insulin    #CKD IV  Cr stable    #Metabolic Acidosis  Due to CKD    #CAD s/p CABG  #HTN  #HLD  #MILES    All diagnosis' and recommendations discussed with patient and/or family in detail.      Plan of care discussed with ED physician      Blaine Reynolds MD    Supplementary Documentation:     The 21st Century Cures Act makes medical notes like these available to patients in the interest of transparency. Please be advised this is a medical document. Medical documents are intended to carry relevant information, facts as evident, and the clinical opinion of the practitioner. The medical note is intended as peer to peer communication and may appear blunt or direct. It is written in medical language and may contain abbreviations or verbiage that are unfamiliar.                                         [1]   Allergies  Allergen Reactions    Ace Inhibitors Coughing   [2]   No current facility-administered medications on file prior to encounter.     Current Outpatient Medications on File Prior to Encounter   Medication Sig Dispense Refill    ferrous sulfate 325 (65 FE) MG Oral Tab EC Take 1 tablet (325 mg total) by mouth 2 (two) times daily with meals. 60 tablet 0    pantoprazole 40 MG Oral Tab EC Take 1 tablet (40 mg total) by mouth 2 (two) times daily before meals. 180 tablet 3    Vitamin C 500 MG Oral Tab Take 1 tablet (500 mg total) by mouth 2 (two) times daily. With iron 30 tablet 3    aspirin 81 MG Oral Chew Tab Chew  by mouth daily.      atorvastatin 40 MG Oral Tab Take 1 tablet (40 mg total) by mouth daily. 90 tablet 3    metFORMIN 500 MG Oral Tab Take 1 tablet (500 mg total) by mouth 2 (two) times daily with meals. 180 tablet 1    losartan 25 MG Oral Tab Take 1 tablet (25 mg total) by mouth daily. 90 tablet 1    glipiZIDE 5 MG Oral Tab Take 1 tablet (5 mg total) by mouth 2 (two) times daily before meals. 180 tablet 1    METOPROLOL SUCCINATE  MG Oral Tablet 24 Hr TAKE 1 TABLET(200 MG) BY MOUTH DAILY 90 tablet 2    furosemide 40 MG Oral Tab Take 1 tablet (40 mg total) by mouth every other day. 45 tablet 3    spironolactone 25 MG Oral Tab Take 0.5 tablets (12.5 mg total) by mouth daily. 90 tablet 3    furosemide 20 MG Oral Tab TAKE 40MG ON EVEN DAYS, AND 20MG ON ODD DAYS 45 tablet 2    allopurinol 100 MG Oral Tab Take 1 tablet (100 mg total) by mouth daily. 90 tablet 3    albuterol 108 (90 Base) MCG/ACT Inhalation Aero Soln Inhale 2 puffs into the lungs every 6 (six) hours as needed. 1 each 0    warfarin 4 MG Oral Tab TAKE 1 TABLET BY MOUTH DAILY OR AS DIRECTED BY ANTICOAGULATION CLINIC (Patient taking differently: Take 1 tablet (4 mg total) by mouth daily. Takes 2mg Mon, Wed, Fri, takes 4 mg every other day(Tues,thur, sat, sun)) 90 tablet 4    amiodarone 200 MG Oral Tab Take 1 tablet (200 mg total) by mouth daily. 90 tablet 3

## 2025-03-29 NOTE — LETTER
[address]  [address]  Authorization for Surgical Operation and Procedure     Date:___________                                                                                                         Time:__________  I hereby authorize Surgeon(s):  Sacha Simmons MD, my physician and his/her assistants (if applicable), which may include medical students, residents, and/or fellows, to perform the following surgical operation/ procedure and administer such anesthesia as may be determined necessary by my physician:  Operation/Procedure name (s) Procedure(s):  ESOPHAGOGASTRODUODENOSCOPY (EGD), COLONOSCOPY, CAPSULE ENDOSCOPY  COLONOSCOPY  CAPSULE ENDOSCOPY on Kyler Haji   2.   I recognize that during the surgical operation/procedure, unforeseen conditions may necessitate additional or different procedures than those listed above.  I, therefore, further authorize and request that the above-named surgeon, assistants, or designees perform such procedures as are, in their judgment, necessary and desirable.    3.   My surgeon/physician has discussed prior to my surgery the potential benefits, risks and side effects of this procedure; the likelihood of achieving goals; and potential problems that might occur during recuperation.  They also discussed reasonable alternatives to the procedure, including risks, benefits, and side effects related to the alternatives and risks related to not receiving this procedure.  I have had all my questions answered and I acknowledge that no guarantee has been made as to the result that may be obtained.    4.   Should the need arise during my operation/procedure, which includes change of level of care prior to discharge, I also consent to the administration of blood and/or blood products.  Further, I understand that despite careful testing and screening of blood or blood products by collecting agencies, I may still be subject to ill effects as a result  of receiving a blood transfusion and/or blood products.  The following are some, but not all, of the potential risks that can occur: fever and allergic reactions, hemolytic reactions, transmission of diseases such as Hepatitis, AIDS and Cytomegalovirus (CMV) and fluid overload.  In the event that I wish to have an autologous transfusion of my own blood, or a directed donor transfusion, I will discuss this with my physician.  Check only if Refusing Blood or Blood Products  I understand refusal of blood or blood products as deemed necessary by my physician may have serious consequences to my condition to include possible death. I hereby assume responsibility for my refusal and release the hospital, its personnel, and my physicians from any responsibility for the consequences of my refusal.          o  Refuse      5.   I authorize the use of any specimen, organs, tissues, body parts or foreign objects that may be removed from my body during the operation/procedure for diagnosis, research or teaching purposes and their subsequent disposal by hospital authorities.  I also authorize the release of specimen test results and/or written reports to my treating physician on the hospital medical staff or other referring or consulting physicians involved in my care, at the discretion of the Pathologist or my treating physician.    6.   I consent to the photographing or videotaping of the operations or procedures to be performed, including appropriate portions of my body for medical, scientific, or educational purposes, provided my identity is not revealed by the pictures or by descriptive texts accompanying them.  If the procedure has been photographed/videotaped, the surgeon will obtain the original picture, image, videotape or CD.  The hospital will not be responsible for storage, release or maintenance of the picture, image, tape or CD.    7.   I consent to the presence of a  or observers in the operating  room as deemed necessary by my physician or their designees.    8.   I recognize that in the event my procedure results in extended X-Ray/fluoroscopy time, I may develop a skin reaction.    9. If I have a Do Not Attempt Resuscitation (DNAR) order in place, that status will be suspended while in the operating room, procedural suite, and during the recovery period unless otherwise explicitly stated by me (or a person authorized to consent on my behalf). The surgeon or my attending physician will determine when the applicable recovery period ends for purposes of reinstating the DNAR order.  10. Patients having a sterilization procedure: I understand that if the procedure is successful the results will be permanent and it will therefore be impossible for me to inseminate, conceive, or bear children.  I also understand that the procedure is intended to result in sterility, although the result has not been guaranteed.   11. I acknowledge that my physician has explained sedation/analgesia administration to me including the risk and benefits I consent to the administration of sedation/analgesia as may be necessary or desirable in the judgment of my physician.    I CERTIFY THAT I HAVE READ AND FULLY UNDERSTAND THE ABOVE CONSENT TO OPERATION and/or OTHER PROCEDURE.    _________________________________________  __________________________________  Signature of Patient     Signature of Responsible Person         ___________________________________         Printed Name of Responsible Person           _________________________________                 Relationship to Patient  _________________________________________  ______________________________  Signature of Witness          Date  Time      Patient Name: Kyler Haji     : 1940                 Printed: 2025     Medical Record #: [id number]                     Page 2 of [address] [place] [address]   [id number]    Consent for Anesthesia    I, Kyler Haji agree to be cared for by an anesthesiologist, who is specially trained to monitor me and give me medicine to put me to sleep or keep me comfortable during my procedure    I understand that my anesthesiologist is not an employee or agent of [facility] or [employer]. He or she works for [email or web address].    As the patient asking for anesthesia services, I agree to:  Allow the anesthesiologist (anesthesia doctor) to give me medicine and do additional procedures as necessary. Some examples are: Starting or using an “IV” to give me medicine, fluids or blood during my procedure, and having a breathing tube placed to help me breathe when I’m asleep (intubation). In the event that my heart stops working properly, I understand that my anesthesiologist will make every effort to sustain my life, unless otherwise directed by [facility] Do Not Resuscitate documents.  Tell my anesthesia doctor before my procedure:  If I am pregnant.  The last time that I ate or drank.  All of the medicines I take (including prescriptions, herbal supplements, and pills I can buy without a prescription (including street drugs/illegal medications). Failure to inform my anesthesiologist about these medicines may increase my risk of anesthetic complications.  If I am allergic to anything or have had a reaction to anesthesia before.  I understand how the anesthesia medicine will help me (benefits).  I understand that with any type of anesthesia medicine there are risks:  The most common risks are: nausea, vomiting, sore throat, muscle soreness, damage to my eyes, mouth, or teeth (from breathing tube placement).  Rare risks include: remembering what happened during my procedure, allergic reactions to medications, injury to my airway, heart, lungs, vision, nerves, or muscles and in extremely rare instances death.  My doctor has explained to me other choices available  to me for my care (alternatives).  Pregnant Patients (“epidural”):  I understand that the risks of having an epidural (medicine given into my back to help control pain during labor), include itching, low blood pressure, difficulty urinating, headache or slowing of the baby’s heart. Very rare risks include infection, bleeding, seizure, irregular heart rhythms and nerve injury.  Regional Anesthesia (“spinal”, “epidural”, & “nerve blocks”):  I understand that rare but potential complications include headache, bleeding, infection, seizure, irregular heart rhythms, and nerve injury.    I can change my mind about having anesthesia services at any time before I get the medicine.    _____________________________________________________________________________  Patient (or Representative) Signature/Relationship to Patient  Date   Time    _____________________________________________________________________________   Name (if used)    Language/Organization   Time    _____________________________________________________________________________  Anesthesiologist Signature     Date   Time  I have discussed the procedure and information above with the patient (or patient’s representative) and answered their questions. The patient or their representative has agreed to have anesthesia services.    _____________________________________________________________________________  Witness        Date   Time  I have verified that the signature is that of the patient or patient’s representative, and that it was signed before the procedure  Patient Name: Kyler Haji     : 1940                 Printed: 2025     Medical Record #: [id number]                     Page 3 of 3

## 2025-03-29 NOTE — LETTER
[address]  [address]  Authorization for Surgical Operation and Procedure     Date:___________                                                                                                         Time:__________  I hereby authorize Surgeon(s):  Sacha Simmons MD, my physician and his/her assistants (if applicable), which may include medical students, residents, and/or fellows, to perform the following surgical operation/ procedure and administer such anesthesia as may be determined necessary by my physician:  Operation/Procedure name (s) Procedure(s):  ENTEROSCOPY on Kyler Haji   2.   I recognize that during the surgical operation/procedure, unforeseen conditions may necessitate additional or different procedures than those listed above.  I, therefore, further authorize and request that the above-named surgeon, assistants, or designees perform such procedures as are, in their judgment, necessary and desirable.    3.   My surgeon/physician has discussed prior to my surgery the potential benefits, risks and side effects of this procedure; the likelihood of achieving goals; and potential problems that might occur during recuperation.  They also discussed reasonable alternatives to the procedure, including risks, benefits, and side effects related to the alternatives and risks related to not receiving this procedure.  I have had all my questions answered and I acknowledge that no guarantee has been made as to the result that may be obtained.    4.   Should the need arise during my operation/procedure, which includes change of level of care prior to discharge, I also consent to the administration of blood and/or blood products.  Further, I understand that despite careful testing and screening of blood or blood products by collecting agencies, I may still be subject to ill effects as a result of receiving a blood transfusion and/or blood products.  The following are some, but  not all, of the potential risks that can occur: fever and allergic reactions, hemolytic reactions, transmission of diseases such as Hepatitis, AIDS and Cytomegalovirus (CMV) and fluid overload.  In the event that I wish to have an autologous transfusion of my own blood, or a directed donor transfusion, I will discuss this with my physician.  Check only if Refusing Blood or Blood Products  I understand refusal of blood or blood products as deemed necessary by my physician may have serious consequences to my condition to include possible death. I hereby assume responsibility for my refusal and release the hospital, its personnel, and my physicians from any responsibility for the consequences of my refusal.          o  Refuse      5.   I authorize the use of any specimen, organs, tissues, body parts or foreign objects that may be removed from my body during the operation/procedure for diagnosis, research or teaching purposes and their subsequent disposal by hospital authorities.  I also authorize the release of specimen test results and/or written reports to my treating physician on the hospital medical staff or other referring or consulting physicians involved in my care, at the discretion of the Pathologist or my treating physician.    6.   I consent to the photographing or videotaping of the operations or procedures to be performed, including appropriate portions of my body for medical, scientific, or educational purposes, provided my identity is not revealed by the pictures or by descriptive texts accompanying them.  If the procedure has been photographed/videotaped, the surgeon will obtain the original picture, image, videotape or CD.  The hospital will not be responsible for storage, release or maintenance of the picture, image, tape or CD.    7.   I consent to the presence of a  or observers in the operating room as deemed necessary by my physician or their designees.    8.   I recognize that  in the event my procedure results in extended X-Ray/fluoroscopy time, I may develop a skin reaction.    9. If I have a Do Not Attempt Resuscitation (DNAR) order in place, that status will be suspended while in the operating room, procedural suite, and during the recovery period unless otherwise explicitly stated by me (or a person authorized to consent on my behalf). The surgeon or my attending physician will determine when the applicable recovery period ends for purposes of reinstating the DNAR order.  10. Patients having a sterilization procedure: I understand that if the procedure is successful the results will be permanent and it will therefore be impossible for me to inseminate, conceive, or bear children.  I also understand that the procedure is intended to result in sterility, although the result has not been guaranteed.   11. I acknowledge that my physician has explained sedation/analgesia administration to me including the risk and benefits I consent to the administration of sedation/analgesia as may be necessary or desirable in the judgment of my physician.    I CERTIFY THAT I HAVE READ AND FULLY UNDERSTAND THE ABOVE CONSENT TO OPERATION and/or OTHER PROCEDURE.    _________________________________________  __________________________________  Signature of Patient     Signature of Responsible Person         ___________________________________         Printed Name of Responsible Person           _________________________________                 Relationship to Patient  _________________________________________  ______________________________  Signature of Witness          Date  Time      Patient Name: Kyler Haji     : 1940                 Printed: April 3, 2025     Medical Record #: [id number]                     Page [address] of [address] [place] [address]  [id number]    Consent for Anesthesia    I, Kyler Haji agree to be cared for by an  anesthesiologist, who is specially trained to monitor me and give me medicine to put me to sleep or keep me comfortable during my procedure    I understand that my anesthesiologist is not an employee or agent of [facility] or [employer]. He or she works for [employer].    As the patient asking for anesthesia services, I agree to:  Allow the anesthesiologist (anesthesia doctor) to give me medicine and do additional procedures as necessary. Some examples are: Starting or using an “IV” to give me medicine, fluids or blood during my procedure, and having a breathing tube placed to help me breathe when I’m asleep (intubation). In the event that my heart stops working properly, I understand that my anesthesiologist will make every effort to sustain my life, unless otherwise directed by [facility] Do Not Resuscitate documents.  Tell my anesthesia doctor before my procedure:  If I am pregnant.  The last time that I ate or drank.  All of the medicines I take (including prescriptions, herbal supplements, and pills I can buy without a prescription (including street drugs/illegal medications). Failure to inform my anesthesiologist about these medicines may increase my risk of anesthetic complications.  If I am allergic to anything or have had a reaction to anesthesia before.  I understand how the anesthesia medicine will help me (benefits).  I understand that with any type of anesthesia medicine there are risks:  The most common risks are: nausea, vomiting, sore throat, muscle soreness, damage to my eyes, mouth, or teeth (from breathing tube placement).  Rare risks include: remembering what happened during my procedure, allergic reactions to medications, injury to my airway, heart, lungs, vision, nerves, or muscles and in extremely rare instances death.  My doctor has explained to me other choices available to me for my care (alternatives).  Pregnant Patients (“epidural”):  I understand  that the risks of having an epidural (medicine given into my back to help control pain during labor), include itching, low blood pressure, difficulty urinating, headache or slowing of the baby’s heart. Very rare risks include infection, bleeding, seizure, irregular heart rhythms and nerve injury.  Regional Anesthesia (“spinal”, “epidural”, & “nerve blocks”):  I understand that rare but potential complications include headache, bleeding, infection, seizure, irregular heart rhythms, and nerve injury.    I can change my mind about having anesthesia services at any time before I get the medicine.    _____________________________________________________________________________  Patient (or Representative) Signature/Relationship to Patient  Date   Time    _____________________________________________________________________________   Name (if used)    Language/Organization   Time    _____________________________________________________________________________  Anesthesiologist Signature     Date   Time  I have discussed the procedure and information above with the patient (or patient’s representative) and answered their questions. The patient or their representative has agreed to have anesthesia services.    _____________________________________________________________________________  Witness        Date   Time  I have verified that the signature is that of the patient or patient’s representative, and that it was signed before the procedure  Patient Name: Kyler Haji     : 1940                 Printed: April 3, 2025     Medical Record #: [id number]                     Page 2 of 2

## 2025-03-29 NOTE — ED PROVIDER NOTES
Patient Seen in: [facility] Emergency Department      History     Chief Complaint   Patient presents with    Abnormal Result     Stated Complaint: low hemoglobin, here for blood transfusion    Subjective:   HPI      This is a 84-year-old male presents emergency room for evaluation of anemia.  Patient reports he been feeling very fatigued recently, was admitted 1 month ago for acute on chronic anemia and dark stools, had upper and lower GI performed which did reveal 5 cm hiatal hernia.  Does have underlying CKD.  Followed up with his primary care provider today was found to be anemic and sent to the ER for further evaluation.  Patient is on Coumadin.  Denies chest pain or shortness of breath.  Denies nausea or vomiting.  Denies urinary symptoms.    Objective:     Past Medical History:    Abdominal hernia    Actinic keratosis    Black stools    Chest pain, unspecified    Cough    Diabetes (HCC)    Diabetes mellitus (HCC)    Dysmetabolic syndrome X    Easy bruising    Essential hypertension    Essential hypertension, malignant    Flatulence/gas pain/belching    Hemorrhoids    High cholesterol    Inguinal hernia without mention of obstruction or gangrene, unilateral or unspecified, (not specified as recurrent)    Myalgia and myositis, unspecified    Obesity, unspecified    Pacemaker    Special screening for malignant neoplasm of prostate    Type II or unspecified type diabetes mellitus without mention of complication, uncontrolled    Undiagnosed cardiac murmurs    Unspecified sleep apnea    AHI 21.7 SaO2 yashira 79.9 %     Wears glasses              Past Surgical History:   Procedure Laterality Date    Cabg  2/2012    CABG x 3    Cath transcatheter aortic valve replacement  2/2012    Colonoscopy  6/27/2014    Procedure: COLONOSCOPY;  Surgeon: Ton Oliveira MD;  Location:  ENDOSCOPY    Colonoscopy N/A 2/11/2025    Procedure: COLONOSCOPY;  Surgeon: Israel Martin MD;  Location:  ENDOSCOPY    Hernia  surgery      Valve repair                  Social History     Socioeconomic History    Marital status:    Tobacco Use    Smoking status: Former     Current packs/day: 0.00     Types: Cigarettes     Start date: 1955     Quit date: 1975     Years since quittin.8     Passive exposure: Never    Smokeless tobacco: Never   Vaping Use    Vaping status: Never Used   Substance and Sexual Activity    Alcohol use: Yes     Alcohol/week: 1.0 standard drink of alcohol     Types: 1 Standard drinks or equivalent per week     Comment: 8-10 drinks per week    Drug use: Never   Other Topics Concern    Caffeine Concern Yes    Exercise No     Social Drivers of Health     Food Insecurity: No Food Insecurity (2025)    NCSS - Food Insecurity     Worried About Running Out of Food in the Last Year: No     Ran Out of Food in the Last Year: No   Transportation Needs: No Transportation Needs (2025)    NCSS - Transportation     Lack of Transportation: No   Housing Stability: Not At Risk (2025)    NCSS - Housing/Utilities     Has Housing: Yes     Worried About Losing Housing: No     Unable to Get Utilities: No                  Physical Exam     ED Triage Vitals [25]   /62   Pulse 60   Resp 18   Temp 98.3 °F (36.8 °C)   Temp src Oral   SpO2 100 %   O2 Device None (Room air)       Current Vitals:   Vital Signs  BP: 102/54  Pulse: 60  Resp: 15  Temp: 98.3 °F (36.8 °C)  Temp src: Oral  MAP (mmHg): 69    Oxygen Therapy  SpO2: 100 %  O2 Device: None (Room air)        Physical Exam  GENERAL: Patient is awake, alert  HEENT:  no scleral icterus.  Mucous membranes are moist  HEART: Regular rate and rhythm, no murmurs.  LUNGS: Clear to auscultation bilaterally.  No Rales, no rhonchi, no wheezing, no stridor.  ABDOMEN: Soft, nondistended,non tender  Rectal exam: Guaiac positive black stool  EXTREMITIES: No peripheral edema, no calf tenderness,    ED Course     Labs Reviewed   COMP METABOLIC PANEL (14) -  Abnormal; Notable for the following components:       Result Value    Glucose 250 (*)     CO2 18.0 (*)     BUN 71 (*)     Creatinine 3.88 (*)     Calculated Osmolality 323 (*)     eGFR-Cr 14 (*)     All other components within normal limits   CBC WITH DIFFERENTIAL WITH PLATELET - Abnormal; Notable for the following components:    RBC 2.02 (*)     HGB 6.2 (*)     HCT 19.1 (*)     Lymphocyte Absolute 0.83 (*)     All other components within normal limits   PROTHROMBIN TIME (PT) - Abnormal; Notable for the following components:    PT 34.6 (*)     INR 3.40 (*)     All other components within normal limits   TYPE AND SCREEN    Narrative:     The following orders were created for panel order Type and screen.  Procedure                               Abnormality         Status                     ---------                               -----------         ------                     ABORH (Blood Type)[[id number]]                               Final result               Antibody Screen[[id number]]                                  Final result                 Please view results for these tests on the individual orders.   PREPARE RBC   ABORH (BLOOD TYPE)   ANTIBODY SCREEN   PREPARE RBC     EKG    Rate, intervals and axes as noted on EKG Report.  Rate: 60  Rhythm: Paced rhythm  Reading: AV dual paced rhythm            A total of 35 minutes of critical care time (exclusive of billable procedures) was administered to manage the patient's critical lab values due to his critical anemia requiring transfusion of packed red blood cells.  This involved direct patient intervention, complex decision making, and/or extensive discussions with the patient, family, and clinical staff.             MDM        Differential diagnosis before testing includes but not limited to GI bleed, electrolyte abnormality, acute kidney injury, coagulopathy, which is a medical condition that poses a threat to life/function    Past Medical  History/comorbidities-as noted in HPI      Discussion of management (consult/physicians, social work, pharmacy,ect) GI Dr. Martin, [facility] Dr. Reynolds      External chart review included EGD and colonoscopy from 12/11/2025 reviewed, colonoscopy unremarkable EGD with hiatal hernia    Medications Provided: IV Protonix    Course of Events during Emergency Room Visit include IV established, blood work obtained.  CBC white count 5.8 hemoglobin critically low at 6.2 platelet 1 99.  INR 3.4.  Chemistry sodium 142 potassium 5.1 bicarb 18 BUN 71 creatinine 3.8 glucose 250.  Patient was given IV Protonix, transfusion of packed red blood cells performed in the ER.  Patient is been discussed with GI as well as hospitalist, discussed all results with the patient and family, will admit for further evaluation treatment    Shared decision making was utilized           Disposition:    Admission  I have discussed with the patient the results of test, differential diagnosis, and treatment plan. They expressed clear understanding of these instructions and agrees to the plan provided.         Note to patient: The 21st Century Cures Act makes medical notes like these available to patients in the interest of transparency. However, this is a medical document intended as peer to peer communication. It is written in medical language and may contain abbreviations or verbiage that are unfamiliar. It may appear blunt or direct. Medical documents are intended to carry relevant information, facts as evident, and the clinical opinion of the practitioner.             Admission disposition: 3/28/2025  9:04 PM           Medical Decision Making      Disposition and Plan     Clinical Impression:  1. Symptomatic anemia    2. Gastrointestinal hemorrhage, unspecified gastrointestinal hemorrhage type    3. Chronic kidney disease, unspecified CKD stage    4. Current use of long term anticoagulation         Disposition:  Admit  3/28/2025  9:04  pm    Follow-up:  No follow-up provider specified.        Medications Prescribed:  Current Discharge Medication List              Supplementary Documentation:         Hospital Problems       Present on Admission  Date Reviewed: 3/28/2025            ICD-10-CM Noted POA    * (Principal) Symptomatic anemia D64.9 3/28/2025 Unknown

## 2025-03-29 NOTE — ED INITIAL ASSESSMENT (HPI)
Patient here for low hgb of 6.3. States he's been having dark stools for the last month. On a blood thinner. Endorses shortness of breath.

## 2025-03-29 NOTE — PROGRESS NOTES
[facility]   part of [facility]     Hospitalist Progress Note     Kyler Haji Patient Status:  Inpatient    1940 MRN [id number]   [facility] 4NW-A Attending Blaine Reynolds MD   Hosp Day # 1 PCP Abhi Arellano MD     Chief Complaint: dark stools    Subjective:     Patient feels a little better today.  Denies any dark stools overnight.  Still weak but better.  No lightheadedness.  No new complaints.      Objective:    Review of Systems:   A comprehensive review of systems was completed; pertinent positive and negatives stated in subjective.    Vital signs:  Temp:  [97.5 °F (36.4 °C)-98.6 °F (37 °C)] 97.5 °F (36.4 °C)  Pulse:  [59-62] 60  Resp:  [15-19] 16  BP: (102-132)/(54-67) 127/63  SpO2:  [96 %-100 %] 97 %    Physical Exam:    General: No acute distress  Respiratory: No wheezes, no rhonchi  Cardiovascular: S1, S2, regular rate and rhythm  Abdomen: Soft, Non-tender, non-distended, positive bowel sounds  Neuro: No new focal deficits.   Extremities: No edema    Diagnostic Data:    Labs:  Recent Labs   Lab 25  1445 25  0115 25  0551 25  0926   WBC 7.2 5.8  --  5.1  --    HGB 6.3* 6.2* 7.0* 7.1* 7.2*   MCV 99.0 94.6  --  91.5  --    .0 199.0  --  193.0  --    INR  --  3.40*  --  3.45*  --        Recent Labs   Lab 25  1445 25  0551   * 250* 107*   BUN 72* 71* 60*   CREATSERUM 3.94* 3.88* 3.49*   CA 9.6 9.3 9.5   ALB 4.4 4.2  --     142 145   K 5.2* 5.1 4.5    111 114*   CO2 18.0* 18.0* 19.0*   ALKPHO 73 63  --    AST 37* 31  --    ALT 34 31  --    BILT 0.3 0.3  --    TP 6.7 6.4  --        Estimated Glomerular Filtration Rate: 16 mL/min/1.73m2 (A) (result from lab).    No results for input(s): \"TROP\", \"TROPHS\", \"CK\" in the last 168 hours.    Recent Labs   Lab 25  0551   PTP 34.6* 35.0*   INR 3.40* 3.45*                  Microbiology    No results found for this visit on  03/28/25.      Imaging: Reviewed in Epic.    Medications:    amiodarone  200 mg Oral Daily    allopurinol  100 mg Oral Daily    Metoprolol Succinate ER  200 mg Oral Daily    insulin aspart  1-68 Units Subcutaneous TID CC    insulin aspart  1-10 Units Subcutaneous TID AC and HS    pantoprazole  40 mg Intravenous Q12H    atorvastatin  40 mg Oral Nightly       Assessment & Plan:      #Symptomatic anemia /presumptive GIB  Dark BM though has been on PO iron  Occult blood +ve  IV PPI BID  Hg 6.2 -> 7.1 post 1unit PRBC  GI consult     #Coumadin coagulopathy  Hold coumadin  No reversal indicated as vitals stable  Monitor INR     #Ao stenosis s/p bioprosthetic valve     #A. Fib  Cont. Rate control  Hold coumadin  Given recurrent bleeds should be evaluated for watchman  Sees [facility] cards     #HFrEF, EF: 40-45% s/p AICD  Compensated  Hold aldactone, lasix for now  Monitor volume closely     #HTN  Hold ARB, cont BB     #DM2  insulin     #CKD IV  Cr stable     #Metabolic Acidosis  Due to CKD     #CAD s/p CABG  #HTN  #HLD  #MILES         Maikel Roper MD    Supplementary Documentation:     Quality:  DVT Mechanical Prophylaxis:   SCDs,    DVT Pharmacologic Prophylaxis   Medication   None                Code Status: Full Code  Pike: No urinary catheter in place  Pike Duration (in days):   Central line:    TAMIKA:     Discharge is dependent on: GI eval   At this point Mr. Haji is expected to be discharge to: Home    The 21st Century Cures Act makes medical notes like these available to patients in the interest of transparency. Please be advised this is a medical document. Medical documents are intended to carry relevant information, facts as evident, and the clinical opinion of the practitioner. The medical note is intended as peer to peer communication and may appear blunt or direct. It is written in medical language and may contain abbreviations or verbiage that are unfamiliar.              **Certification      PHYSICIAN Certification  of Need for Inpatient Hospitalization - Initial Certification    Patient will require inpatient services that will reasonably be expected to span two midnight's based on the clinical documentation in H+P.   Based on patients current state of illness, I anticipate that, after discharge, patient will require TBD.

## 2025-03-30 LAB
ANION GAP SERPL CALC-SCNC: 11 MMOL/L (ref 0–18)
ATRIAL RATE: 60 BPM
BLOOD TYPE BARCODE: 9500
BUN BLD-MCNC: 50 MG/DL (ref 9–23)
CALCIUM BLD-MCNC: 9.6 MG/DL (ref 8.7–10.6)
CHLORIDE SERPL-SCNC: 111 MMOL/L (ref 98–112)
CO2 SERPL-SCNC: 21 MMOL/L (ref 21–32)
CREAT BLD-MCNC: 2.93 MG/DL
EGFRCR SERPLBLD CKD-EPI 2021: 20 ML/MIN/1.73M2 (ref 60–?)
GLUCOSE BLD-MCNC: 163 MG/DL (ref 70–99)
GLUCOSE BLD-MCNC: 168 MG/DL (ref 70–99)
GLUCOSE BLD-MCNC: 174 MG/DL (ref 70–99)
GLUCOSE BLD-MCNC: 239 MG/DL (ref 70–99)
GLUCOSE BLD-MCNC: 251 MG/DL (ref 70–99)
HGB BLD-MCNC: 7.4 G/DL
HGB BLD-MCNC: 7.6 G/DL
HGB BLD-MCNC: 7.8 G/DL
INR BLD: 2.63 (ref 0.8–1.2)
MAGNESIUM SERPL-MCNC: 1.9 MG/DL (ref 1.6–2.6)
OSMOLALITY SERPL CALC.SUM OF ELEC: 314 MOSM/KG (ref 275–295)
P AXIS: 77 DEGREES
P-R INTERVAL: 176 MS
POTASSIUM SERPL-SCNC: 5 MMOL/L (ref 3.5–5.1)
PROTHROMBIN TIME: 28.3 SECONDS (ref 11.6–14.8)
Q-T INTERVAL: 508 MS
QRS DURATION: 138 MS
QTC CALCULATION (BEZET): 508 MS
R AXIS: 262 DEGREES
SODIUM SERPL-SCNC: 143 MMOL/L (ref 136–145)
T AXIS: 89 DEGREES
UNIT VOLUME: 350 ML
VENTRICULAR RATE: 60 BPM

## 2025-03-30 PROCEDURE — 99232 SBSQ HOSP IP/OBS MODERATE 35: CPT | Performed by: HOSPITALIST

## 2025-03-30 NOTE — PLAN OF CARE
Patient alert and oriented x4. Afebrile. Room air. No SOB. No BM noted so far. Hgb stable. Feels better. No complaints of pain. Patient now on Low fiber/Soft diet, tolerating well. Denies nausea and vomiting. Blood glucose monitored. Up to the bathroom. Fall precautions in place. Call light within reach. Needs attended.   POC: Abdominal X-ray on Monday per GI.     Problem: HEMATOLOGIC - ADULT  Goal: Maintains hematologic stability  Description: INTERVENTIONS- Assess for signs and symptoms of bleeding or hemorrhage- Monitor labs and vital signs for trends- Administer supportive blood products/factors, fluids and medications as ordered and appropriate- Administer supportive blood products/factors as ordered and appropriate  Outcome: Progressing     Problem: GASTROINTESTINAL - ADULT  Goal: Maintains or returns to baseline bowel function  Description: INTERVENTIONS:- Assess bowel function- Maintain adequate hydration with IV or PO as ordered and tolerated- Evaluate effectiveness of GI medications- Encourage mobilization and activity- Obtain nutritional consult as needed- Establish a toileting routine/schedule- Consider collaborating with pharmacy to review patient's medication profile  Outcome: Progressing

## 2025-03-30 NOTE — CONSULTS
[facility]  Report of GI Consultation    Kyler Haji Patient Status:  Inpatient    1940 MRN [id number]   Location [facility] 4NW-A Attending Blaine Reynolds MD   Hosp Day # 1 PCP Abhi Arellano MD     Date of Admission:  3/28/2025  Date of Consult:  3/29/2025  PCP: Abhi Arellano MD    Reason for Consultation:   anemia    History of Present Illness:   Patient is a 84 year old male who was admitted to the hospital for Symptomatic anemia.  Upon admission, has recurrent anemia, similar to prior admission 2025.    He reports formed black stools since starting iron.  He has no hematochezia.  His INR on labs through [facility] shows INR > 4 last week.  He has no vomiting.  Appetite is good. He has no nausea or vomiting.  He has been compliant with twice daily iron supplements.  He was not able to get CT enterography due to CKD IV.           Gastrointestinal History      No family history of GI malignancy.     2014 Screening colonoscopy with Dr. Oliveira  -Four colon polyps (1-5 mm) and internal hemorrhoids. Recall 3 yrs      2018 Colonoscopy with Dr Oliveira  -1 HP <10 mm. One 1 cm TA  s/p EMR. Submucosal adipose tissue favoring possible submucosal lipoma. One TA < 10 mm. Repeat colonoscopy in 3 years.     2025 - ED Consult by Dr. Maloney  + Acute on chronic anemia, Occult stool  - presented to the ER yesterday with weakness in the setting of black stools found to have Hgb 7.9 (MCV 90; iron 32, sat 7, ferritin 17) from Hgb 8 last month however steadily declining from 9.9 in 10/2024 and 11.5 in 2024  - Pt reports around 2 days of black soft stools twice daily last week with stools returning to a normal brown color  REC: EGD and colonoscopy, Protonix 40 mg IV twice daily, Please hold Coumadin if the risk remains acceptable, repeat INR this afternoon and then daily, continue to transfuse PRBC as needed to maintain Hgb>7, monitor for overt GI bleeding, OK to continue solid food diet at this  time     02/11/2025 - EGD/ Colonoscopy by Dr. Martin  EGD:  5 cm hiatal hernia.This could explain ROVERTO. Rec: Oral iron therapy as outpatient; if intolerant, then plan for IV iron therapy (patient has never been on oral iron therapy). CTE as outpatient. PPI BID-ac.  Colonoscopy: Terminal ileum briefly intubated, was grossly normal.  Colon is tortuous and redundant.  There is residual stool throughout the right colon, lavaged to the best of  ability. Otherwise normal. Rec: No indication for repeat colonoscopy.             Past Medical History  Past Medical History:    Abdominal hernia    Actinic keratosis    Black stools    Chest pain, unspecified    Cough    Diabetes (HCC)    Diabetes mellitus (HCC)    Dysmetabolic syndrome X    Easy bruising    Essential hypertension    Essential hypertension, malignant    Flatulence/gas pain/belching    Hemorrhoids    High cholesterol    Inguinal hernia without mention of obstruction or gangrene, unilateral or unspecified, (not specified as recurrent)    Myalgia and myositis, unspecified    Obesity, unspecified    Pacemaker    Special screening for malignant neoplasm of prostate    Type II or unspecified type diabetes mellitus without mention of complication, uncontrolled    Undiagnosed cardiac murmurs    Unspecified sleep apnea    AHI 21.7 SaO2 yashira 79.9 %     Wears glasses       Past Surgical History  Past Surgical History:   Procedure Laterality Date    Cabg  2/2012    CABG x 3    Cath transcatheter aortic valve replacement  2/2012    Colonoscopy  6/27/2014    Procedure: COLONOSCOPY;  Surgeon: Ton Oliveira MD;  Location:  ENDOSCOPY    Colonoscopy N/A 2/11/2025    Procedure: COLONOSCOPY;  Surgeon: Israel Martin MD;  Location:  ENDOSCOPY    Hernia surgery      Valve repair         Family History  Family History   Problem Relation Age of Onset    Prostate Cancer Brother     Diabetes Father     Diabetes Mother        Social History  Pediatric History   Patient Parents     Not on file     Other Topics Concern     Service Not Asked    Blood Transfusions Not Asked    Caffeine Concern Yes    Occupational Exposure Not Asked    Hobby Hazards Not Asked    Sleep Concern Not Asked    Stress Concern Not Asked    Weight Concern Not Asked    Special Diet Not Asked    Back Care Not Asked    Exercise No    Bike Helmet Not Asked    Seat Belt Not Asked    Self-Exams Not Asked   Social History Narrative    Not on file           Current Medications:  Current Facility-Administered Medications   Medication Dose Route Frequency    sodium ferric gluconate (Ferrlecit) 125 mg in sodium chloride 0.9% 100mL IVPB premix  125 mg Intravenous Daily    amiodarone (Pacerone) tab 200 mg  200 mg Oral Daily    allopurinol (Zyloprim) tab 100 mg  100 mg Oral Daily    albuterol (Ventolin HFA) 108 (90 Base) MCG/ACT inhaler 2 puff  2 puff Inhalation Q6H PRN    metoprolol succinate ER (Toprol XL) 24 hr tab 200 mg  200 mg Oral Daily    acetaminophen (Tylenol Extra Strength) tab 1,000 mg  1,000 mg Oral Q4H PRN    melatonin tab 3 mg  3 mg Oral Nightly PRN    glycerin-hypromellose- (Artificial Tears) 0.2-0.2-1 % ophthalmic solution 1 drop  1 drop Both Eyes QID PRN    sodium chloride (Saline Mist) 0.65 % nasal solution 1 spray  1 spray Each Nare Q3H PRN    metoclopramide (Reglan) 5 mg/mL injection 5 mg  5 mg Intravenous Q8H PRN    insulin aspart (NovoLOG) 100 Units/mL FlexPen 1-68 Units  1-68 Units Subcutaneous TID CC    glucose (Dex4) 15 GM/59ML oral liquid 15 g  15 g Oral Q15 Min PRN    Or    glucose (Glutose) 40% oral gel 15 g  15 g Oral Q15 Min PRN    Or    glucose-vitamin C (Dex-4) chewable tab 4 tablet  4 tablet Oral Q15 Min PRN    Or    dextrose 50% injection 50 mL  50 mL Intravenous Q15 Min PRN    Or    glucose (Dex4) 15 GM/59ML oral liquid 30 g  30 g Oral Q15 Min PRN    Or    glucose (Glutose) 40% oral gel 30 g  30 g Oral Q15 Min PRN    Or    glucose-vitamin C (Dex-4) chewable tab 8 tablet  8 tablet  Oral Q15 Min PRN    insulin aspart (NovoLOG) 100 Units/mL FlexPen 1-10 Units  1-10 Units Subcutaneous TID AC and HS    pantoprazole (Protonix) 40 mg in sodium chloride 0.9% PF 10 mL IV push  40 mg Intravenous Q12H    atorvastatin (Lipitor) tab 40 mg  40 mg Oral Nightly       Allergies  Allergies[1]    Review of Systems:   A comprehensive 10 point review of systems was completed.  Pertinent positives and negatives noted in the the HPI.     Physical Exam:   Blood pressure 120/77, pulse 60, temperature 98.7 °F (37.1 °C), temperature source Oral, resp. rate 16, weight 198 lb (89.8 kg), SpO2 100%.    GENERAL: NAD, oriented x 3, pleasant  HENT: Normocephalic, no temporal wasting.  Normal oral mucosa with good dentition, no ulcers.  EYES: No scleral icterus  PULM: Lungs clear to auscultation anteriorly  CV: Regular, no murmurs, 2+ radial pulses  ABD: Soft / non-tender / non distended  EXT: No edema, normal muscle mass  SKIN: No rash, no jaundice, no spider angiomas    Results:     Laboratory Data:  Lab Results   Component Value Date    WBC 5.1 03/29/2025    HGB 7.3 (L) 03/29/2025    .0 03/29/2025    CREATSERUM 3.49 (H) 03/29/2025    BUN 60 (H) 03/29/2025     03/29/2025    K 4.5 03/29/2025    CO2 19.0 (L) 03/29/2025    CA 9.5 03/29/2025    ALB 4.2 03/28/2025    ALKPHO 63 03/28/2025    TP 6.4 03/28/2025    AST 31 03/28/2025    ALT 31 03/28/2025    BILT 0.3 03/28/2025    PTT 37.2 (H) 02/06/2025    INR 3.45 (H) 03/29/2025    PTP 35.0 (H) 03/29/2025    TSH 2.110 01/20/2025     (H) 02/15/2012    MG 1.8 03/29/2025    PHOS 3.9 10/28/2024    TROP <0.046 04/16/2018    POCGLU 148 (H) 06/27/2014           No results for input(s): \"URINE\", \"CULTI\", \"BLDSMR\" in the last 168 hours.    Recent Labs   Lab 03/28/25  1445 03/28/25 2010 03/29/25  0115 03/29/25  0551 03/29/25  0926 03/29/25  1700   ALT 34 31  --   --   --   --    AST 37* 31  --   --   --   --    ALKPHO 73 63  --   --   --   --    BILT 0.3 0.3  --   --   --    --    HGB 6.3* 6.2*   < > 7.1* 7.2* 7.3*   WBC 7.2 5.8  --  5.1  --   --     < > = values in this interval not displayed.       Imaging:  No results found.       Impression:   Chronic recurrent iron def anemia with neg EGD and colonoscopy aside from hiatal hernia of 5 cm.  Chronic anticoagulation with recent INR > 4 prior to admission.  CKD IV.    Recommendations:  IV iron therapy   General diet  SBFT since CTE is precluded due to CKD  Hold warfarin for now, monitor INR daily  Consider Hematology consult and repeat EGD and endoscopic capsule placement if work-up unrevealing and pt is not responsive to IV iron therapy  Will continue to follow    Thank you for allowing me to participate in the care of your patient    Israel Martin MD   3/29/2025       [1]   Allergies  Allergen Reactions    Ace Inhibitors Coughing

## 2025-03-30 NOTE — PLAN OF CARE
Patient is A&Ox4, Afebrile. VSS. Does not c/o pain. Meds given per MAR. No bowel movement yet. Ambulates with minimal assist. Patient is on a low fiber soft diet, and is tolerating it well. Blood glucose is monitored, and on a carb coverage. Hgb is stable, small bowel follow through planned for tomorrow. Family at bedside. Call light within reach, safety precautions in place.     Problem: HEMATOLOGIC - ADULT  Goal: Maintains hematologic stability  Description: INTERVENTIONS- Assess for signs and symptoms of bleeding or hemorrhage- Monitor labs and vital signs for trends- Administer supportive blood products/factors, fluids and medications as ordered and appropriate- Administer supportive blood products/factors as ordered and appropriate  Outcome: Progressing     Problem: GASTROINTESTINAL - ADULT  Goal: Maintains or returns to baseline bowel function  Description: INTERVENTIONS:- Assess bowel function- Maintain adequate hydration with IV or PO as ordered and tolerated- Evaluate effectiveness of GI medications- Encourage mobilization and activity- Obtain nutritional consult as needed- Establish a toileting routine/schedule- Consider collaborating with pharmacy to review patient's medication profile  Outcome: Progressing

## 2025-03-30 NOTE — PROGRESS NOTES
[facility]   part of [facility]     Hospitalist Progress Note     Kyler Haji Patient Status:  Inpatient    1940 MRN [id number]   [facility] 4NW-A Attending Blaine Reynolds MD   Hosp Day # 2 PCP Abhi Arellano MD     Chief Complaint: dark stools    Subjective:     Patient feels well today.  Denies fever, chills, n/v.  No new complaints.      Objective:    Review of Systems:   A comprehensive review of systems was completed; pertinent positive and negatives stated in subjective.    Vital signs:  Temp:  [97.4 °F (36.3 °C)-98.7 °F (37.1 °C)] 97.4 °F (36.3 °C)  Pulse:  [59-63] 59  Resp:  [16-18] 18  BP: (110-137)/(61-77) 110/68  SpO2:  [96 %-100 %] 97 %    Physical Exam:    General: No acute distress  Respiratory: No wheezes, no rhonchi  Cardiovascular: S1, S2, regular rate and rhythm  Abdomen: Soft, Non-tender, non-distended, positive bowel sounds  Neuro: No new focal deficits.   Extremities: No edema    Diagnostic Data:    Labs:  Recent Labs   Lab 25  1445 25  0115 25  0551 25  0926 25  1700 25  0115 25  0554 25  0936   WBC 7.2 5.8  --  5.1  --   --   --   --   --    HGB 6.3* 6.2*   < > 7.1* 7.2* 7.3* 7.4*  --  7.8*   MCV 99.0 94.6  --  91.5  --   --   --   --   --    .0 199.0  --  193.0  --   --   --   --   --    INR  --  3.40*  --  3.45*  --   --   --  2.63*  --     < > = values in this interval not displayed.       Recent Labs   Lab 25  1445 25  0551 25  0936   * 250* 107* 174*   BUN 72* 71* 60* 50*   CREATSERUM 3.94* 3.88* 3.49* 2.93*   CA 9.6 9.3 9.5 9.6   ALB 4.4 4.2  --   --     142 145 143   K 5.2* 5.1 4.5 5.0    111 114* 111   CO2 18.0* 18.0* 19.0* 21.0   ALKPHO 73 63  --   --    AST 37* 31  --   --    ALT 34 31  --   --    BILT 0.3 0.3  --   --    TP 6.7 6.4  --   --        Estimated Glomerular Filtration Rate: 20 mL/min/1.73m2 (A) (result from  lab).    No results for input(s): \"TROP\", \"TROPHS\", \"CK\" in the last 168 hours.    Recent Labs   Lab 03/28/25 2010 03/29/25  0551 03/30/25  0554   PTP 34.6* 35.0* 28.3*   INR 3.40* 3.45* 2.63*                  Microbiology    No results found for this visit on 03/28/25.      Imaging: Reviewed in Epic.    Medications:    sodium ferric gluconate  125 mg Intravenous Daily    amiodarone  200 mg Oral Daily    allopurinol  100 mg Oral Daily    Metoprolol Succinate ER  200 mg Oral Daily    insulin aspart  1-68 Units Subcutaneous TID CC    insulin aspart  1-10 Units Subcutaneous TID AC and HS    pantoprazole  40 mg Intravenous Q12H    atorvastatin  40 mg Oral Nightly       Assessment & Plan:      #Symptomatic anemia /presumptive GIB  Dark BM though has been on PO iron  Occult blood +ve  IV PPI BID  Hg 6.2 -> 7.1 -> 7.8 post 1unit PRBC  GI consult     #Coumadin coagulopathy  Hold coumadin  No reversal indicated as vitals stable  Monitor INR     #Ao stenosis s/p bioprosthetic valve     #Paroxysmal A. Fib  Cont. Rate control  Hold coumadin  Given recurrent bleeds should be evaluated for watchman  Sees [employer] cards     #HFrEF, EF: 40-45% s/p AICD  Compensated  Hold aldactone, lasix for now  Monitor volume closely     #HTN  Hold ARB, cont BB     #DM2  insulin     #CKD IV  Cr stable     #Metabolic Acidosis  Due to CKD     #CAD s/p CABG  #HTN  #HLD  #MILES         Maikel Roper MD    Supplementary Documentation:     Quality:  DVT Mechanical Prophylaxis:   SCDs,    DVT Pharmacologic Prophylaxis   Medication   None                Code Status: Full Code  Pike: No urinary catheter in place  Pike Duration (in days):   Central line:    TAMIKA:     Discharge is dependent on: GI eval   At this point Mr. Haji is expected to be discharge to: Home    The 21st Century Cures Act makes medical notes like these available to patients in the interest of transparency. Please be advised this is a medical document. Medical documents are intended to carry  relevant information, facts as evident, and the clinical opinion of the practitioner. The medical note is intended as peer to peer communication and may appear blunt or direct. It is written in medical language and may contain abbreviations or verbiage that are unfamiliar.              **Certification      PHYSICIAN Certification of Need for Inpatient Hospitalization - Initial Certification    Patient will require inpatient services that will reasonably be expected to span two midnight's based on the clinical documentation in H+P.   Based on patients current state of illness, I anticipate that, after discharge, patient will require TBD.

## 2025-03-31 ENCOUNTER — APPOINTMENT (OUTPATIENT)
Dept: GENERAL RADIOLOGY | Facility: HOSPITAL | Age: 85
End: 2025-03-31
Attending: STUDENT IN AN ORGANIZED HEALTH CARE EDUCATION/TRAINING PROGRAM
Payer: MEDICARE

## 2025-03-31 LAB
BLOOD TYPE BARCODE: 9500
GLUCOSE BLD-MCNC: 124 MG/DL (ref 70–99)
GLUCOSE BLD-MCNC: 196 MG/DL (ref 70–99)
GLUCOSE BLD-MCNC: 209 MG/DL (ref 70–99)
GLUCOSE BLD-MCNC: 263 MG/DL (ref 70–99)
HGB BLD-MCNC: 7.6 G/DL
HGB BLD-MCNC: 8.1 G/DL
HGB BLD-MCNC: 8.4 G/DL
INR BLD: 1.89 (ref 0.8–1.2)
PROTHROMBIN TIME: 21.9 SECONDS (ref 11.6–14.8)
UNIT VOLUME: 350 ML

## 2025-03-31 PROCEDURE — 74250 X-RAY XM SM INT 1CNTRST STD: CPT | Performed by: STUDENT IN AN ORGANIZED HEALTH CARE EDUCATION/TRAINING PROGRAM

## 2025-03-31 PROCEDURE — 99232 SBSQ HOSP IP/OBS MODERATE 35: CPT | Performed by: HOSPITALIST

## 2025-03-31 NOTE — PROGRESS NOTES
[facility]   part of [facility]     Hospitalist Progress Note     Kyler Haji Patient Status:  Inpatient    1940 MRN [id number]   [facility] 4NW-A Attending Blaine Reynolds MD   Hosp Day # 3 PCP Abhi Arellano MD     Chief Complaint: dark stools    Subjective:     Patient feeling well today.  No further episodes of black/bloody stools.  No new complaints.      Objective:    Review of Systems:   A comprehensive review of systems was completed; pertinent positive and negatives stated in subjective.    Vital signs:  Temp:  [97.5 °F (36.4 °C)-98 °F (36.7 °C)] 98 °F (36.7 °C)  Pulse:  [59-67] 59  Resp:  [16-18] 16  BP: (132-139)/(62-90) 132/90  SpO2:  [97 %-100 %] 100 %    Physical Exam:    General: No acute distress  Respiratory: No wheezes, no rhonchi  Cardiovascular: S1, S2, regular rate and rhythm  Abdomen: Soft, Non-tender, non-distended, positive bowel sounds  Neuro: No new focal deficits.   Extremities: No edema    Diagnostic Data:    Labs:  Recent Labs   Lab 25  1445 25  0115 25  0551 25  0926 25  0115 25  0554 25  0936 25  1652 25  0344 25  1319   WBC 7.2 5.8  --  5.1  --   --   --   --   --   --   --    HGB 6.3* 6.2*   < > 7.1*   < > 7.4*  --  7.8* 7.6* 7.6* 8.4*   MCV 99.0 94.6  --  91.5  --   --   --   --   --   --   --    .0 199.0  --  193.0  --   --   --   --   --   --   --    INR  --  3.40*  --  3.45*  --   --  2.63*  --   --  1.89*  --     < > = values in this interval not displayed.       Recent Labs   Lab 25  1445 25  0551 25  0936   * 250* 107* 174*   BUN 72* 71* 60* 50*   CREATSERUM 3.94* 3.88* 3.49* 2.93*   CA 9.6 9.3 9.5 9.6   ALB 4.4 4.2  --   --     142 145 143   K 5.2* 5.1 4.5 5.0    111 114* 111   CO2 18.0* 18.0* 19.0* 21.0   ALKPHO 73 63  --   --    AST 37* 31  --   --    ALT 34 31  --   --    BILT 0.3 0.3  --   --    TP 6.7 6.4   --   --        Estimated Glomerular Filtration Rate: 20 mL/min/1.73m2 (A) (result from lab).    No results for input(s): \"TROP\", \"TROPHS\", \"CK\" in the last 168 hours.    Recent Labs   Lab 03/29/25  0551 03/30/25  0554 03/31/25  0344   PTP 35.0* 28.3* 21.9*   INR 3.45* 2.63* 1.89*                  Microbiology    No results found for this visit on 03/28/25.      Imaging: Reviewed in Epic.    Medications:    sodium ferric gluconate  125 mg Intravenous Daily    amiodarone  200 mg Oral Daily    allopurinol  100 mg Oral Daily    Metoprolol Succinate ER  200 mg Oral Daily    insulin aspart  1-68 Units Subcutaneous TID CC    insulin aspart  1-10 Units Subcutaneous TID AC and HS    pantoprazole  40 mg Intravenous Q12H    atorvastatin  40 mg Oral Nightly       Assessment & Plan:      #Symptomatic anemia /presumptive GIB  Dark BM though has been on PO iron  Occult blood +ve  IV PPI BID  Hg 6.2 -> 7.1 -> 7.8 -> 8.4 post 1unit PRBC  GI following  Xray follow through reviewed      #Coumadin coagulopathy  Hold coumadin     #Ao stenosis s/p bioprosthetic valve     #Paroxysmal A. Fib  Cont. Rate control  Hold coumadin  Given recurrent bleeds should be evaluated for watchman  Sees [employer] cards     #HFrEF, EF: 40-45% s/p AICD  Compensated  Hold aldactone, lasix for now  Monitor volume closely     #HTN  Hold ARB, cont BB     #DM2  insulin     #CKD IV  Cr stable     #Metabolic Acidosis  Due to CKD     #CAD s/p CABG  #HTN  #HLD  #MILES         Maikel Roper MD    Supplementary Documentation:     Quality:  DVT Mechanical Prophylaxis:   SCDs,    DVT Pharmacologic Prophylaxis   Medication   None                Code Status: Full Code  Pike: No urinary catheter in place  Pike Duration (in days):   Central line:    TAMIKA:     Discharge is dependent on: GI eval   At this point Mr. Haji is expected to be discharge to: Home    The 21st Century Cures Act makes medical notes like these available to patients in the interest of transparency. Please be  advised this is a medical document. Medical documents are intended to carry relevant information, facts as evident, and the clinical opinion of the practitioner. The medical note is intended as peer to peer communication and may appear blunt or direct. It is written in medical language and may contain abbreviations or verbiage that are unfamiliar.              **Certification      PHYSICIAN Certification of Need for Inpatient Hospitalization - Initial Certification    Patient will require inpatient services that will reasonably be expected to span two midnight's based on the clinical documentation in H+P.   Based on patients current state of illness, I anticipate that, after discharge, patient will require TBD.

## 2025-03-31 NOTE — PLAN OF CARE
Problem: HEMATOLOGIC - ADULT  Goal: Maintains hematologic stability  Description: INTERVENTIONS- Assess for signs and symptoms of bleeding or hemorrhage- Monitor labs and vital signs for trends- Administer supportive blood products/factors, fluids and medications as ordered and appropriate- Administer supportive blood products/factors as ordered and appropriate  Outcome: Progressing     Problem: GASTROINTESTINAL - ADULT  Goal: Maintains or returns to baseline bowel function  Description: INTERVENTIONS:- Assess bowel function- Maintain adequate hydration with IV or PO as ordered and tolerated- Evaluate effectiveness of GI medications- Encourage mobilization and activity- Obtain nutritional consult as needed- Establish a toileting routine/schedule- Consider collaborating with pharmacy to review patient's medication profile  Outcome: Progressing

## 2025-03-31 NOTE — PAYOR COMM NOTE
--------------  ADMISSION REVIEW     Payor: [employer]  Subscriber #:  [id number]  Authorization Number: [id number]    Admit date: 3/28/25  Admit time: 10:00 PM       REVIEW DOCUMENTATION  ED Provider Notes signed by Qamar Pillai DO at 3/28/2025  9:23 PM       Author: Qamar Pillai DO Service: -- Author Type: Physician    Filed: 3/28/2025  9:23 PM Date of Service: 3/28/2025  9:16 PM Status: Signed     Patient Seen in: [facility] Emergency Department    History     Chief Complaint   Patient presents with    Abnormal Result     Stated Complaint: low hemoglobin, here for blood transfusion    HPI  This is a 84-year-old male presents emergency room for evaluation of anemia.  Patient reports he been feeling very fatigued recently, was admitted 1 month ago for acute on chronic anemia and dark stools, had upper and lower GI performed which did reveal 5 cm hiatal hernia.  Does have underlying CKD.  Followed up with his primary care provider today was found to be anemic and sent to the ER for further evaluation.  Patient is on Coumadin.  Denies chest pain or shortness of breath.  Denies nausea or vomiting.  Denies urinary symptoms.    Physical Exam     ED Triage Vitals [03/28/25 1939]   /62   Pulse 60   Resp 18   Temp 98.3 °F (36.8 °C)   Temp src Oral   SpO2 100 %   O2 Device None (Room air)     Vital Signs  BP: 102/54  Pulse: 60  Resp: 15  Temp: 98.3 °F (36.8 °C)  Temp src: Oral  MAP (mmHg): 69    Oxygen Therapy  SpO2: 100 %  O2 Device: None (Room air)    Physical Exam  GENERAL: Patient is awake, alert  HEENT:  no scleral icterus.  Mucous membranes are moist  HEART: Regular rate and rhythm, no murmurs.  LUNGS: Clear to auscultation bilaterally.  No Rales, no rhonchi, no wheezing, no stridor.  ABDOMEN: Soft, nondistended,non tender  Rectal exam: Guaiac positive black stool  EXTREMITIES: No peripheral edema, no calf tenderness,    ED Course     Labs Reviewed   COMP METABOLIC PANEL (14) - Abnormal; Notable for  the following components:       Result Value    Glucose 250 (*)     CO2 18.0 (*)     BUN 71 (*)     Creatinine 3.88 (*)     Calculated Osmolality 323 (*)     eGFR-Cr 14 (*)     All other components within normal limits   CBC WITH DIFFERENTIAL WITH PLATELET - Abnormal; Notable for the following components:    RBC 2.02 (*)     HGB 6.2 (*)     HCT 19.1 (*)     Lymphocyte Absolute 0.83 (*)     All other components within normal limits   PROTHROMBIN TIME (PT) - Abnormal; Notable for the following components:    PT 34.6 (*)     INR 3.40 (*)     All other components within normal limits   TYPE AND SCREEN   PREPARE RBC   ABORH (BLOOD TYPE)   ANTIBODY SCREEN   PREPARE RBC     EKG  Rate, intervals and axes as noted on EKG Report.  Rate: 60  Rhythm: Paced rhythm  Reading: AV dual paced rhythm    MDM      Differential diagnosis before testing includes but not limited to GI bleed, electrolyte abnormality, acute kidney injury, coagulopathy, which is a medical condition that poses a threat to life/function    Past Medical History/comorbidities-as noted in HPI    Discussion of management (consult/physicians, social work, pharmacy,ect) GI Dr. Martin, [facility] Dr. Reynolds    External chart review included EGD and colonoscopy from 12/11/2025 reviewed, colonoscopy unremarkable EGD with hiatal hernia    Medications Provided: IV Protonix    Course of Events during Emergency Room Visit include IV established, blood work obtained.  CBC white count 5.8 hemoglobin critically low at 6.2 platelet 1 99.  INR 3.4.  Chemistry sodium 142 potassium 5.1 bicarb 18 BUN 71 creatinine 3.8 glucose 250.  Patient was given IV Protonix, transfusion of packed red blood cells performed in the ER.  Patient is been discussed with GI as well as hospitalist, discussed all results with the patient and family, will admit for further evaluation treatment    Shared decision making was utilized     Disposition:  Admission  I have discussed with the patient  the results of test, differential diagnosis, and treatment plan. They expressed clear understanding of these instructions and agrees to the plan provided.     Admission disposition: 3/28/2025  9:04 PM    Medical Decision Making  Disposition and Plan     Clinical Impression:  1. Symptomatic anemia    2. Gastrointestinal hemorrhage, unspecified gastrointestinal hemorrhage type    3. Chronic kidney disease, unspecified CKD stage    4. Current use of long term anticoagulation       Disposition:  Admit  3/28/2025  9:04 pm    Rosas Qamar, DO on 3/28/2025  9:23 PM        History and Physical     Chief Complaint   Patient presents with    Abnormal Result     History of Present Illness:   84 year old male with a past medical history of HTN, HLD, CKD III, anemia on PO iron, DM2.  He was having fatigue and lightheadedness.  He went to his PCP office and had blood work done. He has had dark bowel movements.  Blood work showed anemia so he was sent to the ED.        /54  Pulse 60  Temp 98.3 °F (36.8 °C) (Oral)  Resp 15  SpO2 100%       Lab 03/28/25  1445 03/28/25 2010   WBC 7.2 5.8   HGB 6.3* 6.2*   MCV 99.0 94.6   .0 199.0   INR  --  3.40*     * 250*   BUN 72* 71*   CREATSERUM 3.94* 3.88*   CA 9.6 9.3   ALB 4.4 4.2    142   K 5.2* 5.1    111   CO2 18.0* 18.0*   ALKPHO 73 63   AST 37* 31   ALT 34 31   BILT 0.3 0.3   TP 6.7 6.4     Lab 03/28/25 2010   PTP 34.6*   INR 3.40*       Assessment & Plan:  #Symptomatic anemia /presumptive GIB  Dark BM though has been on PO iron  Occult blood +ve  IV PPI BID  Transfuse and monitor hgb  No sig benefit of BID over every day PO iron, likely can adjust on DC  Recent w/u unremarkable other than HH; may need repeat EGD +/- capsule     #Coumadin coagulopathy  Hold coumadin  No reversal indicated as vitals stable  Monitor INR     #Ao stenosis s/p bioprosthetic valve     #A. Fib  Cont. Rate control  Hold coumadin  Given recurrent bleeds should be evaluated for  yocasta  Sees [facility] cards     #HFrEF, EF: 40-45% s/p AICD  Compensated  Hold aldactone, lasix for now  Monitor volume closely     #HTN  Hold ARB, cont BB     #DM2  insulin     #CKD IV  Cr stable     #Metabolic Acidosis  Due to CKD     #CAD s/p CABG  #HTN  #HLD  #MILES     Plan of care discussed with ED physician    Blaine Reynolds MD  3/28/2025  9:38 PM       3/29/2025  GI Consultation   Reason for Consultation:   anemia     History of Present Illness:   Patient is a 84 year old male who was admitted to the hospital for Symptomatic anemia.  Upon admission, has recurrent anemia, similar to prior admission 2/2025.     He reports formed black stools since starting iron.  He has no hematochezia.  His INR on labs through [facility] shows INR > 4 last week.  He has no vomiting.  Appetite is good. He has no nausea or vomiting.  He has been compliant with twice daily iron supplements.  He was not able to get CT enterography due to CKD IV.      Blood pressure 120/77, pulse 60, temperature 98.7 °F (37.1 °C), Oral, resp. rate 16, weight 198 lb (89.8 kg), SpO2 100%.     Component Value Date     WBC 5.1 03/29/2025     HGB 7.3 (L) 03/29/2025     .0 03/29/2025     CREATSERUM 3.49 (H) 03/29/2025     BUN 60 (H) 03/29/2025      03/29/2025     K 4.5 03/29/2025     CO2 19.0 (L) 03/29/2025     CA 9.5 03/29/2025     ALB 4.2 03/28/2025     ALKPHO 63 03/28/2025     TP 6.4 03/28/2025     AST 31 03/28/2025     ALT 31 03/28/2025     BILT 0.3 03/28/2025     PTT 37.2 (H) 02/06/2025     INR 3.45 (H) 03/29/2025     PTP 35.0 (H) 03/29/2025     TSH 2.110 01/20/2025      (H) 02/15/2012     MG 1.8 03/29/2025     Lab 03/28/25  1445 03/28/25 2010 03/29/25  0551 03/29/25  0926 03/29/25  1700   HGB 6.3* 6.2* 7.1* 7.2* 7.3*   WBC 7.2 5.8 5.1  --   --      Impression:   Chronic recurrent iron def anemia with neg EGD and colonoscopy aside from hiatal hernia of 5 cm.  Chronic anticoagulation with recent INR > 4 prior to admission.  CKD IV.      Recommendations:  IV iron therapy   General diet  SBFT since CTE is precluded due to CKD  Hold warfarin for now, monitor INR daily  Consider Hematology consult and repeat EGD and endoscopic capsule placement if work-up unrevealing and pt is not responsive to IV iron therapy  Will continue to follow    Israle Martin MD   3/29/2025    3/30/2025  GI Progress Note    No melena, no hematochezia, no abdominal pain, nausea or vomiting.  Hgb stable.  Receiving iron infusions.       Blood pressure 110/68, pulse 59, temperature 97.4 °F (36.3 °C), temperature source Oral, resp. rate 18, weight 198 lb (89.8 kg), SpO2 97%.     Component Value Date     WBC 5.1 03/29/2025     HGB 7.6 (L) 03/30/2025     .0 03/29/2025     CREATSERUM 2.93 (H) 03/30/2025     BUN 50 (H) 03/30/2025      03/30/2025     K 5.0 03/30/2025     CO2 21.0 03/30/2025     CA 9.6 03/30/2025     ALB 4.2 03/28/2025     ALKPHO 63 03/28/2025     TP 6.4 03/28/2025     AST 31 03/28/2025     ALT 31 03/28/2025     BILT 0.3 03/28/2025     PTT 37.2 (H) 02/06/2025     INR 2.63 (H) 03/30/2025     PTP 28.3 (H) 03/30/2025     TSH 2.110 01/20/2025      (H) 02/15/2012     MG 1.9 03/30/2025     Lab 03/28/25  1445 03/28/25  2010 03/29/25  0551 03/30/25  0115 03/30/25  0936 03/30/25  1652   HGB 6.3* 6.2* 7.1* 7.4* 7.8* 7.6*     Recommendations:  IV iron therapy   General diet  SBFT since CTE is precluded due to CKD  Hold warfarin for now, monitor INR daily  Needs Hematology consult as outpatient for continued IV iron therapy  Consider repeat EGD and endoscopic capsule placement if work-up unrevealing and pt is not responsive to IV iron therapy  Will continue to follow     Israel Martin MD   3/30/2025    3/31/2025  Hospitalist Progress Note   No further episodes of black/bloody stools. No new complaints     Temp:  [97.5 °F (36.4 °C)-98 °F (36.7 °C)] 98 °F (36.7 °C)  Pulse:  [59-67] 59  Resp:  [16-18] 16  BP: (132-139)/(62-90) 132/90  SpO2:  [97 %-100  %] 100 %    Lab 03/28/25  1445 03/28/25 2010 03/29/25  0551 03/30/25  0115 03/30/25  0554 03/30/25  0936 03/30/25  1652 03/31/25  0344 03/31/25  1319   WBC 7.2 5.8 5.1  --   --   --   --   --   --    HGB 6.3* 6.2* 7.1* 7.4*  --  7.8* 7.6* 7.6* 8.4*   MCV 99.0 94.6 91.5  --   --   --   --   --   --    .0 199.0 193.0  --   --   --   --   --   --    INR  --  3.40* 3.45*  --  2.63*  --   --  1.89*  --      Lab 03/28/25  1445 03/28/25 2010 03/29/25  0551 03/30/25  0936   * 250* 107* 174*   BUN 72* 71* 60* 50*   CREATSERUM 3.94* 3.88* 3.49* 2.93*   CA 9.6 9.3 9.5 9.6   ALB 4.4 4.2  --   --     142 145 143   K 5.2* 5.1 4.5 5.0    111 114* 111   CO2 18.0* 18.0* 19.0* 21.0   ALKPHO 73 63  --   --    AST 37* 31  --   --    ALT 34 31  --   --    BILT 0.3 0.3  --   --    TP 6.7 6.4  --   --      Lab 03/29/25  0551 03/30/25  0554 03/31/25  0344   PTP 35.0* 28.3* 21.9*   INR 3.45* 2.63* 1.89*     Assessment & Plan:  #Symptomatic anemia /presumptive GIB  Dark BM though has been on PO iron  Occult blood +ve  IV PPI BID  Hg 6.2 -> 7.1 -> 7.8 -> 8.4 post 1unit PRBC  GI following  Xray follow through reviewed      #Coumadin coagulopathy  Hold coumadin     #Ao stenosis s/p bioprosthetic valve     #Paroxysmal A. Fib  Cont. Rate control  Hold coumadin  Given recurrent bleeds should be evaluated for watchman  Sees [facility] cards     #HFrEF, EF: 40-45% s/p AICD  Compensated  Hold aldactone, lasix for now  Monitor volume closely     #HTN  Hold ARB, cont BB     #DM2  insulin     #CKD IV  Cr stable     #Metabolic Acidosis  Due to CKD     #CAD s/p CABG  #HTN  #HLD  #MILES     DVT Mechanical Prophylaxis:   SCDs,       Discharge is dependent on: GI eval   At this point is expected to be discharge to: Home    **Certification  PHYSICIAN Certification of Need for Inpatient Hospitalization - Initial Certification   Patient will require inpatient services that will reasonably be expected to span two midnight's based on the  clinical documentation in H+P.   Based on patients current state of illness, I anticipate that, after discharge, patient will require TBD.    MEDICATIONS ADMINISTERED:  Medications 03/28/25 03/29/25 03/30/25 03/31/25   allopurinol (Zyloprim) tab 100 mg  Dose: 100 mg  Freq: Daily Route: OR  Start: 03/28/25 2230    (2230 CD)-Not Given       0857 KF-Given       0826 TM-Given       (0930 TM)-Not Given [C]         amiodarone (Pacerone) tab 200 mg  Dose: 200 mg  Freq: Daily Route: OR  Start: 03/28/25 2230    (2230 CD)-Not Given       0857 KF-Given       0826 TM-Given       (0930 TM)-Not Given [C]         atorvastatin (Lipitor) tab 40 mg  Dose: 40 mg  Freq: Nightly Route: OR  Start: 03/28/25 2315    2323 CD-Given       2004 CD-Given       2035 AB-Given       2100         insulin aspart (NovoLOG) 100 Units/mL FlexPen 1-10 Units  Dose: 1-10 Units  Freq: 3 times daily before meals and nightly Route: SC  Start: 03/28/25 2245   Admin Instructions:   Correction Insulin - calculate insulin requirement  Give 1 unit of Novolog (aspart) insulin for every 30 points blood glucose is greater than 140 mg/dL  **DO NOT HOLD OR ALTER INSULIN DOSE WITHOUT A PHYSICIAN ORDER**  Give Novolog/aspart insulin subcutaneously within 30 minutes of scheduled finger-stick time  Notify physician for blood glucose >351mg/dL with time and last dose of correction insulin given.    2323 CD-Given       (0700 CD)-Not Given   (1100 KF)-Not Given     (1600 KF)-Not Given   2225 CD-Given      (0700 CD)-Not Given   1151 TM-Given     1548 TM-Given   (2100 AB)-Not Given      (0700 AB)-Not Given   1308 TM-Given     1601 TM-Given [C]   2100        insulin aspart (NovoLOG) 100 Units/mL FlexPen 1-68 Units  Dose: 1-68 Units  Freq: 3 times daily with meals Route: SC  Start: 03/29/25 0800   Admin Instructions:   1 unit of Novolog (aspart) insulin for every 10 grams of carbohydrates eaten  Give within 10 minutes before or after a meal  Do not give if patient is NPO  (carbohydrate is on hold)     (0800 KF)-Not Given   1258 KF-Given     1840 KF-Given       0937 TM-Given   1310 TM-Given     1840 TM-Given       (0800 TM)-Not Given   1408 TM-Given     1701 TM-Given         magnesium oxide (Mag-Ox) tab 400 mg  Dose: 400 mg  Freq: Once Route: OR  Start: 03/29/25 0745 End: 03/29/25 0857     0857 KF-Given           metoprolol succinate ER (Toprol XL) 24 hr tab 200 mg  Dose: 200 mg  Freq: Daily Route: OR  Start: 03/28/25 2230   Admin Instructions:   Do not crush    (2230 CD)-Not Given       0857 KF-Given       0826 TM-Given       (0930 TM)-Not Given [C]         pantoprazole (Protonix) 40 mg in sodium chloride 0.9% PF 10 mL IV push  Dose: 40 mg  Freq: Every 12 hours Route: IV  Start: 03/29/25 0830   Admin Instructions:   Dilute with 10 ml NS; IV push over 2 minutes     0859 KF-Given   2003 CD-Given      0826 TM-Given   2036 AB-Given      (0830 TM)-Not Given [C]   2030        pantoprazole (Protonix) 40 mg in sodium chloride 0.9% PF 10 mL IV push  Dose: 40 mg  Freq: Once Route: IV  Start: 03/28/25 2002 End: 03/28/25 2020   Admin Instructions:   Dilute with 10 ml NS; IV push over 2 minutes    2018 LA-Given            sodium ferric gluconate (Ferrlecit) 125 mg in sodium chloride 0.9% 100mL IVPB premix  Dose: 125 mg  Freq: Daily Route: IV  Last Dose: 125 mg (03/31/25 1658)  Start: 03/29/25 1700 End: 04/03/25 1659     1733 KF-New Bag       1654 TM-New Bag       1658 TM-New Bag           Vitals       Date/Time Temp Pulse Resp BP SpO2 Weight O2 Device O2 Flow Rate (L/min) [facility]    03/31/25 1803 -- 88 -- -- -- -- -- --     03/31/25 1545 97.5 °F (36.4 °C) 61 18 129/59 99 % -- None (Room air) --     03/31/25 1233 98 °F (36.7 °C) 59 16 132/90 100 % -- None (Room air) --     03/31/25 0904 -- 67 -- -- -- -- -- --     03/31/25 0812 97.6 °F (36.4 °C) 60 16 139/67 100 % -- None (Room air) --     03/31/25 0530 -- 61 -- -- -- -- -- --     03/31/25 0430 97.5 °F (36.4 °C) 59 16 132/70 98 % -- None  (Room air) -- LP    03/31/25 0037 97.8 °F (36.6 °C) 60 16 137/62 98 % -- None (Room air) -- LP    03/30/25 2001 97.6 °F (36.4 °C) 59 18 136/64 97 % -- None (Room air) -- LP    03/30/25 1135 97.4 °F (36.3 °C) 59 18 110/68 97 % -- -- -- TS    03/30/25 0820 97.7 °F (36.5 °C) 63 16 134/71 96 % -- None (Room air) -- TS    03/30/25 0510 98.3 °F (36.8 °C) 60 16 133/65 96 % -- None (Room air) -- KL    03/30/25 0010 98 °F (36.7 °C) -- 16 -- -- -- None (Room air) -- KL     03/29/25 2110 98.7 °F (37.1 °C) 60 16 120/77 100 % -- -- -- KL   03/29/25 1520 97.9 °F (36.6 °C) 60 18 137/61 100 % -- None (Room air) -- TS   03/29/25 1215 97.8 °F (36.6 °C) 59 18 129/66 100 % -- None (Room air) -- TS   03/29/25 0725 97.5 °F (36.4 °C) 60 16 127/63 97 % -- None (Room air) -- TS   03/29/25 0430 98.4 °F (36.9 °C) 60 15 129/62 96 % -- None (Room air) -- KL   03/29/25 0010 98.5 °F (36.9 °C) 60 16 132/65 100 % -- None (Room air) -- KL   03/28/25 2300 -- 59 -- 111/54 99 % -- -- -- KL   03/28/25 2208 -- -- -- -- -- 198 lb (89.8 kg) -- -- CD   03/28/25 2150 98.6 °F (37 °C) 62 16 122/56 100 % -- None (Room air) -- KL   03/28/25 2144 -- 60 19 121/67 100 % -- None (Room air) -- LA   03/28/25 2100 98 °F (36.7 °C) 62 18 110/67 100 % -- None (Room air) -- LA   03/28/25 2045 -- 60 15 102/54 100 % -- None (Room air) -- LA   03/28/25 1939 98.3 °F (36.8 °C) 60 18 104/62 100 % -- None (Room air) -- ET     Blood Transfusion Record       Product Unit Status Volume Start End            Transfuse RBC       25  [id number]  [id number] Completed 03/29/25 0143 300 mL 03/28/25 2101 03/28/25 4035           FOR REVIEW/APPROVAL OF INPT ADMISSION

## 2025-03-31 NOTE — PLAN OF CARE
Pt is a/o x4, able to make needs known. X-ray small bowel done today.   Pt on clear liquid. Appetite is good. No episode of N/V. BM today, Denies blood in the rectum. Hgb 8.1, check q8. Per GI egd, colonoscopy, and capsule endoscopy on 4/2 after 2 days of clears. RA. Tele. Denies SOB/chest pain. VSS. Afebrile. Denies pain. Iron infusion as ordered. Pt ambulates to the bathroom. Fall precaution in place, call light within pt's reach.     Problem: HEMATOLOGIC - ADULT  Goal: Maintains hematologic stability  Description: INTERVENTIONS- Assess for signs and symptoms of bleeding or hemorrhage- Monitor labs and vital signs for trends- Administer supportive blood products/factors, fluids and medications as ordered and appropriate- Administer supportive blood products/factors as ordered and appropriate  Outcome: Progressing     Problem: GASTROINTESTINAL - ADULT  Goal: Maintains or returns to baseline bowel function  Description: INTERVENTIONS:- Assess bowel function- Maintain adequate hydration with IV or PO as ordered and tolerated- Evaluate effectiveness of GI medications- Encourage mobilization and activity- Obtain nutritional consult as needed- Establish a toileting routine/schedule- Consider collaborating with pharmacy to review patient's medication profile  Outcome: Progressing

## 2025-03-31 NOTE — PROGRESS NOTES
Pt A&Ox4 on room air, no complaints of pain, tolerating medications well per mar. VSS. Had a bm earlier today stated didn't have any blood in stool. Plan to have a small bowel follow through tomorrow. Npo at midnight. Checking hgb Q8hr. Call light within reach, frequent checks made, needs met.

## 2025-03-31 NOTE — PROGRESS NOTES
[facility]  Report of GI Progress Note    Kyler Haji Patient Status:  Inpatient    1940 MRN [id number]   Location [facility] 4NW-A Attending Maikel Roper MD   Hosp Day # 2 PCP Abhi Arellano MD     Date of Admission:  3/28/2025  PCP: Abhi Arellano MD    Reason for Visit:   anemia    History of Present Illness:   Patient is a 84 year old male who was admitted to the hospital for Symptomatic anemia:    Mr Haji has no complaints.  Happy with food and his TV channel choices.  No melena, no hematochezia, no abdominal pain, nausea or vomiting.  Hgb stable.  Receiving iron infusions.        Current Medications:  Current Facility-Administered Medications   Medication Dose Route Frequency    sodium ferric gluconate (Ferrlecit) 125 mg in sodium chloride 0.9% 100mL IVPB premix  125 mg Intravenous Daily    amiodarone (Pacerone) tab 200 mg  200 mg Oral Daily    allopurinol (Zyloprim) tab 100 mg  100 mg Oral Daily    albuterol (Ventolin HFA) 108 (90 Base) MCG/ACT inhaler 2 puff  2 puff Inhalation Q6H PRN    metoprolol succinate ER (Toprol XL) 24 hr tab 200 mg  200 mg Oral Daily    acetaminophen (Tylenol Extra Strength) tab 1,000 mg  1,000 mg Oral Q4H PRN    melatonin tab 3 mg  3 mg Oral Nightly PRN    glycerin-hypromellose- (Artificial Tears) 0.2-0.2-1 % ophthalmic solution 1 drop  1 drop Both Eyes QID PRN    sodium chloride (Saline Mist) 0.65 % nasal solution 1 spray  1 spray Each Nare Q3H PRN    metoclopramide (Reglan) 5 mg/mL injection 5 mg  5 mg Intravenous Q8H PRN    insulin aspart (NovoLOG) 100 Units/mL FlexPen 1-68 Units  1-68 Units Subcutaneous TID CC    glucose (Dex4) 15 GM/59ML oral liquid 15 g  15 g Oral Q15 Min PRN    Or    glucose (Glutose) 40% oral gel 15 g  15 g Oral Q15 Min PRN    Or    glucose-vitamin C (Dex-4) chewable tab 4 tablet  4 tablet Oral Q15 Min PRN    Or    dextrose 50% injection 50 mL  50 mL Intravenous Q15 Min PRN    Or    glucose (Dex4) 15 GM/59ML oral liquid 30 g  30  g Oral Q15 Min PRN    Or    glucose (Glutose) 40% oral gel 30 g  30 g Oral Q15 Min PRN    Or    glucose-vitamin C (Dex-4) chewable tab 8 tablet  8 tablet Oral Q15 Min PRN    insulin aspart (NovoLOG) 100 Units/mL FlexPen 1-10 Units  1-10 Units Subcutaneous TID AC and HS    pantoprazole (Protonix) 40 mg in sodium chloride 0.9% PF 10 mL IV push  40 mg Intravenous Q12H    atorvastatin (Lipitor) tab 40 mg  40 mg Oral Nightly       Allergies  Allergies[1]      Physical Exam:   Blood pressure 110/68, pulse 59, temperature 97.4 °F (36.3 °C), temperature source Oral, resp. rate 18, weight 198 lb (89.8 kg), SpO2 97%.    GENERAL: NAD, oriented x 3, pleasant  ABD: S/NT/ND    Results:     Laboratory Data:  Lab Results   Component Value Date    WBC 5.1 03/29/2025    HGB 7.6 (L) 03/30/2025    .0 03/29/2025    CREATSERUM 2.93 (H) 03/30/2025    BUN 50 (H) 03/30/2025     03/30/2025    K 5.0 03/30/2025    CO2 21.0 03/30/2025    CA 9.6 03/30/2025    ALB 4.2 03/28/2025    ALKPHO 63 03/28/2025    TP 6.4 03/28/2025    AST 31 03/28/2025    ALT 31 03/28/2025    BILT 0.3 03/28/2025    PTT 37.2 (H) 02/06/2025    INR 2.63 (H) 03/30/2025    PTP 28.3 (H) 03/30/2025    TSH 2.110 01/20/2025     (H) 02/15/2012    MG 1.9 03/30/2025    PHOS 3.9 10/28/2024    TROP <0.046 04/16/2018    POCGLU 148 (H) 06/27/2014         No results for input(s): \"URINE\", \"CULTI\", \"BLDSMR\" in the last 168 hours.    Recent Labs   Lab 03/28/25  1445 03/28/25 2010 03/29/25 0115 03/29/25  0551 03/29/25  0926 03/30/25  0115 03/30/25  0936 03/30/25  1652   ALT 34 31  --   --   --   --   --   --    AST 37* 31  --   --   --   --   --   --    ALKPHO 73 63  --   --   --   --   --   --    BILT 0.3 0.3  --   --   --   --   --   --    HGB 6.3* 6.2*   < > 7.1*   < > 7.4* 7.8* 7.6*   WBC 7.2 5.8  --  5.1  --   --   --   --     < > = values in this interval not displayed.       Imaging:  No results found.       Impression:   Chronic recurrent iron def anemia with  neg EGD and colonoscopy aside from hiatal hernia of 5 cm.  Chronic anticoagulation with recent INR > 4 prior to admission.  CKD IV.     Recommendations:  IV iron therapy   General diet  SBFT since CTE is precluded due to CKD  Hold warfarin for now, monitor INR daily  Needs Hematology consult as outpatient for continued IV iron therapy  Consider repeat EGD and endoscopic capsule placement if work-up unrevealing and pt is not responsive to IV iron therapy  Will continue to follow      Israel Martin MD   3/30/2025       [1]   Allergies  Allergen Reactions    Ace Inhibitors Coughing

## 2025-03-31 NOTE — PROGRESS NOTES
[facility]  GASTROENTEROLOGY PROGRESS NOTE   [facility] Gastroenterology     Kyler Haji Patient Status:  Inpatient    1940 MRN [id number]   Location [facility] 4NW-A Attending Maikel Roper MD   Hosp Day # 3 PCP Abhi Arellano MD       Reason for Consultation:   Anemia     Subjective:   Small bowel follow-through completed today.  After imaging, I evaluated patient.  He denies any nausea or vomiting.  No overt GI bleeding.  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%    Hypertension associated with diabetes (HCC)    Acquired coagulation factor inhibitor disorder (HCC)    Hyperlipidemia associated with type 2 diabetes mellitus (HCC)    Chronic systolic heart failure (HCC)    Carotid arterial disease    CKD (chronic kidney disease) stage 4, GFR 15-29 ml/min (HCC)    ICD (implantable cardioverter-defibrillator) BiV [place]    Generalized weakness    Symptomatic anemia    Gastrointestinal hemorrhage, unspecified gastrointestinal hemorrhage type    Chronic kidney disease, unspecified CKD stage    Current use of long term anticoagulation       PMH, PSH, Fhx, Shx as per initial consult note    Review of Systems    12 point Review of Systems negative unless otherwise mentioned in Subjective.     Physical Exam  /67 (BP Location: Right arm)   Pulse 60   Temp 97.6 °F (36.4 °C) (Oral)   Resp 16   Wt 198 lb (89.8 kg)   SpO2 100%   BMI 27.62 kg/m²   Body mass index is  27.62 kg/m².      Gen: No acute distress  Resp: no respiratory distress  Abd: Soft, non-tender, non-distended. No rebound tenderness, no guarding.   Neuro: Aox3.     Diagnostic Data:      Labs:  Recent Labs   Lab 03/28/25  1445 03/28/25 2010 03/29/25  0115 03/29/25  0551 03/29/25  0926 03/30/25  0936 03/30/25  1652 03/31/25  0344   WBC 7.2 5.8  --  5.1  --   --   --   --    HGB 6.3* 6.2*   < > 7.1*   < > 7.8* 7.6* 7.6*   MCV 99.0 94.6  --  91.5  --   --   --   --    .0 199.0  --  193.0  --   --   --   --     < > = values in this interval not displayed.       Recent Labs   Lab 03/28/25  1445 03/28/25 2010 03/29/25  0551 03/30/25  0936   * 250* 107* 174*   BUN 72* 71* 60* 50*   CREATSERUM 3.94* 3.88* 3.49* 2.93*   CA 9.6 9.3 9.5 9.6   ALB 4.4 4.2  --   --     142 145 143   K 5.2* 5.1 4.5 5.0    111 114* 111   CO2 18.0* 18.0* 19.0* 21.0   ALKPHO 73 63  --   --    AST 37* 31  --   --    ALT 34 31  --   --    BILT 0.3 0.3  --   --    TP 6.7 6.4  --   --        Recent Labs   Lab 03/29/25  0551 03/30/25  0554 03/31/25  0344   PTP 35.0* 28.3* 21.9*   INR 3.45* 2.63* 1.89*       No data recorded        Imaging: Imaging data reviewed in Epic.    Medications:    sodium ferric gluconate  125 mg Intravenous Daily    amiodarone  200 mg Oral Daily    allopurinol  100 mg Oral Daily    Metoprolol Succinate ER  200 mg Oral Daily    insulin aspart  1-68 Units Subcutaneous TID CC    insulin aspart  1-10 Units Subcutaneous TID AC and HS    pantoprazole  40 mg Intravenous Q12H    atorvastatin  40 mg Oral Nightly       Allergies:  Allergies[1]    Imaging:  I have personally reviewed all pertinent available imaging.       Informed Consent:   The planned procedure(s), the explanation of the procedure, its expected benefits, the potential complications and risks, and the possible alternatives (including their benefits and risks) were discussed with the patient and/or patient's legal representation/power of   in full. The discussion of risks, not limited to but including bleeding, infection, perforation or tear in the gastrointestinal tract, adverse effects from anesthesia, and possible prolonged hospitalization, emergency surgery, risk of morbidity or mortality, and risk of missed lesion(s) were discussed with patient and/or patient's legal representation/power of .  The risks and benefits of not undergoing the procedure(s), and associated risk of potential missed or delay in diagnosis were also reviewed. Patient and/or patient's legal representation/power of . understands the missed rate of colonoscopy of polyps, lesions of 5-10% even in the best of circumstances and that although this is an accurate test, there are limitations to colonoscopy. Also Informed consent was obtained from the patient for the planned video capsule endoscopy, the explanation of the procedure, indications, nature of the procedure, the expected benefits, the potential complications and risks, and possible alternatives (including their benefits and risks) were discussed with the patient in full. The discussion of risks, not limited to but including, capsule retention which could require emergency surgery, or intervention, which could lead to a prolonged hospitalization and risk of sepsis, morbidity or mortality.  We also discussed that even in the best of circumstances that although this is an accurate test / procedure, there are limitations to capsule endoscopy such as the missed rate of GI tract polyps or lesions.   Patient and/or patient's legal representation/power of  understood the proposed procedure(s), and elected to proceed with the procedure(s) with possible intervention and endotherapy (such as polypectomy, biopsy, control of bleeding, etc.) and its risks, benefits and alternatives and wish to proceed with procedure(s). Ample time was given to ask questions, and all questions answered in full to  patient's and/or patient's legal representation/power of  satisfaction.       ASSESSMENT / PLAN:     Kyler Haji is a 84 year old male with PMH of CKD, HTN, HLD, anemia with GI consult for  acute on chronic anemia.     Chronic recurrent iron def anemia with neg EGD and colonoscopy aside from hiatal hernia of 5 cm.  Severe acute on chronic anemia, hemoglobin less than 7 requiring blood transfusion this admission  Chronic anticoagulation with recent INR > 4 prior to admission.  DM, HLD, CKD IV - per primary team    Given above, and last endoscpies > 1 month ago, pursue EGD/colonoscopy to rule out acute on chronic process - r/o PUD, Bart's erosions, GI AVM, or colonic ulcer/AVM. INR ideally <1.5 for EGD/colonoscopy    Recommendations:   Plan for EGD and colonoscopy, tentatively Wednesday, April 2; can likely pursue capsule endoscopy at the same time  Clear liquid diet with extended bowel prep  Monitor INR daily, anticoagulation bridging per primary team  Check CBC, transfuse pRBC if Hg < 7  IV iron per primary team    Patient's family at the bedside, agreeable to above plan.    Sacha Simmons MD  [facility]               [1]   Allergies  Allergen Reactions    Ace Inhibitors Coughing

## 2025-04-01 ENCOUNTER — ANESTHESIA EVENT (OUTPATIENT)
Dept: ENDOSCOPY | Facility: HOSPITAL | Age: 85
End: 2025-04-01
Payer: MEDICARE

## 2025-04-01 LAB
BASOPHILS # BLD AUTO: 0.03 X10(3) UL (ref 0–0.2)
BASOPHILS NFR BLD AUTO: 0.5 %
EOSINOPHIL # BLD AUTO: 0.24 X10(3) UL (ref 0–0.7)
EOSINOPHIL NFR BLD AUTO: 4.2 %
ERYTHROCYTE [DISTWIDTH] IN BLOOD BY AUTOMATED COUNT: 20.1 %
GLUCOSE BLD-MCNC: 144 MG/DL (ref 70–99)
GLUCOSE BLD-MCNC: 152 MG/DL (ref 70–99)
GLUCOSE BLD-MCNC: 162 MG/DL (ref 70–99)
GLUCOSE BLD-MCNC: 210 MG/DL (ref 70–99)
HCT VFR BLD AUTO: 23.3 %
HGB BLD-MCNC: 7.4 G/DL
HGB BLD-MCNC: 7.5 G/DL
HGB BLD-MCNC: 7.7 G/DL
HGB BLD-MCNC: 7.8 G/DL
IMM GRANULOCYTES # BLD AUTO: 0.02 X10(3) UL (ref 0–1)
IMM GRANULOCYTES NFR BLD: 0.4 %
INR BLD: 1.49 (ref 0.8–1.2)
LYMPHOCYTES # BLD AUTO: 1.02 X10(3) UL (ref 1–4)
LYMPHOCYTES NFR BLD AUTO: 17.9 %
MCH RBC QN AUTO: 29.9 PG (ref 26–34)
MCHC RBC AUTO-ENTMCNC: 32.2 G/DL (ref 31–37)
MCV RBC AUTO: 92.8 FL
MONOCYTES # BLD AUTO: 0.69 X10(3) UL (ref 0.1–1)
MONOCYTES NFR BLD AUTO: 12.1 %
NEUTROPHILS # BLD AUTO: 3.71 X10 (3) UL (ref 1.5–7.7)
NEUTROPHILS # BLD AUTO: 3.71 X10(3) UL (ref 1.5–7.7)
NEUTROPHILS NFR BLD AUTO: 64.9 %
PLATELET # BLD AUTO: 183 10(3)UL (ref 150–450)
PROTHROMBIN TIME: 18.1 SECONDS (ref 11.6–14.8)
RBC # BLD AUTO: 2.51 X10(6)UL
WBC # BLD AUTO: 5.7 X10(3) UL (ref 4–11)

## 2025-04-01 PROCEDURE — 99232 SBSQ HOSP IP/OBS MODERATE 35: CPT | Performed by: HOSPITALIST

## 2025-04-01 NOTE — PROGRESS NOTES
Brief GI note    Patient prepping for EGD, colonoscopy, capsule endoscopy tomorrow, 4/2/25.  Continue bowel prep, clear liquid diet; NPO at MN  Check CBC, transfuse pRBC if Hg < 7; INR check      Sacha Simmons MD  [facility]

## 2025-04-01 NOTE — ANESTHESIA PREPROCEDURE EVALUATION
PRE-OP EVALUATION    This is a pended note in anticipation of patient care. The patient has not yet been seen      Patient Name: Kyler Haji    Admit Diagnosis: Current use of long term anticoagulation [Z79.01]  Symptomatic anemia [D64.9]  Gastrointestinal hemorrhage, unspecified gastrointestinal hemorrhage type [K92.2]  Chronic kidney disease, unspecified CKD stage [N18.9]        Pre-op Diagnosis: INPATIENT    ESOPHAGOGASTRODUODENOSCOPY (EGD), COLONOSCOPY, CAPSULE ENDOSCOPY    Anesthesia Procedure: ESOPHAGOGASTRODUODENOSCOPY (EGD), COLONOSCOPY, CAPSULE ENDOSCOPY  COLONOSCOPY  CAPSULE ENDOSCOPY    Surgeons and Role:     * Sacha Simmons MD - Primary    Pre-op vitals reviewed.  Temp: 97.9 °F (36.6 °C)  Pulse: 60  Resp: 18  BP: 109/58  SpO2: 98 %  Body mass index is 27.62 kg/m².    Current medications reviewed.  Hospital Medications:   polyethylene glycol-electrolyte (Golytely) 236 g oral solution 2,000 mL  2,000 mL Oral 2 times per day on Tuesday Wednesday    [COMPLETED] magnesium oxide (Mag-Ox) tab 400 mg  400 mg Oral Once    sodium ferric gluconate (Ferrlecit) 125 mg in sodium chloride 0.9% 100mL IVPB premix  125 mg Intravenous Daily    [COMPLETED] pantoprazole (Protonix) 40 mg in sodium chloride 0.9% PF 10 mL IV push  40 mg Intravenous Once    amiodarone (Pacerone) tab 200 mg  200 mg Oral Daily    allopurinol (Zyloprim) tab 100 mg  100 mg Oral Daily    albuterol (Ventolin HFA) 108 (90 Base) MCG/ACT inhaler 2 puff  2 puff Inhalation Q6H PRN    metoprolol succinate ER (Toprol XL) 24 hr tab 200 mg  200 mg Oral Daily    acetaminophen (Tylenol Extra Strength) tab 1,000 mg  1,000 mg Oral Q4H PRN    melatonin tab 3 mg  3 mg Oral Nightly PRN    glycerin-hypromellose- (Artificial Tears) 0.2-0.2-1 % ophthalmic solution 1 drop  1 drop Both Eyes QID PRN    sodium chloride (Saline Mist) 0.65 % nasal solution 1 spray  1 spray Each Nare Q3H PRN    metoclopramide (Reglan) 5 mg/mL injection 5 mg  5 mg Intravenous Q8H PRN     insulin aspart (NovoLOG) 100 Units/mL FlexPen 1-68 Units  1-68 Units Subcutaneous TID CC    glucose (Dex4) 15 GM/59ML oral liquid 15 g  15 g Oral Q15 Min PRN    Or    glucose (Glutose) 40% oral gel 15 g  15 g Oral Q15 Min PRN    Or    glucose-vitamin C (Dex-4) chewable tab 4 tablet  4 tablet Oral Q15 Min PRN    Or    dextrose 50% injection 50 mL  50 mL Intravenous Q15 Min PRN    Or    glucose (Dex4) 15 GM/59ML oral liquid 30 g  30 g Oral Q15 Min PRN    Or    glucose (Glutose) 40% oral gel 30 g  30 g Oral Q15 Min PRN    Or    glucose-vitamin C (Dex-4) chewable tab 8 tablet  8 tablet Oral Q15 Min PRN    insulin aspart (NovoLOG) 100 Units/mL FlexPen 1-10 Units  1-10 Units Subcutaneous TID AC and HS    pantoprazole (Protonix) 40 mg in sodium chloride 0.9% PF 10 mL IV push  40 mg Intravenous Q12H    atorvastatin (Lipitor) tab 40 mg  40 mg Oral Nightly       Outpatient Medications:   Prescriptions Prior to Admission[1]    Allergies: Ace inhibitors      Anesthesia Evaluation    Patient summary reviewed.    Anesthetic Complications           GI/Hepatic/Renal         (+) hiatal hernia                        Cardiovascular  Comment: +carotid stenosis                (+) hypertension     (+) CAD    (+) CABG/stent  (+) pacemaker/AICD  (+) valvular problems/murmurs (s/p CABG/AVR) and AS    (+) dysrhythmias and atrial fibrillation  (+) CHF                Endo/Other      (+) diabetes         (+) anemia                   Pulmonary                    (+) sleep apnea       Neuro/Psych                              EF 40%        Past Surgical History:   Procedure Laterality Date    Cabg  2/2012    CABG x 3    Cath transcatheter aortic valve replacement  2/2012    Colonoscopy  6/27/2014    Procedure: COLONOSCOPY;  Surgeon: Ton Oliveira MD;  Location:  ENDOSCOPY    Colonoscopy N/A 2/11/2025    Procedure: COLONOSCOPY;  Surgeon: Israel Martin MD;  Location:  ENDOSCOPY    Hernia surgery      Valve repair       Social  History     Socioeconomic History    Marital status:    Tobacco Use    Smoking status: Former     Current packs/day: 0.00     Types: Cigarettes     Start date: 1955     Quit date: 1975     Years since quittin.8     Passive exposure: Never    Smokeless tobacco: Never   Vaping Use    Vaping status: Never Used   Substance and Sexual Activity    Alcohol use: Yes     Alcohol/week: 1.0 standard drink of alcohol     Types: 1 Standard drinks or equivalent per week     Comment: 8-10 drinks per week    Drug use: Never   Other Topics Concern    Caffeine Concern Yes    Exercise No     History   Drug Use Unknown     Available pre-op labs reviewed.  Lab Results   Component Value Date    WBC 5.7 2025    RBC 2.51 (L) 2025    HGB 7.7 (L) 2025    HCT 23.3 (L) 2025    MCV 92.8 2025    MCH 29.9 2025    MCHC 32.2 2025    RDW 20.1 2025    .0 2025     Lab Results   Component Value Date     2025    K 5.0 2025     2025    CO2 21.0 2025    BUN 50 (H) 2025    CREATSERUM 2.93 (H) 2025     (H) 2025    CA 9.6 2025     Lab Results   Component Value Date    INR 1.49 (H) 2025         Airway      Mallampati: II  Mouth opening: 3 FB  TM distance: 4 - 6 cm  Neck ROM: limited Cardiovascular    Cardiovascular exam normal.         Dental      Dental appliance(s): upper dentures and lower dentures       Pulmonary    Pulmonary exam normal.                 Other findings              ASA: 3   Plan: MAC  NPO status verified and patient meets guidelines.        Comment:   Plan is MAC anesthesia, which likely will include deep sedation.  Implied that memory of procedure is unlikely although intraop recall, if it occurs, may be a reasonable and comfortable experience with this anesthetic.  Aware that general anesthesia is not intended though deep sedation may include brief moments of general anesthesia.    Questions answered. Accepts. The consent was signed without further questions.   Plan/risks discussed with: patient                Present on Admission:  **None**           [1]   Medications Prior to Admission   Medication Sig Dispense Refill Last Dose/Taking    fexofenadine 180 MG Oral Tab Take 1 tablet (180 mg total) by mouth daily.   3/28/2025    cholecalciferol 50 MCG (2000 UT) Oral Cap Take 1 capsule (2,000 Units total) by mouth daily.   3/28/2025    ferrous sulfate 325 (65 FE) MG Oral Tab EC Take 1 tablet (325 mg total) by mouth 2 (two) times daily with meals. 60 tablet 0 3/28/2025    pantoprazole 40 MG Oral Tab EC Take 1 tablet (40 mg total) by mouth 2 (two) times daily before meals. 180 tablet 3 3/28/2025    aspirin 81 MG Oral Chew Tab Chew by mouth daily.   3/27/2025    atorvastatin 40 MG Oral Tab Take 1 tablet (40 mg total) by mouth daily. 90 tablet 3 3/27/2025    metFORMIN 500 MG Oral Tab Take 1 tablet (500 mg total) by mouth 2 (two) times daily with meals. 180 tablet 1 3/28/2025    losartan 25 MG Oral Tab Take 1 tablet (25 mg total) by mouth daily. 90 tablet 1 3/28/2025    glipiZIDE 5 MG Oral Tab Take 1 tablet (5 mg total) by mouth 2 (two) times daily before meals. 180 tablet 1 3/28/2025    METOPROLOL SUCCINATE  MG Oral Tablet 24 Hr TAKE 1 TABLET(200 MG) BY MOUTH DAILY 90 tablet 2 3/28/2025    furosemide 40 MG Oral Tab Take 1 tablet (40 mg total) by mouth every other day. 45 tablet 3 3/28/2025    spironolactone 25 MG Oral Tab Take 0.5 tablets (12.5 mg total) by mouth daily. 90 tablet 3 3/28/2025    allopurinol 100 MG Oral Tab Take 1 tablet (100 mg total) by mouth daily. 90 tablet 3 3/28/2025    warfarin 4 MG Oral Tab TAKE 1 TABLET BY MOUTH DAILY OR AS DIRECTED BY ANTICOAGULATION CLINIC (Patient taking differently: Take 1 tablet (4 mg total) by mouth daily. Takes 2mg Mon, Wed, Fri, takes 4 mg every other day(Tues,thur, sat, sun)) 90 tablet 4 3/27/2025    amiodarone 200 MG Oral Tab Take 1 tablet (200  mg total) by mouth daily. 90 tablet 3 3/28/2025    Vitamin C 500 MG Oral Tab Take 1 tablet (500 mg total) by mouth 2 (two) times daily. With iron 30 tablet 3     furosemide 20 MG Oral Tab TAKE 40MG ON EVEN DAYS, AND 20MG ON ODD DAYS 45 tablet 2

## 2025-04-01 NOTE — PLAN OF CARE
Assumed care at 1030. Patient is AOX4, RA, VSS, Clear liquid diet maintained for EGD/Colonoscopy/capsule endoscopy on 4/2. No reports of bloody/dark stools. Telemetry monitoring. No c/o pain. Ambulating to bathroom. Fall precautions in place, call light within reach.      Problem: GASTROINTESTINAL - ADULT  Goal: Maintains or returns to baseline bowel function  Description: INTERVENTIONS:- Assess bowel function- Maintain adequate hydration with IV or PO as ordered and tolerated- Evaluate effectiveness of GI medications- Encourage mobilization and activity- Obtain nutritional consult as needed- Establish a toileting routine/schedule- Consider collaborating with pharmacy to review patient's medication profile  Outcome: Progressing     Problem: HEMATOLOGIC - ADULT  Goal: Maintains hematologic stability  Description: INTERVENTIONS- Assess for signs and symptoms of bleeding or hemorrhage- Monitor labs and vital signs for trends- Administer supportive blood products/factors, fluids and medications as ordered and appropriate- Administer supportive blood products/factors as ordered and appropriate  Outcome: Progressing      ----- Message from Major Schroeder MD sent at 5/31/2022  1:12 PM CDT -----  Severe degeneration noted at 2 lowest levels of lumbar spine.  If pain worse and dysfunction, may needs to see spinal surgeon for possible intervention.

## 2025-04-01 NOTE — PLAN OF CARE
Pt is a/o x4, able to make needs known. Ambulates to the bathroom. Had BM today, no blood. Consent signed by pt for procedure tomorrow. Maintained clear liquid diet. Appetite good. NPO midnight tonight. Started on Golytely as ordered. Iron infusion as ordered. Denies pain. RA. Tele. VSS. Call light within pt's reach.     Problem: HEMATOLOGIC - ADULT  Goal: Maintains hematologic stability  Description: INTERVENTIONS- Assess for signs and symptoms of bleeding or hemorrhage- Monitor labs and vital signs for trends- Administer supportive blood products/factors, fluids and medications as ordered and appropriate- Administer supportive blood products/factors as ordered and appropriate  Outcome: Progressing     Problem: GASTROINTESTINAL - ADULT  Goal: Maintains or returns to baseline bowel function  Description: INTERVENTIONS:- Assess bowel function- Maintain adequate hydration with IV or PO as ordered and tolerated- Evaluate effectiveness of GI medications- Encourage mobilization and activity- Obtain nutritional consult as needed- Establish a toileting routine/schedule- Consider collaborating with pharmacy to review patient's medication profile  Outcome: Progressing

## 2025-04-01 NOTE — PROGRESS NOTES
[facility]   part of [facility]     Hospitalist Progress Note     Kyler Haji Patient Status:  Inpatient    1940 MRN [id number]   [facility] 4NW-A Attending Blaine Reynolds MD   Hosp Day # 4 PCP Abhi Arellano MD     Chief Complaint: dark stools    Subjective:     Patient feeling well today.  Denies fever, chills, n/v.  No new complaints.      Objective:    Review of Systems:   A comprehensive review of systems was completed; pertinent positive and negatives stated in subjective.    Vital signs:  Temp:  [97.5 °F (36.4 °C)-98 °F (36.7 °C)] 97.6 °F (36.4 °C)  Pulse:  [59-88] 60  Resp:  [16-20] 16  BP: ()/(47-90) 119/64  SpO2:  [94 %-100 %] 96 %    Physical Exam:    General: No acute distress  Respiratory: No wheezes, no rhonchi  Cardiovascular: S1, S2, regular rate and rhythm  Abdomen: Soft, Non-tender, non-distended, positive bowel sounds  Neuro: No new focal deficits.   Extremities: No edema    Diagnostic Data:    Labs:  Recent Labs   Lab 25  1445 25  2010 25  0115 25  0551 25  0926 25  0554 25  0936 25  0344 25  1319 25  1708 25  0117 25  0529   WBC 7.2 5.8  --  5.1  --   --   --   --   --   --   --  5.7   HGB 6.3* 6.2*   < > 7.1*   < >  --    < > 7.6* 8.4* 8.1* 7.4* 7.5*   MCV 99.0 94.6  --  91.5  --   --   --   --   --   --   --  92.8   .0 199.0  --  193.0  --   --   --   --   --   --   --  183.0   INR  --  3.40*  --  3.45*  --  2.63*  --  1.89*  --   --   --   --     < > = values in this interval not displayed.       Recent Labs   Lab 25  1445 25  0551 25  0936   * 250* 107* 174*   BUN 72* 71* 60* 50*   CREATSERUM 3.94* 3.88* 3.49* 2.93*   CA 9.6 9.3 9.5 9.6   ALB 4.4 4.2  --   --     142 145 143   K 5.2* 5.1 4.5 5.0    111 114* 111   CO2 18.0* 18.0* 19.0* 21.0   ALKPHO 73 63  --   --    AST 37* 31  --   --    ALT 34 31  --   --    BILT 0.3 0.3   --   --    TP 6.7 6.4  --   --        Estimated Glomerular Filtration Rate: 20 mL/min/1.73m2 (A) (result from lab).    No results for input(s): \"TROP\", \"TROPHS\", \"CK\" in the last 168 hours.    Recent Labs   Lab 03/29/25  0551 03/30/25  0554 03/31/25  0344   PTP 35.0* 28.3* 21.9*   INR 3.45* 2.63* 1.89*                  Microbiology    No results found for this visit on 03/28/25.      Imaging: Reviewed in Epic.    Medications:    sodium ferric gluconate  125 mg Intravenous Daily    amiodarone  200 mg Oral Daily    allopurinol  100 mg Oral Daily    Metoprolol Succinate ER  200 mg Oral Daily    insulin aspart  1-68 Units Subcutaneous TID CC    insulin aspart  1-10 Units Subcutaneous TID AC and HS    pantoprazole  40 mg Intravenous Q12H    atorvastatin  40 mg Oral Nightly       Assessment & Plan:      #Symptomatic anemia /presumptive GIB  Dark BM though has been on PO iron  Occult blood +ve  IV PPI BID  Hg 6.2 -> 7.1 -> 7.8 -> 8.4 post 1unit PRBC  GI following  Xray follow through reviewed   Plan for repeat EGD/colon/capsule endoscopy tomorrow  CLD, npo at midnight      #Coumadin coagulopathy  Hold coumadin     #Ao stenosis s/p bioprosthetic valve     #Paroxysmal A. Fib  Cont. Rate control  Hold coumadin  Given recurrent bleeds should be evaluated for watchman  Sees [employer] cards     #HFrEF, EF: 40-45% s/p AICD  Compensated  Hold aldactone, lasix for now  Monitor volume closely     #HTN  Hold ARB, cont BB     #DM2  insulin     #CKD IV  Cr stable     #Metabolic Acidosis  Due to CKD     #CAD s/p CABG  #HTN  #HLD  #MILES         Maikel Roper MD    Supplementary Documentation:     Quality:  DVT Mechanical Prophylaxis:   SCDs,    DVT Pharmacologic Prophylaxis   Medication   None                Code Status: Full Code  Pike: No urinary catheter in place  Pike Duration (in days):   Central line:    TAMIKA:     Discharge is dependent on: GI eval   At this point Mr. Haji is expected to be discharge to: Home    The 21st Century Cures  Act makes medical notes like these available to patients in the interest of transparency. Please be advised this is a medical document. Medical documents are intended to carry relevant information, facts as evident, and the clinical opinion of the practitioner. The medical note is intended as peer to peer communication and may appear blunt or direct. It is written in medical language and may contain abbreviations or verbiage that are unfamiliar.              **Certification      PHYSICIAN Certification of Need for Inpatient Hospitalization - Initial Certification    Patient will require inpatient services that will reasonably be expected to span two midnight's based on the clinical documentation in H+P.   Based on patients current state of illness, I anticipate that, after discharge, patient will require TBD.

## 2025-04-02 ENCOUNTER — ANESTHESIA EVENT (OUTPATIENT)
Dept: ENDOSCOPY | Facility: HOSPITAL | Age: 85
End: 2025-04-02
Payer: MEDICARE

## 2025-04-02 ENCOUNTER — ANESTHESIA (OUTPATIENT)
Dept: ENDOSCOPY | Facility: HOSPITAL | Age: 85
End: 2025-04-02
Payer: MEDICARE

## 2025-04-02 LAB
ANION GAP SERPL CALC-SCNC: 9 MMOL/L (ref 0–18)
BASOPHILS # BLD AUTO: 0.03 X10(3) UL (ref 0–0.2)
BASOPHILS NFR BLD AUTO: 0.6 %
BUN BLD-MCNC: 32 MG/DL (ref 9–23)
CALCIUM BLD-MCNC: 9.1 MG/DL (ref 8.7–10.6)
CHLORIDE SERPL-SCNC: 107 MMOL/L (ref 98–112)
CO2 SERPL-SCNC: 24 MMOL/L (ref 21–32)
CREAT BLD-MCNC: 2.58 MG/DL
EGFRCR SERPLBLD CKD-EPI 2021: 24 ML/MIN/1.73M2 (ref 60–?)
EOSINOPHIL # BLD AUTO: 0.18 X10(3) UL (ref 0–0.7)
EOSINOPHIL NFR BLD AUTO: 3.4 %
ERYTHROCYTE [DISTWIDTH] IN BLOOD BY AUTOMATED COUNT: 19.9 %
GLUCOSE BLD-MCNC: 151 MG/DL (ref 70–99)
GLUCOSE BLD-MCNC: 166 MG/DL (ref 70–99)
GLUCOSE BLD-MCNC: 172 MG/DL (ref 70–99)
GLUCOSE BLD-MCNC: 200 MG/DL (ref 70–99)
GLUCOSE BLD-MCNC: 241 MG/DL (ref 70–99)
HCT VFR BLD AUTO: 21.9 %
HGB BLD-MCNC: 7.3 G/DL
HGB BLD-MCNC: 7.4 G/DL
HGB BLD-MCNC: 7.6 G/DL
HGB BLD-MCNC: 8.2 G/DL
IMM GRANULOCYTES # BLD AUTO: 0.03 X10(3) UL (ref 0–1)
IMM GRANULOCYTES NFR BLD: 0.6 %
LYMPHOCYTES # BLD AUTO: 0.79 X10(3) UL (ref 1–4)
LYMPHOCYTES NFR BLD AUTO: 14.7 %
MAGNESIUM SERPL-MCNC: 1.8 MG/DL (ref 1.6–2.6)
MCH RBC QN AUTO: 30.4 PG (ref 26–34)
MCHC RBC AUTO-ENTMCNC: 33.3 G/DL (ref 31–37)
MCV RBC AUTO: 91.3 FL
MONOCYTES # BLD AUTO: 0.71 X10(3) UL (ref 0.1–1)
MONOCYTES NFR BLD AUTO: 13.2 %
NEUTROPHILS # BLD AUTO: 3.62 X10 (3) UL (ref 1.5–7.7)
NEUTROPHILS # BLD AUTO: 3.62 X10(3) UL (ref 1.5–7.7)
NEUTROPHILS NFR BLD AUTO: 67.5 %
OSMOLALITY SERPL CALC.SUM OF ELEC: 300 MOSM/KG (ref 275–295)
PLATELET # BLD AUTO: 181 10(3)UL (ref 150–450)
POTASSIUM SERPL-SCNC: 4.4 MMOL/L (ref 3.5–5.1)
RBC # BLD AUTO: 2.4 X10(6)UL
SODIUM SERPL-SCNC: 140 MMOL/L (ref 136–145)
WBC # BLD AUTO: 5.4 X10(3) UL (ref 4–11)

## 2025-04-02 PROCEDURE — 0DJD8ZZ INSPECTION OF LOWER INTESTINAL TRACT, VIA NATURAL OR ARTIFICIAL OPENING ENDOSCOPIC: ICD-10-PCS | Performed by: STUDENT IN AN ORGANIZED HEALTH CARE EDUCATION/TRAINING PROGRAM

## 2025-04-02 PROCEDURE — 0DB68ZX EXCISION OF STOMACH, VIA NATURAL OR ARTIFICIAL OPENING ENDOSCOPIC, DIAGNOSTIC: ICD-10-PCS | Performed by: STUDENT IN AN ORGANIZED HEALTH CARE EDUCATION/TRAINING PROGRAM

## 2025-04-02 PROCEDURE — 0DJ07ZZ INSPECTION OF UPPER INTESTINAL TRACT, VIA NATURAL OR ARTIFICIAL OPENING: ICD-10-PCS | Performed by: STUDENT IN AN ORGANIZED HEALTH CARE EDUCATION/TRAINING PROGRAM

## 2025-04-02 PROCEDURE — 0DB88ZX EXCISION OF SMALL INTESTINE, VIA NATURAL OR ARTIFICIAL OPENING ENDOSCOPIC, DIAGNOSTIC: ICD-10-PCS | Performed by: STUDENT IN AN ORGANIZED HEALTH CARE EDUCATION/TRAINING PROGRAM

## 2025-04-02 PROCEDURE — 99232 SBSQ HOSP IP/OBS MODERATE 35: CPT | Performed by: HOSPITALIST

## 2025-04-02 RX ORDER — SODIUM CHLORIDE, SODIUM LACTATE, POTASSIUM CHLORIDE, CALCIUM CHLORIDE 600; 310; 30; 20 MG/100ML; MG/100ML; MG/100ML; MG/100ML
INJECTION, SOLUTION INTRAVENOUS CONTINUOUS
Status: DISCONTINUED | OUTPATIENT
Start: 2025-04-02 | End: 2025-04-03

## 2025-04-02 RX ORDER — LIDOCAINE HYDROCHLORIDE 10 MG/ML
INJECTION, SOLUTION EPIDURAL; INFILTRATION; INTRACAUDAL; PERINEURAL AS NEEDED
Status: DISCONTINUED | OUTPATIENT
Start: 2025-04-02 | End: 2025-04-02 | Stop reason: SURG

## 2025-04-02 RX ORDER — MAGNESIUM OXIDE 400 MG/1
400 TABLET ORAL ONCE
Status: COMPLETED | OUTPATIENT
Start: 2025-04-02 | End: 2025-04-02

## 2025-04-02 RX ORDER — SODIUM CHLORIDE 9 MG/ML
INJECTION, SOLUTION INTRAVENOUS CONTINUOUS PRN
Status: DISCONTINUED | OUTPATIENT
Start: 2025-04-02 | End: 2025-04-02 | Stop reason: SURG

## 2025-04-02 RX ORDER — BISACODYL 5 MG/1
5 TABLET, DELAYED RELEASE ORAL 2 TIMES DAILY
Status: DISCONTINUED | OUTPATIENT
Start: 2025-04-02 | End: 2025-04-05

## 2025-04-02 RX ORDER — SIMETHICONE 80 MG
80 TABLET,CHEWABLE ORAL
Status: DISPENSED | OUTPATIENT
Start: 2025-04-02 | End: 2025-04-03

## 2025-04-02 RX ORDER — NALOXONE HYDROCHLORIDE 0.4 MG/ML
0.08 INJECTION, SOLUTION INTRAMUSCULAR; INTRAVENOUS; SUBCUTANEOUS ONCE AS NEEDED
Status: DISCONTINUED | OUTPATIENT
Start: 2025-04-02 | End: 2025-04-02 | Stop reason: HOSPADM

## 2025-04-02 RX ADMIN — SODIUM CHLORIDE: 9 INJECTION, SOLUTION INTRAVENOUS at 09:57:00

## 2025-04-02 RX ADMIN — SODIUM CHLORIDE: 9 INJECTION, SOLUTION INTRAVENOUS at 09:33:00

## 2025-04-02 RX ADMIN — LIDOCAINE HYDROCHLORIDE 50 MG: 10 INJECTION, SOLUTION EPIDURAL; INFILTRATION; INTRACAUDAL; PERINEURAL at 09:37:00

## 2025-04-02 NOTE — PROGRESS NOTES
[facility]   part of [facility]     Hospitalist Progress Note     Kyler Haji Patient Status:  Inpatient    1940 MRN [id number]   [facility] 4NW-A Attending Blaine Reynolds MD   Hosp Day # 5 PCP Abhi Arellano MD     Chief Complaint: dark stools    Subjective:     Patient feeling well today.  Seen post procedure, disappointed about needing repeat colonoscopy tomorrow.  Denies pain, no n/v.  No new complaints.      Objective:    Review of Systems:   A comprehensive review of systems was completed; pertinent positive and negatives stated in subjective.    Vital signs:  Temp:  [97 °F (36.1 °C)-98.2 °F (36.8 °C)] 98.2 °F (36.8 °C)  Pulse:  [59-60] 59  Resp:  [13-20] 14  BP: (107-151)/(56-69) 137/62  SpO2:  [95 %-100 %] 96 %    Physical Exam:    General: No acute distress, pleasant   Respiratory: No wheezes, no rhonchi  Cardiovascular: S1, S2, regular rate and rhythm  Abdomen: Soft, Non-tender, non-distended, positive bowel sounds  Neuro: No new focal deficits.   Extremities: No edema    Diagnostic Data:    Labs:  Recent Labs   Lab 25  1445 25  0115 25  0551 25  0926 25  0554 25  0936 25  0344 25  1319 25  0529 25  0848 25  1718 25  0120 25  0544 25  1105   WBC 7.2 5.8  --  5.1  --   --   --   --   --  5.7  --   --   --  5.4  --    HGB 6.3* 6.2*   < > 7.1*   < >  --    < > 7.6*   < > 7.5* 7.7* 7.8* 8.2* 7.3* 7.4*   MCV 99.0 94.6  --  91.5  --   --   --   --   --  92.8  --   --   --  91.3  --    .0 199.0  --  193.0  --   --   --   --   --  183.0  --   --   --  181.0  --    INR  --  3.40*  --  3.45*  --  2.63*  --  1.89*  --   --  1.49*  --   --   --   --     < > = values in this interval not displayed.       Recent Labs   Lab 25  1445 25  0551 25  0936 25  0544   * 250* 107* 174* 151*   BUN 72* 71* 60* 50* 32*   CREATSERUM 3.94* 3.88*  3.49* 2.93* 2.58*   CA 9.6 9.3 9.5 9.6 9.1   ALB 4.4 4.2  --   --   --     142 145 143 140   K 5.2* 5.1 4.5 5.0 4.4    111 114* 111 107   CO2 18.0* 18.0* 19.0* 21.0 24.0   ALKPHO 73 63  --   --   --    AST 37* 31  --   --   --    ALT 34 31  --   --   --    BILT 0.3 0.3  --   --   --    TP 6.7 6.4  --   --   --        Estimated Glomerular Filtration Rate: 24 mL/min/1.73m2 (A) (result from lab).    No results for input(s): \"TROP\", \"TROPHS\", \"CK\" in the last 168 hours.    Recent Labs   Lab 03/30/25  0554 03/31/25  0344 04/01/25  0848   PTP 28.3* 21.9* 18.1*   INR 2.63* 1.89* 1.49*                  Microbiology    No results found for this visit on 03/28/25.      Imaging: Reviewed in Epic.    Medications:    polyethylene glycol-electrolyte  4,000 mL Oral Once    sodium ferric gluconate  125 mg Intravenous Daily    amiodarone  200 mg Oral Daily    allopurinol  100 mg Oral Daily    Metoprolol Succinate ER  200 mg Oral Daily    insulin aspart  1-68 Units Subcutaneous TID CC    insulin aspart  1-10 Units Subcutaneous TID AC and HS    pantoprazole  40 mg Intravenous Q12H    atorvastatin  40 mg Oral Nightly       Assessment & Plan:      #Symptomatic anemia /presumptive GIB  Dark BM though has been on PO iron  Occult blood +ve  IV PPI BID  Hg 6.2 -> 7.1 -> 7.8 -> 8.4 -> 7.4 post 1unit PRBC  GI following  Xray follow through reviewed   S/p EGD with capsule endoscopy 4/2  Repeat colonoscopy planned for tomorrow  CLD, npo at midnight      #Coumadin coagulopathy  Hold coumadin     #Ao stenosis s/p bioprosthetic valve     #Paroxysmal A. Fib  Cont. Rate control  Hold coumadin  Given recurrent bleeds should be evaluated for watchman  Sees [facility] cards     #HFrEF, EF: 40-45% s/p AICD  Compensated  Hold aldactone, lasix for now  Monitor volume closely     #HTN  Hold ARB, cont BB     #DM2  insulin     #CKD IV  Cr stable     #Metabolic Acidosis  Due to CKD     #CAD s/p CABG  #HTN  #HLD  #MILES         Maikel Roper,  MD    Supplementary Documentation:     Quality:  DVT Mechanical Prophylaxis:   SCDs,    DVT Pharmacologic Prophylaxis   Medication   None                Code Status: Full Code  Pike: No urinary catheter in place  Pike Duration (in days):   Central line:    TAMIKA:     Discharge is dependent on: GI eval   At this point Mr. Haji is expected to be discharge to: Home    The 21st Century Cures Act makes medical notes like these available to patients in the interest of transparency. Please be advised this is a medical document. Medical documents are intended to carry relevant information, facts as evident, and the clinical opinion of the practitioner. The medical note is intended as peer to peer communication and may appear blunt or direct. It is written in medical language and may contain abbreviations or verbiage that are unfamiliar.              **Certification      PHYSICIAN Certification of Need for Inpatient Hospitalization - Initial Certification    Patient will require inpatient services that will reasonably be expected to span two midnight's based on the clinical documentation in H+P.   Based on patients current state of illness, I anticipate that, after discharge, patient will require TBD.

## 2025-04-02 NOTE — OPERATIVE REPORT
Colonoscopy Operative Report  Patient Name: Kyler Haji  YOB: 1940  MRN: [id number]  Procedure: Colonoscopy   Pre-operative Diagnosis & Indication: Anemia  Post-operative Diagnosis: Inadequate bowel preparation, incomplete exam  Attending Endoscopist: Sacha Simmons M.D.  Informed Consent: Informed Consent:   The planned procedure(s), the explanation of the procedure, its expected benefits, the potential complications and risks, and the possible alternatives (including their benefits and risks) were discussed with the patient and/or patient's legal representation/power of  in full. The discussion of risks, not limited to but including bleeding, infection, perforation or tear in the gastrointestinal tract, adverse effects from anesthesia, and possible prolonged hospitalization, emergency surgery, risk of morbidity or mortality, and risk of missed lesion(s) were discussed with patient and/or patient's legal representation/power of .  The risks and benefits of not undergoing the procedure(s), and associated risk of potential missed or delay in diagnosis were also reviewed. Patient and/or patient's legal representation/power of . understands the missed rate of colonoscopy of polyps, lesions of 5-10% even in the best of circumstances and that although this is an accurate test, there are limitations to colonoscopy. Patient and/or patient's legal representation/power of  understood the proposed procedure(s), and elected to proceed with the procedure(s) with possible intervention and endotherapy (such as polypectomy, biopsy, control of bleeding, etc.) and its risks, benefits and alternatives and wish to proceed with procedure(s). Ample time was given to ask questions, and all questions answered in full to patient's and/or patient's legal representation/power of  satisfaction.   Physical Exam: Heart: regular rate and rhythm. No rubs, murmurs, or gallops. Lungs: Clear to  auscultation bilaterally. Abdomen: Soft, non-tender, non distended. Positive bowel sounds. No rebound tenderness, no guarding.   A TIME OUT WAS COMPLETED prior to the procedure to confirm the patient, procedure and complete endoscopy safety procedure.   Sedation: Monitored Anesthesia Care; ASA class per anesthesiology team Monitoring: Pulsoximetry, pulse, respirations, and blood pressure, vitals were monitored throughout the entire procedure under monitored anesthesia care.   Preparation Quality:  [place] Bowel Prep Score: Inadequate bowel preparation - solid/semi solid stool precluded visualization  Procedure: After achieving adequate sedation, and placing the patient in the left lateral decubitus position, a digital rectal examination was performed.  The lubricated tip of the  colonoscope was then introduced into the rectum and advanced to the transverse colon - at which point procedure was aborted due to solid/semi-solid stool. The endoscope was then carefully withdrawn from the patient.   CO2 was suctioned to the best of my ability, during withdrawal of the endoscope.  The endoscope was righted, and CO2 was suctioned from the colon to the best of my ability, as it was during withdrawn from the colon.  The endoscope was then removed from the patient.  The patient tolerated the procedure without apparent procedural complications.  The patient left the procedure room in stable condition for recovery.  Toleration: Patient tolerated procedure well   Complications: No immediate complications   Technical Difficulty:  The procedure was not technically difficult   Estimated Blood Loss: Minimal, less than 5mL of estimated blood loss.   Findings and Therapeutics:  Colon:    Inadequate bowel preparation, incomplete exam.  Despite lavage and suction, solid / semi solid stool precluded visualization.  Recommendations:    Post Colonoscopy/polypectomy precautions, watch for bleeding, infection, perforation, adverse drug  reactions.   Continue Bowel Preparation today, clear liquid diet, NPO at Midnight  Plan for colonoscopy tomorrow     Sacha Simmons MD  4/2/2025  10:01 AM      Addendum: I spoke to patient's spouse, Lelia, post procedure about above findings and results and management plan.

## 2025-04-02 NOTE — ANESTHESIA PREPROCEDURE EVALUATION
PRE-OP EVALUATION    Patient Name: Kyler Haji    Admit Diagnosis: Current use of long term anticoagulation [Z79.01]  Symptomatic anemia [D64.9]  Gastrointestinal hemorrhage, unspecified gastrointestinal hemorrhage type [K92.2]  Chronic kidney disease, unspecified CKD stage [N18.9]    Pre-op Diagnosis: INPATIENT    COLONOSCOPY    Anesthesia Procedure: COLONOSCOPY    Surgeons and Role:     * Sacha Simmons MD - Primary    Pre-op vitals reviewed.  Temp: 98.2 °F (36.8 °C)  Pulse: 59  Resp: 14  BP: 137/62  SpO2: 96 %  Body mass index is 27.62 kg/m².    Current medications reviewed.  Hospital Medications:   [COMPLETED] magnesium oxide (Mag-Ox) tab 400 mg  400 mg Oral Once    lactated ringers infusion   Intravenous Continuous    polyethylene glycol-electrolyte (Golytely) 236 g oral solution 4,000 mL  4,000 mL Oral Once    [COMPLETED] polyethylene glycol-electrolyte (Golytely) 236 g oral solution 2,000 mL  2,000 mL Oral 2 times per day on Tuesday Wednesday    [COMPLETED] magnesium oxide (Mag-Ox) tab 400 mg  400 mg Oral Once    sodium ferric gluconate (Ferrlecit) 125 mg in sodium chloride 0.9% 100mL IVPB premix  125 mg Intravenous Daily    [COMPLETED] pantoprazole (Protonix) 40 mg in sodium chloride 0.9% PF 10 mL IV push  40 mg Intravenous Once    amiodarone (Pacerone) tab 200 mg  200 mg Oral Daily    allopurinol (Zyloprim) tab 100 mg  100 mg Oral Daily    albuterol (Ventolin HFA) 108 (90 Base) MCG/ACT inhaler 2 puff  2 puff Inhalation Q6H PRN    metoprolol succinate ER (Toprol XL) 24 hr tab 200 mg  200 mg Oral Daily    acetaminophen (Tylenol Extra Strength) tab 1,000 mg  1,000 mg Oral Q4H PRN    melatonin tab 3 mg  3 mg Oral Nightly PRN    glycerin-hypromellose- (Artificial Tears) 0.2-0.2-1 % ophthalmic solution 1 drop  1 drop Both Eyes QID PRN    sodium chloride (Saline Mist) 0.65 % nasal solution 1 spray  1 spray Each Nare Q3H PRN    metoclopramide (Reglan) 5 mg/mL injection 5 mg  5 mg Intravenous Q8H PRN     insulin aspart (NovoLOG) 100 Units/mL FlexPen 1-68 Units  1-68 Units Subcutaneous TID CC    glucose (Dex4) 15 GM/59ML oral liquid 15 g  15 g Oral Q15 Min PRN    Or    glucose (Glutose) 40% oral gel 15 g  15 g Oral Q15 Min PRN    Or    glucose-vitamin C (Dex-4) chewable tab 4 tablet  4 tablet Oral Q15 Min PRN    Or    dextrose 50% injection 50 mL  50 mL Intravenous Q15 Min PRN    Or    glucose (Dex4) 15 GM/59ML oral liquid 30 g  30 g Oral Q15 Min PRN    Or    glucose (Glutose) 40% oral gel 30 g  30 g Oral Q15 Min PRN    Or    glucose-vitamin C (Dex-4) chewable tab 8 tablet  8 tablet Oral Q15 Min PRN    insulin aspart (NovoLOG) 100 Units/mL FlexPen 1-10 Units  1-10 Units Subcutaneous TID AC and HS    pantoprazole (Protonix) 40 mg in sodium chloride 0.9% PF 10 mL IV push  40 mg Intravenous Q12H    atorvastatin (Lipitor) tab 40 mg  40 mg Oral Nightly       Outpatient Medications:   Prescriptions Prior to Admission[1]    Allergies: Ace inhibitors      Anesthesia Evaluation    Patient summary reviewed.    Anesthetic Complications  (-) history of anesthetic complications         GI/Hepatic/Renal    Negative GI/hepatic/renal ROS.         (+) chronic renal disease and CRI                   Cardiovascular        Exercise tolerance: good     MET: >4      (+) hypertension   (+) hyperlipidemia  (+) CAD    (+) CABG/stent  (+) pacemaker/AICD  (+) valvular problems/murmurs (s/p AVR)     (+) dysrhythmias and atrial fibrillation  (+) CHF (EF 40%)                Endo/Other      (+) diabetes  type 2,                          Pulmonary                    (+) sleep apnea       Neuro/Psych    Negative neuro/psych ROS.                          Suspected lower GIB        Past Surgical History:   Procedure Laterality Date    Cabg  2/2012    CABG x 3    Cath transcatheter aortic valve replacement  2/2012    Colonoscopy  6/27/2014    Procedure: COLONOSCOPY;  Surgeon: Ton Oliveira MD;  Location:  ENDOSCOPY    Colonoscopy N/A 2/11/2025     Procedure: COLONOSCOPY;  Surgeon: Israel Martin MD;  Location:  ENDOSCOPY    Hernia surgery      Valve repair       Social History     Socioeconomic History    Marital status:    Tobacco Use    Smoking status: Former     Current packs/day: 0.00     Types: Cigarettes     Start date: 1955     Quit date: 1975     Years since quittin.8     Passive exposure: Never    Smokeless tobacco: Never   Vaping Use    Vaping status: Never Used   Substance and Sexual Activity    Alcohol use: Yes     Alcohol/week: 1.0 standard drink of alcohol     Types: 1 Standard drinks or equivalent per week     Comment: 8-10 drinks per week    Drug use: Never   Other Topics Concern    Caffeine Concern Yes    Exercise No     History   Drug Use Unknown     Available pre-op labs reviewed.  Lab Results   Component Value Date    WBC 5.4 2025    RBC 2.40 (L) 2025    HGB 7.4 (L) 2025    HCT 21.9 (L) 2025    MCV 91.3 2025    MCH 30.4 2025    MCHC 33.3 2025    RDW 19.9 2025    .0 2025     Lab Results   Component Value Date     2025    K 4.4 2025     2025    CO2 24.0 2025    BUN 32 (H) 2025    CREATSERUM 2.58 (H) 2025     (H) 2025    CA 9.1 2025     Lab Results   Component Value Date    INR 1.49 (H) 2025         Airway      Mallampati: II  Mouth opening: >3 FB  TM distance: > 6 cm   Cardiovascular    Cardiovascular exam normal.  Rhythm: regular  Rate: normal     Dental             Pulmonary    Pulmonary exam normal.                 Other findings              ASA: 3   Plan: MAC           Comment: ation of anesthesia for scheduled procedure  Plan/risks discussed with: patient                Present on Admission:  **None**             [1]   Medications Prior to Admission   Medication Sig Dispense Refill Last Dose/Taking    fexofenadine 180 MG Oral Tab Take 1 tablet (180 mg total) by mouth daily.    3/28/2025    cholecalciferol 50 MCG (2000 UT) Oral Cap Take 1 capsule (2,000 Units total) by mouth daily.   3/28/2025    ferrous sulfate 325 (65 FE) MG Oral Tab EC Take 1 tablet (325 mg total) by mouth 2 (two) times daily with meals. 60 tablet 0 3/28/2025    pantoprazole 40 MG Oral Tab EC Take 1 tablet (40 mg total) by mouth 2 (two) times daily before meals. 180 tablet 3 3/28/2025    aspirin 81 MG Oral Chew Tab Chew by mouth daily.   3/27/2025    atorvastatin 40 MG Oral Tab Take 1 tablet (40 mg total) by mouth daily. 90 tablet 3 3/27/2025    metFORMIN 500 MG Oral Tab Take 1 tablet (500 mg total) by mouth 2 (two) times daily with meals. 180 tablet 1 3/28/2025    losartan 25 MG Oral Tab Take 1 tablet (25 mg total) by mouth daily. 90 tablet 1 3/28/2025    glipiZIDE 5 MG Oral Tab Take 1 tablet (5 mg total) by mouth 2 (two) times daily before meals. 180 tablet 1 3/28/2025    METOPROLOL SUCCINATE  MG Oral Tablet 24 Hr TAKE 1 TABLET(200 MG) BY MOUTH DAILY 90 tablet 2 3/28/2025    furosemide 40 MG Oral Tab Take 1 tablet (40 mg total) by mouth every other day. 45 tablet 3 3/28/2025    spironolactone 25 MG Oral Tab Take 0.5 tablets (12.5 mg total) by mouth daily. 90 tablet 3 3/28/2025    allopurinol 100 MG Oral Tab Take 1 tablet (100 mg total) by mouth daily. 90 tablet 3 3/28/2025    warfarin 4 MG Oral Tab TAKE 1 TABLET BY MOUTH DAILY OR AS DIRECTED BY ANTICOAGULATION CLINIC (Patient taking differently: Take 1 tablet (4 mg total) by mouth daily. Takes 2mg Mon, Wed, Fri, takes 4 mg every other day(Tues,thur, sat, sun)) 90 tablet 4 3/27/2025    amiodarone 200 MG Oral Tab Take 1 tablet (200 mg total) by mouth daily. 90 tablet 3 3/28/2025    Vitamin C 500 MG Oral Tab Take 1 tablet (500 mg total) by mouth 2 (two) times daily. With iron 30 tablet 3     furosemide 20 MG Oral Tab TAKE 40MG ON EVEN DAYS, AND 20MG ON ODD DAYS 45 tablet 2

## 2025-04-02 NOTE — OPERATIVE REPORT
EGD Operative Report  Patient Name: Kyler Haji  YOB: 1940  MRN: [id number]  Procedure: Esophagogastroduodenoscopy (EGD)  with biopsies, deployment of video capsule   Pre-operative Diagnosis & Indication:   Anemia  Post-operative Diagnosis:  Normal visualized endoscopic exam  Deployment of video capsule in the small bowel  Attending Endoscopist: Sacha Simmons M.D.  Informed Consent: The planned procedure(s), the explanation of the procedure, its expected benefits, the potential complications and risks and possible alternatives and their benefits and risks were discussed with the patient or the patient's surrogate. The discussion of risks, not limited to but including bleeding, infection, perforation, adverse effects from anesthesia, need for emergency surgery/prolonged hospitalization,  cardiac arrhythmias,  and aspiration were discussed with patient.  Pt and/or surrogate understood the proposed procedure(s), its risks, benefits and alternatives and wish to proceed with procedure(s). All questions answered in full.  After all questions were answered to their satisfaction, a signed, informed, and witnessed consent was obtained.  Physical Exam: Heart: regular rate and rhythm. No rubs, murmurs, or gallops. Lungs: Clear to auscultation bilaterally. Abdomen: Soft, non-tender, non distended. No rebound tenderness, no guarding.   A TIME OUT WAS COMPLETED prior to the procedure to confirm the patient, procedure(s) and complete endoscopy safety procedure.  Sedation: Monitored Anesthesia Care; ASA class per anesthesiology team   Monitoring: Pulsoximetry, pulse, respirations, and blood pressure , vitals were monitored throughout the entire procedure under monitored anesthesia care.   Procedure: The patient was then brought to the endoscopy suite where his/her pulse, pulse oximetry and blood pressure were monitored. The patient was placed in the left lateral decubitus position and deep sedation was  administered. Once adequate sedation was achieved, a bite block was placed and a lubricated tip of an Olympus video upper endoscope was inserted through the oropharynx and gently manipulated through the esophagus into the stomach and the second portion of the duodenum. Upon withdrawal of the endoscope, careful visualization of the mucosa was performed. The endoscope was then withdrawn into the gastric antrum and placed in a retroflexed position.  The endoscope was then righted, and air was suctioned from the stomach.  The endoscope was then withdrawn from the patient, with careful visual inspection of the mucosa. The patient left the procedure room in stable condition for recovery. Findings and endotherapy as listed below  Toleration: Patient tolerated procedure well   Complications: No immediate complications   Technical Difficulty:  The procedure was not technically difficult   Estimated Blood Loss: Minimal, less than 5mL of estimated blood loss.   Findings and Therapeutics:  Esophagus:   The mucosa was normal.   There were no strictures or stenosis. GE junction traversed with endoscope without resistance.  The Z-line was irregular, appreciated at 42 cm from the incisors.    Stomach:    Normal stomach.   Endoscope was placed in a retroflexion view in the stomach. There was  no evidence of a hiatal hernia. Biopsies with cold forceps were obtained.  Duodenum:   The entire examined duodenum was normal.  Biopsies with cold forceps were obtained.   A video capsule was deployed within the lumen of the duodenum.  Photographic documentation obtained capturing this.  Recommendations:  Post EGD precautions, watch for bleeding, infection, perforation, adverse drug reactions   Follow-up biopsies  Avoid non-aspirin NSAIDs  Follow-up findings of small bowel capsule endoscopy  Proceed with colonoscopy today    Sacha Simmons MD  4/2/2025  9:45 AM

## 2025-04-02 NOTE — ANESTHESIA POSTPROCEDURE EVALUATION
[facility]    Kyler Haji Patient Status:  Inpatient   Age/Gender 84 year old male MRN [id number]   Location [facility] ENDOSCOPY PAIN CENTER Attending Maikel Roper MD   Hosp Day # 5 PCP Abhi Arellano MD       Anesthesia Post-op Note    ESOPHAGOGASTRODUODENOSCOPY (EGD), COLONOSCOPY, CAPSULE ENDOSCOPY    Procedure Summary       Date: 04/02/25 Room / Location:  ENDOSCOPY 02 /  ENDOSCOPY    Anesthesia Start: 0933 Anesthesia Stop:     Procedures:       ESOPHAGOGASTRODUODENOSCOPY (EGD), COLONOSCOPY, CAPSULE ENDOSCOPY      COLONOSCOPY      CAPSULE ENDOSCOPY Diagnosis: (Normal gastro, Incomplete Colonoscopy)    Surgeons: Sacha Simmons MD Anesthesiologist: Shivam Armas MD    Anesthesia Type: MAC ASA Status: 3            Anesthesia Type: MAC    Vitals Value Taken Time   /56 04/02/25 0958   Temp MAC 04/02/25 0958   Pulse 60 04/02/25 0958   Resp 16 04/02/25 0958   SpO2 97 04/02/25 0958           Patient Location: Endoscopy    Anesthesia Type: MAC    Airway Patency: patent    Postop Pain Control: adequate    Mental Status: mildly sedated but able to meaningfully participate in the post-anesthesia evaluation    Nausea/Vomiting: none    Cardiopulmonary/Hydration status: stable euvolemic    Complications: no apparent anesthesia related complications    Postop vital signs: stable    Dental Exam: Unchanged from Preop    Patient to be discharged from PACU when criteria met.

## 2025-04-02 NOTE — PLAN OF CARE
Pt is aaox4, dneies pain, had EGD today, colonoscopy will be done tomorrow, pt and wife  aware of plan of care, started the half of golytely.    Problem: HEMATOLOGIC - ADULT  Goal: Maintains hematologic stability  Description: INTERVENTIONS- Assess for signs and symptoms of bleeding or hemorrhage- Monitor labs and vital signs for trends- Administer supportive blood products/factors, fluids and medications as ordered and appropriate- Administer supportive blood products/factors as ordered and appropriate  Outcome: Progressing     Problem: GASTROINTESTINAL - ADULT  Goal: Maintains or returns to baseline bowel function  Description: INTERVENTIONS:- Assess bowel function- Maintain adequate hydration with IV or PO as ordered and tolerated- Evaluate effectiveness of GI medications- Encourage mobilization and activity- Obtain nutritional consult as needed- Establish a toileting routine/schedule- Consider collaborating with pharmacy to review patient's medication profile  Outcome: Progressing     Problem: SAFETY ADULT - FALL  Goal: Free from fall injury  Description: INTERVENTIONS:- Assess pt frequently for physical needs- Identify cognitive and physical deficits and behaviors that affect risk of falls.- [place] fall precautions as indicated by assessment.- Educate pt/family on patient safety including physical limitations- Instruct pt to call for assistance with activity based on assessment- Modify environment to reduce risk of injury- Provide assistive devices as appropriate- Consider OT/PT consult to assist with strengthening/mobility- Encourage toileting schedule  Outcome: Progressing

## 2025-04-02 NOTE — PLAN OF CARE
Received patient on bed alert and oriented x4. Afebrile. No shortness of breath noted, on room air. Denies any pain and discomforts. No active bleeding noted. Scheduled meds given and tolerated. Prep started for EGD, colonoscopy, capsule endoscopy tomorrow. Call light within reach. Safety precautions in place. All needs attended. Continue monitor.

## 2025-04-03 ENCOUNTER — ANESTHESIA (OUTPATIENT)
Dept: ENDOSCOPY | Facility: HOSPITAL | Age: 85
End: 2025-04-03
Payer: MEDICARE

## 2025-04-03 LAB
ANION GAP SERPL CALC-SCNC: 13 MMOL/L (ref 0–18)
BASOPHILS # BLD AUTO: 0.02 X10(3) UL (ref 0–0.2)
BASOPHILS NFR BLD AUTO: 0.3 %
BUN BLD-MCNC: 27 MG/DL (ref 9–23)
CALCIUM BLD-MCNC: 9 MG/DL (ref 8.7–10.6)
CHLORIDE SERPL-SCNC: 107 MMOL/L (ref 98–112)
CO2 SERPL-SCNC: 23 MMOL/L (ref 21–32)
CREAT BLD-MCNC: 2.49 MG/DL
EGFRCR SERPLBLD CKD-EPI 2021: 25 ML/MIN/1.73M2 (ref 60–?)
EOSINOPHIL # BLD AUTO: 0.01 X10(3) UL (ref 0–0.7)
EOSINOPHIL NFR BLD AUTO: 0.2 %
ERYTHROCYTE [DISTWIDTH] IN BLOOD BY AUTOMATED COUNT: 20.1 %
GLUCOSE BLD-MCNC: 173 MG/DL (ref 70–99)
GLUCOSE BLD-MCNC: 176 MG/DL (ref 70–99)
GLUCOSE BLD-MCNC: 207 MG/DL (ref 70–99)
GLUCOSE BLD-MCNC: 210 MG/DL (ref 70–99)
GLUCOSE BLD-MCNC: 265 MG/DL (ref 70–99)
HCT VFR BLD AUTO: 22.4 %
HGB BLD-MCNC: 7.2 G/DL
HGB BLD-MCNC: 7.2 G/DL
HGB BLD-MCNC: 7.4 G/DL
HGB BLD-MCNC: 8.1 G/DL
IMM GRANULOCYTES # BLD AUTO: 0.03 X10(3) UL (ref 0–1)
IMM GRANULOCYTES NFR BLD: 0.5 %
IRON SATN MFR SERPL: 31 %
IRON SERPL-MCNC: 98 UG/DL
LYMPHOCYTES # BLD AUTO: 0.7 X10(3) UL (ref 1–4)
LYMPHOCYTES NFR BLD AUTO: 11.3 %
MAGNESIUM SERPL-MCNC: 1.7 MG/DL (ref 1.6–2.6)
MCH RBC QN AUTO: 30.3 PG (ref 26–34)
MCHC RBC AUTO-ENTMCNC: 33 G/DL (ref 31–37)
MCV RBC AUTO: 91.8 FL
MONOCYTES # BLD AUTO: 0.55 X10(3) UL (ref 0.1–1)
MONOCYTES NFR BLD AUTO: 8.9 %
NEUTROPHILS # BLD AUTO: 4.88 X10 (3) UL (ref 1.5–7.7)
NEUTROPHILS # BLD AUTO: 4.88 X10(3) UL (ref 1.5–7.7)
NEUTROPHILS NFR BLD AUTO: 78.8 %
OSMOLALITY SERPL CALC.SUM OF ELEC: 305 MOSM/KG (ref 275–295)
PLATELET # BLD AUTO: 181 10(3)UL (ref 150–450)
POTASSIUM SERPL-SCNC: 3.5 MMOL/L (ref 3.5–5.1)
RBC # BLD AUTO: 2.44 X10(6)UL
SODIUM SERPL-SCNC: 143 MMOL/L (ref 136–145)
TOTAL IRON BINDING CAPACITY: 318 UG/DL (ref 250–425)
TRANSFERRIN SERPL-MCNC: 238 MG/DL (ref 215–365)
WBC # BLD AUTO: 6.2 X10(3) UL (ref 4–11)

## 2025-04-03 PROCEDURE — 0DJD8ZZ INSPECTION OF LOWER INTESTINAL TRACT, VIA NATURAL OR ARTIFICIAL OPENING ENDOSCOPIC: ICD-10-PCS | Performed by: STUDENT IN AN ORGANIZED HEALTH CARE EDUCATION/TRAINING PROGRAM

## 2025-04-03 PROCEDURE — 99233 SBSQ HOSP IP/OBS HIGH 50: CPT | Performed by: HOSPITALIST

## 2025-04-03 RX ORDER — ONDANSETRON 2 MG/ML
4 INJECTION INTRAMUSCULAR; INTRAVENOUS EVERY 6 HOURS PRN
Status: DISCONTINUED | OUTPATIENT
Start: 2025-04-03 | End: 2025-04-03 | Stop reason: HOSPADM

## 2025-04-03 RX ORDER — HYDROMORPHONE HYDROCHLORIDE 1 MG/ML
0.4 INJECTION, SOLUTION INTRAMUSCULAR; INTRAVENOUS; SUBCUTANEOUS EVERY 5 MIN PRN
Status: DISCONTINUED | OUTPATIENT
Start: 2025-04-03 | End: 2025-04-03 | Stop reason: HOSPADM

## 2025-04-03 RX ORDER — SODIUM CHLORIDE, SODIUM LACTATE, POTASSIUM CHLORIDE, CALCIUM CHLORIDE 600; 310; 30; 20 MG/100ML; MG/100ML; MG/100ML; MG/100ML
INJECTION, SOLUTION INTRAVENOUS CONTINUOUS
Status: DISCONTINUED | OUTPATIENT
Start: 2025-04-03 | End: 2025-04-03

## 2025-04-03 RX ORDER — NALOXONE HYDROCHLORIDE 0.4 MG/ML
0.08 INJECTION, SOLUTION INTRAMUSCULAR; INTRAVENOUS; SUBCUTANEOUS AS NEEDED
Status: DISCONTINUED | OUTPATIENT
Start: 2025-04-03 | End: 2025-04-03 | Stop reason: HOSPADM

## 2025-04-03 RX ORDER — HYDROMORPHONE HYDROCHLORIDE 1 MG/ML
0.6 INJECTION, SOLUTION INTRAMUSCULAR; INTRAVENOUS; SUBCUTANEOUS EVERY 5 MIN PRN
Status: DISCONTINUED | OUTPATIENT
Start: 2025-04-03 | End: 2025-04-03 | Stop reason: HOSPADM

## 2025-04-03 RX ORDER — LIDOCAINE HYDROCHLORIDE 10 MG/ML
INJECTION, SOLUTION EPIDURAL; INFILTRATION; INTRACAUDAL; PERINEURAL AS NEEDED
Status: DISCONTINUED | OUTPATIENT
Start: 2025-04-03 | End: 2025-04-03 | Stop reason: SURG

## 2025-04-03 RX ORDER — SODIUM CHLORIDE, SODIUM LACTATE, POTASSIUM CHLORIDE, CALCIUM CHLORIDE 600; 310; 30; 20 MG/100ML; MG/100ML; MG/100ML; MG/100ML
INJECTION, SOLUTION INTRAVENOUS CONTINUOUS PRN
Status: DISCONTINUED | OUTPATIENT
Start: 2025-04-03 | End: 2025-04-03 | Stop reason: SURG

## 2025-04-03 RX ORDER — METOCLOPRAMIDE HYDROCHLORIDE 5 MG/ML
5 INJECTION INTRAMUSCULAR; INTRAVENOUS EVERY 8 HOURS PRN
Status: DISCONTINUED | OUTPATIENT
Start: 2025-04-03 | End: 2025-04-03 | Stop reason: HOSPADM

## 2025-04-03 RX ORDER — MAGNESIUM OXIDE 400 MG/1
400 TABLET ORAL ONCE
Status: COMPLETED | OUTPATIENT
Start: 2025-04-03 | End: 2025-04-03

## 2025-04-03 RX ORDER — HYDROMORPHONE HYDROCHLORIDE 1 MG/ML
0.2 INJECTION, SOLUTION INTRAMUSCULAR; INTRAVENOUS; SUBCUTANEOUS EVERY 5 MIN PRN
Status: DISCONTINUED | OUTPATIENT
Start: 2025-04-03 | End: 2025-04-03 | Stop reason: HOSPADM

## 2025-04-03 RX ADMIN — SODIUM CHLORIDE, SODIUM LACTATE, POTASSIUM CHLORIDE, CALCIUM CHLORIDE: 600; 310; 30; 20 INJECTION, SOLUTION INTRAVENOUS at 07:59:00

## 2025-04-03 RX ADMIN — SODIUM CHLORIDE, SODIUM LACTATE, POTASSIUM CHLORIDE, CALCIUM CHLORIDE: 600; 310; 30; 20 INJECTION, SOLUTION INTRAVENOUS at 08:30:00

## 2025-04-03 RX ADMIN — LIDOCAINE HYDROCHLORIDE 25 MG: 10 INJECTION, SOLUTION EPIDURAL; INFILTRATION; INTRACAUDAL; PERINEURAL at 08:01:00

## 2025-04-03 NOTE — OPERATIVE REPORT
Colonoscopy Operative Report  Patient Name: Kyler Haji  YOB: 1940  MRN: [id number]  Procedure: Colonoscopy   Pre-operative Diagnosis & Indication: Anemia   Post-operative Diagnosis: Inadequate bowel preparation, incomplete exam  Attending Endoscopist: Sacha Simmons M.D.  Informed Consent: Informed Consent:   The planned procedure(s), the explanation of the procedure, its expected benefits, the potential complications and risks, and the possible alternatives (including their benefits and risks) were discussed with the patient and/or patient's legal representation/power of  in full. The discussion of risks, not limited to but including bleeding, infection, perforation or tear in the gastrointestinal tract, adverse effects from anesthesia, and possible prolonged hospitalization, emergency surgery, risk of morbidity or mortality, and risk of missed lesion(s) were discussed with patient and/or patient's legal representation/power of .  The risks and benefits of not undergoing the procedure(s), and associated risk of potential missed or delay in diagnosis were also reviewed. Patient and/or patient's legal representation/power of . understands the missed rate of colonoscopy of polyps, lesions of 5-10% even in the best of circumstances and that although this is an accurate test, there are limitations to colonoscopy. Patient and/or patient's legal representation/power of  understood the proposed procedure(s), and elected to proceed with the procedure(s) with possible intervention and endotherapy (such as polypectomy, biopsy, control of bleeding, etc.) and its risks, benefits and alternatives and wish to proceed with procedure(s). Ample time was given to ask questions, and all questions answered in full to patient's and/or patient's legal representation/power of  satisfaction.   Physical Exam: Heart: regular rate and rhythm. No rubs, murmurs, or gallops. Lungs: Clear  to auscultation bilaterally. Abdomen: Soft, non-tender, non distended. Positive bowel sounds. No rebound tenderness, no guarding.   A TIME OUT WAS COMPLETED prior to the procedure to confirm the patient, procedure and complete endoscopy safety procedure.   Sedation: Monitored Anesthesia Care; ASA class per anesthesiology team Monitoring: Pulsoximetry, pulse, respirations, and blood pressure, vitals were monitored throughout the entire procedure under monitored anesthesia care.   Preparation Quality:  [place] Bowel Prep Score: Inadequate bowel preparation - solid/semi solid stool precluded visualization  Procedure: After achieving adequate sedation, and placing the patient in the left lateral decubitus position, a digital rectal examination was performed.  The lubricated tip of the  colonoscope was then introduced into the rectum and advanced to the transverse - at which point procedure was aborted due to solid/semi-solid stool. The endoscope was then carefully withdrawn from the patient.   CO2 was suctioned to the best of my ability, during withdrawal of the endoscope.  The endoscope was righted, and CO2 was suctioned from the colon to the best of my ability, as it was during withdrawn from the colon.  The endoscope was then removed from the patient.  The patient tolerated the procedure without apparent procedural complications.  The patient left the procedure room in stable condition for recovery.  Toleration: Patient tolerated procedure well   Complications: No immediate complications   Technical Difficulty:  The procedure was not technically difficult   Estimated Blood Loss: Minimal, less than 5mL of estimated blood loss.   Findings and Therapeutics:  Colon:    Inadequate bowel preparation, incomplete exam.  Despite lavage and suction, solid / semi solid stool precluded visualization.  No gross/overt blood visualized in the limited portion of the exam  Recommendations:    Post Colonoscopy/polypectomy precautions,  watch for bleeding, infection, perforation, adverse drug reactions.   Follow up on results on capsule endoscopy (that was placed yesterday, 4/2/25)   Follow up in GI in 2-3 weeks  with Dr Maloney or NP   Discuss role for repeat colonoscopy with extended preparation, although need to discuss risks of procedure in setting of age/co-morbid conditions vs. Benefit, as patient just had complete ileocolonoscopy in 2/11/2025  IV iron, Check CBC, transfuse pRBC if Hg < 7 per primary team    GI will signoff, please page with questions   Post procedure, I discussed above findings/management and plan with patient's spouse, Lelia.     Sacha Simmons MD  4/3/2025  8:20 AM

## 2025-04-03 NOTE — CM/SW NOTE
04/03/25 0900   Discharge disposition   Expected discharge disposition Home or Self     Pt ok to dc today back to [place]- GI was still unable to do colonoscopy today due to pt still not being cleared of zuleima for exam.  Plan to follow up with GI as OP.    Claudine Hinson LCSW  /Discharge Planner

## 2025-04-03 NOTE — OPERATIVE REPORT
Video Capsule Report    Physician: Sacha Simmons MD  Referring Physician: Dr Roper  Date of Service: 4/2/25    Clinical Information  Indication: anemia, melena   Date of Colonoscopy: 4/2/25  Date of EGD: 4/2/25      Landmarks  First Gastric Image:   Capsule deployed in small intestine at time of EGD  First Duodenal Image:  Start of exam, capsule deployed in small intestine / duodenum  First Cecal Image:   Incomplete exam, stool in latter half of the exam     Findings  Overt GI bleeding in the proximal part of the small intestine/jejunum, and overt blood visualized. Likely culprit lesion visualized  -incomplete exam in later half of exam, given stool. unable to clearly visualize cecum         Impression  Bleeding lesion / overt blood     Recommendations  Plan for push enteroscopy tomorrow 4/4/25 as patient with PO intake - sooner if clinical changes or dropping Hg   NPO at MN  PPI IV q12 hours  Check CBC, transfuse pRBC if Hg < 7    D/w primary team Dr Roper and patient's spouse, Lelia       Sacha Simmons MD, 04/03/25, 12:38 PM  [facility]

## 2025-04-03 NOTE — PROGRESS NOTES
[facility]   part of [facility]     Hospitalist Progress Note     Kyler Haji Patient Status:  Inpatient    1940 MRN [id number]   [facility] 4NW-A Attending Blaine Reynolds MD   Hosp Day # 6 PCP Abhi Arellano MD     Chief Complaint: dark stools    Subjective:     Patient is doing well post procedure.  He denies fever, chills, n/v.  No cp, no new complaints.      Objective:    Review of Systems:   A comprehensive review of systems was completed; pertinent positive and negatives stated in subjective.    Vital signs:  Temp:  [97.3 °F (36.3 °C)-98.7 °F (37.1 °C)] 97.3 °F (36.3 °C)  Pulse:  [59-66] 60  Resp:  [13-18] 18  BP: ()/(40-68) 91/43  SpO2:  [92 %-100 %] 98 %    Physical Exam:    General: No acute distress, pleasant   Respiratory: No wheezes, no rhonchi  Cardiovascular: S1, S2, regular rate and rhythm  Abdomen: Soft, Non-tender, non-distended, positive bowel sounds  Neuro: No new focal deficits.   Extremities: No edema    Diagnostic Data:    Labs:  Recent Labs   Lab 25  0115 25  0551 25  0926 25  0554 25  0936 25  0344 25  1319 25  0529 25  0848 25  1718 25  0544 25  1105 25  1659 25  0042 25  0553 25  0934   WBC 5.8  --  5.1  --   --   --   --   --  5.7  --   --  5.4  --   --   --  6.2  --    HGB 6.2*   < > 7.1*   < >  --    < > 7.6*   < > 7.5* 7.7*   < > 7.3* 7.4* 7.6* 8.1* 7.4* 7.2*   MCV 94.6  --  91.5  --   --   --   --   --  92.8  --   --  91.3  --   --   --  91.8  --    .0  --  193.0  --   --   --   --   --  183.0  --   --  181.0  --   --   --  181.0  --    INR 3.40*  --  3.45*  --  2.63*  --  1.89*  --   --  1.49*  --   --   --   --   --   --   --     < > = values in this interval not displayed.       Recent Labs   Lab 25  1445 25  0551 25  0936 25  0544 25  0553   * 250*   < > 174* 151* 176*   BUN  72* 71*   < > 50* 32* 27*   CREATSERUM 3.94* 3.88*   < > 2.93* 2.58* 2.49*   CA 9.6 9.3   < > 9.6 9.1 9.0   ALB 4.4 4.2  --   --   --   --     142   < > 143 140 143   K 5.2* 5.1   < > 5.0 4.4 3.5    111   < > 111 107 107   CO2 18.0* 18.0*   < > 21.0 24.0 23.0   ALKPHO 73 63  --   --   --   --    AST 37* 31  --   --   --   --    ALT 34 31  --   --   --   --    BILT 0.3 0.3  --   --   --   --    TP 6.7 6.4  --   --   --   --     < > = values in this interval not displayed.       Estimated Glomerular Filtration Rate: 25 mL/min/1.73m2 (A) (result from lab).    No results for input(s): \"TROP\", \"TROPHS\", \"CK\" in the last 168 hours.    Recent Labs   Lab 03/30/25  0554 03/31/25  0344 04/01/25  0848   PTP 28.3* 21.9* 18.1*   INR 2.63* 1.89* 1.49*                  Microbiology    No results found for this visit on 03/28/25.      Imaging: Reviewed in Epic.    Medications:    bisacodyl  5 mg Oral BID    simethicone  80 mg Oral TID CC and HS    amiodarone  200 mg Oral Daily    allopurinol  100 mg Oral Daily    Metoprolol Succinate ER  200 mg Oral Daily    insulin aspart  1-68 Units Subcutaneous TID CC    insulin aspart  1-10 Units Subcutaneous TID AC and HS    pantoprazole  40 mg Intravenous Q12H    atorvastatin  40 mg Oral Nightly       Assessment & Plan:      #Symptomatic anemia /presumptive GIB  Dark BM though has been on PO iron  Occult blood +ve  IV PPI BID  Hg 6.2 -> 7.1 -> 7.8 -> 8.4 -> 7.4 -> 7.2   Post 1unit PRBC on admission   GI following  Xray follow through reviewed   S/p EGD with capsule endoscopy 4/2  Colonoscopy on 4/3  Discussed with GI - patient had abnormal findings on capsule endoscopy, will plan for push enteroscopy tomorrow     #Coumadin coagulopathy  Hold coumadin     #Ao stenosis s/p bioprosthetic valve     #Paroxysmal A. Fib  Cont. Rate control  Hold coumadin  Given recurrent bleeds should be evaluated for watchman  Sees [facility] cards     #HFrEF, EF: 40-45% s/p AICD  Compensated  Hold aldactone,  lasix for now  Monitor volume closely     #HTN  Hold ARB, cont BB     #DM2  insulin     #CKD IV  Cr stable     #Metabolic Acidosis  Due to CKD     #CAD s/p CABG  #HTN  #HLD  #MILES         Maikel Roper MD    Supplementary Documentation:     Quality:  DVT Mechanical Prophylaxis:   SCDs,    DVT Pharmacologic Prophylaxis   Medication   None                Code Status: Full Code  Pike: No urinary catheter in place  Pike Duration (in days):   Central line:    TAMIKA: 4/3/2025    Discharge is dependent on: GI eval   At this point Mr. Haji is expected to be discharge to: Home    The 21st Century Cures Act makes medical notes like these available to patients in the interest of transparency. Please be advised this is a medical document. Medical documents are intended to carry relevant information, facts as evident, and the clinical opinion of the practitioner. The medical note is intended as peer to peer communication and may appear blunt or direct. It is written in medical language and may contain abbreviations or verbiage that are unfamiliar.              **Certification      PHYSICIAN Certification of Need for Inpatient Hospitalization - Initial Certification    Patient will require inpatient services that will reasonably be expected to span two midnight's based on the clinical documentation in H+P.   Based on patients current state of illness, I anticipate that, after discharge, patient will require TBD.

## 2025-04-03 NOTE — DIETARY NOTE
[facility]   part of [facility]   CLINICAL NUTRITION    Kyler Haji     Admitting diagnosis:  Current use of long term anticoagulation [Z79.01]  Symptomatic anemia [D64.9]  Gastrointestinal hemorrhage, unspecified gastrointestinal hemorrhage type [K92.2]  Chronic kidney disease, unspecified CKD stage [N18.9]    Ht:  5'11\"  Wt: 89.8 kg (198 lb).   Body mass index is 27.62 kg/m².  IBW: 78.2 kg    Wt Readings from Last 6 Encounters:   03/28/25 89.8 kg (198 lb)   03/28/25 89.8 kg (198 lb)   02/25/25 90.8 kg (200 lb 1.6 oz)   02/14/25 90.7 kg (200 lb)   02/07/25 88.9 kg (196 lb)   01/20/25 90.7 kg (200 lb)        Pmhx - HTN, HLD, CKD, recurrent iron deficiency anemia, DM    Labs/Meds reviewed    Diet:       Procedures    Carbohydrate controlled diet 1800 kcal/60 grams; Is Patient on Accuchecks? Yes; Misc Restriction: Low Fiber/Soft     Percent Meals Eaten (last 3 days)       Date/Time Percent Meals Eaten (%)    03/31/25 1410 100 %    03/31/25 1705 100 %    04/01/25 1000 100 %    04/01/25 1400 100 %    04/01/25 1704 100 %    04/03/25 1100 100 %          04/03/25 83 yo male. Presented with fatigue and lightheadedness. Admitted on 3/28 with symptomatic anemia. Chart reviewed for LOS. Noted patient has been on a diet on and off during admission. Visited patient in room, reports good appetite and no unintentional weight loss. Is aware that PO intake during admission has been affected by tests. Agreeable to ONS to supplement during admission, ONS not indicated after discharge.     Tests/Procedures During admission-   3/31 Small bowel follow through - no overt GI bleeding.  4/2 EGD with biopsies, video capsule deployment. Unable to complete colonoscopy due to inadequate bowel prep.  4/3 Colonoscopy: inadequate bowel prep, incomplete exam.     Patient is at low nutrition risk at this time. Please consult if patient status changes or nutrition issues arise.    Montserrat Constantino RDN, LDN, CDCES  Clinical  Dietitian   [id number]

## 2025-04-03 NOTE — PROGRESS NOTES
Pt is alert and oriented x4. Pt finished egd capsule and is taking golyte prep. Pt has no complaints of pain. Pt has ivf running. Pt npo at midnight. Call light in reach and safety measures in place.

## 2025-04-03 NOTE — PLAN OF CARE
Problem: HEMATOLOGIC - ADULT  Goal: Maintains hematologic stability  Description: INTERVENTIONS- Assess for signs and symptoms of bleeding or hemorrhage- Monitor labs and vital signs for trends- Administer supportive blood products/factors, fluids and medications as ordered and appropriate- Administer supportive blood products/factors as ordered and appropriate  Outcome: Progressing     Problem: GASTROINTESTINAL - ADULT  Goal: Maintains or returns to baseline bowel function  Description: INTERVENTIONS:- Assess bowel function- Maintain adequate hydration with IV or PO as ordered and tolerated- Evaluate effectiveness of GI medications- Encourage mobilization and activity- Obtain nutritional consult as needed- Establish a toileting routine/schedule- Consider collaborating with pharmacy to review patient's medication profile  Outcome: Progressing     Problem: SAFETY ADULT - FALL  Goal: Free from fall injury  Description: INTERVENTIONS:- Assess pt frequently for physical needs- Identify cognitive and physical deficits and behaviors that affect risk of falls.- [place] fall precautions as indicated by assessment.- Educate pt/family on patient safety including physical limitations- Instruct pt to call for assistance with activity based on assessment- Modify environment to reduce risk of injury- Provide assistive devices as appropriate- Consider OT/PT consult to assist with strengthening/mobility- Encourage toileting schedule  Outcome: Progressing

## 2025-04-03 NOTE — ANESTHESIA POSTPROCEDURE EVALUATION
[facility]    Kyler aHji Patient Status:  Inpatient   Age/Gender 84 year old male MRN [id number]   Location [facility] Attending Maikel Roper MD   Hosp Day # 6 PCP Abhi Arellano MD       Anesthesia Post-op Note    COLONOSCOPY    Procedure Summary       Date: 04/03/25 Room / Location:  ENDOSCOPY 01 / [facility] END[facility]COPY    Anesthesia Start: 0758 Anesthesia Stop:     Procedure: COLONOSCOPY Diagnosis: (poor bowel prep)    Surgeons: Sacha Simmons MD Anesthesiologist: Shamar Crowder MD    Anesthesia Type: MAC ASA Status: 3            Anesthesia Type: MAC    Vitals Value Taken Time   /50 04/03/25 0829   Temp 97.3 °F (36.3 °C) 04/03/25 0820   Pulse 60 04/03/25 0830   Resp 15 04/03/25 0830   SpO2 93 % 04/03/25 0830   Vitals shown include unfiled device data.        Patient Location: Endoscopy    Anesthesia Type: MAC    Airway Patency: patent and extubated    Postop Pain Control: adequate    Mental Status: mildly sedated but able to meaningfully participate in the post-anesthesia evaluation    Nausea/Vomiting: none    Cardiopulmonary/Hydration status: stable euvolemic    Complications: no apparent anesthesia related complications    Postop vital signs: stable    Dental Exam: Unchanged from Preop

## 2025-04-04 ENCOUNTER — ANESTHESIA (OUTPATIENT)
Dept: ENDOSCOPY | Facility: HOSPITAL | Age: 85
End: 2025-04-04
Payer: MEDICARE

## 2025-04-04 ENCOUNTER — ANESTHESIA EVENT (OUTPATIENT)
Dept: ENDOSCOPY | Facility: HOSPITAL | Age: 85
End: 2025-04-04
Payer: MEDICARE

## 2025-04-04 LAB
ANION GAP SERPL CALC-SCNC: 10 MMOL/L (ref 0–18)
ANTIBODY SCREEN: NEGATIVE
BASOPHILS # BLD AUTO: 0.02 X10(3) UL (ref 0–0.2)
BASOPHILS NFR BLD AUTO: 0.4 %
BUN BLD-MCNC: 30 MG/DL (ref 9–23)
CALCIUM BLD-MCNC: 8.8 MG/DL (ref 8.7–10.6)
CHLORIDE SERPL-SCNC: 109 MMOL/L (ref 98–112)
CO2 SERPL-SCNC: 24 MMOL/L (ref 21–32)
CREAT BLD-MCNC: 2.7 MG/DL
EGFRCR SERPLBLD CKD-EPI 2021: 22 ML/MIN/1.73M2 (ref 60–?)
EOSINOPHIL # BLD AUTO: 0.18 X10(3) UL (ref 0–0.7)
EOSINOPHIL NFR BLD AUTO: 3.2 %
ERYTHROCYTE [DISTWIDTH] IN BLOOD BY AUTOMATED COUNT: 20.4 %
GLUCOSE BLD-MCNC: 152 MG/DL (ref 70–99)
GLUCOSE BLD-MCNC: 176 MG/DL (ref 70–99)
GLUCOSE BLD-MCNC: 180 MG/DL (ref 70–99)
GLUCOSE BLD-MCNC: 231 MG/DL (ref 70–99)
GLUCOSE BLD-MCNC: 262 MG/DL (ref 70–99)
HCT VFR BLD AUTO: 20.6 %
HCT VFR BLD AUTO: 25.2 %
HGB BLD-MCNC: 6.7 G/DL
HGB BLD-MCNC: 8.4 G/DL
IMM GRANULOCYTES # BLD AUTO: 0.03 X10(3) UL (ref 0–1)
IMM GRANULOCYTES NFR BLD: 0.5 %
LYMPHOCYTES # BLD AUTO: 0.88 X10(3) UL (ref 1–4)
LYMPHOCYTES NFR BLD AUTO: 15.9 %
MAGNESIUM SERPL-MCNC: 1.8 MG/DL (ref 1.6–2.6)
MCH RBC QN AUTO: 30.5 PG (ref 26–34)
MCHC RBC AUTO-ENTMCNC: 32.5 G/DL (ref 31–37)
MCV RBC AUTO: 93.6 FL
MONOCYTES # BLD AUTO: 0.69 X10(3) UL (ref 0.1–1)
MONOCYTES NFR BLD AUTO: 12.5 %
NEUTROPHILS # BLD AUTO: 3.74 X10 (3) UL (ref 1.5–7.7)
NEUTROPHILS # BLD AUTO: 3.74 X10(3) UL (ref 1.5–7.7)
NEUTROPHILS NFR BLD AUTO: 67.5 %
OSMOLALITY SERPL CALC.SUM OF ELEC: 305 MOSM/KG (ref 275–295)
PLATELET # BLD AUTO: 156 10(3)UL (ref 150–450)
POTASSIUM SERPL-SCNC: 3.4 MMOL/L (ref 3.5–5.1)
RBC # BLD AUTO: 2.2 X10(6)UL
RH BLOOD TYPE: NEGATIVE
SODIUM SERPL-SCNC: 143 MMOL/L (ref 136–145)
WBC # BLD AUTO: 5.5 X10(3) UL (ref 4–11)

## 2025-04-04 PROCEDURE — 3E0G8GC INTRODUCTION OF OTHER THERAPEUTIC SUBSTANCE INTO UPPER GI, VIA NATURAL OR ARTIFICIAL OPENING ENDOSCOPIC: ICD-10-PCS | Performed by: STUDENT IN AN ORGANIZED HEALTH CARE EDUCATION/TRAINING PROGRAM

## 2025-04-04 PROCEDURE — 0W3P8ZZ CONTROL BLEEDING IN GASTROINTESTINAL TRACT, VIA NATURAL OR ARTIFICIAL OPENING ENDOSCOPIC: ICD-10-PCS | Performed by: STUDENT IN AN ORGANIZED HEALTH CARE EDUCATION/TRAINING PROGRAM

## 2025-04-04 PROCEDURE — 99232 SBSQ HOSP IP/OBS MODERATE 35: CPT | Performed by: HOSPITALIST

## 2025-04-04 DEVICE — REPLAY HEMOSTASIS CLIP, 11MM SPAN
Type: IMPLANTABLE DEVICE
Brand: REPLAY

## 2025-04-04 RX ORDER — SODIUM CHLORIDE, SODIUM LACTATE, POTASSIUM CHLORIDE, CALCIUM CHLORIDE 600; 310; 30; 20 MG/100ML; MG/100ML; MG/100ML; MG/100ML
INJECTION, SOLUTION INTRAVENOUS CONTINUOUS
Status: DISCONTINUED | OUTPATIENT
Start: 2025-04-04 | End: 2025-04-05

## 2025-04-04 RX ORDER — SODIUM CHLORIDE, SODIUM LACTATE, POTASSIUM CHLORIDE, CALCIUM CHLORIDE 600; 310; 30; 20 MG/100ML; MG/100ML; MG/100ML; MG/100ML
INJECTION, SOLUTION INTRAVENOUS CONTINUOUS PRN
Status: DISCONTINUED | OUTPATIENT
Start: 2025-04-04 | End: 2025-04-04 | Stop reason: SURG

## 2025-04-04 RX ORDER — ONDANSETRON 2 MG/ML
4 INJECTION INTRAMUSCULAR; INTRAVENOUS ONCE AS NEEDED
Status: DISCONTINUED | OUTPATIENT
Start: 2025-04-04 | End: 2025-04-04 | Stop reason: HOSPADM

## 2025-04-04 RX ORDER — SODIUM CHLORIDE 9 MG/ML
INJECTION, SOLUTION INTRAVENOUS ONCE
Status: DISCONTINUED | OUTPATIENT
Start: 2025-04-04 | End: 2025-04-05

## 2025-04-04 RX ORDER — LIDOCAINE HYDROCHLORIDE 10 MG/ML
INJECTION, SOLUTION EPIDURAL; INFILTRATION; INTRACAUDAL; PERINEURAL AS NEEDED
Status: DISCONTINUED | OUTPATIENT
Start: 2025-04-04 | End: 2025-04-04 | Stop reason: SURG

## 2025-04-04 RX ORDER — NALOXONE HYDROCHLORIDE 0.4 MG/ML
0.08 INJECTION, SOLUTION INTRAMUSCULAR; INTRAVENOUS; SUBCUTANEOUS ONCE AS NEEDED
Status: DISCONTINUED | OUTPATIENT
Start: 2025-04-04 | End: 2025-04-04 | Stop reason: HOSPADM

## 2025-04-04 RX ADMIN — SODIUM CHLORIDE, SODIUM LACTATE, POTASSIUM CHLORIDE, CALCIUM CHLORIDE: 600; 310; 30; 20 INJECTION, SOLUTION INTRAVENOUS at 11:24:00

## 2025-04-04 RX ADMIN — SODIUM CHLORIDE, SODIUM LACTATE, POTASSIUM CHLORIDE, CALCIUM CHLORIDE: 600; 310; 30; 20 INJECTION, SOLUTION INTRAVENOUS at 11:07:00

## 2025-04-04 RX ADMIN — LIDOCAINE HYDROCHLORIDE 25 MG: 10 INJECTION, SOLUTION EPIDURAL; INFILTRATION; INTRACAUDAL; PERINEURAL at 11:11:00

## 2025-04-04 NOTE — OPERATIVE REPORT
Push enteroscopy operative Report  Patient Name: Kyler Haji  YOB: 1940  MRN: [id number]  Procedure: Upper push enteroscopy with control of bleeding  Pre-operative Diagnosis & Indication:   Positive capsule study with GI bleeding of the small intestine  Anemia  Post-operative Diagnosis:  Small intestinal ulcer with stigmata of recent bleeding, s/p Endo-therapy, control of bleeding  Attending Endoscopist: Sacha Simmons M.D.  Informed Consent: The planned procedure(s), the explanation of the procedure, its expected benefits, the potential complications and risks and possible alternatives and their benefits and risks were discussed with the patient or the patient's surrogate. The discussion of risks, not limited to but including bleeding, infection, perforation, adverse effects from anesthesia, need for emergency surgery/prolonged hospitalization,  cardiac arrhythmias,  and aspiration were discussed with patient.  Pt and/or surrogate understood the proposed procedure(s), its risks, benefits and alternatives and wish to proceed with procedure(s). All questions answered in full.  After all questions were answered to their satisfaction, a signed, informed, and witnessed consent was obtained.  Physical Exam: Heart: regular rate and rhythm. No rubs, murmurs, or gallops. Lungs: Clear to auscultation bilaterally. Abdomen: Soft, non-tender, non distended. No rebound tenderness, no guarding.   A TIME OUT WAS COMPLETED prior to the procedure to confirm the patient, procedure(s) and complete endoscopy safety procedure.  Sedation: Monitored Anesthesia Care; ASA class per anesthesiology team   Monitoring: Pulsoximetry, pulse, respirations, and blood pressure , vitals were monitored throughout the entire procedure under monitored anesthesia care.   Procedure: The patient was then brought to the endoscopy suite where his/her pulse, pulse oximetry and blood pressure were monitored. The patient was placed in the left  lateral decubitus position and deep sedation was administered. Once adequate sedation was achieved, a bite block was placed and a lubricated tip of an Olympus push endoscope was inserted through the oropharynx and gently manipulated through the esophagus into the stomach to the jejunum, with endoscope approximately traversed 150 cm from the incisors. Upon withdrawal of the endoscope, careful visualization of the mucosa was performed. The endoscope was then withdrawn into the gastric antrum and placed in a retroflexed position.  The endoscope was then righted, and air was suctioned from the stomach.  The endoscope was then withdrawn from the patient, with careful visual inspection of the mucosa. The patient left the procedure room in stable condition for recovery. Findings and endotherapy as listed below  Toleration: Patient tolerated procedure well   Complications: No immediate complications   Technical Difficulty:  The procedure was not technically difficult   Estimated Blood Loss: Minimal, less than 5mL of estimated blood loss.   Findings and Therapeutics:  Esophagus:   Normal mucosa.   No strictures. GE junction traversed with endoscope without resistance.  The Z-line was irregular, appreciated at 42 cm from the incisors.    Stomach:    Several superficial ulcers consistent with post-biopsy findings from prior EGD.  No active or stigmata of recent bleeding  Endoscope was placed in a retroflexion view in the stomach. There was no evidence of a hiatal hernia.   Small intestine:   Endoscope traversed into the jejunum, approximately 150 cm from the incisors.  One distal duodenal/proximal jejunal ulcer with stigmata of fresh bleeding.  No visible vessel.  Some fresh appearing blood at the edge of the ulcer.  The ampulla was visualized distinctly away from this ulcer.  Therapeutics: In 4 quadrants, 1:10,000 epinephrine injected at the edge of the ulcer with good blanching of the submucosa.  Then 2 endoscopic hemostatic  clips were deployed with success on the ulcer.  Hemostasis at the end of the maneuver.  Several post-biopsy appearing superficial ulcers in the duodenum.  No active or stigmata of recent bleeding  Recommendations:  Post EGD precautions, watch for bleeding, infection, perforation, adverse drug reactions   Follow-up biopsies  Pantoprazole 40 mg IV q12 hours  Avoid non-aspirin NSAIDs  Trend CBC, transfuse if Hg < 7    GI will follow, please page with any questions or concerns. Post procedure, I discussed findings and management plan with patient's daughter, Lela.     Sacha Simmons MD  4/4/2025  11:23 AM

## 2025-04-04 NOTE — PLAN OF CARE
Patient is alert and oriented, no complaints of pain, no signs of bleeding noted overnight. Patient NPO since midnight, plan for push enteroscopy. Consent signed in the chart. Safety precautions in place, call light within raech, plan of care ongoing.     Problem: HEMATOLOGIC - ADULT  Goal: Maintains hematologic stability  Description: INTERVENTIONS- Assess for signs and symptoms of bleeding or hemorrhage- Monitor labs and vital signs for trends- Administer supportive blood products/factors, fluids and medications as ordered and appropriate- Administer supportive blood products/factors as ordered and appropriate  Outcome: Progressing     Problem: GASTROINTESTINAL - ADULT  Goal: Maintains or returns to baseline bowel function  Description: INTERVENTIONS:- Assess bowel function- Maintain adequate hydration with IV or PO as ordered and tolerated- Evaluate effectiveness of GI medications- Encourage mobilization and activity- Obtain nutritional consult as needed- Establish a toileting routine/schedule- Consider collaborating with pharmacy to review patient's medication profile  Outcome: Progressing

## 2025-04-04 NOTE — PROGRESS NOTES
Rupesh Chávez,    You recently had an upper endoscopy (EGD) procedure completed with biopsies of the stomach and small intestine.    The biopsies of the stomach show NO infection.     Please call the office if you have any questions or concerns about these results.     Sincerely,    Sacha Simmons MD  [facility]  [phone]

## 2025-04-04 NOTE — ANESTHESIA POSTPROCEDURE EVALUATION
[facility]    Kyler Haji Patient Status:  Inpatient   Age/Gender 84 year old male MRN [id number]   Location [facility] ENDOSCOPY PAIN CENTER Attending Maikel Roper MD   Hosp Day # 7 PCP Abhi Arellano MD       Anesthesia Post-op Note    ENTEROSCOPY WITH EPI AND CLIP PLACEMENT X2    Procedure Summary       Date: 04/04/25 Room / Location:  ENDOSCOPY 02 / [facility] ENDOSCOPY    Anesthesia Start: 1107 Anesthesia Stop:     Procedure: ENTEROSCOPY WITH EPI AND CLIP PLACEMENT X2 Diagnosis: (DUODENAL ULCER)    Surgeons: Sacha Simmons MD Anesthesiologist: Jonathon Cantrell MD    Anesthesia Type: MAC ASA Status: 3            Anesthesia Type: MAC    Vitals Value Taken Time   /58 04/04/25 1129   Temp  04/04/25 1129   Pulse 60 04/04/25 1128   Resp 16 04/04/25 1129   SpO2 95 % 04/04/25 1128   Vitals shown include unfiled device data.        Patient Location: Endoscopy    Anesthesia Type: MAC    Airway Patency: patent    Postop Pain Control: adequate    Mental Status: preanesthetic baseline    Nausea/Vomiting: none    Cardiopulmonary/Hydration status: stable euvolemic    Complications: no apparent anesthesia related complications    Postop vital signs: stable    Dental Exam: Unchanged from Preop    Patient to be discharged from PACU when criteria met.

## 2025-04-04 NOTE — PAYOR COMM NOTE
PLEASE GIVE RECONSIDERATION/APPROVAL TO THIS INPT ADMISSION    CONTINUED STAY REVIEW    Payor: [employer]  Subscriber #:  [id number]  Authorization Number: [id number]    Admit date: 3/28/25  Admit time: 10:00 PM    REVIEW DOCUMENTATION:  4/1/2025  GI note   Patient prepping for EGD, colonoscopy, capsule endoscopy tomorrow, 4/2/25.  Continue bowel prep, clear liquid diet; NPO at MN  Check CBC, transfuse pRBC if Hg < 7; INR check    Sacha Simmons MD  4/1/2025  8:27 AM     Hospitalist Progress Note   Denies fever, chills, n/v. No new complaints.     Temp:  [97.5 °F (36.4 °C)-98 °F (36.7 °C)] 97.6 °F (36.4 °C)  Pulse:  [59-88] 60  Resp:  [16-20] 16  BP: ()/(47-90) 119/64  SpO2:  [94 %-100 %] 96 %    Lab 03/28/25  1445 03/28/25 2010 03/29/25  0551 03/30/25  0554 03/31/25  0344 03/31/25  1319 03/31/25  1708 04/01/25  0117 04/01/25  0529   WBC 7.2 5.8 5.1  --   --   --   --   --  5.7   HGB 6.3* 6.2* 7.1*  --  7.6* 8.4* 8.1* 7.4* 7.5*   MCV 99.0 94.6 91.5  --   --   --   --   --  92.8   .0 199.0 193.0  --   --   --   --   --  183.0   INR  --  3.40* 3.45* 2.63* 1.89*  --   --   --   --      Assessment & Plan:  #Symptomatic anemia /presumptive GIB  Dark BM though has been on PO iron  Occult blood +ve  IV PPI BID  Hg 6.2 -> 7.1 -> 7.8 -> 8.4 post 1unit PRBC  GI following  Xray follow through reviewed   Plan for repeat EGD/colon/capsule endoscopy tomorrow  CLD, npo at midnight      #Coumadin coagulopathy  Hold coumadin     #Ao stenosis s/p bioprosthetic valve     #Paroxysmal A. Fib  Cont. Rate control  Hold coumadin  Given recurrent bleeds should be evaluated for watchman  Sees [employer] cards     #HFrEF, EF: 40-45% s/p AICD  Compensated  Hold aldactone, lasix for now  Monitor volume closely     #HTN  Hold ARB, cont BB     #DM2  insulin     #CKD IV  Cr stable     #Metabolic Acidosis  Due to CKD     #CAD s/p CABG  #HTN  #HLD  #MILES    DVT Mechanical Prophylaxis:   SCDs     **Certification  PHYSICIAN Certification  of Need for Inpatient Hospitalization - Initial Certification  Patient will require inpatient services that will reasonably be expected to span two midnight's based on the clinical documentation in H+P.   Based on patients current state of illness, I anticipate that, after discharge, patient will require TBD.  Maikel Roper MD  4/1/2025  8:07 AM     4/2/2025  Operative Report   Procedure: Esophagogastroduodenoscopy (EGD)  with biopsies, deployment of video capsule   Pre-operative Diagnosis & Indication:   Anemia  Post-operative Diagnosis:  Normal visualized endoscopic exam  Deployment of video capsule in the small bowel  Recommendations:  Post EGD precautions, watch for bleeding, infection, perforation, adverse drug reactions   Follow-up biopsies  Avoid non-aspirin NSAIDs  Follow-up findings of small bowel capsule endoscopy  Proceed with colonoscopy today  Procedure: Colonoscopy   Pre-operative Diagnosis & Indication: Anemia  Post-operative Diagnosis: Inadequate bowel preparation, incomplete exam  Recommendations:    Post Colonoscopy/polypectomy precautions, watch for bleeding, infection, perforation, adverse drug reactions.   Continue Bowel Preparation today, clear liquid diet, NPO at Midnight  Plan for colonoscopy tomorrow   Sacha Simmons MD  4/2/2025  10:01 AM   Clinical Information  Indication: anemia, melena   Date of Colonoscopy: 4/2/25  Date of EGD: 4/2/25     Landmarks  First Gastric Image:                               Capsule deployed in small intestine at time of EGD  First Duodenal Image:                          Start of exam, capsule deployed in small intestine / duodenum  First Cecal Image:                                 Incomplete exam, stool in latter half of the exam      Findings  Overt GI bleeding in the proximal part of the small intestine/jejunum, and overt blood visualized. Likely culprit lesion visualized  -incomplete exam in later half of exam, given stool. unable to clearly visualize cecum                                                            Impression  Bleeding lesion / overt blood      Recommendations  Plan for push enteroscopy tomorrow 4/4/25 as patient with PO intake - sooner if clinical changes or dropping Hg   NPO at MN  PPI IV q12 hours  Check CBC, transfuse pRBC if Hg < 7  Sacha Simmons MD  4/03/25, 12:38 PM     4/3/2025  Operative Report   Procedure: Colonoscopy   Pre-operative Diagnosis & Indication: Anemia   Post-operative Diagnosis: Inadequate bowel preparation, incomplete exam  Findings and Therapeutics:  Colon:    Inadequate bowel preparation, incomplete exam.  Despite lavage and suction, solid / semi solid stool precluded visualization.  No gross/overt blood visualized in the limited portion of the exam  Recommendations:    Post Colonoscopy/polypectomy precautions, watch for bleeding, infection, perforation, adverse drug reactions.   Follow up on results on capsule endoscopy (that was placed yesterday, 4/2/25)   Follow up in GI in 2-3 weeks  with Dr Maloney or NP   Discuss role for repeat colonoscopy with extended preparation, although need to discuss risks of procedure in setting of age/co-morbid conditions vs. Benefit, as patient just had complete ileocolonoscopy in 2/11/2025  IV iron, Check CBC, transfuse pRBC if Hg < 7 per primary team  Sacha Simmons MD  4/3/2025  MEDICATIONS ADMINISTERED:  Medications 04/01/25 04/02/25 04/03/25 04/04/25   allopurinol (Zyloprim) tab 100 mg  Dose: 100 mg  Freq: Daily Route: OR  Start: 03/28/25 2230    0849 TM-Given       1321 MO-Given       1142 AC-Given       0930         amiodarone (Pacerone) tab 200 mg  Dose: 200 mg  Freq: Daily Route: OR  Start: 03/28/25 2230    0849 TM-Given       1321 MO-Given       1138 AC-Hold       0930         atorvastatin (Lipitor) tab 40 mg  Dose: 40 mg  Freq: Nightly Route: OR  Start: 03/28/25 2315 2103 LR-Given       1957 TA-Given       2031 KK-Given       2100         bisacodyl (Dulcolax) DR tab 5 mg  Dose: 5  mg  Freq: 2 times daily Route: OR  Start: 04/02/25 1700   Admin Instructions:   Do not crush; separate from antacids by 1 hour     1821 SF-Given       (0900 AC)-Not Given   2031 KK-Given      (0900 AC)-Not Given   2100        insulin aspart (NovoLOG) 100 Units/mL FlexPen 1-10 Units  Dose: 1-10 Units  Freq: 3 times daily before meals and nightly Route: SC  Start: 03/28/25 2245   Admin Instructions:   Correction Insulin - calculate insulin requirement  Give 1 unit of Novolog (aspart) insulin for every 30 points blood glucose is greater than 140 mg/dL  **DO NOT HOLD OR ALTER INSULIN DOSE WITHOUT A PHYSICIAN ORDER**  Give Novolog/aspart insulin subcutaneously within 30 minutes of scheduled finger-stick time  Notify physician for blood glucose >351mg/dL with time and last dose of correction insulin given.    (0700 COREY)-Not Given   1229 TM-Given     (1600 TM)-Not Given   (2100 LR)-Not Given      0600 LR-Given   (1100 MO)-Not Given     1821 SF-Given   2220 TA-Given      0604 TA-Given   1139 AC-Given     1759 AC-Given   2141 KK-Given      0610 KK-Given   1100     1600   2100        insulin aspart (NovoLOG) 100 Units/mL FlexPen 1-68 Units  Dose: 1-68 Units  Freq: 3 times daily with meals Route: SC  Start: 03/29/25 0800   Admin Instructions:   1 unit of Novolog (aspart) insulin for every 10 grams of carbohydrates eaten  Give within 10 minutes before or after a meal  Do not give if patient is NPO (carbohydrate is on hold)    0853 TM-Given   1309 TM-Given     1650 TM-Given       (0800 MO)-Not Given   (1200 MO)-Not Given     (1821 SF)-Not Given       (0800 AC)-Not Given   1140 AC-Given     1802 AC-Given       (0800 AC)-Not Given   1200     1700         magnesium oxide (Mag-Ox) tab 400 mg  Dose: 400 mg  Freq: Once Route: OR  Start: 04/03/25 1500 End: 04/03/25 1759      1759 AC-Given          magnesium oxide (Mag-Ox) tab 400 mg  Dose: 400 mg  Freq: Once Route: OR  Start: 04/02/25 0745 End: 04/02/25 1331     1331 MO-Given            metoprolol succinate ER (Toprol XL) 24 hr tab 200 mg  Dose: 200 mg  Freq: Daily Route: OR  Start: 03/28/25 2230   Admin Instructions:   Do not crush    0849 TM-Given       1320 MO-Given       1139 AC-Hold       0930         pantoprazole (Protonix) 40 mg in sodium chloride 0.9% PF 10 mL IV push  Dose: 40 mg  Freq: Every 12 hours Route: IV  Start: 03/29/25 0830   Admin Instructions:   Dilute with 10 ml NS; IV push over 2 minutes    0754 TM-Given   2046 LR-Given      1320 MO-Given   1958 TA-Given      1138 AC-Given   2032 KK-Given      0833 AC-Given   2030        pantoprazole (Protonix) 40 mg in sodium chloride 0.9% PF 10 mL IV push  Dose: 40 mg  Freq: Once Route: IV  Start: 03/28/25 2002 End: 03/28/25 2020   Admin Instructions:   Dilute with 10 ml NS; IV push over 2 minutes          polyethylene glycol-electrolyte (Golytely) 236 g oral solution 2,000 mL  Dose: 2,000 mL  Freq: 2 times per day on Tuesday Wednesday Route: OR  Start: 04/01/25 1700 End: 04/02/25 0100   Admin Instructions:   Patient to drink 2000ml at 1700 followed by 2000ml at 0100     1649 TM-Given       0100 LR-Given           polyethylene glycol-electrolyte (Golytely) 236 g oral solution 4,000 mL  Dose: 4,000 mL  Freq: Once Route: OR  Start: 04/02/25 1700 End: 04/02/25 1645   Admin Instructions:   Patient to drink 2000ml at 1700 followed by 2000ml at 2200     1645 MO-Given           simethicone (Mylicon) chewable tab 80 mg  Dose: 80 mg  Freq: 3 times daily with meals and nightly Route: OR  Start: 04/02/25 1700 End: 04/03/25 2159     1821 SF-Given   2140 TA-Given      (0800 AC)-Not Given   1138 AC-Given     1759 AC-Given   2159-D/C'd       sodium chloride 0.9% infusion  Freq: Once Route: IV  Start: 04/04/25 0630   Admin Instructions:   Use a 250mL bag: To standard blood administration set with integral filter. Change every 4 hours or after 2 units, whichever comes first. ALERT: NEVER mix any medication or solution with blood or blood products.  Run at  KVO rate       0630         sodium ferric gluconate (Ferrlecit) 125 mg in sodium chloride 0.9% 100mL IVPB premix  Dose: 125 mg  Freq: Daily Route: IV  Last Dose: 125 mg (04/02/25 1827)  Start: 03/29/25 1700 End: 04/02/25 1927    1652 TM-New Bag       1827 SF-New Bag             Medications 04/01/25 04/02/25 04/03/25 04/04/25   lactated ringers infusion  Rate: 100 mL/hr  Freq: Continuous Route: IV  Last Dose: Stopped (04/03/25 0830)  Start: 04/03/25 0830 End: 04/03/25 1126      0830 AC-Stopped   1126-D/C'd       lactated ringers infusion  Rate: 100 mL/hr  Freq: Continuous Route: IV  Start: 04/02/25 1015 End: 04/03/25 1126     1324 MO-New Bag       1126-D/C'd          Vitals       Date/Time Temp Pulse Resp BP SpO2 Weight O2 Device O2 Flow Rate (L/min) [facility]    04/04/25 1045 -- 60 13 147/65 -- -- -- -- DT    04/04/25 1043 97.6 °F (36.4 °C) 59 12 147/65 -- -- -- -- DT    04/04/25 0830 97.6 °F (36.4 °C) 60 -- 140/72 95 % -- -- -- AC    04/04/25 0534 98 °F (36.7 °C) 59 20 132/50 96 % -- None (Room air) -- EK    04/04/25 0046 98.4 °F (36.9 °C) 62 18 133/57 100 % -- None (Room air) -- EK    04/03/25 2118 98 °F (36.7 °C) 62 20 107/70 97 % -- None (Room air) -- EK    04/03/25 1621 98.2 °F (36.8 °C) 60 -- 123/49 97 % -- None (Room air) -- JS    04/03/25 1132 -- 60 -- 91/43 98 % -- -- -- JS    04/03/25 1131 -- 60 -- 88/40 -- -- -- --     04/03/25 1129 97.3 °F (36.3 °C) 59 18 88/41 -- -- -- --     04/03/25 0914 98.7 °F (37.1 °C) -- 18 120/59 97 % -- None (Room air) --     04/03/25 0840 -- 59 15 107/51 93 % -- -- -- MM    04/03/25 0835 -- 59 13 104/51 92 % -- -- -- MM    04/03/25 0830 -- 59 16 102/50 93 % -- -- --     04/03/25 0825 -- 59 16 96/52 92 % -- -- --     04/03/25 0820 -- 60 17 85/48 93 % -- None (Room air) --     04/03/25 0820 97.3 °F (36.3 °C) -- -- -- -- -- -- --     04/03/25 0735 98.5 °F (36.9 °C) 66 -- 124/67 -- -- None (Room air) --     04/03/25 0421 98 °F (36.7 °C) 60 16 138/68 95 % -- None (Room  air) -- AB    04/03/25 0100 98 °F (36.7 °C) 60 16 143/62 94 % -- None (Room air) -- AB     04/02/25 2139 -- 62 -- -- -- -- -- -- AB   04/02/25 1946 98.7 °F (37.1 °C) 59 18 135/61 100 % -- None (Room air) -- AB   04/02/25 1700 98 °F (36.7 °C) 60 16 127/67 96 % -- None (Room air) -- HA   04/02/25 1045 98.2 °F (36.8 °C) 59 14 137/62 96 % -- None (Room air) -- HA   04/02/25 1035 -- 59 14 127/64 97 % -- -- -- VB   04/02/25 1030 -- 60 14 130/60 95 % -- -- -- VB   04/02/25 1025 -- 59 16 130/61 95 % -- -- -- VB   04/02/25 1020 -- 59 13 132/60 98 % -- -- -- VB   04/02/25 1016 -- 59 14 132/60 97 % -- -- -- VB   04/02/25 1015 -- 60 13 128/62 97 % -- -- -- VB   04/02/25 1010 -- 59 19 125/61 97 % -- -- -- VB   04/02/25 1005 -- 60 16 120/60 96 % -- -- -- VB   04/02/25 1000 -- 60 14 107/56 97 % -- -- -- VB   04/02/25 0549 98.2 °F (36.8 °C) 59 18 139/57 97 % -- None (Room air) -- EK   04/02/25 0124 97.8 °F (36.6 °C) 60 20 151/69 100 % -- None (Room air) -- EK   04/01/25 2100 97.6 °F (36.4 °C) 60 20 125/66 98 % -- None (Room air) -- EK   04/01/25 1630 97 °F (36.1 °C) 60 20 140/62 100 % -- None (Room air) -- HA   04/01/25 1126 97.9 °F (36.6 °C) 60 18 109/58 98 % -- None (Room air) -- HA   04/01/25 0739 97.6 °F (36.4 °C) 60 16 119/64 96 % -- None (Room air) -- HA   04/01/25 0553 97.6 °F (36.4 °C) -- 18 -- -- -- None (Room air) -- EK   04/01/25 0013 97.8 °F (36.6 °C) 59 18 94/47 94 % -- None (Room air) -- EK     Blood Transfusion Record       Product Unit Status Volume Start End            Transfuse RBC       25  [id number]  [id number] Completed 04/04/25 1045 270.83 mL 04/04/25 0833 04/04/25 1043       25  [id number]  [id number] Completed 03/29/25 0143 300 mL 03/28/25 2101 03/28/25 2325             PLEASE GIVE RECONSIDERATION/APPROVAL TO THIS INPT ADMISSION

## 2025-04-04 NOTE — PROGRESS NOTES
[facility]   part of [facility]     Hospitalist Progress Note     Kyler Haji Patient Status:  Inpatient    1940 MRN [id number]   [facility] 4NW-A Attending Blaine Reynolds MD   Hosp Day # 7 PCP Abhi Arellano MD     Chief Complaint: dark stools    Subjective:     Patient feels well post procedure.  Denies pain, no n/v.  No new complaints.      Objective:    Review of Systems:   A comprehensive review of systems was completed; pertinent positive and negatives stated in subjective.    Vital signs:  Temp:  [97.2 °F (36.2 °C)-98.4 °F (36.9 °C)] 97.2 °F (36.2 °C)  Pulse:  [] 60  Resp:  [12-25] 20  BP: ()/(41-74) 152/70  SpO2:  [91 %-100 %] 94 %    Physical Exam:    General: No acute distress, pleasant   Respiratory: No wheezes, no rhonchi  Cardiovascular: S1, S2, regular rate and rhythm  Abdomen: Soft, Non-tender, non-distended, positive bowel sounds  Neuro: No new focal deficits.   Extremities: No edema    Diagnostic Data:    Labs:  Recent Labs   Lab 25  0115 25  0551 25  0926 25  0554 25  0936 25  0344 25  1319 25  0529 25  0848 25  1718 25  0544 25  1105 25  0042 25  0553 25  0934 25  1808 25  0522   WBC 5.8  --  5.1  --   --   --   --   --  5.7  --   --  5.4  --   --  6.2  --   --  5.5   HGB 6.2*   < > 7.1*   < >  --    < > 7.6*   < > 7.5* 7.7*   < > 7.3*   < > 8.1* 7.4* 7.2* 7.2* 6.7*   MCV 94.6  --  91.5  --   --   --   --   --  92.8  --   --  91.3  --   --  91.8  --   --  93.6   .0  --  193.0  --   --   --   --   --  183.0  --   --  181.0  --   --  181.0  --   --  156.0   INR 3.40*  --  3.45*  --  2.63*  --  1.89*  --   --  1.49*  --   --   --   --   --   --   --   --     < > = values in this interval not displayed.       Recent Labs   Lab 25  0551 25  0544 25  0553 25  0522   *   < > 151* 176* 152*    BUN 71*   < > 32* 27* 30*   CREATSERUM 3.88*   < > 2.58* 2.49* 2.70*   CA 9.3   < > 9.1 9.0 8.8   ALB 4.2  --   --   --   --       < > 140 143 143   K 5.1   < > 4.4 3.5 3.4*      < > 107 107 109   CO2 18.0*   < > 24.0 23.0 24.0   ALKPHO 63  --   --   --   --    AST 31  --   --   --   --    ALT 31  --   --   --   --    BILT 0.3  --   --   --   --    TP 6.4  --   --   --   --     < > = values in this interval not displayed.       Estimated Glomerular Filtration Rate: 22 mL/min/1.73m2 (A) (result from lab).    No results for input(s): \"TROP\", \"TROPHS\", \"CK\" in the last 168 hours.    Recent Labs   Lab 03/30/25  0554 03/31/25  0344 04/01/25  0848   PTP 28.3* 21.9* 18.1*   INR 2.63* 1.89* 1.49*                  Microbiology    No results found for this visit on 03/28/25.      Imaging: Reviewed in Epic.    Medications:    sodium chloride   Intravenous Once    bisacodyl  5 mg Oral BID    amiodarone  200 mg Oral Daily    allopurinol  100 mg Oral Daily    Metoprolol Succinate ER  200 mg Oral Daily    insulin aspart  1-68 Units Subcutaneous TID CC    insulin aspart  1-10 Units Subcutaneous TID AC and HS    pantoprazole  40 mg Intravenous Q12H    atorvastatin  40 mg Oral Nightly       Assessment & Plan:      #Symptomatic anemia /presumptive GIB  Dark BM though has been on PO iron  Occult blood +ve  IV PPI BID  Hg 6.2 -> 8.4 -> 6.7  Post 2 unit PRBC on admission   GI following  Xray follow through reviewed   S/p EGD with capsule endoscopy 4/2  Colonoscopy on 4/3  Capsule endoscopy with abnormal findinsg  Repeat EGD with push enterscopy on 4/4  Diet advanced today, repeat Hg      #Coumadin coagulopathy  Hold coumadin until cleared by GI     #Ao stenosis s/p bioprosthetic valve     #Paroxysmal A. Fib  Cont. Rate control  Hold coumadin  Given recurrent bleeds should be evaluated for watchman  Sees [employer] cards     #HFrEF, EF: 40-45% s/p AICD  Compensated  Hold aldactone, lasix for now  Monitor volume closely      #HTN  Hold ARB, cont BB     #DM2  insulin     #CKD IV  Cr stable     #Metabolic Acidosis  Due to CKD     #CAD s/p CABG  #HTN  #HLD  #MILES         Maikel Roper MD    Supplementary Documentation:     Quality:  DVT Mechanical Prophylaxis:   SCDs,    DVT Pharmacologic Prophylaxis   Medication   None                Code Status: Full Code  Pike: No urinary catheter in place  Pike Duration (in days):   Central line:    TAMIKA:     Discharge is dependent on: GI eval   At this point Mr. Haji is expected to be discharge to: Home    The 21st Century Cures Act makes medical notes like these available to patients in the interest of transparency. Please be advised this is a medical document. Medical documents are intended to carry relevant information, facts as evident, and the clinical opinion of the practitioner. The medical note is intended as peer to peer communication and may appear blunt or direct. It is written in medical language and may contain abbreviations or verbiage that are unfamiliar.              **Certification      PHYSICIAN Certification of Need for Inpatient Hospitalization - Initial Certification    Patient will require inpatient services that will reasonably be expected to span two midnight's based on the clinical documentation in H+P.   Based on patients current state of illness, I anticipate that, after discharge, patient will require TBD.

## 2025-04-04 NOTE — ANESTHESIA PREPROCEDURE EVALUATION
PRE-OP EVALUATION    Patient Name: Kyler Haji    Admit Diagnosis: Current use of long term anticoagulation [Z79.01]  Symptomatic anemia [D64.9]  Gastrointestinal hemorrhage, unspecified gastrointestinal hemorrhage type [K92.2]  Chronic kidney disease, unspecified CKD stage [N18.9]    Pre-op Diagnosis: INPT    ENTEROSCOPY    Anesthesia Procedure: ENTEROSCOPY    Surgeons and Role:     * Sacha Simmons MD - Primary    Pre-op vitals reviewed.  Temp: 97.6 °F (36.4 °C)  Pulse: 60  Resp: 20  BP: 140/72  SpO2: 95 %  Body mass index is 27.62 kg/m².    Current medications reviewed.  Hospital Medications:   sodium chloride 0.9% infusion   Intravenous Once    [COMPLETED] magnesium oxide (Mag-Ox) tab 400 mg  400 mg Oral Once    [COMPLETED] magnesium oxide (Mag-Ox) tab 400 mg  400 mg Oral Once    [COMPLETED] polyethylene glycol-electrolyte (Golytely) 236 g oral solution 4,000 mL  4,000 mL Oral Once    bisacodyl (Dulcolax) DR tab 5 mg  5 mg Oral BID    [] simethicone (Mylicon) chewable tab 80 mg  80 mg Oral TID CC and HS    [COMPLETED] polyethylene glycol-electrolyte (Golytely) 236 g oral solution 2,000 mL  2,000 mL Oral 2 times per day on     [COMPLETED] magnesium oxide (Mag-Ox) tab 400 mg  400 mg Oral Once    [COMPLETED] sodium ferric gluconate (Ferrlecit) 125 mg in sodium chloride 0.9% 100mL IVPB premix  125 mg Intravenous Daily    [COMPLETED] pantoprazole (Protonix) 40 mg in sodium chloride 0.9% PF 10 mL IV push  40 mg Intravenous Once    amiodarone (Pacerone) tab 200 mg  200 mg Oral Daily    allopurinol (Zyloprim) tab 100 mg  100 mg Oral Daily    albuterol (Ventolin HFA) 108 (90 Base) MCG/ACT inhaler 2 puff  2 puff Inhalation Q6H PRN    metoprolol succinate ER (Toprol XL) 24 hr tab 200 mg  200 mg Oral Daily    acetaminophen (Tylenol Extra Strength) tab 1,000 mg  1,000 mg Oral Q4H PRN    melatonin tab 3 mg  3 mg Oral Nightly PRN    glycerin-hypromellose- (Artificial Tears) 0.2-0.2-1 %  ophthalmic solution 1 drop  1 drop Both Eyes QID PRN    sodium chloride (Saline Mist) 0.65 % nasal solution 1 spray  1 spray Each Nare Q3H PRN    metoclopramide (Reglan) 5 mg/mL injection 5 mg  5 mg Intravenous Q8H PRN    insulin aspart (NovoLOG) 100 Units/mL FlexPen 1-68 Units  1-68 Units Subcutaneous TID CC    glucose (Dex4) 15 GM/59ML oral liquid 15 g  15 g Oral Q15 Min PRN    Or    glucose (Glutose) 40% oral gel 15 g  15 g Oral Q15 Min PRN    Or    glucose-vitamin C (Dex-4) chewable tab 4 tablet  4 tablet Oral Q15 Min PRN    Or    dextrose 50% injection 50 mL  50 mL Intravenous Q15 Min PRN    Or    glucose (Dex4) 15 GM/59ML oral liquid 30 g  30 g Oral Q15 Min PRN    Or    glucose (Glutose) 40% oral gel 30 g  30 g Oral Q15 Min PRN    Or    glucose-vitamin C (Dex-4) chewable tab 8 tablet  8 tablet Oral Q15 Min PRN    insulin aspart (NovoLOG) 100 Units/mL FlexPen 1-10 Units  1-10 Units Subcutaneous TID AC and HS    pantoprazole (Protonix) 40 mg in sodium chloride 0.9% PF 10 mL IV push  40 mg Intravenous Q12H    atorvastatin (Lipitor) tab 40 mg  40 mg Oral Nightly       Outpatient Medications:   Prescriptions Prior to Admission[1]    Allergies: Ace inhibitors      Anesthesia Evaluation  Patient Active Problem List   Diagnosis    Paroxysmal A-fib (HCC)    S/P CABG (coronary artery bypass graft)    H/O aortic valve replacement    Essential hypertension    LBBB (left bundle branch block)    Monitoring for long-term anticoagulant use    Coronary artery disease involving native heart without angina pectoris    Type 2 diabetes mellitus with stage 4 chronic kidney disease, without long-term current use of insulin (HCC)    Right-sided carotid artery occlusion without cerebral infarction    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%    Hypertension associated with diabetes (HCC)    Acquired coagulation factor inhibitor disorder (HCC)    Hyperlipidemia associated with type 2 diabetes mellitus (HCC)    Chronic systolic heart  failure (HCC)    Carotid arterial disease    CKD (chronic kidney disease) stage 4, GFR 15-29 ml/min (HCC)    ICD (implantable cardioverter-defibrillator) BiV [place]    Generalized weakness    Symptomatic anemia    Gastrointestinal hemorrhage, unspecified gastrointestinal hemorrhage type    Chronic kidney disease, unspecified CKD stage    Current use of long term anticoagulation        Past Medical History:    Abdominal hernia    Actinic keratosis    Black stools    Chest pain, unspecified    Cough    Diabetes (HCC)    Diabetes mellitus (HCC)    Dysmetabolic syndrome X    Easy bruising    Essential hypertension    Essential hypertension, malignant    Flatulence/gas pain/belching    Hemorrhoids    High cholesterol    Inguinal hernia without mention of obstruction or gangrene, unilateral or unspecified, (not specified as recurrent)    Myalgia and myositis, unspecified    Obesity, unspecified    Pacemaker    Special screening for malignant neoplasm of prostate    Type II or unspecified type diabetes mellitus without mention of complication, uncontrolled    Undiagnosed cardiac murmurs    Unspecified sleep apnea    AHI 21.7 SaO2 yashira 79.9 %     Wears glasses        Past Surgical History:   Procedure Laterality Date    Cabg  2/2012    CABG x 3    Cath transcatheter aortic valve replacement  2/2012    Colonoscopy  6/27/2014    Procedure: COLONOSCOPY;  Surgeon: Ton Oliveira MD;  Location:  ENDOSCOPY    Colonoscopy N/A 2/11/2025    Procedure: COLONOSCOPY;  Surgeon: Israel Martin MD;  Location:  ENDOSCOPY    Colonoscopy N/A 4/2/2025    Procedure: COLONOSCOPY;  Surgeon: Sacha Simmons MD;  Location:  ENDOSCOPY    Colonoscopy N/A 4/3/2025    Procedure: COLONOSCOPY;  Surgeon: Sacha Simmons MD;  Location:  ENDOSCOPY    Hernia surgery      Valve repair       Social History     Socioeconomic History    Marital status:    Tobacco Use    Smoking status: Former     Current packs/day: 0.00     Types:  Cigarettes     Start date: 1955     Quit date: 1975     Years since quittin.8     Passive exposure: Never    Smokeless tobacco: Never   Vaping Use    Vaping status: Never Used   Substance and Sexual Activity    Alcohol use: Yes     Alcohol/week: 1.0 standard drink of alcohol     Types: 1 Standard drinks or equivalent per week     Comment: 8-10 drinks per week    Drug use: Never   Other Topics Concern    Caffeine Concern Yes    Exercise No     History   Drug Use Unknown     Available pre-op labs reviewed.  Lab Results   Component Value Date    WBC 5.5 2025    RBC 2.20 (L) 2025    HGB 6.7 (LL) 2025    HCT 20.6 (L) 2025    MCV 93.6 2025    MCH 30.5 2025    MCHC 32.5 2025    RDW 20.4 2025    .0 2025     Lab Results   Component Value Date     2025    K 3.4 (L) 2025     2025    CO2 24.0 2025    BUN 30 (H) 2025    CREATSERUM 2.70 (H) 2025     (H) 2025    CA 8.8 2025     Lab Results   Component Value Date    INR 1.49 (H) 2025         Airway      Mallampati: II  Mouth opening: >3 FB  TM distance: 4 - 6 cm  Neck ROM: full Cardiovascular      Rhythm: regular  Rate: normal     Dental    Dentition appears grossly intact         Pulmonary    Pulmonary exam normal.  Breath sounds clear to auscultation bilaterally.               Other findings              ASA: 3   Plan: MAC  NPO status verified and     Post-procedure pain management plan discussed with surgeon and patient.    Comment: Plan is MAC anesthesia, which likely will include deep sedation.  Implied that memory of procedure is unlikely although intraop recall, if it occurs, may be a reasonable and comfortable experience with this anesthetic.  Aware that general anesthesia is not intended though deep sedation may include brief moments of general anesthesia.   Questions answered. Accepts. The consent was signed without further  questions.   Plan/risks discussed with: patient  Use of blood product(s) discussed with: patient    Consented to blood products.          Present on Admission:  **None**             [1]   Medications Prior to Admission   Medication Sig Dispense Refill Last Dose/Taking    fexofenadine 180 MG Oral Tab Take 1 tablet (180 mg total) by mouth daily.   3/28/2025    cholecalciferol 50 MCG (2000 UT) Oral Cap Take 1 capsule (2,000 Units total) by mouth daily.   3/28/2025    ferrous sulfate 325 (65 FE) MG Oral Tab EC Take 1 tablet (325 mg total) by mouth 2 (two) times daily with meals. 60 tablet 0 3/28/2025    pantoprazole 40 MG Oral Tab EC Take 1 tablet (40 mg total) by mouth 2 (two) times daily before meals. 180 tablet 3 3/28/2025    aspirin 81 MG Oral Chew Tab Chew by mouth daily.   3/27/2025    atorvastatin 40 MG Oral Tab Take 1 tablet (40 mg total) by mouth daily. 90 tablet 3 3/27/2025    metFORMIN 500 MG Oral Tab Take 1 tablet (500 mg total) by mouth 2 (two) times daily with meals. 180 tablet 1 3/28/2025    losartan 25 MG Oral Tab Take 1 tablet (25 mg total) by mouth daily. 90 tablet 1 3/28/2025    glipiZIDE 5 MG Oral Tab Take 1 tablet (5 mg total) by mouth 2 (two) times daily before meals. 180 tablet 1 3/28/2025    METOPROLOL SUCCINATE  MG Oral Tablet 24 Hr TAKE 1 TABLET(200 MG) BY MOUTH DAILY 90 tablet 2 3/28/2025    furosemide 40 MG Oral Tab Take 1 tablet (40 mg total) by mouth every other day. 45 tablet 3 3/28/2025    spironolactone 25 MG Oral Tab Take 0.5 tablets (12.5 mg total) by mouth daily. 90 tablet 3 3/28/2025    allopurinol 100 MG Oral Tab Take 1 tablet (100 mg total) by mouth daily. 90 tablet 3 3/28/2025    warfarin 4 MG Oral Tab TAKE 1 TABLET BY MOUTH DAILY OR AS DIRECTED BY ANTICOAGULATION CLINIC (Patient taking differently: Take 1 tablet (4 mg total) by mouth daily. Takes 2mg Mon, Wed, Fri, takes 4 mg every other day(Tues,thur, sat, sun)) 90 tablet 4 3/27/2025    amiodarone 200 MG Oral Tab Take 1  tablet (200 mg total) by mouth daily. 90 tablet 3 3/28/2025    Vitamin C 500 MG Oral Tab Take 1 tablet (500 mg total) by mouth 2 (two) times daily. With iron 30 tablet 3     furosemide 20 MG Oral Tab TAKE 40MG ON EVEN DAYS, AND 20MG ON ODD DAYS 45 tablet 2

## 2025-04-05 VITALS
HEART RATE: 61 BPM | RESPIRATION RATE: 24 BRPM | DIASTOLIC BLOOD PRESSURE: 59 MMHG | SYSTOLIC BLOOD PRESSURE: 125 MMHG | OXYGEN SATURATION: 97 % | TEMPERATURE: 98 F | BODY MASS INDEX: 28 KG/M2 | WEIGHT: 198 LBS

## 2025-04-05 LAB
ANION GAP SERPL CALC-SCNC: 9 MMOL/L (ref 0–18)
BLOOD TYPE BARCODE: 9500
BUN BLD-MCNC: 25 MG/DL (ref 9–23)
CALCIUM BLD-MCNC: 9.1 MG/DL (ref 8.7–10.6)
CHLORIDE SERPL-SCNC: 108 MMOL/L (ref 98–112)
CO2 SERPL-SCNC: 26 MMOL/L (ref 21–32)
CREAT BLD-MCNC: 2.47 MG/DL
EGFRCR SERPLBLD CKD-EPI 2021: 25 ML/MIN/1.73M2 (ref 60–?)
ERYTHROCYTE [DISTWIDTH] IN BLOOD BY AUTOMATED COUNT: 21.9 %
GLUCOSE BLD-MCNC: 185 MG/DL (ref 70–99)
GLUCOSE BLD-MCNC: 197 MG/DL (ref 70–99)
GLUCOSE BLD-MCNC: 248 MG/DL (ref 70–99)
HCT VFR BLD AUTO: 27 %
HGB BLD-MCNC: 8.9 G/DL
INR BLD: 1.26 (ref 0.8–1.2)
MCH RBC QN AUTO: 30.3 PG (ref 26–34)
MCHC RBC AUTO-ENTMCNC: 33 G/DL (ref 31–37)
MCV RBC AUTO: 91.8 FL
OSMOLALITY SERPL CALC.SUM OF ELEC: 306 MOSM/KG (ref 275–295)
PLATELET # BLD AUTO: 155 10(3)UL (ref 150–450)
POTASSIUM SERPL-SCNC: 3.4 MMOL/L (ref 3.5–5.1)
POTASSIUM SERPL-SCNC: 3.6 MMOL/L (ref 3.5–5.1)
PROTHROMBIN TIME: 15.9 SECONDS (ref 11.6–14.8)
RBC # BLD AUTO: 2.94 X10(6)UL
SODIUM SERPL-SCNC: 143 MMOL/L (ref 136–145)
UNIT VOLUME: 350 ML
WBC # BLD AUTO: 8.5 X10(3) UL (ref 4–11)

## 2025-04-05 PROCEDURE — 99239 HOSP IP/OBS DSCHRG MGMT >30: CPT | Performed by: HOSPITALIST

## 2025-04-05 RX ORDER — POTASSIUM CHLORIDE 1500 MG/1
40 TABLET, EXTENDED RELEASE ORAL ONCE
Status: COMPLETED | OUTPATIENT
Start: 2025-04-05 | End: 2025-04-05

## 2025-04-05 RX ORDER — WARFARIN SODIUM 2 MG/1
4 TABLET ORAL NIGHTLY
Status: DISCONTINUED | OUTPATIENT
Start: 2025-04-05 | End: 2025-04-05

## 2025-04-05 NOTE — PLAN OF CARE
Resumed care at 0730. Aox4, vitals stable. Independent to bathroom, steady gait. Tolerating PO diet, insulin given as ordered. Denies pain.

## 2025-04-05 NOTE — CONSULTS
[facility] Consultation    Kyler Haji Patient Status:  Inpatient    1940 MRN [id number]   Location [facility] 4NW-A Attending Sandrine Rice MD   Hosp Day # 8 PCP Abhi Arellano MD     Reason for Consultation:    Consider Watchman    History of Present Illness:  Kyler Haji is a a(n) 84 year old male.  84-year-old patient history ischemic cardiomyopathy previous bypass surgery reduced EF left bundle branch block permanent pacemaker AICD aortic valve replacement atrial fibrillation here for GI bleeding.  Patient denies cardiac symptoms of chest pain tightness palpitations syncope orthopnea or other CV symptoms.    History:  Past Medical History:    Abdominal hernia    Actinic keratosis    Black stools    Chest pain, unspecified    Cough    Diabetes (HCC)    Diabetes mellitus (HCC)    Dysmetabolic syndrome X    Easy bruising    Essential hypertension    Essential hypertension, malignant    Flatulence/gas pain/belching    Hemorrhoids    High cholesterol    Inguinal hernia without mention of obstruction or gangrene, unilateral or unspecified, (not specified as recurrent)    Myalgia and myositis, unspecified    Obesity, unspecified    Pacemaker    Special screening for malignant neoplasm of prostate    Type II or unspecified type diabetes mellitus without mention of complication, uncontrolled    Undiagnosed cardiac murmurs    Unspecified sleep apnea    AHI 21.7 SaO2 yashira 79.9 %     Wears glasses     Past Surgical History:   Procedure Laterality Date    Cabg  2012    CABG x 3    Cath transcatheter aortic valve replacement  2012    Colonoscopy  2014    Procedure: COLONOSCOPY;  Surgeon: Ton Oliveira MD;  Location:  ENDOSCOPY    Colonoscopy N/A 2025    Procedure: COLONOSCOPY;  Surgeon: Israel Martin MD;  Location:  ENDOSCOPY    Colonoscopy N/A 2025    Procedure: COLONOSCOPY;  Surgeon: Sacha Simmons MD;  Location:  ENDOSCOPY    Colonoscopy N/A 4/3/2025    Procedure:  COLONOSCOPY;  Surgeon: Sacha Simmons MD;  Location:  ENDOSCOPY    Hernia surgery      Valve repair       Family History   Problem Relation Age of Onset    Prostate Cancer Brother     Diabetes Father     Diabetes Mother          Allergies:  Allergies[1]    Medications:   sodium chloride   Intravenous Once    bisacodyl  5 mg Oral BID    amiodarone  200 mg Oral Daily    allopurinol  100 mg Oral Daily    Metoprolol Succinate ER  200 mg Oral Daily    insulin aspart  1-68 Units Subcutaneous TID CC    insulin aspart  1-10 Units Subcutaneous TID AC and HS    pantoprazole  40 mg Intravenous Q12H    atorvastatin  40 mg Oral Nightly       Continuous Infusions:      Social History:   reports that he quit smoking about 49 years ago. His smoking use included cigarettes. He started smoking about 69 years ago. He has never been exposed to tobacco smoke. He has never used smokeless tobacco. He reports current alcohol use of about 1.0 standard drink of alcohol per week. He reports that he does not use drugs.    Review of Systems:  All systems were reviewed and are negative except as described above in HPI.    Physical Exam:      Temp:  [97.6 °F (36.4 °C)-99.3 °F (37.4 °C)] 97.6 °F (36.4 °C)  Pulse:  [59-61] 61  Resp:  [18-24] 24  BP: (125-139)/(56-64) 125/59  SpO2:  [92 %-97 %] 97 %    Wt Readings from Last 3 Encounters:   03/28/25 198 lb (89.8 kg)   03/28/25 198 lb (89.8 kg)   02/25/25 200 lb 1.6 oz (90.8 kg)       General: No apparent distress  HEENT: No focal deficits.  Neck: supple. JVP normal  Cardiac: Regular rhythm, S1, S2 normal,  rub or gallop.  No murmur.  Lungs: CTA  Abdomen: Soft, non-tender.   Extremities: No edema  Neurologic: no focal deficits  Skin: Warm and dry.        Labs:   HEM:  Recent Labs   Lab 04/01/25  0529 04/01/25  0848 04/02/25  0544 04/02/25  1105 04/03/25  0553 04/03/25  0934 04/03/25  1808 04/04/25  0522 04/04/25  1649 04/05/25  0940   WBC 5.7  --  5.4  --  6.2  --   --  5.5  --  8.5   HGB 7.5*   < >  7.3*   < > 7.4* 7.2* 7.2* 6.7* 8.4* 8.9*   .0  --  181.0  --  181.0  --   --  156.0  --  155.0    < > = values in this interval not displayed.       Chem:  Recent Labs   Lab 03/30/25  0618 03/30/25  0936 04/02/25  0544 04/03/25  0553 04/04/25  0522 04/05/25  0940   NA  --  143 140 143 143 143   K  --  5.0 4.4 3.5 3.4* 3.4*   CL  --  111 107 107 109 108   CO2  --  21.0 24.0 23.0 24.0 26.0   BUN  --  50* 32* 27* 30* 25*   CREATSERUM  --  2.93* 2.58* 2.49* 2.70* 2.47*   CA  --  9.6 9.1 9.0 8.8 9.1   MG 1.9  --  1.8 1.7 1.8  --    GLU  --  174* 151* 176* 152* 197*       No results for input(s): \"ALT\", \"AST\", \"ALB\", \"AMYLASE\", \"LIPASE\", \"LDH\" in the last 168 hours.    Invalid input(s): \"ALPHOS\", \"TBIL\", \"DBIL\", \"TPROT\"    Recent Labs   Lab 03/30/25  0554 03/31/25  0344 04/01/25  0848   INR 2.63* 1.89* 1.49*       No results for input(s): \"TROP\" in the last 168 hours.          Impression:      PAF on amiodarone  CHADS VASC 5, on coumadin  ICM - EF yashira 35%, current 45-50%  Chronic HFrEF - compensated, euvolemic  LBBB  S/p BIV/ICD 6/18 (Rylan) - longevity ~ 3 yrs, no therapies  CAD s/p CABG 2012 - no angina  S/p SAVR 2012 - normal function echo 6/23  S/p L CEA 2012  CKD 4  HTN - controlled  HLD - LDL at target < 70          Recommend:    Here for GI bleed we are asked about a Watchman  Presently in sinus rhythm  High risk of CVA if off anticoagulation for prolonged period  Restart anticoagulation if bleeds or allows per GI service  Presently in sinus rhythm acceptable start Coumadin without bridging to decrease bleed risk  We discussed risks and benefits with watchman with the patient  He would like to think it over  This is an outpatient procedure when discharged we will have him follow-up in our office to further discussion and make his decision              Abdirashid Hobson MD  4/5/2025  1:06 PM        [1]   Allergies  Allergen Reactions    Ace Inhibitors Coughing

## 2025-04-05 NOTE — PROGRESS NOTES
[facility]   part of [facility]     Hospitalist Progress Note     Kyler Haji Patient Status:  Inpatient    1940 MRN [id number]   [facility] 4NW-A Attending Blaine Reynolds MD   Hosp Day # 8 PCP Abhi Arellano MD     Chief Complaint: dark stools    Subjective:     Patient feels well post procedure.  Denies pain, no n/v.  No new complaints.      Objective:    Review of Systems:   A comprehensive review of systems was completed; pertinent positive and negatives stated in subjective.    Vital signs:  Temp:  [97.2 °F (36.2 °C)-99.3 °F (37.4 °C)] 97.6 °F (36.4 °C)  Pulse:  [59-63] 61  Resp:  [13-25] 24  BP: (103-152)/(56-70) 125/59  SpO2:  [91 %-100 %] 97 %    Physical Exam:    General: No acute distress, pleasant   Respiratory: No wheezes, no rhonchi  Cardiovascular: S1, S2, regular rate and rhythm  Abdomen: Soft, Non-tender, non-distended, positive bowel sounds  Neuro: No new focal deficits.   Extremities: No edema    Diagnostic Data:    Labs:  Recent Labs   Lab 25  0554 25  0936 25  0344 25  1319 25  0529 25  0848 25  1718 25  0544 25  1105 25  0553 25  0934 25  1808 25  0522 25  1649 25  0940   WBC  --   --   --   --  5.7  --   --  5.4  --  6.2  --   --  5.5  --  8.5   HGB  --    < > 7.6*   < > 7.5* 7.7*   < > 7.3*   < > 7.4* 7.2* 7.2* 6.7* 8.4* 8.9*   MCV  --   --   --   --  92.8  --   --  91.3  --  91.8  --   --  93.6  --  91.8   PLT  --   --   --   --  183.0  --   --  181.0  --  181.0  --   --  156.0  --  155.0   INR 2.63*  --  1.89*  --   --  1.49*  --   --   --   --   --   --   --   --   --     < > = values in this interval not displayed.       Recent Labs   Lab 25  0553 25  0522 25  0940   * 152* 197*   BUN 27* 30* 25*   CREATSERUM 2.49* 2.70* 2.47*   CA 9.0 8.8 9.1    143 143   K 3.5 3.4* 3.4*    109 108   CO2 23.0 24.0 26.0       Estimated Glomerular  Filtration Rate: 25 mL/min/1.73m2 (A) (result from lab).    No results for input(s): \"TROP\", \"TROPHS\", \"CK\" in the last 168 hours.    Recent Labs   Lab 03/30/25  0554 03/31/25  0344 04/01/25  0848   PTP 28.3* 21.9* 18.1*   INR 2.63* 1.89* 1.49*                  Microbiology    No results found for this visit on 03/28/25.      Imaging: Reviewed in Epic.    Medications:    sodium chloride   Intravenous Once    bisacodyl  5 mg Oral BID    amiodarone  200 mg Oral Daily    allopurinol  100 mg Oral Daily    Metoprolol Succinate ER  200 mg Oral Daily    insulin aspart  1-68 Units Subcutaneous TID CC    insulin aspart  1-10 Units Subcutaneous TID AC and HS    pantoprazole  40 mg Intravenous Q12H    atorvastatin  40 mg Oral Nightly       Assessment & Plan:      #Symptomatic anemia /presumptive GIB  Dark BM though has been on PO iron  Occult blood +ve  IV PPI BID  Hg 6.2 -> 8.4 -> 6.7  Post 2 unit PRBC on admission   GI following  Xray follow through reviewed   S/p EGD with capsule endoscopy 4/2  Colonoscopy on 4/3  Capsule endoscopy with abnormal findings  Repeat EGD with push enterscopy on 4/4  Diet advanced today, repeat Hg      #Coumadin coagulopathy  Hold coumadin until cleared by GI     #Ao stenosis s/p bioprosthetic valve     #Paroxysmal A. Fib  Cont. Rate control  Hold coumadin  Given recurrent bleeds should be evaluated for watchman  Sees [facility] cards     #HFrEF, EF: 40-45% s/p AICD  Compensated  Hold aldactone, lasix for now  Monitor volume closely     #HTN  Hold ARB, cont BB     #DM2  insulin     #CKD IV  Cr stable     #Metabolic Acidosis  Due to CKD     #CAD s/p CABG  #HTN  #HLD  #MILES           Supplementary Documentation:     Quality:  DVT Mechanical Prophylaxis:   SCDs,    DVT Pharmacologic Prophylaxis   Medication   None                Code Status: Full Code  Pike: No urinary catheter in place  Pike Duration (in days):   Central line:    TAMIKA:     Discharge is dependent on: GI eval   At this point Mr. Haji is  expected to be discharge to: Home    The 21st Century Cures Act makes medical notes like these available to patients in the interest of transparency. Please be advised this is a medical document. Medical documents are intended to carry relevant information, facts as evident, and the clinical opinion of the practitioner. The medical note is intended as peer to peer communication and may appear blunt or direct. It is written in medical language and may contain abbreviations or verbiage that are unfamiliar.              **Certification      PHYSICIAN Certification of Need for Inpatient Hospitalization - Initial Certification    Patient will require inpatient services that will reasonably be expected to span two midnight's based on the clinical documentation in H+P.   Based on patients current state of illness, I anticipate that, after discharge, patient will require TBD.

## 2025-04-05 NOTE — SPIRITUAL CARE NOTE
Spiritual Care Visit Note    Patient Name: Kyler Haji Date of Spiritual Care Visit: 25   : 1940 Primary Dx: Symptomatic anemia       Referred By:      Spiritual Care Taxonomy:    Intended Effects: Establish rapport and connectedness    Methods: Collaborate with care team member, Offer spiritual/Cheondoism support    Interventions: Acknowledge current situation, Acknowledge response to difficult experience, Ask guided questions about laurel, Explain  role, Prayer for healing    Visit Type/Summary:  Provided a compassionate, listening presence for Kyler to share his story.  Patient appeared grateful that he is healing and may go home today.  Offered prayer with patient as requested.  Tenriism laurel appeared important to patient per conversation.     - Spiritual Care: Responded to a request for spiritual care and assessed Kyler for spiritual care needs. Consulted with RN prior to visit. Offered empathic listening and emotional support. Provided support for Kyler spiritual/Cheondoism requests. Coordinated Tenriism Communion and verified NPO status. Offered prayer.  remains available for follow up.    Spiritual Care support can be requested via an Epic consult. For urgent/immediate needs, please contact the On Call  at: Edward: ext [phone]

## 2025-04-05 NOTE — PLAN OF CARE
Assumed care at 1515. Patient is in bed awake & alert.Family at the bedside. Per report from previosu shift RN,she spoke to the doctor & that she was told that patient is cleared for discharge. He can resume coumadin & no need for bridging.RN to facilitate discharge.    throat and right ear pain for 7 days. Pt states that pain started in her throat, and then felt progressive pain to right ear. STates pain with swallowing, but no change in voice. Denies fever, chills. Cough, non-productive. No SOB.

## 2025-04-05 NOTE — PROGRESS NOTES
GI Note:    No further bleeding. Hgb stable. Ok to restart coumadin. Agree with consideration of Watchman. BID PPI X 8 weeks. Will schedule follow up in GI Office.     JACQUE Lozano  Gastroenterology  [facility]

## 2025-04-05 NOTE — PLAN OF CARE
A/o x4. No bleeding noted. Soft diet, tolerated well. Denies pain. Vss. Afebrile overnight. Ambulates in room with standby assist.   Problem: GASTROINTESTINAL - ADULT  Goal: Maintains or returns to baseline bowel function  Description: INTERVENTIONS:- Assess bowel function- Maintain adequate hydration with IV or PO as ordered and tolerated- Evaluate effectiveness of GI medications- Encourage mobilization and activity- Obtain nutritional consult as needed- Establish a toileting routine/schedule- Consider collaborating with pharmacy to review patient's medication profile  Outcome: Progressing

## 2025-04-05 NOTE — DISCHARGE INSTRUCTIONS
Please follow up w/ cardiology in the office to further discuss Watchan procedure and to make decision if doing it

## 2025-04-07 ENCOUNTER — PATIENT OUTREACH (OUTPATIENT)
Dept: CASE MANAGEMENT | Age: 85
End: 2025-04-07

## 2025-04-07 NOTE — PROGRESS NOTES
Voicemail received; Patient's daughter, Lela requesting assistance with scheduling appointment. Patient's daughter can be reached at [phone].   Called patient's daughter back    [facility] appointment request (discharged 04/05)    [facility]  [address]  [place] [address]  [phone]  Apt made:  Tue 04/08 @3:00pm    Dr Abhi Arellano  Internal Medicine, IP Consult to Primary Care  [address] [place] [address] [address]  [place] [address]  [phone]  Apt made:  Fri 04/11 @1:00pm w/CR [address]  Gastroenterology  [facility]  [address] [facility] [address]  [phone]  Patient has existing appointment Tue 04/22 @9:00am w/CR [place]  Patient's daughter stated she would like an appointment next week.  Per office, they have no openings until June, but has put patient on the waitlist if a sooner appointment comes up.  Confirmed w/pt's daughter, Lela  Closing encounter

## 2025-04-08 ENCOUNTER — OFFICE VISIT (OUTPATIENT)
Dept: INTERNAL MEDICINE CLINIC | Facility: CLINIC | Age: 85
End: 2025-04-08
Payer: MEDICARE

## 2025-04-08 VITALS
SYSTOLIC BLOOD PRESSURE: 118 MMHG | RESPIRATION RATE: 16 BRPM | BODY MASS INDEX: 27.44 KG/M2 | WEIGHT: 196 LBS | OXYGEN SATURATION: 100 % | DIASTOLIC BLOOD PRESSURE: 60 MMHG | HEIGHT: 71 IN | TEMPERATURE: 98 F | HEART RATE: 73 BPM

## 2025-04-08 DIAGNOSIS — D64.9 SYMPTOMATIC ANEMIA: ICD-10-CM

## 2025-04-08 DIAGNOSIS — E87.5 HYPERKALEMIA: ICD-10-CM

## 2025-04-08 DIAGNOSIS — E11.22 TYPE 2 DIABETES MELLITUS WITH STAGE 4 CHRONIC KIDNEY DISEASE, WITHOUT LONG-TERM CURRENT USE OF INSULIN (HCC): ICD-10-CM

## 2025-04-08 DIAGNOSIS — I48.0 PAROXYSMAL A-FIB (HCC): ICD-10-CM

## 2025-04-08 DIAGNOSIS — Z79.01 CURRENT USE OF LONG TERM ANTICOAGULATION: ICD-10-CM

## 2025-04-08 DIAGNOSIS — K92.2 GASTROINTESTINAL HEMORRHAGE, UNSPECIFIED GASTROINTESTINAL HEMORRHAGE TYPE: Primary | ICD-10-CM

## 2025-04-08 DIAGNOSIS — N18.4 CKD (CHRONIC KIDNEY DISEASE) STAGE 4, GFR 15-29 ML/MIN (HCC): Chronic | ICD-10-CM

## 2025-04-08 DIAGNOSIS — D50.0 IRON DEFICIENCY ANEMIA DUE TO CHRONIC BLOOD LOSS: ICD-10-CM

## 2025-04-08 DIAGNOSIS — I10 ESSENTIAL HYPERTENSION: ICD-10-CM

## 2025-04-08 DIAGNOSIS — N18.4 TYPE 2 DIABETES MELLITUS WITH STAGE 4 CHRONIC KIDNEY DISEASE, WITHOUT LONG-TERM CURRENT USE OF INSULIN (HCC): ICD-10-CM

## 2025-04-08 PROCEDURE — 1160F RVW MEDS BY RX/DR IN RCRD: CPT | Performed by: NURSE PRACTITIONER

## 2025-04-08 PROCEDURE — 1170F FXNL STATUS ASSESSED: CPT | Performed by: NURSE PRACTITIONER

## 2025-04-08 PROCEDURE — 1159F MED LIST DOCD IN RCRD: CPT | Performed by: NURSE PRACTITIONER

## 2025-04-08 PROCEDURE — 1111F DSCHRG MED/CURRENT MED MERGE: CPT | Performed by: NURSE PRACTITIONER

## 2025-04-08 PROCEDURE — 99214 OFFICE O/P EST MOD 30 MIN: CPT | Performed by: NURSE PRACTITIONER

## 2025-04-08 RX ORDER — FERROUS SULFATE 325(65) MG
325 TABLET, DELAYED RELEASE (ENTERIC COATED) ORAL
COMMUNITY
Start: 2025-04-08 | End: 2025-04-13

## 2025-04-08 RX ORDER — ASCORBIC ACID 500 MG
500 TABLET ORAL DAILY
COMMUNITY
Start: 2025-04-08

## 2025-04-08 RX ORDER — ATORVASTATIN CALCIUM 40 MG/1
40 TABLET, FILM COATED ORAL NIGHTLY
COMMUNITY

## 2025-04-08 NOTE — PROGRESS NOTES
[facility] PHARMACIST MEDICATION RECONCILIATION        Kyler Haji MRN [id number]    1940 PCP Abhi Arellano MD       Comments: Medication history completed by the [facility] Pharmacist with the patient in the office.     Outpatient Encounter Medications as of 2025   Medication Sig    atorvastatin 40 MG Oral Tab Take 1 tablet (40 mg total) by mouth nightly.    fexofenadine 180 MG Oral Tab Take 1 tablet (180 mg total) by mouth daily.    cholecalciferol 50 MCG (2000 UT) Oral Cap Take 1 capsule (2,000 Units total) by mouth daily.    ferrous sulfate 325 (65 FE) MG Oral Tab EC Take 1 tablet (325 mg total) by mouth 2 (two) times daily with meals.    pantoprazole 40 MG Oral Tab EC Take 1 tablet (40 mg total) by mouth 2 (two) times daily before meals.    Vitamin C 500 MG Oral Tab Take 1 tablet (500 mg total) by mouth 2 (two) times daily. With iron    aspirin 81 MG Oral Chew Tab Chew 1 tablet (81 mg total) by mouth every evening.    metFORMIN 500 MG Oral Tab Take 1 tablet (500 mg total) by mouth 2 (two) times daily with meals.    losartan 25 MG Oral Tab Take 1 tablet (25 mg total) by mouth daily.    glipiZIDE 5 MG Oral Tab Take 1 tablet (5 mg total) by mouth 2 (two) times daily before meals.    METOPROLOL SUCCINATE  MG Oral Tablet 24 Hr TAKE 1 TABLET(200 MG) BY MOUTH DAILY    furosemide 40 MG Oral Tab Take 1 tablet (40 mg total) by mouth every other day.    spironolactone 25 MG Oral Tab Take 0.5 tablets (12.5 mg total) by mouth daily.    furosemide 20 MG Oral Tab TAKE 40MG ON EVEN DAYS, AND 20MG ON ODD DAYS    allopurinol 100 MG Oral Tab Take 1 tablet (100 mg total) by mouth daily.    warfarin 4 MG Oral Tab TAKE 1 TABLET BY MOUTH DAILY OR AS DIRECTED BY ANTICOAGULATION CLINIC    amiodarone 200 MG Oral Tab Take 1 tablet (200 mg total) by mouth daily.     Medication Adherence Assessment:   Patient reports taking all medications as prescribed.  States his daughter that brought  him home from the hospital took the medication list at discharge and filled his pill box with all the medications per the AVS.  Denies any signs of bleeding since discharge, but states his stools are still dark due to the iron tablets he takes.  Discussed with APRN, will decrease the iron supplement to one time daily since there is only a 7% additional absorption of the iron if dosed more than once a day.  Also decreased the Vitamin C dose to once daily since it is being used to help the absorption of the iron tablet.    Reviewed all medications in detail with patient including dose, indication, timing of administration, monitoring parameters, and potential side effects of medications.   Patient confirmed understanding.     Thank you,    Clarissa Ardon, PharmD, 4/8/2025, 3:24 PM  [facility]

## 2025-04-08 NOTE — PATIENT INSTRUCTIONS
Follow up with Cardiology Dr. Sadie Galan on April 15, at 1:30pm  Marc Walker MD   [address] [facility]   [address] [place] [address] [phone]

## 2025-04-09 NOTE — PAYOR COMM NOTE
--------------  DISCHARGE REVIEW    Payor: [employer]  Subscriber #:  [id number]  Authorization Number: [id number]    Admit date: 3/28/25  Admit time:  10:00 PM  Discharge Date: 4/5/2025  5:20 PM     Admitting Physician: Blaine Reynolds MD  Attending Physician:  No att. providers found  Primary Care Physician: Abhi Arellano MD       4/4 GI      Push enteroscopy operative Report  Patient Name: Kyler Haji  YOB: 1940  MRN: [id number]  Procedure: Upper push enteroscopy with control of bleeding  Pre-operative Diagnosis & Indication:   Positive capsule study with GI bleeding of the small intestine  Anemia  Post-operative Diagnosis:  Small intestinal ulcer with stigmata of recent bleeding, s/p Endo-therapy, control of bleeding  Attending Endoscopist: Sacha Simmons M.D.  Informed Consent: The planned procedure(s), the explanation of the procedure, its expected benefits, the potential complications and risks and possible alternatives and their benefits and risks were discussed with the patient or the patient's surrogate. The discussion of risks, not limited to but including bleeding, infection, perforation, adverse effects from anesthesia, need for emergency surgery/prolonged hospitalization,  cardiac arrhythmias,  and aspiration were discussed with patient.  Pt and/or surrogate understood the proposed procedure(s), its risks, benefits and alternatives and wish to proceed with procedure(s). All questions answered in full.  After all questions were answered to their satisfaction, a signed, informed, and witnessed consent was obtained.  Physical Exam: Heart: regular rate and rhythm. No rubs, murmurs, or gallops. Lungs: Clear to auscultation bilaterally. Abdomen: Soft, non-tender, non distended. No rebound tenderness, no guarding.   A TIME OUT WAS COMPLETED prior to the procedure to confirm the patient, procedure(s) and complete endoscopy safety procedure.  Sedation: Monitored Anesthesia Care; ASA  class per anesthesiology team   Monitoring: Pulsoximetry, pulse, respirations, and blood pressure , vitals were monitored throughout the entire procedure under monitored anesthesia care.   Procedure: The patient was then brought to the endoscopy suite where his/her pulse, pulse oximetry and blood pressure were monitored. The patient was placed in the left lateral decubitus position and deep sedation was administered. Once adequate sedation was achieved, a bite block was placed and a lubricated tip of an Olympus push endoscope was inserted through the oropharynx and gently manipulated through the esophagus into the stomach to the jejunum, with endoscope approximately traversed 150 cm from the incisors. Upon withdrawal of the endoscope, careful visualization of the mucosa was performed. The endoscope was then withdrawn into the gastric antrum and placed in a retroflexed position.  The endoscope was then righted, and air was suctioned from the stomach.  The endoscope was then withdrawn from the patient, with careful visual inspection of the mucosa. The patient left the procedure room in stable condition for recovery. Findings and endotherapy as listed below  Toleration: Patient tolerated procedure well   Complications: No immediate complications   Technical Difficulty:  The procedure was not technically difficult   Estimated Blood Loss: Minimal, less than 5mL of estimated blood loss.   Findings and Therapeutics:  Esophagus:   Normal mucosa.   No strictures. GE junction traversed with endoscope without resistance.  The Z-line was irregular, appreciated at 42 cm from the incisors.    Stomach:    Several superficial ulcers consistent with post-biopsy findings from prior EGD.  No active or stigmata of recent bleeding  Endoscope was placed in a retroflexion view in the stomach. There was no evidence of a hiatal hernia.   Small intestine:   Endoscope traversed into the jejunum, approximately 150 cm from the incisors.  One  distal duodenal/proximal jejunal ulcer with stigmata of fresh bleeding.  No visible vessel.  Some fresh appearing blood at the edge of the ulcer.  The ampulla was visualized distinctly away from this ulcer.  Therapeutics: In 4 quadrants, 1:10,000 epinephrine injected at the edge of the ulcer with good blanching of the submucosa.  Then 2 endoscopic hemostatic clips were deployed with success on the ulcer.  Hemostasis at the end of the maneuver.  Several post-biopsy appearing superficial ulcers in the duodenum.  No active or stigmata of recent bleeding  Recommendations:  Post EGD precautions, watch for bleeding, infection, perforation, adverse drug reactions   Follow-up biopsies  Pantoprazole 40 mg IV q12 hours  Avoid non-aspirin NSAIDs  Trend CBC, transfuse if Hg < 7     GI will follow, please page with any questions or concerns. Post procedure, I discussed findings and management plan with patient's daughter, Lela.      Sacha Simmons MD  4/4/2025  11:23 AM               Electronically signed by Sacha Simmons MD at 4/4/2025 11:32 AM    4/4  Chief Complaint: dark stools     Subjective:  Patient feels well post procedure.  Denies pain, no n/v.  No new complaints.       Objective:  Review of Systems:   A comprehensive review of systems was completed; pertinent positive and negatives stated in subjective.     Vital signs:  Temp:  [97.2 °F (36.2 °C)-98.4 °F (36.9 °C)] 97.2 °F (36.2 °C)  Pulse:  [] 60  Resp:  [12-25] 20  BP: ()/(41-74) 152/70  SpO2:  [91 %-100 %] 94 %     Physical Exam:    General: No acute distress, pleasant   Respiratory: No wheezes, no rhonchi  Cardiovascular: S1, S2, regular rate and rhythm  Abdomen: Soft, Non-tender, non-distended, positive bowel sounds  Neuro: No new focal deficits.   Extremities: No edema     Diagnostic Data:    Labs:                       Recent Labs   Lab 03/28/25 2010 03/29/25  0115 03/29/25  0551 03/29/25  0926 03/30/25  0554 03/30/25  0936 03/31/25  0344  03/31/25  1319 04/01/25  0529 04/01/25  0848 04/01/25  1718 04/02/25  0544 04/02/25  1105 04/03/25  0042 04/03/25  0553 04/03/25  0934 04/03/25  1808 04/04/25  0522   WBC 5.8  --  5.1  --   --   --   --   --  5.7  --   --  5.4  --   --  6.2  --   --  5.5   HGB 6.2*   < > 7.1*   < >  --    < > 7.6*   < > 7.5* 7.7*   < > 7.3*   < > 8.1* 7.4* 7.2* 7.2* 6.7*   MCV 94.6  --  91.5  --   --   --   --   --  92.8  --   --  91.3  --   --  91.8  --   --  93.6   .0  --  193.0  --   --   --   --   --  183.0  --   --  181.0  --   --  181.0  --   --  156.0   INR 3.40*  --  3.45*  --  2.63*  --  1.89*  --   --  1.49*  --   --   --   --   --   --   --   --     < > = values in this interval not displayed.                 Recent Labs   Lab 03/28/25 2010 03/29/25  0551 04/02/25  0544 04/03/25  0553 04/04/25  0522   *   < > 151* 176* 152*   BUN 71*   < > 32* 27* 30*   CREATSERUM 3.88*   < > 2.58* 2.49* 2.70*   CA 9.3   < > 9.1 9.0 8.8   ALB 4.2  --   --   --   --       < > 140 143 143   K 5.1   < > 4.4 3.5 3.4*      < > 107 107 109   CO2 18.0*   < > 24.0 23.0 24.0   ALKPHO 63  --   --   --   --    AST 31  --   --   --   --    ALT 31  --   --   --   --    BILT 0.3  --   --   --   --    TP 6.4  --   --   --   --     < > = values in this interval not displayed.         Estimated Glomerular Filtration Rate: 22 mL/min/1.73m2 (A) (result from lab).     No results for input(s): \"TROP\", \"TROPHS\", \"CK\" in the last 168 hours.           Recent Labs   Lab 03/30/25  0554 03/31/25  0344 04/01/25  0848   PTP 28.3* 21.9* 18.1*   INR 2.63* 1.89* 1.49*                     Microbiology     No results found for this visit on 03/28/25.        Imaging: Reviewed in Epic.     Medications:   Scheduled Medications    sodium chloride   Intravenous Once    bisacodyl  5 mg Oral BID    amiodarone  200 mg Oral Daily    allopurinol  100 mg Oral Daily    Metoprolol Succinate ER  200 mg Oral Daily    insulin aspart  1-68 Units Subcutaneous TID  CC    insulin aspart  1-10 Units Subcutaneous TID AC and HS    pantoprazole  40 mg Intravenous Q12H    atorvastatin  40 mg Oral Nightly            Assessment & Plan:  #Symptomatic anemia /presumptive GIB  Dark BM though has been on PO iron  Occult blood +ve  IV PPI BID  Hg 6.2 -> 8.4 -> 6.7  Post 2 unit PRBC on admission   GI following  Xray follow through reviewed   S/p EGD with capsule endoscopy 4/2  Colonoscopy on 4/3  Capsule endoscopy with abnormal findinsg  Repeat EGD with push enterscopy on 4/4  Diet advanced today, repeat Hg      #Coumadin coagulopathy  Hold coumadin until cleared by GI     #Ao stenosis s/p bioprosthetic valve     #Paroxysmal A. Fib  Cont. Rate control  Hold coumadin  Given recurrent bleeds should be evaluated for watchman  Sees [facility] cards     #HFrEF, EF: 40-45% s/p AICD  Compensated  Hold aldactone, lasix for now  Monitor volume closely     #HTN  Hold ARB, cont BB     #DM2  insulin     #CKD IV  Cr stable     #Metabolic Acidosis  Due to CKD     #CAD s/p CABG  #HTN  #HLD  #MILES           Maikel Roper MD     Supplementary Documentation:  Quality:  DVT Mechanical Prophylaxis:   SCDs,    DVT Pharmacologic Prophylaxis   Medication   None                 Code Status: Full Code  Pike: No urinary catheter in place  Pike Duration (in days):   Central line:    TAMIKA:      Discharge is dependent on: GI eval   At this point Mr. Haji is expected to be discharge to: Home          **Certification        PHYSICIAN Certification of Need for Inpatient Hospitalization - Initial Certification     Patient will require inpatient services that will reasonably be expected to span two midnight's based on the clinical documentation in H+P.   Based on patients current state of illness, I anticipate that, after discharge, patient will require TBD.                      Electronically signed by Maikel Roper MD at 4/4/2025  3:41 PM

## 2025-04-13 DIAGNOSIS — D50.0 IRON DEFICIENCY ANEMIA DUE TO CHRONIC BLOOD LOSS: ICD-10-CM

## 2025-04-13 DIAGNOSIS — I10 ESSENTIAL HYPERTENSION: ICD-10-CM

## 2025-04-13 DIAGNOSIS — M10.9 GOUT, UNSPECIFIED CAUSE, UNSPECIFIED CHRONICITY, UNSPECIFIED SITE: ICD-10-CM

## 2025-04-13 DIAGNOSIS — N18.4 TYPE 2 DIABETES MELLITUS WITH STAGE 4 CHRONIC KIDNEY DISEASE, WITHOUT LONG-TERM CURRENT USE OF INSULIN (HCC): ICD-10-CM

## 2025-04-13 DIAGNOSIS — E11.22 TYPE 2 DIABETES MELLITUS WITH STAGE 4 CHRONIC KIDNEY DISEASE, WITHOUT LONG-TERM CURRENT USE OF INSULIN (HCC): ICD-10-CM

## 2025-04-13 RX ORDER — FUROSEMIDE 20 MG/1
TABLET ORAL
Qty: 45 TABLET | Refills: 2 | Status: SHIPPED | OUTPATIENT
Start: 2025-04-13

## 2025-04-13 RX ORDER — GLIPIZIDE 5 MG/1
5 TABLET ORAL
Qty: 180 TABLET | Refills: 1 | Status: SHIPPED | OUTPATIENT
Start: 2025-04-13

## 2025-04-13 RX ORDER — FERROUS SULFATE 325(65) MG
325 TABLET, DELAYED RELEASE (ENTERIC COATED) ORAL
Qty: 90 TABLET | Refills: 0 | Status: SHIPPED | OUTPATIENT
Start: 2025-04-13

## 2025-04-13 RX ORDER — ALLOPURINOL 100 MG/1
100 TABLET ORAL DAILY
Qty: 90 TABLET | Refills: 3 | Status: SHIPPED | OUTPATIENT
Start: 2025-04-13

## 2025-04-14 NOTE — TELEPHONE ENCOUNTER
Last time medication was refilled: 4/17/24  Next office visit due/scheduled: 5/7/25  Last office visit: 3/28/25  Last Labs: 1/20/25

## 2025-04-14 NOTE — TELEPHONE ENCOUNTER
Last time medication was refilled: 4/28/24  Next office visit due/scheduled: 5/7/25  Last office visit: 3/28/25  Last Labs: 1/20/25

## 2025-04-14 NOTE — DISCHARGE SUMMARY
[facility]     Kyler Haji Patient Status:  Inpatient    1940 MRN [id number]   Location [facility] 4NW-A Attending No att. providers found   Hosp Day # 8 PCP Abhi rAellano MD     Date of Admission: 3/28/2025  Date of Discharge:  2025     Discharge Disposition: Home or Self Care    Discharge Diagnosis:  Anemia due to acute GI bleeding  Hypertension  Hyperlipidemia  CKD 3  Diabetes type 2  Coagulopathy  Paroxysmal A-fib  Aortic stenosis status post bioprosthetic valve  Chronic diastolic CHF compensated  Status post AICD  CKD 4  Metabolic acidosis  CAD status post CABG    History of Present Illness: 84 years old man with history of having melena intermittently for 1 month and found to have a hemoglobin of 6.3.    Brief Synopsis: Patient was found to have chronic recurrent iron deficiency anemia with negative EGD and colonoscopy in the past aside from hiatal hernia 5 cm.  He is on chronic anticoagulation with recent INR greater than 4 and he also has history of CKD 4.  Patient was seen by GI with recommendations for IV iron therapy small bowel follow-through and reversal of coagulopathy.  Patient had endoscopy with biopsies and debridement of video capsule in the small bowel.  EGD was normal the entire examined duodenum was normal.  Patient was found to have overt GI bleeding in the proximal part of the jejunum and overt blood visualized.  The plan was for push enteroscopy.  Colonoscopy showed no evidence of bleeding.  Patient underwent upper push enteroscopy with control of bleeding as he was found to have small intestinal ulcer with stigmata of recent bleeding status post Endo-therapy and control of bleeding.  He was continued on PPI 40 mg IV twice daily he is advised to follow-up biopsies and to avoid nonaspirin NSAIDs.  Cardiologist outpatient as well with recommendation to restart Coumadin without heparin.    Lace+ Score: 84  59-90 High Risk  29-58 Medium Risk  0-28    Low Risk  Patient was referred to the [facility].    TCM Follow-Up Recommendation:  LACE > 58: High Risk of readmission after discharge from the hospital.      Procedures during hospitalization:   EGD colonoscopy small bowel capsule    Incidental or significant findings and recommendations (brief descriptions):  Anemia    Lab/Test results pending at Discharge:       Consultants:  Gi  Cardiology  Inr on Monday 4/7    Discharge Medication List:     Discharge Medications        CHANGE how you take these medications        Instructions Prescription details   warfarin 4 MG Tabs  Commonly known as: Coumadin  What changed:   how much to take  how to take this  when to take this  additional instructions      Take as directed. If you are unsure how to take this medication, talk to your nurse or doctor.  Original instructions: TAKE 1 TABLET BY MOUTH DAILY OR AS DIRECTED BY ANTICOAGULATION CLINIC   Quantity: 90 tablet  Refills: 4            CONTINUE taking these medications        Instructions Prescription details   amiodarone 200 MG Tabs  Commonly known as: Pacerone      Take 1 tablet (200 mg total) by mouth daily.   Quantity: 90 tablet  Refills: 3     aspirin 81 MG Chew      Chew 1 tablet (81 mg total) by mouth every evening.   Refills: 0     cholecalciferol 50 MCG (2000 UT) Caps  Commonly known as: Vitamin D3      Take 1 capsule (2,000 Units total) by mouth daily.   Refills: 0     fexofenadine 180 MG Tabs  Commonly known as: Allegra      Take 1 tablet (180 mg total) by mouth daily.   Refills: 0     furosemide 40 MG Tabs  Commonly known as: Lasix      Take 1 tablet (40 mg total) by mouth every other day.   Quantity: 45 tablet  Refills: 3     losartan 25 MG Tabs  Commonly known as: Cozaar      Take 1 tablet (25 mg total) by mouth daily.   Quantity: 90 tablet  Refills: 1     metFORMIN 500 MG Tabs  Commonly known as: Glucophage      Take 1 tablet (500 mg total) by mouth 2 (two) times daily with meals.    Quantity: 180 tablet  Refills: 1     Metoprolol Succinate  MG Tb24  Commonly known as: TOPROL-XL      TAKE 1 TABLET(200 MG) BY MOUTH DAILY   Quantity: 90 tablet  Refills: 2     pantoprazole 40 MG Tbec  Commonly known as: Protonix      Take 1 tablet (40 mg total) by mouth 2 (two) times daily before meals.   Quantity: 180 tablet  Refills: 3     spironolactone 25 MG Tabs  Commonly known as: Aldactone      Take 0.5 tablets (12.5 mg total) by mouth daily.   Quantity: 90 tablet  Refills: 3              [place] reviewed[place]     Follow-up appointment:   Javi Maloney MD  [address] [facility]   [facility] [address]  [phone]    Go in 2 week(s)  for a follow-up office visit with GI., The office will call to arrange date/time of visit    [facility]  [address] [place] [address] [address]  [facility] [address]  [phone]  Schedule an appointment as soon as possible for a visit      Abhi Arellano MD  [address] [place] [address] [address]  [facility] [id number] [phone]    Schedule an appointment as soon as possible for a visit in 1 week(s)      Appointments for Next 30 Days 4/14/2025 - 5/14/2025        Date Arrival Time Visit Type Length Department Provider     4/22/2025  9:00 AM  FOLLOW-UP OV [86686] 20 min [facility] Kenzie Gandara APRN    Patient Instructions:     Please arrive 15 minutes prior to your scheduled appointment time.                    4/30/2025 11:00 AM  EXAM - ESTABLISHED [1624] 20 min [facility] Rhett Jones MD    Patient Instructions:         Location Instructions:     Masks are optional for all patients and visitors, unless otherwise indicated. Note: A $50 fee will be charged for missed appointments or same-day cancellations. Please provide 24 hours' notice if you need to cancel or reschedule your appointment.               5/7/2025  1:00 PM  [facility] HOSPITAL FOLLOW UP [5840] 30 min [facility], [address] [place], [place] [address]Brittany Rivera  CR FERNANDES    Patient Instructions:         Location Instructions:     Masks are optional for all patients and visitors, unless otherwise indicated. Note: A $50 fee will be charged for missed appointments or same-day cancellations. Please provide 24 hours' notice if you need to cancel or reschedule your appointment.                      Vital signs:       Physical Exam:    General: No acute distress   Lungs: clear to auscultation  Cardiovascular: S1, S2  Abdomen: Soft    -----------------------------------------------------------------------------------------------  PATIENT DISCHARGE INSTRUCTIONS: See electronic chart    Sandrine Rice MD    Total time spent on discharge plannin minutes     The  [employer] Cures Act makes medical notes like these available to patients in the interest of transparency. Please be advised this is a medical document. Medical documents are intended to carry relevant information, facts as evident, and the clinical opinion of the practitioner. The medical note is intended as peer to peer communication and may appear blunt or direct. It is written in medical language and may contain abbreviations or verbiage that are unfamiliar.

## 2025-04-20 PROBLEM — D50.0 IRON DEFICIENCY ANEMIA DUE TO CHRONIC BLOOD LOSS: Status: ACTIVE | Noted: 2025-04-20

## 2025-04-20 NOTE — PROGRESS NOTES
TCM VISIT  [facility]      HISTORY   HPI: Kyler Haji is a 84 year old male here today for hospital follow up for GI bleed, symptomatic anemia/ROVERTO, PAF, current lung term use of anticoagulation, DMII controlled, HTN, CKD4, hyperkalemia.  Kyler Haji was discharged from [facility]  to Home or Self Care.  Admit Date: 3/28/25. Discharge Date: 4/5/25.     Hospital Discharge Diagnosis:     Anemia due to acute GI bleeding  Hypertension  Hyperlipidemia  CKD 3  Diabetes type 2  Coagulopathy  Paroxysmal A-fib  Aortic stenosis status post bioprosthetic valve  Chronic diastolic CHF compensated  Status post AICD  CKD 4  Metabolic acidosis  CAD status post CABG    Hospital stay was uncomplicated.  Kyler Haji was discharge with changed to warfarin and a referral to the [facility] for continued follow up.      Today, patient is being seen for hospital follow-up.  He reports doing good since discharge.    He presented to ED with fatigue and lightheadedness.  He went to his PCP office and had blood work done.  He reported having dark bowel movements.  Blood work showed anemia and he was sent to ED.  He was found to have chronic recurrent iron deficiency anemia with negative EGD and colonoscopy in the past.  He is on chronic anticoagulation with recent INR greater than 4.  He also has history of CKD 4.  He was seen by GI with recommendations for IV iron therapy and small bowel follow-through and reversal of coagulopathy.  He had endoscopy with biopsies and video capsule in the small bowel.  EGD was normal, the entire examined duodenum was normal.  He was found to have overt GI bleeding in the proximal part of the jejunum and overt blood visualized.  The plan was for push enteroscopy.  Colonoscopy showed no evidence of bleeding.  Underwent upper push endoscopy with control of bleeding and he was found to have small intestinal ulcer with stigmata of recent bleeding post Endo-therapy and control of  bleeding.  He was continued on PPI twice daily and he was advised to follow-up for biopsies and to avoid nonaspirin NSAIDs.  He is also to follow-up with cardiology outpatient as well with recommendation to restart Coumadin without heparin.  He was cleared by consultants and discharged home in stable condition.    Interactive contact within 2 business days post discharge first initiated on Date: 4/8/2025      Allergies:  Allergies[1]   Current Meds:  Current Outpatient Medications   Medication Sig Dispense Refill    furosemide 20 MG Oral Tab TAKE 40MG ON EVEN DAYS, AND 20MG ON ODD DAYS 45 tablet 2    glipiZIDE 5 MG Oral Tab Take 1 tablet (5 mg total) by mouth 2 (two) times daily before meals. 180 tablet 1    atorvastatin 40 MG Oral Tab Take 1 tablet (40 mg total) by mouth nightly.      Vitamin C 500 MG Oral Tab Take 1 tablet (500 mg total) by mouth daily. With iron      fexofenadine 180 MG Oral Tab Take 1 tablet (180 mg total) by mouth daily.      cholecalciferol 50 MCG (2000 UT) Oral Cap Take 1 capsule (2,000 Units total) by mouth daily.      pantoprazole 40 MG Oral Tab EC Take 1 tablet (40 mg total) by mouth 2 (two) times daily before meals. 180 tablet 3    aspirin 81 MG Oral Chew Tab Chew 1 tablet (81 mg total) by mouth every evening.      metFORMIN 500 MG Oral Tab Take 1 tablet (500 mg total) by mouth 2 (two) times daily with meals. 180 tablet 1    losartan 25 MG Oral Tab Take 1 tablet (25 mg total) by mouth daily. 90 tablet 1    METOPROLOL SUCCINATE  MG Oral Tablet 24 Hr TAKE 1 TABLET(200 MG) BY MOUTH DAILY 90 tablet 2    furosemide 40 MG Oral Tab Take 1 tablet (40 mg total) by mouth every other day. 45 tablet 3    spironolactone 25 MG Oral Tab Take 0.5 tablets (12.5 mg total) by mouth daily. 90 tablet 3    warfarin 4 MG Oral Tab TAKE 1 TABLET BY MOUTH DAILY OR AS DIRECTED BY ANTICOAGULATION CLINIC 90 tablet 4    amiodarone 200 MG Oral Tab Take 1 tablet (200 mg total) by mouth daily. 90 tablet 3     ferrous sulfate 325 (65 FE) MG Oral Tab EC Take 1 tablet (325 mg total) by mouth daily with breakfast. 90 tablet 0    allopurinol 100 MG Oral Tab Take 1 tablet (100 mg total) by mouth daily. 90 tablet 3     No current facility-administered medications for this visit.       HISTORY: reconciled and reviewed with patient  Past Medical History[2]   Past Surgical History[3]   Family History[4]   Short Social Hx on File[5]     Imaging & Consults:        Lab Results   Component Value Date/Time    WBC 8.5 04/05/2025 09:40 AM    HGB 8.9 (L) 04/05/2025 09:40 AM    HCT 27.0 (L) 04/05/2025 09:40 AM    .0 04/05/2025 09:40 AM     (H) 04/05/2025 09:40 AM     04/05/2025 09:40 AM    K 3.6 04/05/2025 03:12 PM     04/05/2025 09:40 AM    CO2 26.0 04/05/2025 09:40 AM    BUN 25 (H) 04/05/2025 09:40 AM    CREATSERUM 2.47 (H) 04/05/2025 09:40 AM    CA 9.1 04/05/2025 09:40 AM    MG 1.8 04/04/2025 05:22 AM    PHOS 3.9 10/28/2024 09:23 AM    ALB 4.2 03/28/2025 08:10 PM    ALT 31 03/28/2025 08:10 PM    AST 31 03/28/2025 08:10 PM    INR 1.26 (H) 04/05/2025 02:00 PM    A1C 7.2 (H) 01/20/2025 01:58 PM       Immunization History   Administered Date(s) Administered    Covid-19 Vaccine Pfizer 30 mcg/0.3 ml 02/01/2021, 02/22/2021, 10/01/2021    Covid-19 Vaccine Pfizer Bivalent 30mcg/0.3mL 11/02/2022    FLU VAC High Dose 65 YRS & Older PRSV Free (64343) 10/16/2021, 10/26/2022, 09/22/2023    FLUAD High Dose 65 yr and older (63813) 10/03/2017, 09/24/2018, 09/28/2020    FLUZONE 6 months and older PFS 0.5 ml (24567) 10/13/2015, 10/04/2016, 09/22/2020    Flublok Quad Influenza Vaccine (96421) 09/30/2019    High Dose Fluzone Influenza Vaccine, 65yr+ PF 0.5mL (71773) 10/20/2024    Influenza 10/04/2011, 10/09/2012, 10/08/2013, 10/14/2014, 10/26/2022    Pfizer Covid-19 Vaccine 30mcg/0.3ml 12yrs+ 10/16/2023, 11/05/2024    Pneumococcal (Prevnar 13) 04/14/2015    Pneumovax 04/14/2007    RSV, bivalent, diluent reconstituted PF (Abrysvo)  12/11/2023    TDAP 08/22/2023    Zoster Vaccine Recombinant Adjuvanted (Shingrix) 11/08/2021, 03/03/2022       ROS:  GENERAL: energy fair/stable, denies fever, + improving weakness  SKIN: denies any skin changes  EYES: denies blurred vision, eye pain  HEENT: denies ear pain, loss of hearing, sore throat  RESPIRATORY: denies cough, denies dyspnea with exertion  CARDIOVASCULAR: denies syncope, chest pain, + improving fatigue, denies palpitations   GI: denies abdominal pain, melena, constipation, diarrhea, nausea, vomiting   MUSCULOSKELETAL: denies pain, states normal range of motion of extremities  NEUROLOGIC: denies confusion, balance difficulty  PSYCHIATRIC: denies depression or anxiety  HEMATOLOGIC: + anemia, denies bruising, bleeding    PHYSICAL EXAM:  Vitals:    04/08/25 1508   BP: 118/60   Pulse: 73   Resp: 16   Temp: 98 °F (36.7 °C)       GENERAL: well developed, well nourished, in no apparent distress, good historian  INTEGUMENTARY: warm, dry, no rashes  HEENT: atraumatic, normocephalic, sclera white, conjunctivae pink  NECK: supple  CHEST: no chest tenderness  LUNGS: clear to auscultation bilaterally, no wheezes, rhonchi or rales, normal respiratory effort  CARDIO: S1, S2, RRR without audible murmur  GI: + BS to all quadrants, no tenderness  MUSCULOSKELETAL: + ROM in all joints, no evidence of swelling, pain   EXTREMITIES: no cyanosis, or edema  NEURO: Oriented x3  PSYCHIATRIC: appropriate affect    ASSESSMENT/ PLAN:   1. Gastrointestinal hemorrhage/symptomatic anemia/iron deficiency anemia  Bleeding found in proximal part of jejunum and underwent push enteroscopy with control of bleeding  Reports improving fatigue/weakness  No further bleeding  Hemoglobin on admission was 6.3  Patient received 2 units of PRBCs and IV iron infusion  Iron panel in hospital was normal  Hemoglobin prior to discharge was stable at 8.9  Continue  Pantoprazole 40 mg twice daily  Ferrous sulfate 325 mg daily with meals  Vitamin C  500 MG Oral Tab; Take 1 tablet (500 mg total) by mouth daily. With iron  To follow-up with cardiology to discuss watchman's procedure  Tolerating an oral diet  Appetite is better-eating small things throughout the day/drinking well  Moving bowels/passing gas  Follow-up with OPAL Guaman on 4/22 at 9 AM  Cardiology RN on 4/14 for INR at 9:30 AM  Follow-up with nephrology Dr. Jones on 4/30 at 11 AM  Follow-up with cardiology ARLEEN Skinner on 4/15 at 1:30 PM  All pertinent hospital records, labs, radiology from current hospitalization reviewed    2. Paroxysmal A-fib/current use of long-term anticoagulation  Stable  Denies any palpitations  Regular rhythm with HR 73 today  Continue   warfarin 4 mg daily  Amiodarone 200 mg daily  Metoprolol  mg daily  Next INR on Friday per cardiology  Denies any bleeding at time of exam and is to continue to monitor for bleeding and report immediately    3. Type 2 diabetes mellitus with stage 4 chronic kidney disease, without long-term current use of insulin ([facility])  A1c slightly elevated at 7.2%  Reports not checking his BS  Recommended checking at least with symptoms  Continue  Atorvastatin 40 mg nightly  Glipizide 5 mg 2 times daily  Losartan 25 mg daily  Metformin 500 mg 3 times daily  Continue to monitor for S/S of hyper hypoglycemia  Further A1c monitoring per PCP    4. Essential hypertension  Stable with BP of 118/60 with HR of 73  Currently not checking BP at home  Encourage to check BP 1-3 times per week and keep log  Continue  Losartan 25 mg daily  Metoprolol  mg daily  Continue to monitor for S/S of hyper hypotension  Follow-up with cardiology as directed    5. CKD (chronic kidney disease) stage 4, GFR 15-29 ml/min ([facility])  Stable CKD 4  Kidney function at discharge  BUN 25  Creatinine 2.47  GFR 25  Avoid nephrotoxic medications  Follow-up with nephrology as directed  Further labs per nephrology    6. Hyperkalemia  Resolved  Potassium on 4/5 prior to discharge  was 3.6  Continue to monitor labs as directed      Orders Placed This Encounter    atorvastatin 40 MG Oral Tab     Sig: Take 1 tablet (40 mg total) by mouth nightly.    DISCONTD: ferrous sulfate 325 (65 FE) MG Oral Tab EC     Sig: Take 1 tablet (325 mg total) by mouth daily with breakfast.    Vitamin C 500 MG Oral Tab     Sig: Take 1 tablet (500 mg total) by mouth daily. With iron           Medications & Refills for this Visit:  Current Medications[6]  Requested Prescriptions      No prescriptions requested or ordered in this encounter         Health Maintenance:  Health Maintenance   Topic Date Due    Diabetes Care Dilated Eye Exam  10/31/2024    COVID-19 Vaccine (7 - 2024-25 season) 05/05/2025    Diabetes Care A1C  07/20/2025    Diabetes Care: GFR  04/05/2026    Influenza Vaccine  Completed    Annual Well Visit  Completed    Annual Depression Screening  Completed    Fall Risk Screening (Annual)  Completed    Diabetes Care: Foot Exam (Annual)  Completed    Diabetes Care: Microalb/Creat Ratio (Annual)  Completed    Pneumococcal Vaccine: 50+ Years  Completed    Zoster Vaccines  Completed    Meningococcal B Vaccine  Aged Out    Colonoscopy  Discontinued       Chronic Care Management Referral: N/A      Transitional Care Management Certification:  During the visit, I accessed [employer] and/or [facility] Everywhere and personally reviewed the following for the recent hospitalization: provider notes, consults, summaries, labs and other test results and the pertinent findings are documented below.     Medication Reconciliation:  I am aware of an inpatient discharge within the last 30 days.  The discharge medication list has been reconciled with the patient's current medication list and reviewed by me.  See medication list for additions of new medication, and changes to current doses of medications and discontinued medications.        Discharge Disposition/Plan:     Future Appointments   Date Time Provider Department Center    4/22/2025  9:00 AM Kenzie Gandara APRN [facility]   4/30/2025 11:00 AM Rhett Jones MD [facility] [place]   5/7/2025  1:00 PM Brittany Ramires APRN [facility] [address]   7/14/2025  1:15 PM Abhi Arellano MD [facility] [address]      1.  Transitional Care Clinic Visit: Next visit Discharged  2.  PCP Visit: 5/7/2025  3.  Chronic Care Management: N/A     CR [place], 4/8/2025  [facility]  [phone]       [1]   Allergies  Allergen Reactions    Ace Inhibitors Coughing   [2]   Past Medical History:   Abdominal hernia    Actinic keratosis    Black stools    Chest pain, unspecified    Cough    Diabetes (HCC)    Diabetes mellitus (HCC)    Dysmetabolic syndrome X    Easy bruising    Essential hypertension    Essential hypertension, malignant    Flatulence/gas pain/belching    Hemorrhoids    High cholesterol    Inguinal hernia without mention of obstruction or gangrene, unilateral or unspecified, (not specified as recurrent)    Myalgia and myositis, unspecified    Obesity, unspecified    Pacemaker    Special screening for malignant neoplasm of prostate    Type II or unspecified type diabetes mellitus without mention of complication, uncontrolled    Undiagnosed cardiac murmurs    Unspecified sleep apnea    AHI 21.7 SaO2 yashira 79.9 %     Wears glasses   [3]   Past Surgical History:  Procedure Laterality Date    Cabg  2/2012    CABG x 3    Cath transcatheter aortic valve replacement  2/2012    Colonoscopy  6/27/2014    Procedure: COLONOSCOPY;  Surgeon: Ton Oliveira MD;  Location:  ENDOSCOPY    Colonoscopy N/A 2/11/2025    Procedure: COLONOSCOPY;  Surgeon: Israel Martin MD;  Location:  ENDOSCOPY    Colonoscopy N/A 4/3/2025    Procedure: COLONOSCOPY;  Surgeon: Sacha Simmons MD;  Location:  ENDOSCOPY    Colonoscopy N/A 4/2/2025    Procedure: COLONOSCOPY;  Surgeon: Sacha Simmons MD;  Location:  ENDOSCOPY    Hernia surgery      Valve repair     [4]   Family  History  Problem Relation Age of Onset    Prostate Cancer Brother     Diabetes Father     Diabetes Mother    [5]   Social History  Socioeconomic History    Marital status:    Tobacco Use    Smoking status: Former     Current packs/day: 0.00     Types: Cigarettes     Start date: 1955     Quit date: 1975     Years since quittin.8     Passive exposure: Never    Smokeless tobacco: Never   Vaping Use    Vaping status: Never Used   Substance and Sexual Activity    Alcohol use: Yes     Alcohol/week: 1.0 standard drink of alcohol     Types: 1 Standard drinks or equivalent per week     Comment: 8-10 drinks per week    Drug use: Never   Other Topics Concern    Caffeine Concern Yes    Exercise No     Social Drivers of Health     Food Insecurity: No Food Insecurity (3/28/2025)    NCSS - Food Insecurity     Worried About Running Out of Food in the Last Year: No     Ran Out of Food in the Last Year: No   Transportation Needs: No Transportation Needs (3/28/2025)    NCSS - Transportation     Lack of Transportation: No   Housing Stability: Not At Risk (3/28/2025)    NCSS - Housing/Utilities     Has Housing: Yes     Worried About Losing Housing: No     Unable to Get Utilities: No   [6]    atorvastatin 40 MG Oral Tab Take 1 tablet (40 mg total) by mouth nightly.      [DISCONTINUED] ferrous sulfate 325 (65 FE) MG Oral Tab EC Take 1 tablet (325 mg total) by mouth daily with breakfast.      Vitamin C 500 MG Oral Tab Take 1 tablet (500 mg total) by mouth daily. With iron      fexofenadine 180 MG Oral Tab Take 1 tablet (180 mg total) by mouth daily.      cholecalciferol 50 MCG (2000 UT) Oral Cap Take 1 capsule (2,000 Units total) by mouth daily.      pantoprazole 40 MG Oral Tab EC Take 1 tablet (40 mg total) by mouth 2 (two) times daily before meals. 180 tablet 3    aspirin 81 MG Oral Chew Tab Chew 1 tablet (81 mg total) by mouth every evening.      metFORMIN 500 MG Oral Tab Take 1 tablet (500 mg total) by mouth 2  (two) times daily with meals. 180 tablet 1    losartan 25 MG Oral Tab Take 1 tablet (25 mg total) by mouth daily. 90 tablet 1    [DISCONTINUED] glipiZIDE 5 MG Oral Tab Take 1 tablet (5 mg total) by mouth 2 (two) times daily before meals. 180 tablet 1    METOPROLOL SUCCINATE  MG Oral Tablet 24 Hr TAKE 1 TABLET(200 MG) BY MOUTH DAILY 90 tablet 2    furosemide 40 MG Oral Tab Take 1 tablet (40 mg total) by mouth every other day. 45 tablet 3    spironolactone 25 MG Oral Tab Take 0.5 tablets (12.5 mg total) by mouth daily. 90 tablet 3    [DISCONTINUED] furosemide 20 MG Oral Tab TAKE 40MG ON EVEN DAYS, AND 20MG ON ODD DAYS 45 tablet 2    [DISCONTINUED] allopurinol 100 MG Oral Tab Take 1 tablet (100 mg total) by mouth daily. 90 tablet 3    warfarin 4 MG Oral Tab TAKE 1 TABLET BY MOUTH DAILY OR AS DIRECTED BY ANTICOAGULATION CLINIC 90 tablet 4    amiodarone 200 MG Oral Tab Take 1 tablet (200 mg total) by mouth daily. 90 tablet 3

## 2025-04-22 ENCOUNTER — LAB ENCOUNTER (OUTPATIENT)
Dept: LAB | Age: 85
End: 2025-04-22
Attending: NURSE PRACTITIONER
Payer: MEDICARE

## 2025-04-22 DIAGNOSIS — N18.4 CKD (CHRONIC KIDNEY DISEASE) STAGE 4, GFR 15-29 ML/MIN (HCC): ICD-10-CM

## 2025-04-22 DIAGNOSIS — K26.9 DUODENAL ULCER: ICD-10-CM

## 2025-04-22 DIAGNOSIS — K28.9 JEJUNAL ULCER: ICD-10-CM

## 2025-04-22 DIAGNOSIS — D50.9 IRON DEFICIENCY ANEMIA, UNSPECIFIED IRON DEFICIENCY ANEMIA TYPE: ICD-10-CM

## 2025-04-22 DIAGNOSIS — K44.9 LARGE HIATAL HERNIA: ICD-10-CM

## 2025-04-22 LAB
ANION GAP SERPL CALC-SCNC: 13 MMOL/L (ref 0–18)
BASOPHILS # BLD AUTO: 0.05 X10(3) UL (ref 0–0.2)
BASOPHILS NFR BLD AUTO: 1 %
BILIRUB UR QL STRIP.AUTO: NEGATIVE
BUN BLD-MCNC: 46 MG/DL (ref 9–23)
CALCIUM BLD-MCNC: 9.7 MG/DL (ref 8.7–10.6)
CHLORIDE SERPL-SCNC: 109 MMOL/L (ref 98–112)
CLARITY UR REFRACT.AUTO: CLEAR
CO2 SERPL-SCNC: 20 MMOL/L (ref 21–32)
CREAT BLD-MCNC: 3.41 MG/DL (ref 0.7–1.3)
DEPRECATED HBV CORE AB SER IA-ACNC: 195 NG/ML (ref 50–336)
EGFRCR SERPLBLD CKD-EPI 2021: 17 ML/MIN/1.73M2 (ref 60–?)
EOSINOPHIL # BLD AUTO: 0.2 X10(3) UL (ref 0–0.7)
EOSINOPHIL NFR BLD AUTO: 3.8 %
ERYTHROCYTE [DISTWIDTH] IN BLOOD BY AUTOMATED COUNT: 20.3 %
FASTING STATUS PATIENT QL REPORTED: YES
FOLATE SERPL-MCNC: 12.5 NG/ML (ref 5.4–?)
GLUCOSE BLD-MCNC: 165 MG/DL (ref 70–99)
GLUCOSE UR STRIP.AUTO-MCNC: NORMAL MG/DL
HCT VFR BLD AUTO: 27.4 % (ref 39–53)
HGB BLD-MCNC: 8.8 G/DL (ref 13–17.5)
IMM GRANULOCYTES # BLD AUTO: 0.01 X10(3) UL (ref 0–1)
IMM GRANULOCYTES NFR BLD: 0.2 %
IRON SATN MFR SERPL: 23 % (ref 20–50)
IRON SERPL-MCNC: 78 UG/DL (ref 65–175)
KETONES UR STRIP.AUTO-MCNC: NEGATIVE MG/DL
LEUKOCYTE ESTERASE UR QL STRIP.AUTO: NEGATIVE
LYMPHOCYTES # BLD AUTO: 0.89 X10(3) UL (ref 1–4)
LYMPHOCYTES NFR BLD AUTO: 17 %
MAGNESIUM SERPL-MCNC: 1.3 MG/DL (ref 1.6–2.6)
MCH RBC QN AUTO: 30.4 PG (ref 26–34)
MCHC RBC AUTO-ENTMCNC: 32.1 G/DL (ref 31–37)
MCV RBC AUTO: 94.8 FL (ref 80–100)
MONOCYTES # BLD AUTO: 0.45 X10(3) UL (ref 0.1–1)
MONOCYTES NFR BLD AUTO: 8.6 %
NEUTROPHILS # BLD AUTO: 3.63 X10 (3) UL (ref 1.5–7.7)
NEUTROPHILS # BLD AUTO: 3.63 X10(3) UL (ref 1.5–7.7)
NEUTROPHILS NFR BLD AUTO: 69.4 %
NITRITE UR QL STRIP.AUTO: NEGATIVE
OSMOLALITY SERPL CALC.SUM OF ELEC: 310 MOSM/KG (ref 275–295)
PH UR STRIP.AUTO: 5.5 [PH] (ref 5–8)
PHOSPHATE SERPL-MCNC: 2.9 MG/DL (ref 2.4–5.1)
PLATELET # BLD AUTO: 222 10(3)UL (ref 150–450)
POTASSIUM SERPL-SCNC: 4.9 MMOL/L (ref 3.5–5.1)
PROT UR STRIP.AUTO-MCNC: NEGATIVE MG/DL
PTH-INTACT SERPL-MCNC: 150.2 PG/ML (ref 18.5–88)
RBC # BLD AUTO: 2.89 X10(6)UL (ref 3.8–5.8)
RBC UR QL AUTO: NEGATIVE
SODIUM SERPL-SCNC: 142 MMOL/L (ref 136–145)
SP GR UR STRIP.AUTO: 1.01 (ref 1–1.03)
TOTAL IRON BINDING CAPACITY: 343 UG/DL (ref 250–425)
TRANSFERRIN SERPL-MCNC: 256 MG/DL (ref 215–365)
UROBILINOGEN UR STRIP.AUTO-MCNC: NORMAL MG/DL
VIT B12 SERPL-MCNC: 173 PG/ML (ref 211–911)
VIT D+METAB SERPL-MCNC: 37 NG/ML (ref 30–100)
WBC # BLD AUTO: 5.2 X10(3) UL (ref 4–11)

## 2025-04-22 PROCEDURE — 82746 ASSAY OF FOLIC ACID SERUM: CPT

## 2025-04-22 PROCEDURE — 81003 URINALYSIS AUTO W/O SCOPE: CPT

## 2025-04-22 PROCEDURE — 82306 VITAMIN D 25 HYDROXY: CPT

## 2025-04-22 PROCEDURE — 83550 IRON BINDING TEST: CPT

## 2025-04-22 PROCEDURE — 36415 COLL VENOUS BLD VENIPUNCTURE: CPT

## 2025-04-22 PROCEDURE — 83540 ASSAY OF IRON: CPT

## 2025-04-22 PROCEDURE — 85025 COMPLETE CBC W/AUTO DIFF WBC: CPT

## 2025-04-22 PROCEDURE — 83735 ASSAY OF MAGNESIUM: CPT

## 2025-04-22 PROCEDURE — 82728 ASSAY OF FERRITIN: CPT

## 2025-04-22 PROCEDURE — 84100 ASSAY OF PHOSPHORUS: CPT

## 2025-04-22 PROCEDURE — 80048 BASIC METABOLIC PNL TOTAL CA: CPT

## 2025-04-22 PROCEDURE — 82607 VITAMIN B-12: CPT

## 2025-04-22 PROCEDURE — 83970 ASSAY OF PARATHORMONE: CPT

## 2025-04-25 ENCOUNTER — TELEPHONE (OUTPATIENT)
Dept: INTERNAL MEDICINE CLINIC | Facility: CLINIC | Age: 85
End: 2025-04-25

## 2025-04-25 NOTE — TELEPHONE ENCOUNTER
Patient seen in ER yesterday. Confirmed with spouse she is NOT trying to remove the stitches. She was having issues with removing the gauze. She noticed a small open wound above eyebrow, denies active bleeding. Informed spouse we would recommend recovering the open wound with gauze to keep area protected and prevent infection. She has sterile non stick gauze at home and will reapply to open tissue. Informed she can reassess area this weekend to make sure there is no active bleeding. Routing to OPAL Soto as JERSON. Pt scheduled for 05/01 for follow up.

## 2025-04-25 NOTE — TELEPHONE ENCOUNTER
Patient's spouse called stating that she was attempting to remove stitches from him and she states she is having a very difficult time removing the gauze. She is asking what she can do to help remove it.     Please call back regarding next steps.    supervision/verbal cues

## 2025-04-30 ENCOUNTER — OFFICE VISIT (OUTPATIENT)
Dept: NEPHROLOGY | Facility: CLINIC | Age: 85
End: 2025-04-30
Payer: MEDICARE

## 2025-04-30 VITALS — DIASTOLIC BLOOD PRESSURE: 56 MMHG | SYSTOLIC BLOOD PRESSURE: 116 MMHG | BODY MASS INDEX: 27 KG/M2 | WEIGHT: 198 LBS

## 2025-04-30 DIAGNOSIS — N18.4 CKD (CHRONIC KIDNEY DISEASE) STAGE 4, GFR 15-29 ML/MIN (HCC): Primary | ICD-10-CM

## 2025-04-30 PROCEDURE — 1111F DSCHRG MED/CURRENT MED MERGE: CPT | Performed by: INTERNAL MEDICINE

## 2025-04-30 PROCEDURE — 99214 OFFICE O/P EST MOD 30 MIN: CPT | Performed by: INTERNAL MEDICINE

## 2025-04-30 RX ORDER — SODIUM BICARBONATE 650 MG/1
650 TABLET ORAL 2 TIMES DAILY
Qty: 60 TABLET | Refills: 3 | Status: SHIPPED | OUTPATIENT
Start: 2025-04-30

## 2025-04-30 NOTE — PROGRESS NOTES
Nephrology Clinic Note      Kyler Haji is a 84 year old male.    HPI:     Chief Complaint   Patient presents with    Chronic Kidney Disease    Hypertension     85 y/o male with hxof HTN, DM, Afib, CKD 4 here for follow up.    Recent fall w/ facial injury- mechanical fall; tripped on wife's walker- evaluated @ [facility] post injury     Denies any gout problems       NO LE edema  No dysuria  No hematuria      HISTORY:  Past Medical History:    Abdominal hernia    Actinic keratosis    Black stools    Chest pain, unspecified    Cough    Diabetes (HCC)    Diabetes mellitus (HCC)    Dysmetabolic syndrome X    Easy bruising    Essential hypertension    Essential hypertension, malignant    Flatulence/gas pain/belching    Hemorrhoids    High cholesterol    Inguinal hernia without mention of obstruction or gangrene, unilateral or unspecified, (not specified as recurrent)    Myalgia and myositis, unspecified    Obesity, unspecified    Pacemaker    Special screening for malignant neoplasm of prostate    Type II or unspecified type diabetes mellitus without mention of complication, uncontrolled    Undiagnosed cardiac murmurs    Unspecified sleep apnea    AHI 21.7 SaO2 yashira 79.9 %     Wears glasses      Past Surgical History:   Procedure Laterality Date    Cabg  2/2012    CABG x 3    Cath transcatheter aortic valve replacement  2/2012    Colonoscopy  6/27/2014    Procedure: COLONOSCOPY;  Surgeon: Ton Oliveira MD;  Location:  ENDOSCOPY    Colonoscopy N/A 2/11/2025    Procedure: COLONOSCOPY;  Surgeon: Israel Martin MD;  Location:  ENDOSCOPY    Colonoscopy N/A 4/3/2025    Procedure: COLONOSCOPY;  Surgeon: Sacha Simmons MD;  Location:  ENDOSCOPY    Colonoscopy N/A 4/2/2025    Procedure: COLONOSCOPY;  Surgeon: Sacha Simmons MD;  Location:  ENDOSCOPY    Hernia surgery      Valve repair        Family History   Problem Relation Age of Onset    Prostate Cancer Brother     Diabetes Father     Diabetes Mother        Social History:   Social History     Socioeconomic History    Marital status:    Tobacco Use    Smoking status: Former     Current packs/day: 0.00     Types: Cigarettes     Start date: 1955     Quit date: 1975     Years since quittin.8     Passive exposure: Never    Smokeless tobacco: Never   Vaping Use    Vaping status: Never Used   Substance and Sexual Activity    Alcohol use: Yes     Alcohol/week: 1.0 standard drink of alcohol     Types: 1 Standard drinks or equivalent per week     Comment: 8-10 drinks per week    Drug use: Never   Other Topics Concern    Caffeine Concern Yes    Exercise No     Social Drivers of Health     Food Insecurity: No Food Insecurity (3/28/2025)    NCSS - Food Insecurity     Worried About Running Out of Food in the Last Year: No     Ran Out of Food in the Last Year: No   Transportation Needs: No Transportation Needs (3/28/2025)    NCSS - Transportation     Lack of Transportation: No   Housing Stability: Not At Risk (3/28/2025)    NCSS - Housing/Utilities     Has Housing: Yes     Worried About Losing Housing: No     Unable to Get Utilities: No        Medications (Active prior to today's visit):  Current Outpatient Medications   Medication Sig Dispense Refill    furosemide 20 MG Oral Tab TAKE 40MG ON EVEN DAYS, AND 20MG ON ODD DAYS 45 tablet 2    glipiZIDE 5 MG Oral Tab Take 1 tablet (5 mg total) by mouth 2 (two) times daily before meals. 180 tablet 1    ferrous sulfate 325 (65 FE) MG Oral Tab EC Take 1 tablet (325 mg total) by mouth daily with breakfast. 90 tablet 0    allopurinol 100 MG Oral Tab Take 1 tablet (100 mg total) by mouth daily. 90 tablet 3    atorvastatin 40 MG Oral Tab Take 1 tablet (40 mg total) by mouth nightly.      Vitamin C 500 MG Oral Tab Take 1 tablet (500 mg total) by mouth daily. With iron      fexofenadine 180 MG Oral Tab Take 1 tablet (180 mg total) by mouth daily.      cholecalciferol 50 MCG (2000 UT) Oral Cap Take 1 capsule (2,000 Units  total) by mouth daily.      pantoprazole 40 MG Oral Tab EC Take 1 tablet (40 mg total) by mouth 2 (two) times daily before meals. 180 tablet 3    aspirin 81 MG Oral Chew Tab Chew 1 tablet (81 mg total) by mouth every evening.      metFORMIN 500 MG Oral Tab Take 1 tablet (500 mg total) by mouth 2 (two) times daily with meals. 180 tablet 1    losartan 25 MG Oral Tab Take 1 tablet (25 mg total) by mouth daily. 90 tablet 1    METOPROLOL SUCCINATE  MG Oral Tablet 24 Hr TAKE 1 TABLET(200 MG) BY MOUTH DAILY 90 tablet 2    furosemide 40 MG Oral Tab Take 1 tablet (40 mg total) by mouth every other day. 45 tablet 3    spironolactone 25 MG Oral Tab Take 0.5 tablets (12.5 mg total) by mouth daily. 90 tablet 3    warfarin 4 MG Oral Tab TAKE 1 TABLET BY MOUTH DAILY OR AS DIRECTED BY ANTICOAGULATION CLINIC 90 tablet 4    amiodarone 200 MG Oral Tab Take 1 tablet (200 mg total) by mouth daily. 90 tablet 3       Allergies:  Allergies   Allergen Reactions    Ace Inhibitors Coughing         ROS:     Denies fever/chills  Denies wt loss/gain  Denies HA or visual changes  Denies CP or palpitations  Denies SOB/cough/hemoptysis  Denies abd or flank pain  Denies N/V/D  Denies change in urinary habits or gross hematuria  Denies LE edema  Denies skin rashes/myalgias/arthralgias      PHYSICAL EXAM:   /56 (BP Location: Left arm, Patient Position: Sitting)   Wt 198 lb (89.8 kg)   BMI 27.23 kg/m²   Wt Readings from Last 6 Encounters:   04/30/25 198 lb (89.8 kg)   04/22/25 201 lb 14.4 oz (91.6 kg)   04/08/25 196 lb (88.9 kg)   03/28/25 198 lb (89.8 kg)   03/28/25 198 lb (89.8 kg)   02/25/25 200 lb 1.6 oz (90.8 kg)        General: Alert and oriented in no apparent distress.    HEENT: No scleral icterus, MMM  Neck: Supple, no TAYLER or thyromegaly  Cardiac: Regular rate and rhythm, S1, S2 normal, no murmur or rub  Lungs: Clear without wheezes, rales, rhonchi.    Abdomen: Soft, non-tender. + bowel sounds, no palpable  organomegaly  Extremities: Without clubbing, cyanosis or edema.  Neurologic: Alert and oriented, normal affect, cranial nerves grossly intact, moving all extremities  Skin: Warm and dry, no rashes    Labs reviewed      ASSESSMENT/PLAN:       1. CKD-related to HTN/DM; UA is fairly bland most recently. Volume status stable.  - encourage hydration  - cpm; will monitor CKD profile  - have discussed RRT/transplant options in past   - GFR too low for sglt2i; have discussed kerendia - not interested     2. Gout - no further flares. Continue allopurinol     3. HTN- controlled; cpm    4. DM; controlled     5. Afib- controlled; on AC     6. BMD-  otc vit D daily; monitor ; PTH @ goal for CKD stage    7. Low Mg- start supp    8. Acidosis- start oral bicarb     9. Anemia- plan for [facility] (office working on prior auth)    F/U 4-6 mos    Rhett Jones  4/30/2025

## 2025-05-01 ENCOUNTER — OFFICE VISIT (OUTPATIENT)
Dept: INTERNAL MEDICINE CLINIC | Facility: CLINIC | Age: 85
End: 2025-05-01
Payer: MEDICARE

## 2025-05-01 VITALS
TEMPERATURE: 97 F | RESPIRATION RATE: 18 BRPM | DIASTOLIC BLOOD PRESSURE: 64 MMHG | SYSTOLIC BLOOD PRESSURE: 118 MMHG | HEART RATE: 63 BPM | BODY MASS INDEX: 27.11 KG/M2 | HEIGHT: 71.5 IN | WEIGHT: 198 LBS | OXYGEN SATURATION: 100 %

## 2025-05-01 DIAGNOSIS — Z48.02 ENCOUNTER FOR REMOVAL OF SUTURES: ICD-10-CM

## 2025-05-01 DIAGNOSIS — S01.81XS FACIAL LACERATION, SEQUELA: Primary | ICD-10-CM

## 2025-05-01 PROCEDURE — 1111F DSCHRG MED/CURRENT MED MERGE: CPT | Performed by: NURSE PRACTITIONER

## 2025-05-01 PROCEDURE — 99213 OFFICE O/P EST LOW 20 MIN: CPT | Performed by: NURSE PRACTITIONER

## 2025-05-01 PROCEDURE — 1159F MED LIST DOCD IN RCRD: CPT | Performed by: NURSE PRACTITIONER

## 2025-05-01 PROCEDURE — 1170F FXNL STATUS ASSESSED: CPT | Performed by: NURSE PRACTITIONER

## 2025-05-01 PROCEDURE — 1160F RVW MEDS BY RX/DR IN RCRD: CPT | Performed by: NURSE PRACTITIONER

## 2025-05-01 NOTE — PROGRESS NOTES
HPI:    Patient ID: Kyler Haji is a 84 year old male.    Chief Complaint   Patient presents with    Hospital F/U       Seen at [facility] after falling when tripping over wife recliner leg. He was seen in ER. CT negative for bleed and sutures we placed. Reports feeling well, just needs sutures removed        Review of Systems   All other systems reviewed and are negative.        Past Medical History[1]  Past Surgical History[2]  Family History[3]  Social Hx on file[4]     Current Medications[5]  Allergies:Allergies[6]    Lab Results   Component Value Date     (H) 04/22/2025    BUN 46 (H) 04/22/2025    BUNCREA 20.6 (H) 06/20/2021    CREATSERUM 3.41 (H) 04/22/2025    ANIONGAP 13 04/22/2025    GFR 77 02/05/2018    GFRNAA 21 (L) 07/11/2022    GFRAA 24 (L) 07/11/2022    CA 9.7 04/22/2025    OSMOCALC 310 (H) 04/22/2025    ALKPHO 63 03/28/2025    AST 31 03/28/2025    ALT 31 03/28/2025    BILT 0.3 03/28/2025    TP 6.4 03/28/2025    ALB 4.2 03/28/2025    GLOBULIN 2.2 03/28/2025    AGRATIO 1.7 05/30/2013     04/22/2025    K 4.9 04/22/2025     04/22/2025    CO2 20.0 (L) 04/22/2025     Lab Results   Component Value Date    WBC 5.2 04/22/2025    RBC 2.89 (L) 04/22/2025    HGB 8.8 (L) 04/22/2025    HCT 27.4 (L) 04/22/2025    MCV 94.8 04/22/2025    MCH 30.4 04/22/2025    MCHC 32.1 04/22/2025    RDW 20.3 04/22/2025    .0 04/22/2025    MPV 10.7 11/02/2012     Lab Results   Component Value Date    CHOLEST 129 01/20/2025    TRIG 134 01/20/2025    HDL 49 01/20/2025    LDL 57 01/20/2025    VLDL 20 01/20/2025    TCHDLRATIO 2.84 04/17/2017    NONHDLC 80 01/20/2025     Lab Results   Component Value Date     (H) 01/20/2025    A1C 7.2 (H) 01/20/2025     Lab Results   Component Value Date    TSH 2.110 01/20/2025     Lab Results   Component Value Date    VITD 37.0 04/22/2025     Lab Results   Component Value Date    MIGUEL ANGEL 195 04/22/2025     Lab Results   Component Value Date    FOLIC 12.5 04/22/2025     Lab  Results   Component Value Date    IRON 78 04/22/2025     Lab Results   Component Value Date    B12 173 (L) 04/22/2025     Lab Results   Component Value Date    PHOS 2.9 04/22/2025     Lab Results   Component Value Date    MG 1.3 (L) 04/22/2025        PHYSICAL EXAM:   /64   Pulse 63   Temp 97.3 °F (36.3 °C) (Temporal)   Resp 18   Ht 5' 11.5\" (1.816 m)   Wt 198 lb (89.8 kg)   SpO2 100%   BMI 27.23 kg/m²   Physical Exam  Vitals and nursing note reviewed.   HENT:      Head:        Comments: 12 sutures removed without complications   Cardiovascular:      Rate and Rhythm: Normal rate.   Pulmonary:      Effort: Pulmonary effort is normal.   Neurological:      Mental Status: He is alert.              ASSESSMENT/PLAN:   Diagnoses and all orders for this visit:    Facial laceration, sequela    Encounter for removal of sutures        Brittany Ramires, CR            [1]   Past Medical History:   Abdominal hernia    Actinic keratosis    Black stools    Chest pain, unspecified    Cough    Diabetes (HCC)    Diabetes mellitus (HCC)    Dysmetabolic syndrome X    Easy bruising    Essential hypertension    Essential hypertension, malignant    Flatulence/gas pain/belching    Hemorrhoids    High cholesterol    Inguinal hernia without mention of obstruction or gangrene, unilateral or unspecified, (not specified as recurrent)    Myalgia and myositis, unspecified    Obesity, unspecified    Pacemaker    Special screening for malignant neoplasm of prostate    Type II or unspecified type diabetes mellitus without mention of complication, uncontrolled    Undiagnosed cardiac murmurs    Unspecified sleep apnea    AHI 21.7 SaO2 yashira 79.9 %     Wears glasses   [2]   Past Surgical History:  Procedure Laterality Date    Cabg  2/2012    CABG x 3    Cath transcatheter aortic valve replacement  2/2012    Colonoscopy  6/27/2014    Procedure: COLONOSCOPY;  Surgeon: Ton Oliveira MD;  Location:  ENDOSCOPY    Colonoscopy N/A 2/11/2025     Procedure: COLONOSCOPY;  Surgeon: Israel Martin MD;  Location:  ENDOSCOPY    Colonoscopy N/A 4/3/2025    Procedure: COLONOSCOPY;  Surgeon: Sacha Simmons MD;  Location:  ENDOSCOPY    Colonoscopy N/A 2025    Procedure: COLONOSCOPY;  Surgeon: Sacha Simmons MD;  Location:  ENDOSCOPY    Hernia surgery      Valve repair     [3]   Family History  Problem Relation Age of Onset    Prostate Cancer Brother     Diabetes Father     Diabetes Mother    [4]   Social History  Socioeconomic History    Marital status:    Tobacco Use    Smoking status: Former     Current packs/day: 0.00     Types: Cigarettes     Start date: 1955     Quit date: 1975     Years since quittin.8     Passive exposure: Never    Smokeless tobacco: Never   Vaping Use    Vaping status: Never Used   Substance and Sexual Activity    Alcohol use: Yes     Alcohol/week: 1.0 standard drink of alcohol     Types: 1 Standard drinks or equivalent per week     Comment: 8-10 drinks per week    Drug use: Never   Other Topics Concern    Caffeine Concern Yes    Exercise No   [5]   Current Outpatient Medications   Medication Sig Dispense Refill    sodium bicarbonate 650 MG Oral Tab Take 1 tablet (650 mg total) by mouth 2 (two) times daily. 60 tablet 3    furosemide 20 MG Oral Tab TAKE 40MG ON EVEN DAYS, AND 20MG ON ODD DAYS 45 tablet 2    glipiZIDE 5 MG Oral Tab Take 1 tablet (5 mg total) by mouth 2 (two) times daily before meals. 180 tablet 1    ferrous sulfate 325 (65 FE) MG Oral Tab EC Take 1 tablet (325 mg total) by mouth daily with breakfast. 90 tablet 0    allopurinol 100 MG Oral Tab Take 1 tablet (100 mg total) by mouth daily. 90 tablet 3    atorvastatin 40 MG Oral Tab Take 1 tablet (40 mg total) by mouth nightly.      Vitamin C 500 MG Oral Tab Take 1 tablet (500 mg total) by mouth daily. With iron      fexofenadine 180 MG Oral Tab Take 1 tablet (180 mg total) by mouth daily.      cholecalciferol 50 MCG (2000 UT) Oral Cap Take 1  capsule (2,000 Units total) by mouth daily.      pantoprazole 40 MG Oral Tab EC Take 1 tablet (40 mg total) by mouth 2 (two) times daily before meals. 180 tablet 3    aspirin 81 MG Oral Chew Tab Chew 1 tablet (81 mg total) by mouth every evening.      metFORMIN 500 MG Oral Tab Take 1 tablet (500 mg total) by mouth 2 (two) times daily with meals. 180 tablet 1    losartan 25 MG Oral Tab Take 1 tablet (25 mg total) by mouth daily. 90 tablet 1    METOPROLOL SUCCINATE  MG Oral Tablet 24 Hr TAKE 1 TABLET(200 MG) BY MOUTH DAILY 90 tablet 2    furosemide 40 MG Oral Tab Take 1 tablet (40 mg total) by mouth every other day. 45 tablet 3    spironolactone 25 MG Oral Tab Take 0.5 tablets (12.5 mg total) by mouth daily. 90 tablet 3    warfarin 4 MG Oral Tab TAKE 1 TABLET BY MOUTH DAILY OR AS DIRECTED BY ANTICOAGULATION CLINIC 90 tablet 4    amiodarone 200 MG Oral Tab Take 1 tablet (200 mg total) by mouth daily. 90 tablet 3   [6]   Allergies  Allergen Reactions    Ace Inhibitors Coughing

## 2025-05-05 ENCOUNTER — NURSE ONLY (OUTPATIENT)
Dept: NEPHROLOGY | Facility: CLINIC | Age: 85
End: 2025-05-05
Payer: MEDICARE

## 2025-05-05 VITALS — WEIGHT: 197.5 LBS | DIASTOLIC BLOOD PRESSURE: 72 MMHG | BODY MASS INDEX: 27 KG/M2 | SYSTOLIC BLOOD PRESSURE: 130 MMHG

## 2025-05-05 DIAGNOSIS — D63.1 ANEMIA DUE TO STAGE 4 CHRONIC KIDNEY DISEASE (HCC): Primary | ICD-10-CM

## 2025-05-05 DIAGNOSIS — N18.4 ANEMIA DUE TO STAGE 4 CHRONIC KIDNEY DISEASE (HCC): Primary | ICD-10-CM

## 2025-06-08 DIAGNOSIS — I10 ESSENTIAL HYPERTENSION: ICD-10-CM

## 2025-06-08 DIAGNOSIS — N18.4 TYPE 2 DIABETES MELLITUS WITH STAGE 4 CHRONIC KIDNEY DISEASE, WITHOUT LONG-TERM CURRENT USE OF INSULIN (HCC): ICD-10-CM

## 2025-06-08 DIAGNOSIS — E11.22 TYPE 2 DIABETES MELLITUS WITH STAGE 4 CHRONIC KIDNEY DISEASE, WITHOUT LONG-TERM CURRENT USE OF INSULIN (HCC): ICD-10-CM

## 2025-06-08 RX ORDER — METOPROLOL SUCCINATE 200 MG/1
200 TABLET, EXTENDED RELEASE ORAL DAILY
Qty: 90 TABLET | Refills: 2 | Status: SHIPPED | OUTPATIENT
Start: 2025-06-08

## 2025-06-08 NOTE — TELEPHONE ENCOUNTER
Last time medication was refilled: 11/25/24  Next office visit due/scheduled: 7/14/25  Last office visit: 5/1/25  Last Labs: 3/28/25

## 2025-06-18 ENCOUNTER — OFFICE VISIT (OUTPATIENT)
Dept: INTERNAL MEDICINE CLINIC | Facility: CLINIC | Age: 85
End: 2025-06-18
Payer: MEDICARE

## 2025-06-18 ENCOUNTER — LAB ENCOUNTER (OUTPATIENT)
Dept: LAB | Age: 85
End: 2025-06-18
Attending: NURSE PRACTITIONER
Payer: MEDICARE

## 2025-06-18 VITALS
RESPIRATION RATE: 16 BRPM | TEMPERATURE: 97 F | DIASTOLIC BLOOD PRESSURE: 58 MMHG | HEIGHT: 71.5 IN | HEART RATE: 72 BPM | BODY MASS INDEX: 26.98 KG/M2 | SYSTOLIC BLOOD PRESSURE: 106 MMHG | WEIGHT: 197 LBS | OXYGEN SATURATION: 99 %

## 2025-06-18 DIAGNOSIS — E83.42 HYPOMAGNESEMIA: ICD-10-CM

## 2025-06-18 DIAGNOSIS — R29.898 WEAKNESS OF BOTH LOWER EXTREMITIES: ICD-10-CM

## 2025-06-18 DIAGNOSIS — N18.4 CKD (CHRONIC KIDNEY DISEASE) STAGE 4, GFR 15-29 ML/MIN (HCC): Primary | Chronic | ICD-10-CM

## 2025-06-18 DIAGNOSIS — J30.2 SEASONAL ALLERGIC RHINITIS, UNSPECIFIED TRIGGER: ICD-10-CM

## 2025-06-18 DIAGNOSIS — N18.4 CKD (CHRONIC KIDNEY DISEASE) STAGE 4, GFR 15-29 ML/MIN (HCC): Chronic | ICD-10-CM

## 2025-06-18 DIAGNOSIS — M54.2 NECK PAIN: ICD-10-CM

## 2025-06-18 LAB
ANION GAP SERPL CALC-SCNC: 13 MMOL/L (ref 0–18)
BASOPHILS # BLD AUTO: 0.03 X10(3) UL (ref 0–0.2)
BASOPHILS NFR BLD AUTO: 0.5 %
BUN BLD-MCNC: 64 MG/DL (ref 9–23)
CALCIUM BLD-MCNC: 9.5 MG/DL (ref 8.7–10.6)
CHLORIDE SERPL-SCNC: 106 MMOL/L (ref 98–112)
CO2 SERPL-SCNC: 22 MMOL/L (ref 21–32)
CREAT BLD-MCNC: 4.41 MG/DL (ref 0.7–1.3)
EGFRCR SERPLBLD CKD-EPI 2021: 12 ML/MIN/1.73M2 (ref 60–?)
EOSINOPHIL # BLD AUTO: 0.06 X10(3) UL (ref 0–0.7)
EOSINOPHIL NFR BLD AUTO: 1 %
ERYTHROCYTE [DISTWIDTH] IN BLOOD BY AUTOMATED COUNT: 16 %
FASTING STATUS PATIENT QL REPORTED: NO
GLUCOSE BLD-MCNC: 229 MG/DL (ref 70–99)
HCT VFR BLD AUTO: 21.5 % (ref 39–53)
HGB BLD-MCNC: 7 G/DL (ref 13–17.5)
IMM GRANULOCYTES # BLD AUTO: 0.02 X10(3) UL (ref 0–1)
IMM GRANULOCYTES NFR BLD: 0.3 %
LYMPHOCYTES # BLD AUTO: 0.82 X10(3) UL (ref 1–4)
LYMPHOCYTES NFR BLD AUTO: 13.6 %
MAGNESIUM SERPL-MCNC: 1.7 MG/DL (ref 1.6–2.6)
MCH RBC QN AUTO: 32.6 PG (ref 26–34)
MCHC RBC AUTO-ENTMCNC: 32.6 G/DL (ref 31–37)
MCV RBC AUTO: 100 FL (ref 80–100)
MONOCYTES # BLD AUTO: 0.56 X10(3) UL (ref 0.1–1)
MONOCYTES NFR BLD AUTO: 9.3 %
NEUTROPHILS # BLD AUTO: 4.54 X10 (3) UL (ref 1.5–7.7)
NEUTROPHILS # BLD AUTO: 4.54 X10(3) UL (ref 1.5–7.7)
NEUTROPHILS NFR BLD AUTO: 75.3 %
OSMOLALITY SERPL CALC.SUM OF ELEC: 318 MOSM/KG (ref 275–295)
PHOSPHATE SERPL-MCNC: 4.2 MG/DL (ref 2.4–5.1)
PLATELET # BLD AUTO: 205 10(3)UL (ref 150–450)
POTASSIUM SERPL-SCNC: 5.1 MMOL/L (ref 3.5–5.1)
RBC # BLD AUTO: 2.15 X10(6)UL (ref 3.8–5.8)
SODIUM SERPL-SCNC: 141 MMOL/L (ref 136–145)
WBC # BLD AUTO: 6 X10(3) UL (ref 4–11)

## 2025-06-18 PROCEDURE — 99214 OFFICE O/P EST MOD 30 MIN: CPT | Performed by: NURSE PRACTITIONER

## 2025-06-18 PROCEDURE — 1159F MED LIST DOCD IN RCRD: CPT | Performed by: NURSE PRACTITIONER

## 2025-06-18 PROCEDURE — 1170F FXNL STATUS ASSESSED: CPT | Performed by: NURSE PRACTITIONER

## 2025-06-18 PROCEDURE — 83735 ASSAY OF MAGNESIUM: CPT

## 2025-06-18 PROCEDURE — 84100 ASSAY OF PHOSPHORUS: CPT

## 2025-06-18 PROCEDURE — 36415 COLL VENOUS BLD VENIPUNCTURE: CPT

## 2025-06-18 PROCEDURE — 80048 BASIC METABOLIC PNL TOTAL CA: CPT

## 2025-06-18 PROCEDURE — G2211 COMPLEX E/M VISIT ADD ON: HCPCS | Performed by: NURSE PRACTITIONER

## 2025-06-18 PROCEDURE — 85025 COMPLETE CBC W/AUTO DIFF WBC: CPT

## 2025-06-18 RX ORDER — AZELASTINE 1 MG/ML
1 SPRAY, METERED NASAL 2 TIMES DAILY
Qty: 30 ML | Refills: 0 | Status: SHIPPED | OUTPATIENT
Start: 2025-06-18

## 2025-06-18 NOTE — PROGRESS NOTES
The following individual(s) verbally consented to be recorded using ambient AI listening technology and understand that they can each withdraw their consent to this listening technology at any point by asking the clinician to turn off or pause the recording:    Patient name: Kyler Haji  Additional names:  Shelly (daughter)

## 2025-06-18 NOTE — PROGRESS NOTES
HPI:    Patient ID: Kyler Haji is a 84 year old male.    Chief Complaint   Patient presents with    Dizziness       Dizziness  Associated symptoms include weakness.     History of Present Illness  Kyler Haji is an 84 year old male who presents with dizziness and leg weakness. He is accompanied by his daughter, Lela.    He experiences dizziness and leg weakness, particularly when standing for a minute or two, with legs shaking and a sensation of impending fall unless he holds onto something. These symptoms occur primarily when walking, not while sitting. There is no heart racing or shortness of breath. Minimal leg swelling is noted,    He recalls previous dizziness associated with low hemoglobin, with the last check two months ago showing a level of 9. A GFR of 14 was also noted two months ago.    He experiences pain in the back of the head and top of the shoulders, located on the neck, elvis the spine, radiating upwards into the head.    He has three canes at home and occasionally a walker. He avoided a recent family event due to concerns about stability.     Review of Systems   Constitutional: Negative.    Respiratory: Negative.     Cardiovascular: Negative.    Gastrointestinal: Negative.    Allergic/Immunologic: Positive for environmental allergies.   Neurological:  Positive for dizziness, tremors and weakness.         Past Medical History[1]  Past Surgical History[2]  Family History[3]  Social Hx on file[4]     Current Medications[5]  Allergies:Allergies[6]    Lab Results   Component Value Date     (H) 04/22/2025    BUN 46 (H) 04/22/2025    BUNCREA 20.6 (H) 06/20/2021    CREATSERUM 3.41 (H) 04/22/2025    ANIONGAP 13 04/22/2025    GFR 77 02/05/2018    GFRNAA 21 (L) 07/11/2022    GFRAA 24 (L) 07/11/2022    CA 9.7 04/22/2025    OSMOCALC 310 (H) 04/22/2025    ALKPHO 63 03/28/2025    AST 31 03/28/2025    ALT 31 03/28/2025    BILT 0.3 03/28/2025    TP 6.4 03/28/2025    ALB 4.2 03/28/2025    GLOBULIN  2.2 03/28/2025    AGRATIO 1.7 05/30/2013     04/22/2025    K 4.9 04/22/2025     04/22/2025    CO2 20.0 (L) 04/22/2025     Lab Results   Component Value Date    WBC 5.2 04/22/2025    RBC 2.89 (L) 04/22/2025    HGB 8.8 (L) 04/22/2025    HCT 27.4 (L) 04/22/2025    MCV 94.8 04/22/2025    MCH 30.4 04/22/2025    MCHC 32.1 04/22/2025    RDW 20.3 04/22/2025    .0 04/22/2025    MPV 10.7 11/02/2012     Lab Results   Component Value Date    CHOLEST 129 01/20/2025    TRIG 134 01/20/2025    HDL 49 01/20/2025    LDL 57 01/20/2025    VLDL 20 01/20/2025    TCHDLRATIO 2.84 04/17/2017    NONHDLC 80 01/20/2025     Lab Results   Component Value Date     (H) 01/20/2025    A1C 7.2 (H) 01/20/2025     Lab Results   Component Value Date    TSH 2.110 01/20/2025     Lab Results   Component Value Date    VITD 37.0 04/22/2025     Lab Results   Component Value Date    MIGUEL AGNEL 195 04/22/2025     Lab Results   Component Value Date    FOLIC 12.5 04/22/2025     Lab Results   Component Value Date    IRON 78 04/22/2025     Lab Results   Component Value Date    B12 173 (L) 04/22/2025     Lab Results   Component Value Date    PHOS 2.9 04/22/2025     Lab Results   Component Value Date    MG 1.3 (L) 04/22/2025        PHYSICAL EXAM:   /58   Pulse 72   Temp 97 °F (36.1 °C) (Temporal)   Resp 16   Ht 5' 11.5\" (1.816 m)   Wt 197 lb (89.4 kg)   SpO2 99%   BMI 27.09 kg/m²   Physical Exam  Vitals and nursing note reviewed.   Constitutional:       Appearance: Normal appearance.   Cardiovascular:      Rate and Rhythm: Normal rate. Rhythm irregular.      Heart sounds: Murmur heard.   Pulmonary:      Effort: Pulmonary effort is normal. No respiratory distress.      Breath sounds: Normal breath sounds.   Abdominal:      Palpations: Abdomen is soft.   Musculoskeletal:      Right lower leg: No edema.      Left lower leg: No edema.   Skin:     General: Skin is warm.      Findings: Bruising (scaterred on upper extremities) present.    Neurological:      Mental Status: He is alert and oriented to person, place, and time.      Motor: No weakness.      Gait: Gait normal.   Psychiatric:         Mood and Affect: Mood normal.            ASSESSMENT/PLAN:   Diagnoses and all orders for this visit:    CKD (chronic kidney disease) stage 4, GFR 15-29 ml/min ([facility])  -     CBC With Differential With Platelet; Future  -     Basic Metabolic Panel (8) [E]; Future  -     Phosphorus [E]; Future    Neck pain  -     XR CERVICAL SPINE (2-3 VIEWS) (CPT=72040); Future    Seasonal allergic rhinitis, unspecified trigger  -     azelastine 0.1 % Nasal Solution; 1 spray by Nasal route in the morning and 1 spray in the evening.    Hypomagnesemia  -     Magnesium [E]; Future  -     Phosphorus [E]; Future    Weakness of both lower extremities  -     Phosphorus [E]; Future      Assessment & Plan  Dizziness and leg weakness  Experiencing dizziness and leg weakness, particularly when standing or walking. Symptoms may be related to low hemoglobin, kidney function, or electrolyte imbalance. Anticholinergic effects of allergy medication may also contribute to dizziness.  - Order blood work to check hemoglobin levels, kidney function, and electrolytes.  - Encourage use of a cane or walker for stability.    Low hemoglobin  Previous hemoglobin level was 9, checked two months ago. Low hemoglobin may contribute to dizziness and weakness. If hemoglobin is below 7, hospitalization may be required.    Chronic kidney disease, stage 5  Last GFR was 14 with a potassium level of 4.6, checked two months ago. Kidney function may contribute to current symptoms.    Allergic rhinitis  Experiencing rhinorrhea, possibly related to allergies. Currently taking Allegra, which may contribute to dizziness due to anticholinergic effects.  - Discontinue Allegra.  - Prescribe Azelastine nasal spray if allergy symptoms persist.    Cervical spine pain  Experiencing pain in the occipital region and top of  shoulders, possibly related to cervical spine issues.  - Order cervical spine x-ray to evaluate spinal column and discs.    Follow-up  Follow-up plans discussed to address current symptoms and management.  - Perform blood work today.  - Schedule cervical spine x-ray.  - Visit [facility] for cane or walker fitting.  - Follow up with cardiologist regarding Watchman procedure.        CR Rivers            [1]   Past Medical History:   Abdominal hernia    Actinic keratosis    Black stools    Chest pain, unspecified    Cough    Diabetes (HCC)    Diabetes mellitus (HCC)    Dysmetabolic syndrome X    Easy bruising    Essential hypertension    Essential hypertension, malignant    Flatulence/gas pain/belching    Hemorrhoids    High cholesterol    Inguinal hernia without mention of obstruction or gangrene, unilateral or unspecified, (not specified as recurrent)    Myalgia and myositis, unspecified    Obesity, unspecified    Pacemaker    Special screening for malignant neoplasm of prostate    Type II or unspecified type diabetes mellitus without mention of complication, uncontrolled    Undiagnosed cardiac murmurs    Unspecified sleep apnea    AHI 21.7 SaO2 yashira 79.9 %     Wears glasses   [2]   Past Surgical History:  Procedure Laterality Date    Cabg  2/2012    CABG x 3    Cath transcatheter aortic valve replacement  2/2012    Colonoscopy  6/27/2014    Procedure: COLONOSCOPY;  Surgeon: Ton Oliveira MD;  Location:  ENDOSCOPY    Colonoscopy N/A 2/11/2025    Procedure: COLONOSCOPY;  Surgeon: Israel Martin MD;  Location:  ENDOSCOPY    Colonoscopy N/A 4/3/2025    Procedure: COLONOSCOPY;  Surgeon: Sacha Simmons MD;  Location:  ENDOSCOPY    Colonoscopy N/A 4/2/2025    Procedure: COLONOSCOPY;  Surgeon: Sacha Simmons MD;  Location:  ENDOSCOPY    Hernia surgery      Valve repair     [3]   Family History  Problem Relation Age of Onset    Prostate Cancer Brother     Diabetes Father     Diabetes Mother    [4]    Social History  Socioeconomic History    Marital status:    Tobacco Use    Smoking status: Former     Current packs/day: 0.00     Types: Cigarettes     Start date: 1955     Quit date: 1975     Years since quittin.0     Passive exposure: Never    Smokeless tobacco: Never   Vaping Use    Vaping status: Never Used   Substance and Sexual Activity    Alcohol use: Yes     Alcohol/week: 1.0 standard drink of alcohol     Types: 1 Standard drinks or equivalent per week     Comment: 8-10 drinks per week    Drug use: Never   Other Topics Concern    Caffeine Concern Yes    Exercise No   [5]   Current Outpatient Medications   Medication Sig Dispense Refill    azelastine 0.1 % Nasal Solution 1 spray by Nasal route in the morning and 1 spray in the evening. 30 mL 0    Metoprolol Succinate  MG Oral Tablet 24 Hr Take 1 tablet (200 mg total) by mouth daily. 90 tablet 2    metFORMIN 500 MG Oral Tab Take 1 tablet (500 mg total) by mouth 2 (two) times daily with meals. 180 tablet 1    sodium bicarbonate 650 MG Oral Tab Take 1 tablet (650 mg total) by mouth 2 (two) times daily. 60 tablet 3    furosemide 20 MG Oral Tab TAKE 40MG ON EVEN DAYS, AND 20MG ON ODD DAYS 45 tablet 2    glipiZIDE 5 MG Oral Tab Take 1 tablet (5 mg total) by mouth 2 (two) times daily before meals. 180 tablet 1    ferrous sulfate 325 (65 FE) MG Oral Tab EC Take 1 tablet (325 mg total) by mouth daily with breakfast. 90 tablet 0    allopurinol 100 MG Oral Tab Take 1 tablet (100 mg total) by mouth daily. 90 tablet 3    atorvastatin 40 MG Oral Tab Take 1 tablet (40 mg total) by mouth nightly.      Vitamin C 500 MG Oral Tab Take 1 tablet (500 mg total) by mouth daily. With iron      fexofenadine 180 MG Oral Tab Take 1 tablet (180 mg total) by mouth daily.      cholecalciferol 50 MCG (2000 UT) Oral Cap Take 1 capsule (2,000 Units total) by mouth daily.      pantoprazole 40 MG Oral Tab EC Take 1 tablet (40 mg total) by mouth 2 (two) times daily  before meals. 180 tablet 3    aspirin 81 MG Oral Chew Tab Chew 1 tablet (81 mg total) by mouth every evening.      losartan 25 MG Oral Tab Take 1 tablet (25 mg total) by mouth daily. 90 tablet 1    furosemide 40 MG Oral Tab Take 1 tablet (40 mg total) by mouth every other day. 45 tablet 3    spironolactone 25 MG Oral Tab Take 0.5 tablets (12.5 mg total) by mouth daily. 90 tablet 3    warfarin 4 MG Oral Tab TAKE 1 TABLET BY MOUTH DAILY OR AS DIRECTED BY ANTICOAGULATION CLINIC 90 tablet 4    amiodarone 200 MG Oral Tab Take 1 tablet (200 mg total) by mouth daily. 90 tablet 3   [6]   Allergies  Allergen Reactions    Ace Inhibitors Coughing

## 2025-06-19 ENCOUNTER — HOSPITAL ENCOUNTER (INPATIENT)
Facility: HOSPITAL | Age: 85
LOS: 3 days | Discharge: HOME | End: 2025-06-22
Attending: EMERGENCY MEDICINE | Admitting: INTERNAL MEDICINE
Payer: MEDICARE

## 2025-06-19 ENCOUNTER — HOSPITAL ENCOUNTER (INPATIENT)
Facility: HOSPITAL | Age: 85
LOS: 3 days | Discharge: HOME | DRG: 378 | End: 2025-06-22
Attending: EMERGENCY MEDICINE | Admitting: INTERNAL MEDICINE
Payer: MEDICARE

## 2025-06-19 ENCOUNTER — ANESTHESIA EVENT (OUTPATIENT)
Dept: ENDOSCOPY | Facility: HOSPITAL | Age: 85
End: 2025-06-19
Payer: MEDICARE

## 2025-06-19 DIAGNOSIS — D64.9 SYMPTOMATIC ANEMIA: Primary | ICD-10-CM

## 2025-06-19 DIAGNOSIS — K92.2 GASTROINTESTINAL HEMORRHAGE, UNSPECIFIED GASTROINTESTINAL HEMORRHAGE TYPE: ICD-10-CM

## 2025-06-19 DIAGNOSIS — I38 VALVULAR HEART DISEASE: ICD-10-CM

## 2025-06-19 PROBLEM — N17.9 ACUTE RENAL FAILURE (ARF): Status: ACTIVE | Noted: 2025-06-19

## 2025-06-19 PROBLEM — N17.9 ACUTE KIDNEY INJURY: Status: ACTIVE | Noted: 2025-06-19

## 2025-06-19 PROBLEM — E87.20 METABOLIC ACIDOSIS: Status: ACTIVE | Noted: 2025-06-19

## 2025-06-19 PROBLEM — R73.9 HYPERGLYCEMIA: Status: ACTIVE | Noted: 2025-06-19

## 2025-06-19 LAB
ALBUMIN SERPL-MCNC: 4.4 G/DL (ref 3.2–4.8)
ALBUMIN/GLOB SERPL: 2.1 {RATIO} (ref 1–2)
ALP LIVER SERPL-CCNC: 62 U/L (ref 45–117)
ALT SERPL-CCNC: 27 U/L (ref 10–49)
ANION GAP SERPL CALC-SCNC: 16 MMOL/L (ref 0–18)
ANTIBODY SCREEN: NEGATIVE
APTT PPP: 34.1 SECONDS (ref 23–36)
AST SERPL-CCNC: 38 U/L (ref ?–34)
BASOPHILS # BLD AUTO: 0.05 X10(3) UL (ref 0–0.2)
BASOPHILS NFR BLD AUTO: 0.7 %
BILIRUB SERPL-MCNC: 0.3 MG/DL (ref 0.2–1.1)
BUN BLD-MCNC: 69 MG/DL (ref 9–23)
CALCIUM BLD-MCNC: 9.2 MG/DL (ref 8.7–10.6)
CHLORIDE SERPL-SCNC: 105 MMOL/L (ref 98–112)
CO2 SERPL-SCNC: 20 MMOL/L (ref 21–32)
CREAT BLD-MCNC: 4.38 MG/DL (ref 0.7–1.3)
DEPRECATED HBV CORE AB SER IA-ACNC: 38 NG/ML (ref 50–336)
EGFRCR SERPLBLD CKD-EPI 2021: 13 ML/MIN/1.73M2 (ref 60–?)
EOSINOPHIL # BLD AUTO: 0.18 X10(3) UL (ref 0–0.7)
EOSINOPHIL NFR BLD AUTO: 2.7 %
ERYTHROCYTE [DISTWIDTH] IN BLOOD BY AUTOMATED COUNT: 15.9 %
EST. AVERAGE GLUCOSE BLD GHB EST-MCNC: 123 MG/DL (ref 68–126)
GLOBULIN PLAS-MCNC: 2.1 G/DL (ref 2–3.5)
GLUCOSE BLD-MCNC: 175 MG/DL (ref 70–99)
GLUCOSE BLD-MCNC: 181 MG/DL (ref 70–99)
GLUCOSE BLD-MCNC: 228 MG/DL (ref 70–99)
HBA1C MFR BLD: 5.9 % (ref ?–5.7)
HCT VFR BLD AUTO: 21.6 % (ref 39–53)
HGB BLD-MCNC: 6.4 G/DL (ref 13–17.5)
HGB BLD-MCNC: 6.9 G/DL (ref 13–17.5)
HGB BLD-MCNC: 7.1 G/DL (ref 13–17.5)
IMM GRANULOCYTES # BLD AUTO: 0.02 X10(3) UL (ref 0–1)
IMM GRANULOCYTES NFR BLD: 0.3 %
INR BLD: 3.19 (ref 0.8–1.2)
IRON SATN MFR SERPL: 24 % (ref 20–50)
IRON SERPL-MCNC: 97 UG/DL (ref 65–175)
LYMPHOCYTES # BLD AUTO: 0.99 X10(3) UL (ref 1–4)
LYMPHOCYTES NFR BLD AUTO: 14.8 %
MCH RBC QN AUTO: 31.8 PG (ref 26–34)
MCHC RBC AUTO-ENTMCNC: 31.9 G/DL (ref 31–37)
MCV RBC AUTO: 99.5 FL (ref 80–100)
MONOCYTES # BLD AUTO: 0.7 X10(3) UL (ref 0.1–1)
MONOCYTES NFR BLD AUTO: 10.5 %
NEUTROPHILS # BLD AUTO: 4.73 X10 (3) UL (ref 1.5–7.7)
NEUTROPHILS # BLD AUTO: 4.73 X10(3) UL (ref 1.5–7.7)
NEUTROPHILS NFR BLD AUTO: 71 %
OSMOLALITY SERPL CALC.SUM OF ELEC: 319 MOSM/KG (ref 275–295)
PLATELET # BLD AUTO: 199 10(3)UL (ref 150–450)
POTASSIUM SERPL-SCNC: 5.3 MMOL/L (ref 3.5–5.1)
PROT SERPL-MCNC: 6.5 G/DL (ref 5.7–8.2)
PROTHROMBIN TIME: 32.9 SECONDS (ref 11.6–14.8)
RBC # BLD AUTO: 2.17 X10(6)UL (ref 3.8–5.8)
RH BLOOD TYPE: NEGATIVE
SODIUM SERPL-SCNC: 141 MMOL/L (ref 136–145)
TOTAL IRON BINDING CAPACITY: 404 UG/DL (ref 250–425)
TRANSFERRIN SERPL-MCNC: 316 MG/DL (ref 215–365)
WBC # BLD AUTO: 6.7 X10(3) UL (ref 4–11)

## 2025-06-19 PROCEDURE — 99223 1ST HOSP IP/OBS HIGH 75: CPT | Performed by: INTERNAL MEDICINE

## 2025-06-19 PROCEDURE — 30233N1 TRANSFUSION OF NONAUTOLOGOUS RED BLOOD CELLS INTO PERIPHERAL VEIN, PERCUTANEOUS APPROACH: ICD-10-PCS | Performed by: INTERNAL MEDICINE

## 2025-06-19 RX ORDER — DEXTROSE MONOHYDRATE 25 G/50ML
50 INJECTION, SOLUTION INTRAVENOUS
Status: DISCONTINUED | OUTPATIENT
Start: 2025-06-19 | End: 2025-06-22

## 2025-06-19 RX ORDER — NICOTINE POLACRILEX 4 MG
15 LOZENGE BUCCAL
Status: DISCONTINUED | OUTPATIENT
Start: 2025-06-19 | End: 2025-06-22

## 2025-06-19 RX ORDER — ONDANSETRON 2 MG/ML
4 INJECTION INTRAMUSCULAR; INTRAVENOUS EVERY 4 HOURS PRN
Status: DISCONTINUED | OUTPATIENT
Start: 2025-06-19 | End: 2025-06-19

## 2025-06-19 RX ORDER — HEPARIN SODIUM AND DEXTROSE 10000; 5 [USP'U]/100ML; G/100ML
1000 INJECTION INTRAVENOUS ONCE
Status: DISCONTINUED | OUTPATIENT
Start: 2025-06-19 | End: 2025-06-19

## 2025-06-19 RX ORDER — HEPARIN SODIUM AND DEXTROSE 10000; 5 [USP'U]/100ML; G/100ML
INJECTION INTRAVENOUS CONTINUOUS
Status: DISCONTINUED | OUTPATIENT
Start: 2025-06-19 | End: 2025-06-19

## 2025-06-19 RX ORDER — NICOTINE POLACRILEX 4 MG
30 LOZENGE BUCCAL
Status: DISCONTINUED | OUTPATIENT
Start: 2025-06-19 | End: 2025-06-22

## 2025-06-19 RX ORDER — WARFARIN SODIUM 4 MG/1
TABLET ORAL AS DIRECTED
COMMUNITY

## 2025-06-19 RX ORDER — SODIUM CHLORIDE 9 MG/ML
INJECTION, SOLUTION INTRAVENOUS CONTINUOUS
Status: ACTIVE | OUTPATIENT
Start: 2025-06-19 | End: 2025-06-20

## 2025-06-19 RX ORDER — ACETAMINOPHEN 500 MG
500 TABLET ORAL EVERY 4 HOURS PRN
Status: DISCONTINUED | OUTPATIENT
Start: 2025-06-19 | End: 2025-06-22

## 2025-06-19 RX ORDER — SODIUM BICARBONATE 325 MG/1
650 TABLET ORAL 2 TIMES DAILY
Status: DISCONTINUED | OUTPATIENT
Start: 2025-06-19 | End: 2025-06-22

## 2025-06-19 RX ORDER — SODIUM CHLORIDE 9 MG/ML
INJECTION, SOLUTION INTRAVENOUS ONCE
Status: COMPLETED | OUTPATIENT
Start: 2025-06-19 | End: 2025-06-19

## 2025-06-19 RX ORDER — AMIODARONE HYDROCHLORIDE 200 MG/1
200 TABLET ORAL DAILY
Status: DISCONTINUED | OUTPATIENT
Start: 2025-06-20 | End: 2025-06-22

## 2025-06-19 RX ORDER — ATORVASTATIN CALCIUM 40 MG/1
40 TABLET, FILM COATED ORAL NIGHTLY
Status: DISCONTINUED | OUTPATIENT
Start: 2025-06-20 | End: 2025-06-22

## 2025-06-19 RX ORDER — METOPROLOL SUCCINATE 100 MG/1
200 TABLET, EXTENDED RELEASE ORAL DAILY
Status: DISCONTINUED | OUTPATIENT
Start: 2025-06-20 | End: 2025-06-22

## 2025-06-19 RX ORDER — ONDANSETRON 2 MG/ML
4 INJECTION INTRAMUSCULAR; INTRAVENOUS EVERY 6 HOURS PRN
Status: CANCELLED | OUTPATIENT
Start: 2025-06-19

## 2025-06-19 RX ORDER — HYDROMORPHONE HYDROCHLORIDE 1 MG/ML
0.5 INJECTION, SOLUTION INTRAMUSCULAR; INTRAVENOUS; SUBCUTANEOUS EVERY 30 MIN PRN
Status: DISCONTINUED | OUTPATIENT
Start: 2025-06-19 | End: 2025-06-19

## 2025-06-19 RX ORDER — SODIUM CHLORIDE 9 MG/ML
INJECTION, SOLUTION INTRAVENOUS CONTINUOUS
Status: ACTIVE | OUTPATIENT
Start: 2025-06-19 | End: 2025-06-19

## 2025-06-19 NOTE — ANESTHESIA PREPROCEDURE EVALUATION
PRE-OP EVALUATION    Patient Name: Kyler Haji    Admit Diagnosis: Valvular heart disease [I38]  Symptomatic anemia [D64.9]  Gastrointestinal hemorrhage, unspecified gastrointestinal hemorrhage type [K92.2]    Pre-op Diagnosis: INPT    ENTEROSCOPY    Anesthesia Procedure: ENTEROSCOPY    Surgeons and Role:     * Darion Roach MD - Primary    Pre-op vitals reviewed.  Temp: 97.8 °F (36.6 °C)  Pulse: 60  Resp: 16  BP: 132/59  SpO2: 100 %  Body mass index is 27.09 kg/m².    Current medications reviewed.  Hospital Medications:  Current Medications[1]    Outpatient Medications:   Prescriptions Prior to Admission[2]    Allergies: Ace inhibitors      Anesthesia Evaluation    Patient summary reviewed.    Anesthetic Complications  (-) history of anesthetic complications         GI/Hepatic/Renal      (-) GERD       (+) chronic renal disease and CRI                   Cardiovascular  Comment: Echo 2018    Conclusions     Summary:     1. Left ventricle: The cavity size is normal. Wall thickness is normal.      Systolic function is mildly reduced. The estimated ejection fraction is      40-45%. Wall motion is normal; there are no regional wall motion      abnormalities. There is diastolic dysfunction.   2. Aortic valve: A prosthesis is present and functioning normally. The      prosthesis has a normal range of motion. The sewing ring appears normal,      has no rocking motion, and shows no evidence of dehiscence. Mildly      thickened leaflets. Peak velocity (S): 2.2m/sec. Mean gradient (S): 13mm      Hg. Peak gradient (S): 19mm Hg.   3. Mitral valve: There is mild regurgitation.   4. Left atrium: The atrium is mildly dilated.   5. Right ventricle: Estimation of the right ventricular systolic pressure is      within the normal range.   6. Pericardium, extracardiac: There is no significant pericardial effusion.     Impressions:     - Prior study date: 5/31/18.   - The left ventricle dimensions are smaller     There has  been some improvement in the LVSF.       ECG reviewed.          (-) obesity  (+) hypertension     (+) CAD    (+) CABG/stent  (+) pacemaker/AICD       (+) dysrhythmias and atrial fibrillation  (+) CHF  (-) angina     (-) IBANEZ         Endo/Other      (+) diabetes  type 2,       (+) anemia        (-) thrombocytopenia           Pulmonary      (-) asthma  (-) COPD       (-) shortness of breath     (+) sleep apnea       Neuro/Psych          (-) CVA   (-) TIA    (+) neuromuscular disease             Patient Active Problem List:     Paroxysmal A-fib (HCC)     S/P CABG (coronary artery bypass graft)     H/O aortic valve replacement     Essential hypertension     LBBB (left bundle branch block)     Monitoring for long-term anticoagulant use     Coronary artery disease involving native heart without angina pectoris     Type 2 diabetes mellitus with stage 4 chronic kidney disease, without long-term current use of insulin (HCC)     Right-sided carotid artery occlusion without cerebral infarction     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%     Hypertension associated with diabetes (HCC)     Acquired coagulation factor inhibitor disorder (HCC)     Hyperlipidemia associated with type 2 diabetes mellitus (HCC)     Hyperkalemia     Chronic systolic heart failure (HCC)     Carotid arterial disease     CKD (chronic kidney disease) stage 4, GFR 15-29 ml/min (HCC)     ICD (implantable cardioverter-defibrillator) BiV [place]     Generalized weakness     Symptomatic anemia     Gastrointestinal hemorrhage, unspecified gastrointestinal hemorrhage type     Chronic kidney disease, unspecified CKD stage     Current use of long term anticoagulation     Iron deficiency anemia due to chronic blood loss     Acute renal failure (ARF) (HCC)     Acute kidney injury (HCC)     Metabolic acidosis     Hyperglycemia     Valvular heart disease            Past Surgical History[3]  Social Hx on file[4]  History   Drug Use Unknown     Available pre-op labs  reviewed.  Lab Results   Component Value Date    WBC 6.7 06/19/2025    RBC 2.17 (L) 06/19/2025    HGB 6.9 (LL) 06/19/2025    HCT 21.6 (L) 06/19/2025    MCV 99.5 06/19/2025    MCH 31.8 06/19/2025    MCHC 31.9 06/19/2025    RDW 15.9 06/19/2025    .0 06/19/2025     Lab Results   Component Value Date     06/19/2025    K 5.3 (H) 06/19/2025     06/19/2025    CO2 20.0 (L) 06/19/2025    BUN 69 (H) 06/19/2025    CREATSERUM 4.38 (H) 06/19/2025     (H) 06/19/2025    CA 9.2 06/19/2025     Lab Results   Component Value Date    INR 3.19 (H) 06/19/2025         Airway      Mallampati: II  Mouth opening: >3 FB  TM distance: > 6 cm  Neck ROM: full Cardiovascular    Cardiovascular exam normal.  Rhythm: regular  Rate: normal     Dental             Pulmonary    Pulmonary exam normal.  Breath sounds clear to auscultation bilaterally.               Other findings        ASA: 4   Plan: MAC  NPO status verified and patient meets guidelines.    Post-procedure pain management plan discussed with surgeon and patient.    Comment:   Chart review prior to anesthesia encounter.    Plan/risks discussed with: patient  Use of blood product(s) discussed with: patient    Consented to blood products.          Present on Admission:   Acute renal failure (ARF) (HCC)   Acute kidney injury (HCC)   Metabolic acidosis   Hyperglycemia             [1]    sodium chloride 0.9% infusion   Intravenous Continuous    [START ON 6/20/2025] amiodarone (Pacerone) tab 200 mg  200 mg Oral Daily    [START ON 6/20/2025] atorvastatin (Lipitor) tab 40 mg  40 mg Oral Nightly    [START ON 6/20/2025] metoprolol succinate ER (Toprol XL) 24 hr tab 200 mg  200 mg Oral Daily    sodium bicarbonate tab 650 mg  650 mg Oral BID    glucose (Dex4) 15 GM/59ML oral liquid 15 g  15 g Oral Q15 Min PRN    Or    glucose (Glutose) 40% oral gel 15 g  15 g Oral Q15 Min PRN    Or    glucose-vitamin C (Dex-4) chewable tab 4 tablet  4 tablet Oral Q15 Min PRN    Or    dextrose  50% injection 50 mL  50 mL Intravenous Q15 Min PRN    Or    glucose (Dex4) 15 GM/59ML oral liquid 30 g  30 g Oral Q15 Min PRN    Or    glucose (Glutose) 40% oral gel 30 g  30 g Oral Q15 Min PRN    Or    glucose-vitamin C (Dex-4) chewable tab 8 tablet  8 tablet Oral Q15 Min PRN    sodium chloride 0.9% infusion   Intravenous Continuous    acetaminophen (Tylenol Extra Strength) tab 500 mg  500 mg Oral Q4H PRN    melatonin tab 3 mg  3 mg Oral Nightly PRN    insulin aspart (NovoLOG) 100 Units/mL FlexPen 2-10 Units  2-10 Units Subcutaneous TID AC and HS    sodium ferric gluconate (Ferrlecit) 125 mg in sodium chloride 0.9% 100mL IVPB premix  125 mg Intravenous Daily    pantoprazole (Protonix) 40 mg in sodium chloride 0.9% PF 10 mL IV push  40 mg Intravenous Q12H    sodium chloride 0.9% infusion   Intravenous Once   [2]   Facility-Administered Medications Prior to Admission   Medication Dose Route Frequency Provider Last Rate Last Admin    [COMPLETED] darbepoetin fernando (Aranesp) 300 MCG/0.6ML injection 300 mcg  300 mcg Subcutaneous Once Rhett Jones MD   300 mcg at 05/05/25 1115     Medications Prior to Admission   Medication Sig Dispense Refill Last Dose/Taking    warfarin 4 MG Oral Tab Take 0.5-1 tablets (2-4 mg total) by mouth As Directed.   6/18/2025 Evening    Metoprolol Succinate  MG Oral Tablet 24 Hr Take 1 tablet (200 mg total) by mouth daily. 90 tablet 2 6/19/2025 Morning    metFORMIN 500 MG Oral Tab Take 1 tablet (500 mg total) by mouth 2 (two) times daily with meals. 180 tablet 1 6/19/2025 Morning    sodium bicarbonate 650 MG Oral Tab Take 1 tablet (650 mg total) by mouth 2 (two) times daily. 60 tablet 3 6/19/2025 Morning    furosemide 20 MG Oral Tab TAKE 40MG ON EVEN DAYS, AND 20MG ON ODD DAYS 45 tablet 2 6/19/2025 Morning    glipiZIDE 5 MG Oral Tab Take 1 tablet (5 mg total) by mouth 2 (two) times daily before meals. 180 tablet 1 6/19/2025 Morning    ferrous sulfate 325 (65 FE) MG Oral Tab EC Take 1  tablet (325 mg total) by mouth daily with breakfast. 90 tablet 0 6/19/2025 Morning    allopurinol 100 MG Oral Tab Take 1 tablet (100 mg total) by mouth daily. 90 tablet 3 6/19/2025 Morning    atorvastatin 40 MG Oral Tab Take 1 tablet (40 mg total) by mouth daily.   6/19/2025 Morning    cholecalciferol 50 MCG (2000 UT) Oral Cap Take 1 capsule (2,000 Units total) by mouth in the morning.   6/19/2025 Morning    pantoprazole 40 MG Oral Tab EC Take 1 tablet (40 mg total) by mouth 2 (two) times daily before meals. 180 tablet 3 6/19/2025 Morning    aspirin 81 MG Oral Tab EC Take 1 tablet (81 mg total) by mouth every evening.   6/18/2025 Evening    losartan 25 MG Oral Tab Take 1 tablet (25 mg total) by mouth daily. 90 tablet 1 6/19/2025 Morning    furosemide 40 MG Oral Tab Take 1 tablet (40 mg total) by mouth every other day. 45 tablet 3 6/18/2025 Morning    spironolactone 25 MG Oral Tab Take 0.5 tablets (12.5 mg total) by mouth daily. 90 tablet 3 6/19/2025 Morning    amiodarone 200 MG Oral Tab Take 1 tablet (200 mg total) by mouth daily. 90 tablet 3 6/19/2025 Morning    azelastine 0.1 % Nasal Solution 1 spray by Nasal route in the morning and 1 spray in the evening. (Patient not taking: Reported on 6/19/2025) 30 mL 0 Not Taking   [3]   Past Surgical History:  Procedure Laterality Date    Cabg  2/2012    CABG x 3    Cath transcatheter aortic valve replacement  2/2012    Colonoscopy  6/27/2014    Procedure: COLONOSCOPY;  Surgeon: Ton Oliveira MD;  Location:  ENDOSCOPY    Colonoscopy N/A 2/11/2025    Procedure: COLONOSCOPY;  Surgeon: Israel Martin MD;  Location:  ENDOSCOPY    Colonoscopy N/A 4/3/2025    Procedure: COLONOSCOPY;  Surgeon: Sacha Simmons MD;  Location:  ENDOSCOPY    Colonoscopy N/A 4/2/2025    Procedure: COLONOSCOPY;  Surgeon: Sacha Simmons MD;  Location:  ENDOSCOPY    Hernia surgery      Valve repair     [4]   Social History  Socioeconomic History    Marital status:    Tobacco Use     Smoking status: Former     Current packs/day: 0.00     Types: Cigarettes     Start date: 1955     Quit date: 1975     Years since quittin.0     Passive exposure: Never    Smokeless tobacco: Never   Vaping Use    Vaping status: Never Used   Substance and Sexual Activity    Alcohol use: Yes     Alcohol/week: 1.0 standard drink of alcohol     Types: 1 Standard drinks or equivalent per week     Comment: 8-10 drinks per week    Drug use: Never   Other Topics Concern    Caffeine Concern Yes    Exercise No

## 2025-06-19 NOTE — CONSULTS
[facility]                       [facility]    Kyelr Haji Patient Status:  Inpatient    1940 MRN [id number]   Location [facility] 7NE-A Attending Kendra Flynn MD   Hosp Day # 0 PCP Abhi Arellano MD     Reason for consultation: Acute on chronic anemia  HPI: This is an 84-year-old male with a PMHx that includes CAD s/p CABG, A-fib (Coumadin), s/p AVR HF s/p ICD, DM, CKD, and prostate cancer no RTX who presented to the ER today with weakness in the setting of black stools found to have acute on chronic anemia Hgb 6.9 from Hgb 7 yesterday and 8.82 months prior however steadily declining from 9-11 in .  Patient reports ongoing twice daily black soft stools which she attributes to oral iron supplementation.  No abdominal pain, nausea, vomiting, heartburn, dysphagia, or odynophagia.  No increase in BM frequency and no hematochezia.  He is on a daily ASA in addition to Coumadin and denies additional NSAID use.  He reports a slight decrease in appetite with a 5 pound weight loss over the last 2 months.  Overall weak and dizzy which has steadily progressed over the last several days  Endoscopic History   Post enteroscopy 2025 (Dr. Simmons) for positive capsule study with GI bleeding of the small intestine, anemia revealed small intestinal ulcer with stigmata of recent bleeding s/p epi and clip placement x 2  Colonoscopy 2025 (Dr. Simmons) for anemia revealed an in adequate bowel prep despite 2 attempts--no gross/overt blood visualized in the limited portion of the exam  Video capsule endoscopy 2025 (Dr. Simmons) revealed overt GI bleeding in the proximal part of the small intestine/jejunum and overt blood visualized likely culprit lesion identified  EGD with deployment of video capsule 2025 (Dr. Simmons) for anemia revealed normal visualized endoscopic exam, deployment of the video capsule in the small bowel  Colonoscopy 2025 (Dr. Martin) for ROVERTO revealed  TI briefly intubated (grossly normal), a tortuous and redundant colon, with no polyps, lesions, AVMs, ulcers, or other findings anywhere in the colon--there was residual stool throughout the right colon and that was lavaged   EGD 2/2025 (Dr. Martin) for ROVERTO revealed 5 cm hiatal hernia  Colonoscopy 3/2018 (Dr Oliveira) for hx of adenomatous colon polyps revealed three colon polyps (2-20 mm) and internal hemorrhoids. Recall 3 yrs  Screening colonoscopy 6/2014 (Dr Oliveira) revealed four colon polyps (1-5 mm) and internal hemorrhoids.  Recall 3 yrs  PMHx: Past Medical History[1]             PSHx: Past Surgical History[2]  Medications: Current Medications[3]  Allergies: Allergies[4]  Social HX: Short Social Hx on File[5]   FamHx: The patient has no family history of colon cancer or other gastrointestinal malignancies;  No family history of ulcer disease, or inflammatory bowel disease  ROS:  In addition to the pertinent positives described above:            Infectious Disease:  No chronic infections or recent fevers prior to the acute illness  Cardiovascular: + HTN, AF (Coumadin), CAD s/p CABG, s/p AVR, HF s/p ICD            Respiratory: + MILES            Hematologic: The patient reports no easy bruising, frequent gum bleeding or nose bleeding; + anemia            Dermatologic: The patient reports no recent rashes or chronic skin disorders            Rheumatologic: The patient reports no history of chronic arthritis, myalgias, arthralgias            Genitourinary:  + CKD           Psychiatric: The patient reports no history of depression, anxiety, suicidal ideation, or homicidal ideation           Oncologic: + prostate cancer            ENT: The patient reports no hoarseness of voice, hearing loss, sinus congestion, tinnitus           Neurologic: The patient reports no history of seizure, stroke, or frequent headaches  PE: /59   Pulse 60   Temp 97.8 °F (36.6 °C) (Temporal)   Resp 16   Wt 197 lb (89.4 kg)   SpO2 100%    BMI 27.09 kg/m²   Gen: AAO x 3, able to speak in complete sentences  HENT: EOMI, PERRL, oropharynx is clear with moist mucosal membranes  Eyes: Sclerae are anicteric  Neck:  Supple without nuchal rigidity  CV: Irregularly irregular; rate controlled; positive murmur  Resp: Clear to auscultation bilaterally without wheezes; rubs, rhonchi, or rales  Abdomen: Soft, non-tender, non-distended with the presence of bowel sounds; No hepatosplenomegaly; no rebound or guarding; No ascites is clinically apparent; no tympany to percussion  Ext: No peripheral edema or cyanosis  Skin: Warm and dry; ecchymosis noted to bilateral arms  Psychiatric: Appropriate mood and congruent affect without obvious depression or anxiety  Labs:   Lab Results   Component Value Date    WBC 6.7 06/19/2025    HGB 6.9 06/19/2025    HCT 21.6 06/19/2025    .0 06/19/2025    CREATSERUM 4.38 06/19/2025    BUN 69 06/19/2025     06/19/2025    K 5.3 06/19/2025     06/19/2025    CO2 20.0 06/19/2025     06/19/2025    CA 9.2 06/19/2025    ALB 4.4 06/19/2025    ALKPHO 62 06/19/2025    BILT 0.3 06/19/2025    AST 38 06/19/2025    ALT 27 06/19/2025    PTT 34.1 06/19/2025    INR 3.19 06/19/2025    PTP 32.9 06/19/2025     Recent Labs   Lab 06/18/25  1523 06/19/25  1157   * 228*   BUN 64* 69*   CREATSERUM 4.41* 4.38*   CA 9.5 9.2    141   K 5.1 5.3*    105   CO2 22.0 20.0*     Recent Labs   Lab 06/19/25  1157   RBC 2.17*   HGB 6.9*   HCT 21.6*   MCV 99.5   MCH 31.8   MCHC 31.9   RDW 15.9   NEPRELIM 4.73   WBC 6.7   .0       Recent Labs   Lab 06/19/25  1157   ALT 27   AST 38*     Impression: 84-year-old male with a Hx of CAD s/p CABG, A-fib (Coumadin), s/p AVR HF s/p ICD, DM, CKD, and prostate cancer no RTX who presented to the ER today with weakness in the setting of black stools found to have acute on chronic anemia Hgb 6.9 from Hgb 7 yesterday and 8.8 two months prior however steadily declining from 9-11 in 2024.   Patient with extensive endoscopic evaluations this year with only small bowel revealing active bleeding.  Will treat with PPI IV twice daily and plan for push enteroscopy in a.m. if INR normalizes  The risks, benefits, alternatives of the procedure including the risks of anesthesia, bleeding, perforation, missed lesions, need for surgery, and infection were discussed with the patient, spouse at bedside, and daughter at bedside. All expressed understanding of the risks and are agreeable to proceed.  Recommendations:     Plan for push enteroscopy AM under MAC with Dr Roach pending clinical course and a.m. INR  Cardiac diet today; NPO after midnight with sips of water for necessary medications   Protonix 40 mg IV twice daily  Stop heparin infusion now--discussed with Dr. Flynn as INR greater than 3 and continue to hold Coumadin if risk remains acceptable  1 PRBC now and continue to check Hgb transfusing as needed to maintain hemoglobin greater than 7--higher if needed from a cardiac perspective  Cardiology consult as patient is open to discussion for Watchman procedure  CBC, BMP, Mg, PT/INR in AM correcting electrolytes per Hospitalist recommendations     Thank you for the consultation, we will follow the patient with you.  Attending addendum (Dr. IVON Roach) to follow later today and provide formal, final recommendations at that time   Susan Dyson, CR  1:06 PM  6/19/2025  [facility]  [phone]    GI attending addendum:    I have personally seen and examined this patient and agree with above nurse practitioner's assessment and recommendations.     Briefly, patient is an 84-year-old male with chronic medical ointments including CAD status post CABG, A-fib on Coumadin, heart failure, aortic valve replacement, diabetes, CKD, prostate cancer admission that is listed above that presented to the ER today with anemia with hemoglobin of 7 noted on outpatient labs.  Patient did note a dark stool  here once he got up to the floor but denied any additional melena or hematochezia prior.  Endoscopic history as above most recent push enteroscopy in April 2025 with small bowel ulcer with stigmata of recent bleeding with epinephrine and clip placement.  Hemoglobin in the ER 6.9 and 1 unit PRBCs been ordered.  On physical exam, patient resting comfortably in bed.  Abdomen soft, nontender, nondistended.  Patient with acute on chronic anemia and possible melena.  Will need to rule out continued upper GI source of bleeding.  Will plan for push enteroscopy tomorrow if INR improved.  Pantoprazole 40 mg IV twice daily.  Continue to monitor hemoglobin and transfuse for Humoryl less than 7.  Additional recommendations as above.  Thank you for the consultation.  Will continue to follow along with you.    Juan J Roach DO  6:21 PM  6/19/2025  [facility]  [phone]         [1]   Past Medical History:   Abdominal hernia    Actinic keratosis    Black stools    Chest pain, unspecified    Cough    Diabetes (HCC)    Diabetes mellitus (HCC)    Dysmetabolic syndrome X    Easy bruising    Essential hypertension    Essential hypertension, malignant    Flatulence/gas pain/belching    Hemorrhoids    High cholesterol    Inguinal hernia without mention of obstruction or gangrene, unilateral or unspecified, (not specified as recurrent)    Myalgia and myositis, unspecified    Obesity, unspecified    Pacemaker    Special screening for malignant neoplasm of prostate    Type II or unspecified type diabetes mellitus without mention of complication, uncontrolled    Undiagnosed cardiac murmurs    Unspecified sleep apnea    AHI 21.7 SaO2 yashira 79.9 %     Wears glasses   [2]   Past Surgical History:  Procedure Laterality Date    Cabg  2/2012    CABG x 3    Cath transcatheter aortic valve replacement  2/2012    Colonoscopy  6/27/2014    Procedure: COLONOSCOPY;  Surgeon: Ton Oliveira MD;  Location:  ENDOSCOPY     Colonoscopy N/A 2025    Procedure: COLONOSCOPY;  Surgeon: Israel Martin MD;  Location:  ENDOSCOPY    Colonoscopy N/A 4/3/2025    Procedure: COLONOSCOPY;  Surgeon: Sacha Simmons MD;  Location:  ENDOSCOPY    Colonoscopy N/A 2025    Procedure: COLONOSCOPY;  Surgeon: Sacha Simmons MD;  Location:  ENDOSCOPY    Hernia surgery      Valve repair     [3]    sodium chloride 0.9% infusion   Intravenous Continuous    [START ON 2025] amiodarone (Pacerone) tab 200 mg  200 mg Oral Daily    [START ON 2025] atorvastatin (Lipitor) tab 40 mg  40 mg Oral Nightly    [START ON 2025] metoprolol succinate ER (Toprol XL) 24 hr tab 200 mg  200 mg Oral Daily    sodium bicarbonate tab 650 mg  650 mg Oral BID    glucose (Dex4) 15 GM/59ML oral liquid 15 g  15 g Oral Q15 Min PRN    Or    glucose (Glutose) 40% oral gel 15 g  15 g Oral Q15 Min PRN    Or    glucose-vitamin C (Dex-4) chewable tab 4 tablet  4 tablet Oral Q15 Min PRN    Or    dextrose 50% injection 50 mL  50 mL Intravenous Q15 Min PRN    Or    glucose (Dex4) 15 GM/59ML oral liquid 30 g  30 g Oral Q15 Min PRN    Or    glucose (Glutose) 40% oral gel 30 g  30 g Oral Q15 Min PRN    Or    glucose-vitamin C (Dex-4) chewable tab 8 tablet  8 tablet Oral Q15 Min PRN    sodium chloride 0.9% infusion   Intravenous Continuous    acetaminophen (Tylenol Extra Strength) tab 500 mg  500 mg Oral Q4H PRN    melatonin tab 3 mg  3 mg Oral Nightly PRN    insulin aspart (NovoLOG) 100 Units/mL FlexPen 2-10 Units  2-10 Units Subcutaneous TID AC and HS   [4]   Allergies  Allergen Reactions    Ace Inhibitors Coughing   [5]   Social History  Socioeconomic History    Marital status:    Tobacco Use    Smoking status: Former     Current packs/day: 0.00     Types: Cigarettes     Start date: 1955     Quit date: 1975     Years since quittin.0     Passive exposure: Never    Smokeless tobacco: Never   Vaping Use    Vaping status: Never Used   Substance and Sexual  Activity    Alcohol use: Yes     Alcohol/week: 1.0 standard drink of alcohol     Types: 1 Standard drinks or equivalent per week     Comment: 8-10 drinks per week    Drug use: Never   Other Topics Concern    Caffeine Concern Yes    Exercise No     Social Drivers of Health     Food Insecurity: No Food Insecurity (6/19/2025)    NCSS - Food Insecurity     Worried About Running Out of Food in the Last Year: No     Ran Out of Food in the Last Year: No   Transportation Needs: No Transportation Needs (6/19/2025)    NCSS - Transportation     Lack of Transportation: No   Housing Stability: Not At Risk (6/19/2025)    NCSS - Housing/Utilities     Has Housing: Yes     Worried About Losing Housing: No     Unable to Get Utilities: No

## 2025-06-19 NOTE — H&P
[facility]     Kyler Haji Patient Status:  Emergency    1940 MRN [id number]   Location [facility] EMERGENCY DEPARTMENT Attending No att. providers found   Hosp Day # 0 PCP Abhi Arellano MD     Chief Complaint: weakness     Subjective:    History of Present Illness:     Kyler Haji is a 84 year old male with  HTN, HL, CHF, CAD, CKD, DM, GIB, AVR on coumadin presented with generalized weakness and found to have low Hg as OP. He denies dizziness, CP/SOB/abd pain, hematochezia. Has seen some dark stools. Otherwise stable in ER.     History/Other:    Past Medical History:  Past Medical History[1]  Past Surgical History:   Past Surgical History[2]   Family History:   Family History[3]  Social History:    reports that he quit smoking about 50 years ago. His smoking use included cigarettes. He started smoking about 70 years ago. He has never been exposed to tobacco smoke. He has never used smokeless tobacco. He reports current alcohol use of about 1.0 standard drink of alcohol per week. He reports that he does not use drugs.     Allergies: Allergies[4]    Medications:  Medications Ordered Prior to Encounter[5]    Review of Systems:   A comprehensive review of systems was completed.    Pertinent positives and negatives noted in the HPI.    Objective:   Physical Exam:    /59   Pulse 60   Temp 97.8 °F (36.6 °C) (Temporal)   Resp 16   Wt 197 lb (89.4 kg)   SpO2 100%   BMI 27.09 kg/m²   General: No acute distress, Alert  Respiratory: No rhonchi, no wheezes  Cardiovascular: S1, S2. Regular rate and rhythm  Abdomen: Soft, Non-tender, non-distended, positive bowel sounds  Neuro: No new focal deficits  Extremities: No edema      Results:    Labs:      Labs Last 24 Hours:    Recent Labs   Lab 25  1523 25  1157   RBC 2.15* 2.17*   HGB 7.0* 6.9*   HCT 21.5* 21.6*   .0 99.5   MCH 32.6 31.8   MCHC 32.6 31.9   RDW 16.0 15.9   NEPRELIM 4.54 4.73   WBC 6.0  6.7   .0 199.0       Recent Labs   Lab 06/18/25  1523 06/19/25  1157   * 228*   BUN 64* 69*   CREATSERUM 4.41* 4.38*   EGFRCR 12* 13*   CA 9.5 9.2   ALB  --  4.4    141   K 5.1 5.3*    105   CO2 22.0 20.0*   ALKPHO  --  62   AST  --  38*   ALT  --  27   BILT  --  0.3   TP  --  6.5       Estimated Glomerular Filtration Rate: 13 mL/min/1.73m2 (A) (result from lab).    Lab Results   Component Value Date    PT 14.7 11/06/2012    PT 21.6 (H) 02/28/2012    PT 21.3 (H) 02/27/2012    INR 1.26 (H) 04/05/2025    INR 1.49 (H) 04/01/2025    INR 1.89 (H) 03/31/2025       No results for input(s): \"TROP\", \"TROPHS\", \"CK\" in the last 168 hours.    No results for input(s): \"TROP\", \"PBNP\" in the last 168 hours.    No results for input(s): \"PCT\" in the last 168 hours.    Imaging: Imaging data reviewed in Epic.    Assessment & Plan:      #Symptomatic anemia /presumptive GIB  - H&H  - PPI IV BID   - GI cosnult  - NPO until seen by GI    # ILIR   - gentle IVF today   - hold diuretic and ACEI    #Hypertension    #Hyperlipidemia  #CKD 4  - monitor     #Diabetes type 2  - ISS    #Coagulopathy, check INR   #Paroxysmal A-fib  #Aortic stenosis status post bioprosthetic valve  #Chronic HFrEF, monitor volume status     #Status post AICD  #Metabolic acidosis due to CKD  #CAD status post CABG  #MILES        Plan of care discussed with patient, wife, daughter, ER.     Kendra Flynn MD    Supplementary Documentation:     The 21st Century Cures Act makes medical notes like these available to patients in the interest of transparency. Please be advised this is a medical document. Medical documents are intended to carry relevant information, facts as evident, and the clinical opinion of the practitioner. The medical note is intended as peer to peer communication and may appear blunt or direct. It is written in medical language and may contain abbreviations or verbiage that are unfamiliar.                                       [1]    Past Medical History:   Abdominal hernia    Actinic keratosis    Black stools    Chest pain, unspecified    Cough    Diabetes (HCC)    Diabetes mellitus (HCC)    Dysmetabolic syndrome X    Easy bruising    Essential hypertension    Essential hypertension, malignant    Flatulence/gas pain/belching    Hemorrhoids    High cholesterol    Inguinal hernia without mention of obstruction or gangrene, unilateral or unspecified, (not specified as recurrent)    Myalgia and myositis, unspecified    Obesity, unspecified    Pacemaker    Special screening for malignant neoplasm of prostate    Type II or unspecified type diabetes mellitus without mention of complication, uncontrolled    Undiagnosed cardiac murmurs    Unspecified sleep apnea    AHI 21.7 SaO2 yashira 79.9 %     Wears glasses   [2]   Past Surgical History:  Procedure Laterality Date    Cabg  2/2012    CABG x 3    Cath transcatheter aortic valve replacement  2/2012    Colonoscopy  6/27/2014    Procedure: COLONOSCOPY;  Surgeon: Ton Oliveira MD;  Location:  ENDOSCOPY    Colonoscopy N/A 2/11/2025    Procedure: COLONOSCOPY;  Surgeon: Israel Martin MD;  Location:  ENDOSCOPY    Colonoscopy N/A 4/3/2025    Procedure: COLONOSCOPY;  Surgeon: Sacha Simmons MD;  Location:  ENDOSCOPY    Colonoscopy N/A 4/2/2025    Procedure: COLONOSCOPY;  Surgeon: Sacha Simmons MD;  Location:  ENDOSCOPY    Hernia surgery      Valve repair     [3]   Family History  Problem Relation Age of Onset    Prostate Cancer Brother     Diabetes Father     Diabetes Mother    [4]   Allergies  Allergen Reactions    Ace Inhibitors Coughing   [5]   Current Facility-Administered Medications on File Prior to Encounter   Medication Dose Route Frequency Provider Last Rate Last Admin    [COMPLETED] darbepoetin fernando (Aranesp) 300 MCG/0.6ML injection 300 mcg  300 mcg Subcutaneous Once Rhett Jones MD   300 mcg at 05/05/25 1115    [COMPLETED] potassium chloride (Klor-Con M20) tab 40 mEq  40 mEq Oral  Once Sandrine Rice MD   40 mEq at 25 1106    [COMPLETED] magnesium oxide (Mag-Ox) tab 400 mg  400 mg Oral Once Maikel Roper MD   400 mg at 25 1759    [COMPLETED] magnesium oxide (Mag-Ox) tab 400 mg  400 mg Oral Once Maikel Roper MD   400 mg at 25 1331    [COMPLETED] polyethylene glycol-electrolyte (Golytely) 236 g oral solution 4,000 mL  4,000 mL Oral Once Susan Dyson APRN   2,000 mL at 25 1645    [] simethicone (Mylicon) chewable tab 80 mg  80 mg Oral TID CC and HS Susan Dyson APRN   80 mg at 25 1759    [COMPLETED] polyethylene glycol-electrolyte (Golytely) 236 g oral solution 2,000 mL  2,000 mL Oral 2 times per day on  Susan Dyson APRN   2,000 mL at 25 0100    [COMPLETED] magnesium oxide (Mag-Ox) tab 400 mg  400 mg Oral Once Blaine Reynolds MD   400 mg at 25 0857    [COMPLETED] sodium ferric gluconate (Ferrlecit) 125 mg in sodium chloride 0.9% 100mL IVPB premix  125 mg Intravenous Daily Israel Martin  mL/hr at 25 1827 125 mg at 25 1827    [COMPLETED] pantoprazole (Protonix) 40 mg in sodium chloride 0.9% PF 10 mL IV push  40 mg Intravenous Once Qamar Pillai DO   40 mg at 25 2018     Current Outpatient Medications on File Prior to Encounter   Medication Sig Dispense Refill    warfarin 4 MG Oral Tab Take 0.5-1 tablets (2-4 mg total) by mouth As Directed. 2 MG on Monday, Wednesday, and Friday  4 MG , Tuesday, Thursday, and Saturday      Metoprolol Succinate  MG Oral Tablet 24 Hr Take 1 tablet (200 mg total) by mouth daily. 90 tablet 2    metFORMIN 500 MG Oral Tab Take 1 tablet (500 mg total) by mouth 2 (two) times daily with meals. 180 tablet 1    sodium bicarbonate 650 MG Oral Tab Take 1 tablet (650 mg total) by mouth 2 (two) times daily. 60 tablet 3    furosemide 20 MG Oral Tab TAKE 40MG ON EVEN DAYS, AND 20MG ON ODD DAYS 45 tablet 2    glipiZIDE 5 MG Oral Tab Take 1 tablet (5 mg  total) by mouth 2 (two) times daily before meals. 180 tablet 1    ferrous sulfate 325 (65 FE) MG Oral Tab EC Take 1 tablet (325 mg total) by mouth daily with breakfast. 90 tablet 0    allopurinol 100 MG Oral Tab Take 1 tablet (100 mg total) by mouth daily. 90 tablet 3    atorvastatin 40 MG Oral Tab Take 1 tablet (40 mg total) by mouth daily.      cholecalciferol 50 MCG (2000 UT) Oral Cap Take 1 capsule (2,000 Units total) by mouth in the morning.      pantoprazole 40 MG Oral Tab EC Take 1 tablet (40 mg total) by mouth 2 (two) times daily before meals. 180 tablet 3    aspirin 81 MG Oral Tab EC Take 1 tablet (81 mg total) by mouth every evening.      losartan 25 MG Oral Tab Take 1 tablet (25 mg total) by mouth daily. 90 tablet 1    furosemide 40 MG Oral Tab Take 1 tablet (40 mg total) by mouth every other day. 45 tablet 3    spironolactone 25 MG Oral Tab Take 0.5 tablets (12.5 mg total) by mouth daily. 90 tablet 3    amiodarone 200 MG Oral Tab Take 1 tablet (200 mg total) by mouth daily. 90 tablet 3    azelastine 0.1 % Nasal Solution 1 spray by Nasal route in the morning and 1 spray in the evening. (Patient not taking: Reported on 6/19/2025) 30 mL 0

## 2025-06-19 NOTE — PLAN OF CARE
Admission Navigator Completed.   Per GI  can eat until Midnight.   Heparin gtt infusing at 10 ml/hr.   Wants to initiate POA of HC with spiritual care   Pt is ambulatory but wants to start using a rolling walker.  Wife and daughter at bedside.

## 2025-06-19 NOTE — ED PROVIDER NOTES
Patient Seen in: [facility] Emergency Department        History  Chief Complaint   Patient presents with    Abnormal Labs     Stated Complaint: hgb 7.0    Subjective:   HPI          84-year-old male who presents to the emergency department due to the report of a low hemoglobin.  The patient has a history of chronic kidney disease in reviewing his records he had labs that were drawn on 4/22/2025 that had a hemoglobin of 8.8.  He usually has hemoglobin that averaged roughly in the eights but he was told that his hemoglobin drop from yesterday was down to 7.0.  He denies any change in stools.  No abdominal pain.  No chest pain or shortness of breath.  He is on Coumadin.  He has seen gastroenterology in the past because of anemia.  He is on Coumadin because of his chronic heart disease.        Objective:     Past Medical History:    Abdominal hernia    Actinic keratosis    Black stools    Chest pain, unspecified    Cough    Diabetes (HCC)    Diabetes mellitus (HCC)    Dysmetabolic syndrome X    Easy bruising    Essential hypertension    Essential hypertension, malignant    Flatulence/gas pain/belching    Hemorrhoids    High cholesterol    Inguinal hernia without mention of obstruction or gangrene, unilateral or unspecified, (not specified as recurrent)    Myalgia and myositis, unspecified    Obesity, unspecified    Pacemaker    Special screening for malignant neoplasm of prostate    Type II or unspecified type diabetes mellitus without mention of complication, uncontrolled    Undiagnosed cardiac murmurs    Unspecified sleep apnea    AHI 21.7 SaO2 yashira 79.9 %     Wears glasses              Past Surgical History:   Procedure Laterality Date    Cabg  2/2012    CABG x 3    Cath transcatheter aortic valve replacement  2/2012    Colonoscopy  6/27/2014    Procedure: COLONOSCOPY;  Surgeon: Ton Oliveira MD;  Location:  ENDOSCOPY    Colonoscopy N/A 2/11/2025    Procedure: COLONOSCOPY;  Surgeon: Israel Martin,  MD;  Location:  ENDOSCOPY    Colonoscopy N/A 4/3/2025    Procedure: COLONOSCOPY;  Surgeon: Sacha Simmons MD;  Location:  ENDOSCOPY    Colonoscopy N/A 4/2/2025    Procedure: COLONOSCOPY;  Surgeon: Sacha Simmons MD;  Location:  ENDOSCOPY    Hernia surgery      Valve repair                  No pertinent social history.                              Physical Exam    ED Triage Vitals [06/19/25 1130]   /66   Pulse 60   Resp 18   Temp 97.8 °F (36.6 °C)   Temp src Temporal   SpO2 100 %   O2 Device None (Room air)       Current Vitals:   Vital Signs  BP: 118/59  Pulse: 60  Resp: 12  Temp: 97.8 °F (36.6 °C)  Temp src: Temporal  MAP (mmHg): 78    Oxygen Therapy  SpO2: 100 %  O2 Device: None (Room air)            Physical Exam  General: This a pleasant nontoxic appearing patient in no apparent distress alert and oriented ×3  HEENT: Pupils are equal reactive to light.  Extra ocular motions are intact.  No scleral icterus or conjunctival pallor: Neck is supple without tenderness on palpation.  Head is atraumatic normocephalic.  Oral mucosa moist.  Tongue is midline.    Lungs: Clear to auscultation bilaterally.  No wheezes, rhonchi, or rales appreciated.  No accessory muscle use noted for breathing.  Cardiac: Regular rate and rhythm.  Normal S1 and 2 without murmurs or ectopy appreciated  Abdomen: Soft on examination without tenderness to deep palpation or to percussion.  No masses appreciated.  Bowel sounds are normoactive.  No CVA tenderness.  Extremities: No cyanosis, no edema or clubbing.  Pulses are +2.  Full range of motion is noted of the extremities without deformities.  No tenderness.  Neurologically intact.  Rectal: Chaperone in place.  There is dark liquid stool noted in the vault Hemoccult positive.      ED Course  Labs Reviewed   COMP METABOLIC PANEL (14) - Abnormal; Notable for the following components:       Result Value    Glucose 228 (*)     Potassium 5.3 (*)     CO2 20.0 (*)     BUN 69 (*)      Creatinine 4.38 (*)     Calculated Osmolality 319 (*)     eGFR-Cr 13 (*)     AST 38 (*)     A/G Ratio 2.1 (*)     All other components within normal limits   CBC WITH DIFFERENTIAL WITH PLATELET - Abnormal; Notable for the following components:    RBC 2.17 (*)     HGB 6.9 (*)     HCT 21.6 (*)     Lymphocyte Absolute 0.99 (*)     All other components within normal limits   PTT, ACTIVATED   PROTHROMBIN TIME (PT)   TYPE AND SCREEN    Narrative:     The following orders were created for panel order Type and screen.  Procedure                               Abnormality         Status                     ---------                               -----------         ------                     ABORH (Blood Type)[[id number]]                               In process                 Antibody Screen[[id number]]                                  In process                   Please view results for these tests on the individual orders.   ABORH (BLOOD TYPE)   ANTIBODY SCREEN                            MDM   Differential diagnosis includes but is not limited to laboratory error, anemia due to chronic disease, anemia from blood loss.  The patient is otherwise hemodynamically stable appears to be nontoxic.  Hemoglobin 6.9 hematocrit 21.6.  He will need to be transfused.  Will speak to the GI physicians regarding the patient's heme positive stools as he may need a repeat endoscopy performed.  GI thought it would be easier if the patient was hospitalized to have the endoscopy performed.  The patient does have valvular disease and he needs to be anticoagulated and is not on Coumadin.  His Coumadin will need to be held he will need to be started heparin after I spoke to the hospitalist service who wanted the heparin started as they would hold the patient's Coumadin.  The patient will also receive his blood.  He was admitted to hospital for continued care.  Admission disposition: 6/19/2025  1:12 PM       Glucose is 228 with a potassium of 5.3  CO2 of 20 BUN of 69 creatinine 4.3.  AST of 38.  A total of 30 minutes of critical care time (exclusive of billable procedures) was administered to manage the patient's critical lab values due to his symptomatic anemia with hemoglobin of 6.9 requiring blood transfusion.  This involved direct patient intervention, complex decision making, and/or extensive discussions with the patient, family, and clinical staff.    Medical Decision Making      Disposition and Plan     Clinical Impression:  1. Symptomatic anemia    2. Gastrointestinal hemorrhage, unspecified gastrointestinal hemorrhage type    3. Valvular heart disease         Disposition:  Admit  6/19/2025  1:12 pm    Follow-up:  No follow-up provider specified.        Medications Prescribed:  Current Discharge Medication List                Supplementary Documentation:         Hospital Problems       Present on Admission  Date Reviewed: 6/18/2025          ICD-10-CM Noted POA    * (Principal) Symptomatic anemia D64.9 3/28/2025 Unknown    Acute kidney injury (HCC) N17.9 6/19/2025 Yes    Acute renal failure (ARF) (HCC) N17.9 6/19/2025 Yes    Hyperglycemia R73.9 6/19/2025 Yes    Metabolic acidosis E87.20 6/19/2025 Yes

## 2025-06-19 NOTE — PROGRESS NOTES
NURSING ADMISSION NOTE      Patient admitted via Cart  Oriented to room.  Assumed pt care at 1500  A/O X4 VSS. Afebrile. A-paced on tele. O2 sats >90% on RA  Pt resting in bed in no apparent distress.   Denies any pain or discomfort.   Tolerating diet, pt made aware he is to be NPO at midnight  Pt denies n/v, Abd soft non distended.   (+)BS.Heparin gtt d/c'd. 1U PRBC administered.   Pt cont x2, and able to make needs known.   All safety precautions in place and all needs met at this time  Safety precautions initiated.  Bed in low position.  Call light in reach.

## 2025-06-19 NOTE — ED INITIAL ASSESSMENT (HPI)
Presenting d/t hgb of 7 per daughter. Hx low hemoglobin and GI bleed in February. Fell in April, on blood thinners. Denies blood in stool or vomit. Patient A&Ox4, pink in color.

## 2025-06-19 NOTE — ED QUICK NOTES
Orders for admission, patient is aware of plan and ready to go upstairs. Any questions, please call ED RN Kosta at extension [phone].     Patient Covid vaccination status: Fully vaccinated     COVID Test Ordered in ED: None    COVID Suspicion at Admission: N/A    Running Infusions: Medication Infusions[1]     Mental Status/LOC at time of transport: Alert x4    Other pertinent information:   CIWA score: N/A   NIH score:  N/A             [1]    continuous dose heparin

## 2025-06-20 ENCOUNTER — APPOINTMENT (OUTPATIENT)
Dept: CV DIAGNOSTICS | Facility: HOSPITAL | Age: 85
DRG: 378 | End: 2025-06-20
Attending: INTERNAL MEDICINE
Payer: MEDICARE

## 2025-06-20 ENCOUNTER — ANESTHESIA (OUTPATIENT)
Dept: ENDOSCOPY | Facility: HOSPITAL | Age: 85
End: 2025-06-20
Payer: MEDICARE

## 2025-06-20 ENCOUNTER — APPOINTMENT (OUTPATIENT)
Dept: CV DIAGNOSTICS | Facility: HOSPITAL | Age: 85
End: 2025-06-20
Attending: INTERNAL MEDICINE
Payer: MEDICARE

## 2025-06-20 LAB
ANION GAP SERPL CALC-SCNC: 12 MMOL/L (ref 0–18)
BASOPHILS # BLD AUTO: 0.04 X10(3) UL (ref 0–0.2)
BASOPHILS NFR BLD AUTO: 0.6 %
BUN BLD-MCNC: 64 MG/DL (ref 9–23)
CALCIUM BLD-MCNC: 9.5 MG/DL (ref 8.7–10.6)
CHLORIDE SERPL-SCNC: 111 MMOL/L (ref 98–112)
CO2 SERPL-SCNC: 22 MMOL/L (ref 21–32)
CREAT BLD-MCNC: 3.88 MG/DL (ref 0.7–1.3)
EGFRCR SERPLBLD CKD-EPI 2021: 14 ML/MIN/1.73M2 (ref 60–?)
EOSINOPHIL # BLD AUTO: 0.29 X10(3) UL (ref 0–0.7)
EOSINOPHIL NFR BLD AUTO: 4.4 %
ERYTHROCYTE [DISTWIDTH] IN BLOOD BY AUTOMATED COUNT: 17.4 %
GLUCOSE BLD-MCNC: 101 MG/DL (ref 70–99)
GLUCOSE BLD-MCNC: 103 MG/DL (ref 70–99)
GLUCOSE BLD-MCNC: 141 MG/DL (ref 70–99)
GLUCOSE BLD-MCNC: 156 MG/DL (ref 70–99)
GLUCOSE BLD-MCNC: 226 MG/DL (ref 70–99)
HCT VFR BLD AUTO: 24.2 % (ref 39–53)
HGB BLD-MCNC: 7.9 G/DL (ref 13–17.5)
IMM GRANULOCYTES # BLD AUTO: 0.02 X10(3) UL (ref 0–1)
IMM GRANULOCYTES NFR BLD: 0.3 %
INR BLD: 3.19 (ref 0.8–1.2)
LYMPHOCYTES # BLD AUTO: 1.14 X10(3) UL (ref 1–4)
LYMPHOCYTES NFR BLD AUTO: 17.3 %
MAGNESIUM SERPL-MCNC: 1.8 MG/DL (ref 1.6–2.6)
MCH RBC QN AUTO: 31.5 PG (ref 26–34)
MCHC RBC AUTO-ENTMCNC: 32.6 G/DL (ref 31–37)
MCV RBC AUTO: 96.4 FL (ref 80–100)
MONOCYTES # BLD AUTO: 0.68 X10(3) UL (ref 0.1–1)
MONOCYTES NFR BLD AUTO: 10.3 %
NEUTROPHILS # BLD AUTO: 4.41 X10 (3) UL (ref 1.5–7.7)
NEUTROPHILS # BLD AUTO: 4.41 X10(3) UL (ref 1.5–7.7)
NEUTROPHILS NFR BLD AUTO: 67.1 %
OSMOLALITY SERPL CALC.SUM OF ELEC: 318 MOSM/KG (ref 275–295)
PLATELET # BLD AUTO: 184 10(3)UL (ref 150–450)
POTASSIUM SERPL-SCNC: 4.4 MMOL/L (ref 3.5–5.1)
PROTHROMBIN TIME: 32.9 SECONDS (ref 11.6–14.8)
RBC # BLD AUTO: 2.51 X10(6)UL (ref 3.8–5.8)
SODIUM SERPL-SCNC: 145 MMOL/L (ref 136–145)
WBC # BLD AUTO: 6.6 X10(3) UL (ref 4–11)

## 2025-06-20 PROCEDURE — 99232 SBSQ HOSP IP/OBS MODERATE 35: CPT | Performed by: INTERNAL MEDICINE

## 2025-06-20 PROCEDURE — 93306 TTE W/DOPPLER COMPLETE: CPT | Performed by: INTERNAL MEDICINE

## 2025-06-20 RX ORDER — ALLOPURINOL 100 MG/1
100 TABLET ORAL DAILY
Status: DISCONTINUED | OUTPATIENT
Start: 2025-06-20 | End: 2025-06-22

## 2025-06-20 RX ORDER — LOSARTAN POTASSIUM 25 MG/1
25 TABLET ORAL DAILY
Status: DISCONTINUED | OUTPATIENT
Start: 2025-06-21 | End: 2025-06-22

## 2025-06-20 RX ORDER — MAGNESIUM SULFATE HEPTAHYDRATE 40 MG/ML
2 INJECTION, SOLUTION INTRAVENOUS ONCE
Status: COMPLETED | OUTPATIENT
Start: 2025-06-20 | End: 2025-06-20

## 2025-06-20 NOTE — PROGRESS NOTES
[facility]   part of [facility]     Hospitalist Progress Note     Kyler Haji Patient Status:  Inpatient    1940 MRN [id number]   [facility] 7NE-A Attending Kendra Flynn MD   Hosp Day # 1 PCP Abhi Arellano MD     Chief Complaint: anemia     Subjective:     Patient with no abd pain, dizziness, weakness.     Objective:    Review of Systems:   A comprehensive review of systems was completed; pertinent positive and negatives stated in subjective.    Vital signs:  Temp:  [97.6 °F (36.4 °C)-97.9 °F (36.6 °C)] 97.6 °F (36.4 °C)  Pulse:  [60-63] 60  Resp:  [11-20] 18  BP: (128-146)/(54-78) 146/72  SpO2:  [92 %-100 %] 100 %    Physical Exam:    General: No acute distress  Respiratory: No wheezes, no rhonchi  Cardiovascular: S1, S2, regular rate and rhythm  Abdomen: Soft, Non-tender, non-distended, positive bowel sounds  Neuro: No new focal deficits.   Extremities: No edema      Diagnostic Data:    Labs:  Recent Labs   Lab 25  1523 25  1157 25  1310 25  1750 25  0536   WBC 6.0 6.7  --   --   --  6.6   HGB 7.0* 6.9*  --  6.4* 7.1* 7.9*   .0 99.5  --   --   --  96.4   .0 199.0  --   --   --  184.0   INR  --   --  3.19*  --   --  3.19*       Recent Labs   Lab 25  1523 25  1157 25  0536   * 228* 101*   BUN 64* 69* 64*   CREATSERUM 4.41* 4.38* 3.88*   CA 9.5 9.2 9.5   ALB  --  4.4  --     141 145   K 5.1 5.3* 4.4    105 111   CO2 22.0 20.0* 22.0   ALKPHO  --  62  --    AST  --  38*  --    ALT  --  27  --    BILT  --  0.3  --    TP  --  6.5  --        Estimated Glomerular Filtration Rate: 14 mL/min/1.73m2 (A) (result from lab).    No results for input(s): \"TROP\", \"TROPHS\", \"CK\" in the last 168 hours.    Recent Labs   Lab 25  1310 25  0536   PTP 32.9* 32.9*   INR 3.19* 3.19*                  Microbiology    No results found for this visit on 25.      Imaging: Reviewed in  Epic.    Medications: Scheduled Medications[1]    Assessment & Plan:      # Acute on chronic blood loss anemia   #Symptomatic anemia /presumptive GIB sp 1 unit PRBC  - H&H  - PPI IV BID   - GI consult-> push enteroscopy when INR lower  - soft diet today    - iron IV    # ILIR , improved   -may stop IVF      #Hypertension     #Hyperlipidemia  #CKD 4  - monitor      #Diabetes type 2  - ISS     #Coagulopathy due to coumadin   - check INR in am     #Paroxysmal A-fib  #Aortic stenosis status post bioprosthetic valve  #Chronic HFrEF, monitor volume status      #Status post AICD  #Metabolic acidosis due to CKD  #CAD status post CABG  #MILES         Kendra Flynn MD    Supplementary Documentation:     Quality:  DVT Mechanical Prophylaxis:        DVT Pharmacologic Prophylaxis   Medication   None                Code Status: Full Code  Pike: No urinary catheter in place  Pike Duration (in days):   Central line:    TAMIKA:     Discharge is dependent on: course  At this point Mr. Haji is expected to be discharge to: home     The 21st Century Cures Act makes medical notes like these available to patients in the interest of transparency. Please be advised this is a medical document. Medical documents are intended to carry relevant information, facts as evident, and the clinical opinion of the practitioner. The medical note is intended as peer to peer communication and may appear blunt or direct. It is written in medical language and may contain abbreviations or verbiage that are unfamiliar.              **Certification      PHYSICIAN Certification of Need for Inpatient Hospitalization - Initial Certification    Patient will require inpatient services that will reasonably be expected to span two midnight's based on the clinical documentation in H+P.   Based on patients current state of illness, I anticipate that, after discharge, patient will require TBD.           [1]    amiodarone  200 mg Oral Daily    atorvastatin  40 mg Oral  Nightly    Metoprolol Succinate ER  200 mg Oral Daily    sodium bicarbonate  650 mg Oral BID    insulin aspart  2-10 Units Subcutaneous TID AC and HS    sodium ferric gluconate  125 mg Intravenous Daily    pantoprazole  40 mg Intravenous Q12H

## 2025-06-20 NOTE — DIETARY NOTE
[facility]   part of [facility]   CLINICAL NUTRITION    Kyler Haji     Admitting diagnosis:  Valvular heart disease [I38]  Symptomatic anemia [D64.9]  Gastrointestinal hemorrhage, unspecified gastrointestinal hemorrhage type [K92.2]    Ht:  5'11\"  Wt: 86.6 kg (191 lb).   Body mass index is 26.27 kg/m².  IBW: 78 kg    Wt Readings from Last 6 Encounters:   06/20/25 86.6 kg (191 lb)   06/18/25 89.4 kg (197 lb)   05/05/25 89.6 kg (197 lb 8 oz)   05/01/25 89.8 kg (198 lb)   04/30/25 89.8 kg (198 lb)   04/22/25 91.6 kg (201 lb 14.4 oz)        Labs/Meds reviewed    Diet:       Procedures    Low Fiber/Soft diet Low Fiber/Soft; Sodium Restriction: 2 GM NA; Fluid Restriction: 2000 ml; Is Patient on Accuchecks? Yes    NPO     Percent Meals Eaten (last 3 days)       Date/Time Percent Meals Eaten (%)    06/19/25 1718 80 %              Pt chart reviewed d/t CHF education. Provided pt with low sodium diet handout from [facility]. Discussed strategies to reduce sodium consumption. Answered questions and pt was receptive to education.   Patient reports good appetite at this time.  Nursing notes reports Percent Meals Eaten (%): 80 % intake for last meal.  Tolerating po diet without diarrhea, emesis, or constipation.   No significant weight changes noted.     Patient is at low nutrition risk at this time.    Please consult if patient status changes or nutrition issues arise.    Vineet Costello RD, LDN  Clinical Nutrition   Available via Epic secure chat

## 2025-06-20 NOTE — PLAN OF CARE
Patient A&Ox4  Room air  Last bowel movement late last night  Takes pills whole  Ambulates 1 assist with a walker   Procedure cancelled, will try again tomorrow  Soft diet   QID accucheck, insulin coverage   Call light within reach

## 2025-06-20 NOTE — PLAN OF CARE
Assumed care 1900, 6/19, NOC. HGB stable, denies nausea, no BM on shift, NPO since midnight, consent in chart, awaiting procedure, needs met

## 2025-06-20 NOTE — PAYOR COMM NOTE
--------------  ADMISSION REVIEW     Payor: [employer]  Subscriber #:  [id number]  Authorization Number: [id number]    Admit date: 6/19/25  Admit time:  2:37 PM    Patient Seen in: [facility] Emergency Department    Stated Complaint: hgb 7.0          84-year-old male who presents to the emergency department due to the report of a low hemoglobin.  The patient has a history of chronic kidney disease in reviewing his records he had labs that were drawn on 4/22/2025 that had a hemoglobin of 8.8.  He usually has hemoglobin that averaged roughly in the eights but he was told that his hemoglobin drop from yesterday was down to 7.0.  He denies any change in stools.  No abdominal pain.  No chest pain or shortness of breath.  He is on Coumadin.  He has seen gastroenterology in the past because of anemia.  He is on Coumadin because of his chronic heart disease.    Past Medical History:    Abdominal hernia    Actinic keratosis    Black stools    Chest pain, unspecified    Cough    Diabetes (HCC)    Diabetes mellitus (HCC)    Dysmetabolic syndrome X    Easy bruising    Essential hypertension    Essential hypertension, malignant    Flatulence/gas pain/belching    Hemorrhoids    High cholesterol    Inguinal hernia without mention of obstruction or gangrene, unilateral or unspecified, (not specified as recurrent)    Myalgia and myositis, unspecified    Obesity, unspecified    Pacemaker    Special screening for malignant neoplasm of prostate    Type II or unspecified type diabetes mellitus without mention of complication, uncontrolled    Undiagnosed cardiac murmurs    Unspecified sleep apnea    AHI 21.7 SaO2 yashira 79.9 %      Past Surgical History:   Procedure Laterality Date    Cabg  2/2012    CABG x 3    Cath transcatheter aortic valve replacement  2/2012    Colonoscopy  6/27/2014    Procedure: COLONOSCOPY;  Surgeon: Ton Oliveira MD;  Location:  ENDOSCOPY    Colonoscopy N/A 2/11/2025    Procedure: COLONOSCOPY;   Surgeon: Israel Martin MD;  Location:  ENDOSCOPY    Colonoscopy N/A 4/3/2025    Procedure: COLONOSCOPY;  Surgeon: Sacha Simmons MD;  Location:  ENDOSCOPY    Colonoscopy N/A 4/2/2025    Procedure: COLONOSCOPY;  Surgeon: Sacha Simmons MD;  Location:  ENDOSCOPY    Hernia surgery      Valve repair       ED Triage Vitals [06/19/25 1130]   /66   Pulse 60   Resp 18   Temp 97.8 °F (36.6 °C)   Temp src Temporal   SpO2 100 %   O2 Device None (Room air)     Current Vitals:   Vital Signs  BP: 118/59  Pulse: 60  Resp: 12  Temp: 97.8 °F (36.6 °C)  Temp src: Temporal  MAP (mmHg): 78    Physical Exam  General: This a pleasant nontoxic appearing patient in no apparent distress alert and oriented ×3  HEENT: Pupils are equal reactive to light.  Extra ocular motions are intact.  No scleral icterus or conjunctival pallor: Neck is supple without tenderness on palpation.  Head is atraumatic normocephalic.  Oral mucosa moist.  Tongue is midline.    Lungs: Clear to auscultation bilaterally.  No wheezes, rhonchi, or rales appreciated.  No accessory muscle use noted for breathing.  Cardiac: Regular rate and rhythm.  Normal S1 and 2 without murmurs or ectopy appreciated  Abdomen: Soft on examination without tenderness to deep palpation or to percussion.  No masses appreciated.  Bowel sounds are normoactive.  No CVA tenderness.  Extremities: No cyanosis, no edema or clubbing.  Pulses are +2.  Full range of motion is noted of the extremities without deformities.  No tenderness.  Neurologically intact.  Rectal: Chaperone in place.  There is dark liquid stool noted in the vault Hemoccult positive.    Labs Reviewed   COMP METABOLIC PANEL (14) - Abnormal; Notable for the following components:       Result Value    Glucose 228 (*)     Potassium 5.3 (*)     CO2 20.0 (*)     BUN 69 (*)     Creatinine 4.38 (*)     Calculated Osmolality 319 (*)     eGFR-Cr 13 (*)     AST 38 (*)     A/G Ratio 2.1 (*)     All other components within  normal limits   CBC WITH DIFFERENTIAL WITH PLATELET - Abnormal; Notable for the following components:    RBC 2.17 (*)     HGB 6.9 (*)     HCT 21.6 (*)     Lymphocyte Absolute 0.99 (*)     All other components within normal limits   PTT, ACTIVATED   PROTHROMBIN TIME (PT)   TYPE AND SCREEN     MDM   Differential diagnosis includes but is not limited to laboratory error, anemia due to chronic disease, anemia from blood loss.  The patient is otherwise hemodynamically stable appears to be nontoxic.  Hemoglobin 6.9 hematocrit 21.6.  He will need to be transfused.  Will speak to the GI physicians regarding the patient's heme positive stools as he may need a repeat endoscopy performed.  GI thought it would be easier if the patient was hospitalized to have the endoscopy performed.  The patient does have valvular disease and he needs to be anticoagulated and is not on Coumadin.  His Coumadin will need to be held he will need to be started heparin after I spoke to the hospitalist service who wanted the heparin started as they would hold the patient's Coumadin.  The patient will also receive his blood.  He was admitted to hospital for continued care.  Admission disposition: 6/19/2025  1:12 PM       Glucose is 228 with a potassium of 5.3 CO2 of 20 BUN of 69 creatinine 4.3.  AST of 38.    Disposition and Plan     Clinical Impression:  1. Symptomatic anemia    2. Gastrointestinal hemorrhage, unspecified gastrointestinal hemorrhage type    3. Valvular heart disease         History and Physical     Kyler Haji is a 84 year old male with  HTN, HL, CHF, CAD, CKD, DM, GIB, AVR on coumadin presented with generalized weakness and found to have low Hg as OP. He denies dizziness, CP/SOB/abd pain, hematochezia. Has seen some dark stools. Otherwise stable in ER.          Physical Exam:    /59   Pulse 60   Temp 97.8 °F (36.6 °C) (Temporal)   Resp 16   Wt 197 lb (89.4 kg)   SpO2 100%   BMI 27.09 kg/m²   General: No acute  distress, Alert  Respiratory: No rhonchi, no wheezes  Cardiovascular: S1, S2. Regular rate and rhythm  Abdomen: Soft, Non-tender, non-distended, positive bowel sounds  Neuro: No new focal deficits  Extremities: No edema     Lab 06/18/25  1523 06/19/25  1157   RBC 2.15* 2.17*   HGB 7.0* 6.9*   HCT 21.5* 21.6*   .0 99.5   MCH 32.6 31.8   MCHC 32.6 31.9   RDW 16.0 15.9   NEPRELIM 4.54 4.73   WBC 6.0 6.7   .0 199.0      * 228*   BUN 64* 69*   CREATSERUM 4.41* 4.38*   EGFRCR 12* 13*   CA 9.5 9.2   ALB  --  4.4    141   K 5.1 5.3*    105   CO2 22.0 20.0*   ALKPHO  --  62   AST  --  38*   ALT  --  27   BILT  --  0.3   TP  --  6.5        Assessment & Plan:  #Symptomatic anemia /presumptive GIB  - H&H  - PPI IV BID   - GI cosnult  - NPO until seen by GI     # ILIR   - gentle IVF today   - hold diuretic and ACEI     #Hypertension     #Hyperlipidemia  #CKD 4  - monitor      #Diabetes type 2  - ISS     #Coagulopathy, check INR   #Paroxysmal A-fib  #Aortic stenosis status post bioprosthetic valve  #Chronic HFrEF, monitor volume status      #Status post AICD  #Metabolic acidosis due to CKD  #CAD status post CABG  #MILES       GI:      HPI: This is an 84-year-old male with a PMHx that includes CAD s/p CABG, A-fib (Coumadin), s/p AVR HF s/p ICD, DM, CKD, and prostate cancer no RTX who presented to the ER today with weakness in the setting of black stools found to have acute on chronic anemia Hgb 6.9 from Hgb 7 yesterday and 8.82 months prior however steadily declining from 9-11 in 2024.  Patient reports ongoing twice daily black soft stools which she attributes to oral iron supplementation.  No abdominal pain, nausea, vomiting, heartburn, dysphagia, or odynophagia.  No increase in BM frequency and no hematochezia.  He is on a daily ASA in addition to Coumadin and denies additional NSAID use.  He reports a slight decrease in appetite with a 5 pound weight loss over the last 2 months.  Overall weak and  dizzy which has steadily progressed over the last several days  Endoscopic History   Post enteroscopy 4/2025 (Dr. Simmons) for positive capsule study with GI bleeding of the small intestine, anemia revealed small intestinal ulcer with stigmata of recent bleeding s/p epi and clip placement x 2  Colonoscopy 4/2025 (Dr. Simmons) for anemia revealed an in adequate bowel prep despite 2 attempts--no gross/overt blood visualized in the limited portion of the exam  Video capsule endoscopy 4/2025 (Dr. Simmons) revealed overt GI bleeding in the proximal part of the small intestine/jejunum and overt blood visualized likely culprit lesion identified  EGD with deployment of video capsule 4/2025 (Dr. Simmons) for anemia revealed normal visualized endoscopic exam, deployment of the video capsule in the small bowel  Colonoscopy 2/2025 (Dr. Martin) for ROVERTO revealed TI briefly intubated (grossly normal), a tortuous and redundant colon, with no polyps, lesions, AVMs, ulcers, or other findings anywhere in the colon--there was residual stool throughout the right colon and that was lavaged   EGD 2/2025 (Dr. Martin) for ROVERTO revealed 5 cm hiatal hernia  Colonoscopy 3/2018 (Dr Oliveira) for hx of adenomatous colon polyps revealed three colon polyps (2-20 mm) and internal hemorrhoids. Recall 3 yrs  Screening colonoscopy 6/2014 (Dr Oliveira) revealed four colon polyps (1-5 mm) and internal hemorrhoids.  Recall 3 yrs  CAD s/p CABG, A-fib (Coumadin), s/p AVR HF s/p ICD, DM, CKD, and prostate cancer no RTX who presented to the ER today with weakness in the setting of black stools found to have acute on chronic anemia Hgb 6.9 from Hgb 7 yesterday and 8.8 two months prior however steadily declining from 9-11 in 2024.  Patient with extensive endoscopic evaluations this year with only small bowel revealing active bleeding.  Will treat with PPI IV twice daily and plan for push enteroscopy in a.m. if INR normalizes  The risks, benefits, alternatives of the procedure  including the risks of anesthesia, bleeding, perforation, missed lesions, need for surgery, and infection were discussed with the patient, spouse at bedside, and daughter at bedside. All expressed understanding of the risks and are agreeable to proceed.  Recommendations:      Plan for push enteroscopy AM under MAC with Dr Roach pending clinical course and a.m. INR  Cardiac diet today; NPO after midnight with sips of water for necessary medications   Protonix 40 mg IV twice daily  Stop heparin infusion now--discussed with Dr. Flynn as INR greater than 3 and continue to hold Coumadin if risk remains acceptable  1 PRBC now and continue to check Hgb transfusing as needed to maintain hemoglobin greater than 7--higher if needed from a cardiac perspective  Cardiology consult as patient is open to discussion for Watchman procedure  CBC, BMP, Mg, PT/INR in AM correcting electrolytes per Hospitalist recommendations       GI attending addendum:     I have personally seen and examined this patient and agree with above nurse practitioner's assessment and recommendations.      Briefly, patient is an 84-year-old male with chronic medical ointments including CAD status post CABG, A-fib on Coumadin, heart failure, aortic valve replacement, diabetes, CKD, prostate cancer admission that is listed above that presented to the ER today with anemia with hemoglobin of 7 noted on outpatient labs.  Patient did note a dark stool here once he got up to the floor but denied any additional melena or hematochezia prior.  Endoscopic history as above most recent push enteroscopy in April 2025 with small bowel ulcer with stigmata of recent bleeding with epinephrine and clip placement.  Hemoglobin in the ER 6.9 and 1 unit PRBCs been ordered.  On physical exam, patient resting comfortably in bed.  Abdomen soft, nontender, nondistended.  Patient with acute on chronic anemia and possible melena.  Will need to rule out continued upper GI source of  bleeding.  Will plan for push enteroscopy tomorrow if INR improved.  Pantoprazole 40 mg IV twice daily.  Continue to monitor hemoglobin and transfuse for Humoryl less than 7.  Additional recommendations as above.  Thank you for the consultation.  Will continue to follow along with you.    NURSING:  Heparin gtt infusing at 10 ml/hr     6/20:    CARDS:   Reason for Consultation:  Anticoagulation management      History of Present Illness:  Kyler Haji is a a(n) 84 year old male with PMH HFrEF, HTN, HLD, CAD (CABG), CKD, DM2, SSS s/p BIV/ICD 2018,  pAF, SAVR on coumadin who presented to the hospital yesterday with a low Hgb and generalized weakness. Presumed to have GIB. Was started on PPI and GI consulted. Pt found to have associated ILIR in ER. Diuretic and ACEi held. Coumadin held for GIB. Pt has no CV complaints. Hoping to \"get off coumadin\". Talks about Watchman- we discussed coumadin indications for SAVR.     Current Facility-Administered Medications:     magnesium sulfate in sterile water for injection 2 g/50mL IVPB premix 2 g, 2 g, Intravenous, Once    amiodarone (Pacerone) tab 200 mg, 200 mg, Oral, Daily    atorvastatin (Lipitor) tab 40 mg, 40 mg, Oral, Nightly    metoprolol succinate ER (Toprol XL) 24 hr tab 200 mg, 200 mg, Oral, Daily    sodium bicarbonate tab 650 mg, 650 mg, Oral, BID    insulin aspart (NovoLOG) 100 Units/mL FlexPen 2-10 Units, 2-10 Units, Subcutaneous, TID AC and HS    sodium ferric gluconate (Ferrlecit) 125 mg in sodium chloride 0.9% 100mL IVPB premix, 125 mg, Intravenous, Daily    pantoprazole (Protonix) 40 mg in sodium chloride 0.9% PF 10 mL IV push, 40 mg, Intravenous, Q12H      Telemetry: AV Paced     Physical Exam  Vitals and nursing note reviewed.   Constitutional:       Appearance: Normal appearance. He is well-developed and well-groomed. He is not ill-appearing or toxic-appearing.   Cardiovascular:      Rate and Rhythm: Normal rate and regular rhythm.      Heart sounds: No  murmur heard.  Pulmonary:      Effort: Pulmonary effort is normal.      Breath sounds: Normal breath sounds.   Skin:     General: Skin is warm and dry.      Capillary Refill: Capillary refill takes less than 2 seconds.      Coloration: Skin is pale.   Neurological:      General: No focal deficit present.      Mental Status: He is alert and oriented to person, place, and time.   Psychiatric:         Attention and Perception: Attention normal.         Mood and Affect: Mood normal.         Behavior: Behavior is cooperative.         Cognition and Memory: Cognition normal.           Lab Results   Component Value Date     WBC 6.6 06/20/2025     RBC 2.51 06/20/2025     HGB 7.9 06/20/2025     HCT 24.2 06/20/2025     MCV 96.4 06/20/2025     MCH 31.5 06/20/2025     MCHC 32.6 06/20/2025     RDW 17.4 06/20/2025     .0 06/20/2025        Lab 06/18/25  1523 06/19/25  1157 06/20/25  0536   * 228* 101*   BUN 64* 69* 64*   CREATSERUM 4.41* 4.38* 3.88*   CA 9.5 9.2 9.5   ALB  --  4.4  --     141 145   K 5.1 5.3* 4.4    105 111   CO2 22.0 20.0* 22.0       Lab 06/19/25  1310 06/20/25  0536   PTP 32.9* 32.9*   INR 3.19* 3.19*     Assessment/Plan:     Anemia  GIB  PPI  Hold coumadin until cleared by GI  Appreciate GI reccs     pAF  Secondary hypercoagulable state   CHADSVASC 5   Currently AV Paced   - continue amiodarone  - coumadin held for GIB, resume when cleared by GI     ICM  HFrEF  EF yashira 35%, current 55% (07/2024)  GDMT: continue toprol                 - losartan, jl on hold for ILIR  - euvolemic by exam- hold diuretic  - update echo     SSS  S/p PIV/ICD  AV Paced rhythm on monitor      CAD  LBBB  HLD  History of CABG 2012  Continue ASA, statin      S/p SAVR 2012  - coumadin held for GIB, resume when cleared by GI  - update echo     ILIR on CKD 4  Cr 4.41 on admit  Gentle IVF- Cr improved to 3.88 this AM  Hold losartan, spironolactone, diuretics for now     HTN  Reasonably well controlled.   Monitor for  elevated readings, resume losartan and jl as able if ILIR resolves     HOSPITALIST:  Patient with no abd pain     Vital signs:  Temp:  [97.6 °F (36.4 °C)-97.9 °F (36.6 °C)] 97.6 °F (36.4 °C)  Pulse:  [60-63] 60  Resp:  [11-20] 18  BP: (128-146)/(54-78) 146/72  SpO2:  [92 %-100 %] 100 %     Physical Exam:    General: No acute distress  Respiratory: No wheezes, no rhonchi  Cardiovascular: S1, S2, regular rate and rhythm  Abdomen: Soft, Non-tender, non-distended, positive bowel sounds  Neuro: No new focal deficits.   Extremities: No edema       Assessment & Plan:  # Acute on chronic blood loss anemia   #Symptomatic anemia /presumptive GIB sp 1 unit PRBC  - H&H  - PPI IV BID   - GI consult-> push enteroscopy when INR lower  - soft diet today    - iron IV     # ILIR , improved   -may stop IVF      #Hypertension     #Hyperlipidemia  #CKD 4  - monitor      #Diabetes type 2  - ISS     #Coagulopathy due to coumadin   - check INR in am      #Paroxysmal A-fib  #Aortic stenosis status post bioprosthetic valve  #Chronic HFrEF, monitor volume status      #Status post AICD  #Metabolic acidosis due to CKD  #CAD status post CABG  #MILES    MEDICATIONS ADMINISTERED IN LAST 1 DAY:  Transfuse RBC       Date Action Dose Route User    6/19/2025 1735 Rate/Dose Change (none) Intravenous Lisa Nieves RN    6/19/2025 1720 New Bag (none) Intravenous Lisa Nieves RN          amiodarone (Pacerone) tab 200 mg       Date Action Dose Route User    6/20/2025 0819 Given 200 mg Oral Sharon Amanda RN          insulin aspart (NovoLOG) 100 Units/mL FlexPen 2-10 Units       Date Action Dose Route User    6/20/2025 1259 Given 2 Units Subcutaneous (Left Upper Arm) Sharon Amanda RN    6/19/2025 2114 Given 2 Units Subcutaneous (Left Upper Arm) Rhett Crow RN    6/19/2025 1900 Given 3 Units Subcutaneous (Left Upper Arm) Lisa Nieves RN          magnesium sulfate in sterile water for injection 2 g/50mL IVPB premix 2 g       Date Action  Dose Route User    6/20/2025 0819 New Bag 2 g Intravenous Sharon Amanda RN          metoprolol succinate ER (Toprol XL) 24 hr tab 200 mg       Date Action Dose Route User    6/20/2025 0819 Given 200 mg Oral Sharon Amanda RN          pantoprazole (Protonix) 40 mg in sodium chloride 0.9% PF 10 mL IV push       Date Action Dose Route User    6/20/2025 0517 Given 40 mg Intravenous Rhett Crow RN    6/19/2025 1615 Given 40 mg Intravenous Lisa Nieves RN          sodium bicarbonate tab 650 mg       Date Action Dose Route User    6/20/2025 0819 Given 650 mg Oral Sharon Amanda RN    6/19/2025 2114 Given 650 mg Oral Rhett Crow RN          sodium chloride 0.9% infusion       Date Action Dose Route User    6/19/2025 1718 New Bag (none) Intravenous Lisa Nieves RN          sodium chloride 0.9% infusion       Date Action Dose Route User    6/19/2025 1700 New Bag (none) Intravenous Lisa Nieves RN          sodium ferric gluconate (Ferrlecit) 125 mg in sodium chloride 0.9% 100mL IVPB premix       Date Action Dose Route User    6/20/2025 0932 New Bag 125 mg Intravenous Sharon Amanda RN    6/19/2025 1700 New Bag 125 mg Intravenous Lisa Nieves RN            Vitals (last day)       Date/Time Temp Pulse Resp BP SpO2 Weight O2 Device O2 Flow Rate (L/min) Who    06/20/25 1200 97.6 °F (36.4 °C) 60 18 146/72 100 % -- None (Room air) -- CV    06/20/25 0800 97.7 °F (36.5 °C) 60 20 145/74 96 % -- None (Room air) -- CV    06/20/25 0515 97.9 °F (36.6 °C) 60 -- 131/65 92 % 191 lb (86.6 kg) None (Room air) -- TL    06/19/25 2300 97.6 °F (36.4 °C) 63 18 130/73 99 % -- None (Room air) -- TL    06/19/25 1930 97.6 °F (36.4 °C) 60 15 133/78 100 % -- None (Room air) -- TL    06/19/25 1720 97.9 °F (36.6 °C) -- -- 128/54 -- -- -- -- IC    06/19/25 1720 -- 60 11 -- 100 % -- None (Room air) -- YVONNE    06/19/25 1542 -- -- -- -- -- 197 lb (89.4 kg) -- -- IC    06/19/25 1400 -- 60 16 132/59 100 % -- -- -- MD    06/19/25 1315 --  60 18 121/62 100 % -- -- -- MD    06/19/25 1300 -- 60 12 118/59 100 % -- -- -- MD    06/19/25 1251 -- 109 21 153/88 100 % -- None (Room air) -- RQ    06/19/25 1215 -- 60 16 106/78 100 % -- -- -- MD    06/19/25 1204 -- -- -- -- -- -- None (Room air) -- MD    06/19/25 1200 -- 60 20 125/58 100 % -- -- -- MD    06/19/25 1130 97.8 °F (36.6 °C) 60 18 114/66 100 % 197 lb (89.4 kg) None (Room air) -- AL       Blood Transfusion Record       Product Unit Status Volume Start End            Transfuse RBC       25  [id number]  [id number] Completed 06/19/25 1906 295.83 mL 06/19/25 1720 06/19/25 1900

## 2025-06-20 NOTE — CONSULTS
[facility]   part of [facility]    Report of Consultation    Kyler Haji Patient Status:  Inpatient    1940 MRN [id number]   Location [facility] 7NE-A Attending Kendra Flynn MD   Hosp Day # 1 PCP Abhi Arellano MD     Date of Admission:  2025  Date of Consult:  2025      Reason for Consultation:  Anticoagulation management     History of Present Illness:  Kyler Haji is a a(n) 84 year old male with PMH HFrEF, HTN, HLD, CAD (CABG), CKD, DM2, SSS s/p BIV/ICD 2018,  pAF, SAVR on coumadin who presented to the hospital yesterday with a low Hgb and generalized weakness. Presumed to have GIB. Was started on PPI and GI consulted. Pt found to have associated ILIR in ER. Diuretic and ACEi held. Coumadin held for GIB. Pt has no CV complaints. Hoping to \"get off coumadin\". Talks about Watchman- we discussed coumadin indications for SAVR.    History:  Past Medical History[1]  Past Surgical History[2]  Family History[3]   reports that he quit smoking about 50 years ago. His smoking use included cigarettes. He started smoking about 70 years ago. He has never been exposed to tobacco smoke. He has never used smokeless tobacco. He reports current alcohol use of about 1.0 standard drink of alcohol per week. He reports that he does not use drugs.    Allergies:  Allergies[4]    Medications:  Current Hospital Medications[5]    Review of Systems:  All systems were reviewed and are negative except as described above in HPI.    Physical Exam:  Blood pressure 131/65, pulse 60, temperature 97.9 °F (36.6 °C), temperature source Temporal, resp. rate 18, weight 191 lb (86.6 kg), SpO2 92%.  Temp (24hrs), Av.8 °F (36.6 °C), Min:97.6 °F (36.4 °C), Max:97.9 °F (36.6 °C)    Wt Readings from Last 3 Encounters:   25 191 lb (86.6 kg)   25 197 lb (89.4 kg)   25 197 lb 8 oz (89.6 kg)       Telemetry: AV Paced    Physical Exam  Vitals and nursing note reviewed.   Constitutional:        Appearance: Normal appearance. He is well-developed and well-groomed. He is not ill-appearing or toxic-appearing.   Cardiovascular:      Rate and Rhythm: Normal rate and regular rhythm.      Heart sounds: No murmur heard.  Pulmonary:      Effort: Pulmonary effort is normal.      Breath sounds: Normal breath sounds.   Skin:     General: Skin is warm and dry.      Capillary Refill: Capillary refill takes less than 2 seconds.      Coloration: Skin is pale.   Neurological:      General: No focal deficit present.      Mental Status: He is alert and oriented to person, place, and time.   Psychiatric:         Attention and Perception: Attention normal.         Mood and Affect: Mood normal.         Behavior: Behavior is cooperative.         Cognition and Memory: Cognition normal.         Laboratories and Data:  Diagnostics:  EK2025  AV Paced    Echo: Pending        Labs:   Lab Results   Component Value Date    WBC 6.6 2025    RBC 2.51 2025    HGB 7.9 2025    HCT 24.2 2025    MCV 96.4 2025    MCH 31.5 2025    MCHC 32.6 2025    RDW 17.4 2025    .0 2025     Lab Results   Component Value Date    PT 14.7 2012    PT 21.6 (H) 2012    PT 21.3 (H) 2012    INR 3.19 (H) 2025    INR 3.19 (H) 2025    INR 1.26 (H) 2025       Assessment/Plan:    Anemia  GIB  PPI  Hold coumadin until cleared by GI  Appreciate GI reccs    pAF  Secondary hypercoagulable state   CHADSVASC 5   Currently AV Paced   - continue amiodarone  - coumadin held for GIB, resume when cleared by GI    ICM  HFrEF  EF yashira 35%, current 55% (2024)  GDMT: continue toprol   - losartan, jl on hold for ILIR  - euvolemic by exam- hold diuretic  - update echo    SSS  S/p PIV/ICD  AV Paced rhythm on monitor     CAD  LBBB  HLD  History of CABG   Continue ASA, statin     S/p SAVR   - coumadin held for GIB, resume when cleared by GI  - update echo    ILIR on CKD 4  Cr  4.41 on admit  Gentle IVF- Cr improved to 3.88 this AM  Hold losartan, spironolactone, diuretics for now    HTN  Reasonably well controlled.   Monitor for elevated readings, resume losartan and jl as able if ILIR resolves       Is this a shared or split note between Advanced Practice Provider and Physician? Yes      Bernadine Watson, APRN  6/20/2025  8:41 AM    ADDENDUM:  The patient was personally seen and examined by me. I agree with the note written below with the following comments:    No CP, SOB. Reports dark stools. Planning EGD tmrw.   Holding warfarin. Transfuse per parameters.  See Dr. Fagan as outpatient to consider Watchman.     Marc Walker MD  Interventional Cardiology  [facility]  Office: [phone]    6/20/2025  5:11 PM        [1]   Past Medical History:   Abdominal hernia    Actinic keratosis    Black stools    Chest pain, unspecified    Cough    Diabetes (HCC)    Diabetes mellitus (HCC)    Dysmetabolic syndrome X    Easy bruising    Essential hypertension    Essential hypertension, malignant    Flatulence/gas pain/belching    Hemorrhoids    High cholesterol    Inguinal hernia without mention of obstruction or gangrene, unilateral or unspecified, (not specified as recurrent)    Myalgia and myositis, unspecified    Obesity, unspecified    Pacemaker    Special screening for malignant neoplasm of prostate    Type II or unspecified type diabetes mellitus without mention of complication, uncontrolled    Undiagnosed cardiac murmurs    Unspecified sleep apnea    AHI 21.7 SaO2 yashira 79.9 %     Wears glasses   [2]   Past Surgical History:  Procedure Laterality Date    Cabg  2/2012    CABG x 3    Cath transcatheter aortic valve replacement  2/2012    Colonoscopy  6/27/2014    Procedure: COLONOSCOPY;  Surgeon: Ton Oliveira MD;  Location:  ENDOSCOPY    Colonoscopy N/A 2/11/2025    Procedure: COLONOSCOPY;  Surgeon: Israel Martin MD;  Location:  ENDOSCOPY    Colonoscopy N/A 4/3/2025     Procedure: COLONOSCOPY;  Surgeon: Sacha Simmons MD;  Location:  ENDOSCOPY    Colonoscopy N/A 4/2/2025    Procedure: COLONOSCOPY;  Surgeon: Sacha Simmons MD;  Location:  ENDOSCOPY    Hernia surgery      Valve repair     [3]   Family History  Problem Relation Age of Onset    Prostate Cancer Brother     Diabetes Father     Diabetes Mother    [4]   Allergies  Allergen Reactions    Ace Inhibitors Coughing   [5]   Current Facility-Administered Medications:     magnesium sulfate in sterile water for injection 2 g/50mL IVPB premix 2 g, 2 g, Intravenous, Once    amiodarone (Pacerone) tab 200 mg, 200 mg, Oral, Daily    atorvastatin (Lipitor) tab 40 mg, 40 mg, Oral, Nightly    metoprolol succinate ER (Toprol XL) 24 hr tab 200 mg, 200 mg, Oral, Daily    sodium bicarbonate tab 650 mg, 650 mg, Oral, BID    glucose (Dex4) 15 GM/59ML oral liquid 15 g, 15 g, Oral, Q15 Min PRN **OR** glucose (Glutose) 40% oral gel 15 g, 15 g, Oral, Q15 Min PRN **OR** glucose-vitamin C (Dex-4) chewable tab 4 tablet, 4 tablet, Oral, Q15 Min PRN **OR** dextrose 50% injection 50 mL, 50 mL, Intravenous, Q15 Min PRN **OR** glucose (Dex4) 15 GM/59ML oral liquid 30 g, 30 g, Oral, Q15 Min PRN **OR** glucose (Glutose) 40% oral gel 30 g, 30 g, Oral, Q15 Min PRN **OR** glucose-vitamin C (Dex-4) chewable tab 8 tablet, 8 tablet, Oral, Q15 Min PRN    acetaminophen (Tylenol Extra Strength) tab 500 mg, 500 mg, Oral, Q4H PRN    melatonin tab 3 mg, 3 mg, Oral, Nightly PRN    insulin aspart (NovoLOG) 100 Units/mL FlexPen 2-10 Units, 2-10 Units, Subcutaneous, TID AC and HS    sodium ferric gluconate (Ferrlecit) 125 mg in sodium chloride 0.9% 100mL IVPB premix, 125 mg, Intravenous, Daily    pantoprazole (Protonix) 40 mg in sodium chloride 0.9% PF 10 mL IV push, 40 mg, Intravenous, Q12H

## 2025-06-21 ENCOUNTER — APPOINTMENT (OUTPATIENT)
Dept: GENERAL RADIOLOGY | Facility: HOSPITAL | Age: 85
End: 2025-06-21
Attending: INTERNAL MEDICINE
Payer: MEDICARE

## 2025-06-21 ENCOUNTER — APPOINTMENT (OUTPATIENT)
Dept: GENERAL RADIOLOGY | Facility: HOSPITAL | Age: 85
DRG: 378 | End: 2025-06-21
Attending: INTERNAL MEDICINE
Payer: MEDICARE

## 2025-06-21 LAB
ANION GAP SERPL CALC-SCNC: 11 MMOL/L (ref 0–18)
BLOOD TYPE BARCODE: 9500
BUN BLD-MCNC: 50 MG/DL (ref 9–23)
CALCIUM BLD-MCNC: 9.3 MG/DL (ref 8.7–10.6)
CHLORIDE SERPL-SCNC: 110 MMOL/L (ref 98–112)
CO2 SERPL-SCNC: 23 MMOL/L (ref 21–32)
CREAT BLD-MCNC: 3.4 MG/DL (ref 0.7–1.3)
EGFRCR SERPLBLD CKD-EPI 2021: 17 ML/MIN/1.73M2 (ref 60–?)
ERYTHROCYTE [DISTWIDTH] IN BLOOD BY AUTOMATED COUNT: 17.1 %
GLUCOSE BLD-MCNC: 122 MG/DL (ref 70–99)
GLUCOSE BLD-MCNC: 126 MG/DL (ref 70–99)
GLUCOSE BLD-MCNC: 174 MG/DL (ref 70–99)
GLUCOSE BLD-MCNC: 198 MG/DL (ref 70–99)
HCT VFR BLD AUTO: 23.4 % (ref 39–53)
HGB BLD-MCNC: 7.3 G/DL (ref 13–17.5)
INR BLD: 2.88 (ref 0.8–1.2)
INR: 3 (ref 0.8–1.3)
INR: 3.1 (ref 0.8–1.3)
MAGNESIUM SERPL-MCNC: 2.2 MG/DL (ref 1.6–2.6)
MCH RBC QN AUTO: 30.4 PG (ref 26–34)
MCHC RBC AUTO-ENTMCNC: 31.2 G/DL (ref 31–37)
MCV RBC AUTO: 97.5 FL (ref 80–100)
OSMOLALITY SERPL CALC.SUM OF ELEC: 313 MOSM/KG (ref 275–295)
PLATELET # BLD AUTO: 168 10(3)UL (ref 150–450)
POTASSIUM SERPL-SCNC: 4.2 MMOL/L (ref 3.5–5.1)
PROTHROMBIN TIME: 30.4 SECONDS (ref 11.6–14.8)
RBC # BLD AUTO: 2.4 X10(6)UL (ref 3.8–5.8)
SODIUM SERPL-SCNC: 144 MMOL/L (ref 136–145)
UNIT VOLUME: 350 ML
WBC # BLD AUTO: 5.5 X10(3) UL (ref 4–11)

## 2025-06-21 PROCEDURE — 72040 X-RAY EXAM NECK SPINE 2-3 VW: CPT | Performed by: INTERNAL MEDICINE

## 2025-06-21 PROCEDURE — 99232 SBSQ HOSP IP/OBS MODERATE 35: CPT | Performed by: INTERNAL MEDICINE

## 2025-06-21 NOTE — PROGRESS NOTES
[facility]                       Gastroenterology Follow up Note - [facility]    Kyler Haji Patient Status:  Inpatient    1940 MRN [id number]   Location [facility] 7NE-A Attending Kendra Flynn MD   Hosp Day # 1 PCP Abhi Arellano MD     Reason for consultation: Acute on chronic anemia, melena  Subjective: Patient seen and examined.  Enteroscopy was initially planned for today, however INR this morning was again noted to be 3.19.  Procedure thus postponed until tomorrow.  Denies abdominal pain, nausea, emesis.  No bowel movements overnight.  Review of Systems:   10 point ROS completed and was negative, except for pertinent positive and negatives stated in subjective.    For PMH, PSH, FHx and SHx- please see initial consult note.     Objective: /72 (BP Location: Right arm)   Pulse 60   Temp 97.6 °F (36.4 °C) (Oral)   Resp 18   Wt 191 lb (86.6 kg)   SpO2 100%   BMI 26.27 kg/m²   Gen: No acute distress  Resp: no respiratory distress  Abd: Soft, non-tender, non-distended. No rebound tenderness, no guarding.   Neuro: Aox3.     Labs:   Lab Results   Component Value Date    WBC 6.6 2025    HGB 7.9 2025    HCT 24.2 2025    .0 2025    CREATSERUM 3.88 2025    BUN 64 2025     2025    K 4.4 2025     2025    CO2 22.0 2025     2025    CA 9.5 2025    INR 3.19 2025    PTP 32.9 2025    MG 1.8 2025     Recent Labs   Lab 25  1523 25  1157 25  0536   * 228* 101*   BUN 64* 69* 64*   CREATSERUM 4.41* 4.38* 3.88*   CA 9.5 9.2 9.5    141 145   K 5.1 5.3* 4.4    105 111   CO2 22.0 20.0* 22.0     Recent Labs   Lab 25  0536   RBC 2.51*   HGB 7.9*   HCT 24.2*   MCV 96.4   MCH 31.5   MCHC 32.6   RDW 17.4   NEPRELIM 4.41   WBC 6.6   .0       Recent Labs   Lab 25  1157   ALT 27   AST 38*       Assessment:  84-year-old  male with a Hx of CAD s/p CABG, A-fib (Coumadin), s/p AVR HF s/p ICD, DM, CKD, and prostate cancer no RTX who presented to the ER today with weakness in the setting of black stools found to have acute on chronic anemia Hgb 6.9 from Hgb 7 yesterday and 8.8 two months prior however steadily declining from 9-11 in 2024.  Patient with extensive endoscopic evaluations this year with only small bowel revealing active bleeding.  Will treat with PPI IV twice daily and plan for push enteroscopy in a.m. if INR normalizes  The risks, benefits, alternatives of the procedure including the risks of anesthesia, bleeding, perforation, missed lesions, need for surgery, and infection were discussed with the patient, spouse at bedside, and daughter at bedside. All expressed understanding of the risks and are agreeable to proceed.  Plan:  Okay to restart GI soft diet today.  N.p.o. after midnight.  Pantoprazole 40 mg IV twice daily  Plan for push enteroscopy tomorrow if INR improved  Cardiology following and appreciate recommendations.  Continue to hold Coumadin if okay with cardiology.  Continue to monitor hemoglobin daily and transfuse for hemoglobin less than 7  CBC, BMP, and INR in the morning  Thank you for allowing us to participate in patient's care. Please call us with any questions or concerns.  The GI consult service will continue to follow.     Juan J Roach DO  [facility]  [phone]   no

## 2025-06-21 NOTE — PROGRESS NOTES
[facility]   [facility]     Hospitalist Progress Note     Kyler Haji Patient Status:  Inpatient    1940 MRN [id number]   [facility] 7NE-A Attending Kendra Flynn MD   Hosp Day # 2 PCP Abhi Arellano MD     Chief Complaint: anemia     Subjective:     Patient with no abd pain, dizziness, weakness. He is frustrated that procedure is cancelled.   + neck pain with movement     Objective:    Review of Systems:   A comprehensive review of systems was completed; pertinent positive and negatives stated in subjective.    Vital signs:  Temp:  [97.6 °F (36.4 °C)-98.5 °F (36.9 °C)] 97.7 °F (36.5 °C)  Pulse:  [60] 60  Resp:  [18-20] 20  BP: (115-146)/(63-75) 125/72  SpO2:  [94 %-100 %] 96 %    Physical Exam:    General: No acute distress  Respiratory: No wheezes, no rhonchi  Cardiovascular: S1, S2, regular rate and rhythm  Abdomen: Soft, Non-tender, non-distended, positive bowel sounds  Neuro: No new focal deficits.   Extremities: No edema      Diagnostic Data:    Labs:  Recent Labs   Lab 25  1523 25  1157 25  1310 25  1750 25  2052 25  0536 25  0619   WBC 6.0 6.7  --   --   --  6.6 5.5   HGB 7.0* 6.9*  --  6.4* 7.1* 7.9* 7.3*   .0 99.5  --   --   --  96.4 97.5   .0 199.0  --   --   --  184.0 168.0   INR  --   --  3.19*  --   --  3.19* 2.88*       Recent Labs   Lab 25  1157 25  0536 25  0619   * 101* 122*   BUN 69* 64* 50*   CREATSERUM 4.38* 3.88* 3.40*   CA 9.2 9.5 9.3   ALB 4.4  --   --     145 144   K 5.3* 4.4 4.2    111 110   CO2 20.0* 22.0 23.0   ALKPHO 62  --   --    AST 38*  --   --    ALT 27  --   --    BILT 0.3  --   --    TP 6.5  --   --        Estimated Glomerular Filtration Rate: 17 mL/min/1.73m2 (A) (result from lab).    No results for input(s): \"TROP\", \"TROPHS\", \"CK\" in the last 168 hours.    Recent Labs   Lab 25  1310 25  0536 25  0619   PTP 32.9* 32.9* 30.4*    INR 3.19* 3.19* 2.88*                  Microbiology    No results found for this visit on 06/19/25.      Imaging: Reviewed in Epic.    Medications: Scheduled Medications[1]    Assessment & Plan:      # Acute on chronic blood loss anemia   #Symptomatic anemia /presumptive GIB sp 1 unit PRBC  - H&H  - PPI IV BID   - GI consult-> push enteroscopy when INR lower  - soft diet today    - iron IV    # ILRI , improved      #Hypertension     #Hyperlipidemia  #CKD 4  - monitor      #Diabetes type 2  - ISS     #Coagulopathy due to coumadin   - check INR in am   - sp vit K today     #Paroxysmal A-fib  #Aortic stenosis status post bioprosthetic valve  #Chronic HFrEF, monitor volume status      #Status post AICD  #Metabolic acidosis due to CKD  #CAD status post CABG  #MILES         Kendra Flynn MD    Supplementary Documentation:     Quality:  DVT Mechanical Prophylaxis:        DVT Pharmacologic Prophylaxis   Medication   None                Code Status: Full Code  Pike: No urinary catheter in place  Pike Duration (in days):   Central line:    TAMIKA:     Discharge is dependent on: course  At this point Mr. Haji is expected to be discharge to: home     The 21st Century Cures Act makes medical notes like these available to patients in the interest of transparency. Please be advised this is a medical document. Medical documents are intended to carry relevant information, facts as evident, and the clinical opinion of the practitioner. The medical note is intended as peer to peer communication and may appear blunt or direct. It is written in medical language and may contain abbreviations or verbiage that are unfamiliar.              **Certification      PHYSICIAN Certification of Need for Inpatient Hospitalization - Initial Certification    Patient will require inpatient services that will reasonably be expected to span two midnight's based on the clinical documentation in H+P.   Based on patients current state of illness, I  anticipate that, after discharge, patient will require TBD.           [1]    allopurinol  100 mg Oral Daily    losartan  25 mg Oral Daily    amiodarone  200 mg Oral Daily    atorvastatin  40 mg Oral Nightly    Metoprolol Succinate ER  200 mg Oral Daily    sodium bicarbonate  650 mg Oral BID    insulin aspart  2-10 Units Subcutaneous TID AC and HS    sodium ferric gluconate  125 mg Intravenous Daily    pantoprazole  40 mg Intravenous Q12H

## 2025-06-21 NOTE — PLAN OF CARE
Received pt alert, oriented, following commands. On room air with diminished breath sounds. Afebrile. BP stable. AV paced on tele. No BM overnight. Voiding in urinal. Pt reminded of NPO after 0000 for enteroscopy today. Pt updated on POC.  No further needs at this time.

## 2025-06-21 NOTE — PROGRESS NOTES
Gastroenterology Progress Note  Patient Name: Kyler Haji  Chief Complaint: melena  S: Pt denies recent melena, abdominal pain.   O: /60 (BP Location: Right arm)   Pulse 60   Temp 98.1 °F (36.7 °C) (Oral)   Resp 18   Wt 195 lb 6.4 oz (88.6 kg)   SpO2 95%   BMI 26.87 kg/m²   Gen: AAOx3  CV: RRR with normal S1 / S2  Resp: No increased respiratory effort.   Abd: soft, non-tender, non-distended; no rebound or guarding  Ext: No edema or cyanosis  Skin: Warm and dry  Laboratory Data:   Lab Results   Component Value Date    WBC 5.5 06/21/2025    HGB 7.3 06/21/2025    HCT 23.4 06/21/2025    .0 06/21/2025    CREATSERUM 3.40 06/21/2025    BUN 50 06/21/2025     06/21/2025    K 4.2 06/21/2025     06/21/2025    CO2 23.0 06/21/2025     06/21/2025    CA 9.3 06/21/2025    INR 2.88 06/21/2025    PTP 30.4 06/21/2025    MG 2.2 06/21/2025     Recent Labs   Lab 06/19/25  1157 06/20/25  0536 06/21/25  0619   * 101* 122*   BUN 69* 64* 50*   CREATSERUM 4.38* 3.88* 3.40*   CA 9.2 9.5 9.3    145 144   K 5.3* 4.4 4.2    111 110   CO2 20.0* 22.0 23.0     Recent Labs   Lab 06/20/25  0536 06/21/25  0619   RBC 2.51* 2.40*   HGB 7.9* 7.3*   HCT 24.2* 23.4*   MCV 96.4 97.5   MCH 31.5 30.4   MCHC 32.6 31.2   RDW 17.4 17.1   NEPRELIM 4.41  --    WBC 6.6 5.5   .0 168.0       Recent Labs   Lab 06/19/25  1157   ALT 27   AST 38*     Assessment: 84-year-old male with a Hx of CAD s/p CABG, A-fib (Coumadin), s/p AVR HF s/p ICD, DM, CKD, and prostate cancer no RTX who presented to the ER with weakness in the setting of black stools found to have acute on chronic anemia Hgb 6.9 from Hgb 7 yesterday and 8.8 two months prior however steadily declining from 9-11 in 2024.  Patient with extensive endoscopic evaluations this year with only small bowel revealing active bleeding.  Will treat with PPI IV twice daily and plan for push enteroscopy in a.m. if INR normalizes    Plan:  Okay to restart GI  soft diet today.  N.p.o. after midnight.  Pantoprazole 40 mg IV twice daily  Plan for push enteroscopy tomorrow if INR improved. He received vitamin K today.   Cardiology following and appreciate recommendations.  Continue to hold Coumadin if okay with cardiology.  Continue to monitor hemoglobin daily and transfuse for hemoglobin less than 7  CBC, BMP, and INR in the morning    Gay Blair DO  10:43 AM  6/21/2025  [facility]  [phone]

## 2025-06-21 NOTE — PLAN OF CARE
Patient A&Ox4  Room air  Last bowel movement two nights prior  Takes pills whole  Ambulates 1 assist with a walker   Procedure cancelled, will try again tomorrow due to INR  Vit. K given today   Xray of cervical completed   Soft diet   QID accucheck, insulin coverage   Call light within reach, all safety measures maintained

## 2025-06-21 NOTE — PROGRESS NOTES
[facility]  Cardiology Progress Note    Kyler Haji Patient Status:  Inpatient    1940 MRN [id number]   [facility] 7NE-A Attending Kendra Flynn MD   Hosp Day # 2 PCP Abhi Arellano MD       Events: Stable. Denies CP or SOB.     Allergies:   Allergies[1]    Medications:  Current Hospital Medications[2]    Problems:  Problem List[3]    Objective:   Temp: 97.7 °F (36.5 °C)  Pulse: 60  Resp: 20  BP: 125/72    Intake/Output:     Intake/Output Summary (Last 24 hours) at 2025 0830  Last data filed at 2025 0400  Gross per 24 hour   Intake 240 ml   Output 300 ml   Net -60 ml       Last 3 Weights   25 0000 195 lb 6.4 oz (88.6 kg)   25 0515 191 lb (86.6 kg)   25 1542 197 lb (89.4 kg)   25 1130 197 lb (89.4 kg)   25 1436 197 lb (89.4 kg)   25 1103 197 lb 8 oz (89.6 kg)       Physical Exam:    General: Alert and oriented x 3. No apparent distress. No respiratory or constitutional distress.  HEENT: Normocephalic, anicteric sclera  Neck: No JVD  Cardiac: Regular rate and rhythm. S1, S2 normal. No murmur, rubs or gallops.   Lungs: Clear without wheezes, rales, rhonchi or dullness.  Normal excursions and effort.  Abdomen: Soft, non-tender. BS-present.  Extremities: Without clubbing, cyanosis or edema.  Peripheral pulses are intact and equal.  Neurologic: Alert and oriented, normal affect. No motor or coordinational deficit.  Skin: Warm and dry.     Laboratory/Data:         Recent Labs   Lab 25  1523 25  1157 25  1310 25  1750 25  2052 25  0536 25  0619   WBC 6.0 6.7  --   --   --  6.6 5.5   HGB 7.0* 6.9*  --  6.4* 7.1* 7.9* 7.3*   .0 99.5  --   --   --  96.4 97.5   .0 199.0  --   --   --  184.0 168.0   INR  --   --  3.19*  --   --  3.19* 2.88*       Recent Labs   Lab 25  1523 25  1157 25  0536 25  0619    141 145 144   K 5.1 5.3* 4.4 4.2    105 111 110   CO2 22.0  20.0* 22.0 23.0   BUN 64* 69* 64* 50*   CREATSERUM 4.41* 4.38* 3.88* 3.40*   CA 9.5 9.2 9.5 9.3   MG 1.7  --  1.8 2.2   PHOS 4.2  --   --   --    * 228* 101* 122*       Recent Labs   Lab 06/19/25  1157   ALT 27   AST 38*   ALB 4.4       No results for input(s): \"TROP\" in the last 168 hours.      Diagnostics:    Echo 6/20/25  1. Left ventricle: The cavity size was normal. Wall thickness was normal.      Systolic function was normal. The estimated ejection fraction was 45-50%,      by visual assessment. Left ventricular diastolic function parameters were      normal for the patient's age.   2. Left atrium: The left atrial volume was moderately increased.   3. Aortic valve: A bioprosthetic valve is present. The peak systolic      velocity was 1.87m/sec. The mean systolic gradient was 8mm Hg. The valve      area (VTI) was 2.37cm^2. The valve area (VTI) index was 1.14cm^2/m^2.   4. Mitral valve: There was moderate regurgitation.   5. Tricuspid valve: There was mild-moderate regurgitation.   6. Pulmonary arteries: Systolic pressure was mildly increased, in the range      of 45mm Hg to 50mm Hg.   Impressions:  No previous study from [facility] was   available for comparison.       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 Will give Vit K and repeat INR this morning  Transfuse per parameters  Holding ASA, coumadin  Consider Watchman in future  Continue metoprolol, amiodarone  Losartan, jl on hold for ILIR  S/p IVF, follow renal function    Marc Walker MD  Interventional Cardiology  [facility]  Office: [phone]    6/21/2025  8:30 AM         [1]   Allergies  Allergen Reactions    Ace Inhibitors Coughing   [2]   Current Facility-Administered Medications   Medication Dose Route Frequency    phytonadione (Aqua-[place]ep[place]) 2 mg in sodium chloride 0.9% 50 mL IVPB  2 mg Intravenous Once    allopurinol (Zyloprim) tab 100 mg  100 mg Oral Daily    losartan (Cozaar) tab 25 mg  25 mg Oral Daily    amiodarone (Pacerone) tab 200 mg  200 mg Oral Daily    atorvastatin (Lipitor) tab 40 mg  40 mg Oral Nightly    metoprolol succinate ER (Toprol XL) 24 hr tab 200 mg  200 mg Oral Daily    sodium bicarbonate tab 650 mg  650 mg Oral BID    glucose (Dex4) 15 GM/59ML oral liquid 15 g  15 g Oral Q15 Min PRN    Or    glucose (Glutose) 40% oral gel 15 g  15 g Oral Q15 Min PRN    Or    glucose-vitamin C (Dex-4) chewable tab 4 tablet  4 tablet Oral Q15 Min PRN    Or    dextrose 50% injection 50 mL  50 mL Intravenous Q15 Min PRN    Or    glucose (Dex4) 15 GM/59ML oral liquid 30 g  30 g Oral Q15 Min PRN    Or    glucose (Glutose) 40% oral gel 30 g  30 g Oral Q15 Min PRN    Or    glucose-vitamin C (Dex-4) chewable tab 8 tablet  8 tablet Oral Q15 Min PRN    acetaminophen (Tylenol Extra Strength) tab 500 mg  500 mg Oral Q4H PRN    melatonin tab 3 mg  3 mg Oral Nightly PRN    insulin aspart (NovoLOG) 100 Units/mL FlexPen 2-10 Units  2-10 Units Subcutaneous TID AC and HS    sodium ferric gluconate (Ferrlecit) 125 mg in sodium chloride 0.9% 100mL IVPB premix  125 mg Intravenous Daily    pantoprazole (Protonix) 40 mg in sodium chloride 0.9% PF 10 mL IV push  40 mg Intravenous Q12H   [3]   Patient Active Problem List  Diagnosis    Paroxysmal A-fib (HCC)    S/P CABG (coronary artery bypass graft)    H/O aortic valve replacement    Essential hypertension    LBBB (left bundle branch block)    Monitoring for long-term anticoagulant use    Coronary artery disease involving native heart without angina pectoris    Type 2 diabetes  mellitus with stage 4 chronic kidney disease, without long-term current use of insulin (HCC)    Right-sided carotid artery occlusion without cerebral infarction    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%    Hypertension associated with diabetes (HCC)    Acquired coagulation factor inhibitor disorder (HCC)    Hyperlipidemia associated with type 2 diabetes mellitus (HCC)    Hyperkalemia    Chronic systolic heart failure (HCC)    Carotid arterial disease    CKD (chronic kidney disease) stage 4, GFR 15-29 ml/min (HCC)    ICD (implantable cardioverter-defibrillator) BiV [facility]    Generalized weakness    Symptomatic anemia    Gastrointestinal hemorrhage, unspecified gastrointestinal hemorrhage type    Chronic kidney disease, unspecified CKD stage    Current use of long term anticoagulation    Iron deficiency anemia due to chronic blood loss    Acute renal failure (ARF) (HCC)    Acute kidney injury (HCC)    Metabolic acidosis    Hyperglycemia    Valvular heart disease

## 2025-06-22 VITALS
WEIGHT: 190.13 LBS | TEMPERATURE: 97 F | DIASTOLIC BLOOD PRESSURE: 63 MMHG | BODY MASS INDEX: 26 KG/M2 | OXYGEN SATURATION: 96 % | SYSTOLIC BLOOD PRESSURE: 132 MMHG | HEART RATE: 60 BPM | RESPIRATION RATE: 12 BRPM

## 2025-06-22 LAB
ANION GAP SERPL CALC-SCNC: 9 MMOL/L (ref 0–18)
BASOPHILS # BLD AUTO: 0.04 X10(3) UL (ref 0–0.2)
BASOPHILS NFR BLD AUTO: 0.7 %
BUN BLD-MCNC: 41 MG/DL (ref 9–23)
CALCIUM BLD-MCNC: 9.2 MG/DL (ref 8.7–10.6)
CHLORIDE SERPL-SCNC: 110 MMOL/L (ref 98–112)
CO2 SERPL-SCNC: 24 MMOL/L (ref 21–32)
CREAT BLD-MCNC: 3.08 MG/DL (ref 0.7–1.3)
EGFRCR SERPLBLD CKD-EPI 2021: 19 ML/MIN/1.73M2 (ref 60–?)
EOSINOPHIL # BLD AUTO: 0.29 X10(3) UL (ref 0–0.7)
EOSINOPHIL NFR BLD AUTO: 5.1 %
ERYTHROCYTE [DISTWIDTH] IN BLOOD BY AUTOMATED COUNT: 16.9 %
GLUCOSE BLD-MCNC: 132 MG/DL (ref 70–99)
GLUCOSE BLD-MCNC: 142 MG/DL (ref 70–99)
GLUCOSE BLD-MCNC: 162 MG/DL (ref 70–99)
GLUCOSE BLD-MCNC: 184 MG/DL (ref 70–99)
HCT VFR BLD AUTO: 23.1 % (ref 39–53)
HGB BLD-MCNC: 7.5 G/DL (ref 13–17.5)
IMM GRANULOCYTES # BLD AUTO: 0.02 X10(3) UL (ref 0–1)
IMM GRANULOCYTES NFR BLD: 0.4 %
INR BLD: 1.47 (ref 0.8–1.2)
LYMPHOCYTES # BLD AUTO: 0.77 X10(3) UL (ref 1–4)
LYMPHOCYTES NFR BLD AUTO: 13.5 %
MCH RBC QN AUTO: 31.8 PG (ref 26–34)
MCHC RBC AUTO-ENTMCNC: 32.5 G/DL (ref 31–37)
MCV RBC AUTO: 97.9 FL (ref 80–100)
MONOCYTES # BLD AUTO: 0.71 X10(3) UL (ref 0.1–1)
MONOCYTES NFR BLD AUTO: 12.5 %
NEUTROPHILS # BLD AUTO: 3.86 X10 (3) UL (ref 1.5–7.7)
NEUTROPHILS # BLD AUTO: 3.86 X10(3) UL (ref 1.5–7.7)
NEUTROPHILS NFR BLD AUTO: 67.8 %
OSMOLALITY SERPL CALC.SUM OF ELEC: 308 MOSM/KG (ref 275–295)
PLATELET # BLD AUTO: 184 10(3)UL (ref 150–450)
POTASSIUM SERPL-SCNC: 4.2 MMOL/L (ref 3.5–5.1)
PROTHROMBIN TIME: 18 SECONDS (ref 11.6–14.8)
RBC # BLD AUTO: 2.36 X10(6)UL (ref 3.8–5.8)
SODIUM SERPL-SCNC: 143 MMOL/L (ref 136–145)
WBC # BLD AUTO: 5.7 X10(3) UL (ref 4–11)

## 2025-06-22 PROCEDURE — 0DB98ZX EXCISION OF DUODENUM, VIA NATURAL OR ARTIFICIAL OPENING ENDOSCOPIC, DIAGNOSTIC: ICD-10-PCS | Performed by: INTERNAL MEDICINE

## 2025-06-22 PROCEDURE — 99239 HOSP IP/OBS DSCHRG MGMT >30: CPT | Performed by: INTERNAL MEDICINE

## 2025-06-22 PROCEDURE — 0W3P8ZZ CONTROL BLEEDING IN GASTROINTESTINAL TRACT, VIA NATURAL OR ARTIFICIAL OPENING ENDOSCOPIC: ICD-10-PCS | Performed by: INTERNAL MEDICINE

## 2025-06-22 PROCEDURE — 0DB68ZX EXCISION OF STOMACH, VIA NATURAL OR ARTIFICIAL OPENING ENDOSCOPIC, DIAGNOSTIC: ICD-10-PCS | Performed by: INTERNAL MEDICINE

## 2025-06-22 RX ORDER — SODIUM CHLORIDE, SODIUM LACTATE, POTASSIUM CHLORIDE, CALCIUM CHLORIDE 600; 310; 30; 20 MG/100ML; MG/100ML; MG/100ML; MG/100ML
INJECTION, SOLUTION INTRAVENOUS CONTINUOUS PRN
Status: DISCONTINUED | OUTPATIENT
Start: 2025-06-22 | End: 2025-06-22 | Stop reason: SURG

## 2025-06-22 RX ORDER — ASPIRIN 81 MG/1
81 TABLET ORAL EVERY EVENING
Status: SHIPPED | COMMUNITY
Start: 2025-06-23

## 2025-06-22 RX ORDER — PANTOPRAZOLE SODIUM 40 MG/1
40 TABLET, DELAYED RELEASE ORAL
Qty: 60 TABLET | Refills: 0 | Status: SHIPPED | OUTPATIENT
Start: 2025-06-22 | End: 2025-08-21

## 2025-06-22 RX ORDER — SODIUM CHLORIDE, SODIUM LACTATE, POTASSIUM CHLORIDE, CALCIUM CHLORIDE 600; 310; 30; 20 MG/100ML; MG/100ML; MG/100ML; MG/100ML
INJECTION, SOLUTION INTRAVENOUS CONTINUOUS
Status: DISCONTINUED | OUTPATIENT
Start: 2025-06-22 | End: 2025-06-22

## 2025-06-22 RX ORDER — ONDANSETRON 2 MG/ML
4 INJECTION INTRAMUSCULAR; INTRAVENOUS ONCE AS NEEDED
Status: DISCONTINUED | OUTPATIENT
Start: 2025-06-22 | End: 2025-06-22 | Stop reason: HOSPADM

## 2025-06-22 RX ORDER — NALOXONE HYDROCHLORIDE 0.4 MG/ML
0.08 INJECTION, SOLUTION INTRAMUSCULAR; INTRAVENOUS; SUBCUTANEOUS ONCE AS NEEDED
Status: DISCONTINUED | OUTPATIENT
Start: 2025-06-22 | End: 2025-06-22 | Stop reason: HOSPADM

## 2025-06-22 RX ADMIN — SODIUM CHLORIDE, SODIUM LACTATE, POTASSIUM CHLORIDE, CALCIUM CHLORIDE: 600; 310; 30; 20 INJECTION, SOLUTION INTRAVENOUS at 10:08:00

## 2025-06-22 NOTE — ANESTHESIA POSTPROCEDURE EVALUATION
[facility]    Kyler Haji Patient Status:  Inpatient   Age/Gender 84 year old male MRN [id number]   Location [facility] END[facility] Attending Kendra Flynn MD   Hosp Day # 3 PCP Abhi Arellano MD       Anesthesia Post-op Note    ENTEROSCOPY WITH APC CAUTERIZATION AND BIOPSIES    Procedure Summary       Date: 06/22/25 Room / Location:  ENDOSCOPY 03 / [facility] END[facility]    Anesthesia Start: 1008 Anesthesia Stop: 1037    Procedure: ENTEROSCOPY WITH APC CAUTERIZATION AND BIOPSIES Diagnosis: (DUODENUM AVM)    Surgeons: Gay Blair DO Anesthesiologist: Cali Nagy MD    Anesthesia Type: MAC ASA Status: 4            Anesthesia Type: MAC    Vitals Value Taken Time   BP 83/ 46 06/22/25 10:37   Temp  06/22/25 10:37   Pulse 62 06/22/25 10:37   Resp 16 06/22/25 10:37   SpO2 90 06/22/25 10:37           Patient Location: Endoscopy    Anesthesia Type: MAC    Airway Patency: patent    Postop Pain Control: adequate    Mental Status: mildly sedated but able to meaningfully participate in the post-anesthesia evaluation    Nausea/Vomiting: none    Cardiopulmonary/Hydration status: stable euvolemic    Complications: no apparent anesthesia related complications    Postop vital signs: stable    Comments: Report given to RN    Dental Exam: Unchanged from Preop    Patient to be discharged from PACU when criteria met.

## 2025-06-22 NOTE — PROGRESS NOTES
[facility]  Cardiology Progress Note    Kyler Haji Patient Status:  Inpatient    1940 MRN [id number]   [facility] 7NE-A Attending Kendra Flynn MD   Hosp Day # 3 PCP Abhi Arellano MD       Events: Stable. Denies CP or SOB.     Allergies:   Allergies[1]    Medications:  Current Hospital Medications[2]    Problems:  Problem List[3]    Objective:   Temp: 97.3 °F (36.3 °C)  Pulse: 60  Resp: 12  BP: 132/63    Intake/Output:     Intake/Output Summary (Last 24 hours) at 2025 1659  Last data filed at 2025 1130  Gross per 24 hour   Intake 300 ml   Output --   Net 300 ml       Last 3 Weights   25 0500 190 lb 1.6 oz (86.2 kg)   25 0000 195 lb 6.4 oz (88.6 kg)   25 0515 191 lb (86.6 kg)   25 1542 197 lb (89.4 kg)   25 1130 197 lb (89.4 kg)   25 1436 197 lb (89.4 kg)   25 1103 197 lb 8 oz (89.6 kg)       Physical Exam:    General: Alert and oriented x 3. No apparent distress. No respiratory or constitutional distress.  HEENT: Normocephalic, anicteric sclera  Neck: No JVD  Cardiac: Regular rate and rhythm. S1, S2 normal. No murmur, rubs or gallops.   Lungs: Clear without wheezes, rales, rhonchi or dullness.  Normal excursions and effort.  Abdomen: Soft, non-tender. BS-present.  Extremities: Without clubbing, cyanosis or edema.  Peripheral pulses are intact and equal.  Neurologic: Alert and oriented, normal affect. No motor or coordinational deficit.  Skin: Warm and dry.     Laboratory/Data:         Recent Labs   Lab 25  1523 25  1157 25  1310 25  1750 25  2052 25  0536 25  0619 25  1025 25  1103 25  0531   WBC 6.0 6.7  --   --   --  6.6 5.5  --   --  5.7   HGB 7.0* 6.9*  --  6.4* 7.1* 7.9* 7.3*  --   --  7.5*   .0 99.5  --   --   --  96.4 97.5  --   --  97.9   .0 199.0  --   --   --  184.0 168.0  --   --  184.0   INR  --   --    < >  --   --  3.19* 2.88* 3.0* 3.1* 1.47*    <  > = values in this interval not displayed.       Recent Labs   Lab 06/18/25  1523 06/19/25  1157 06/20/25  0536 06/21/25  0619 06/22/25  0531    141 145 144 143   K 5.1 5.3* 4.4 4.2 4.2    105 111 110 110   CO2 22.0 20.0* 22.0 23.0 24.0   BUN 64* 69* 64* 50* 41*   CREATSERUM 4.41* 4.38* 3.88* 3.40* 3.08*   CA 9.5 9.2 9.5 9.3 9.2   MG 1.7  --  1.8 2.2  --    PHOS 4.2  --   --   --   --    * 228* 101* 122* 132*       Recent Labs   Lab 06/19/25  1157   ALT 27   AST 38*   ALB 4.4       No results for input(s): \"TROP\" in the last 168 hours.      Diagnostics:    Echo 6/20/25  1. Left ventricle: The cavity size was normal. Wall thickness was normal.      Systolic function was normal. The estimated ejection fraction was 45-50%,      by visual assessment. Left ventricular diastolic function parameters were      normal for the patient's age.   2. Left atrium: The left atrial volume was moderately increased.   3. Aortic valve: A bioprosthetic valve is present. The peak systolic      velocity was 1.87m/sec. The mean systolic gradient was 8mm Hg. The valve      area (VTI) was 2.37cm^2. The valve area (VTI) index was 1.14cm^2/m^2.   4. Mitral valve: There was moderate regurgitation.   5. Tricuspid valve: There was mild-moderate regurgitation.   6. Pulmonary arteries: Systolic pressure was mildly increased, in the range      of 45mm Hg to 50mm Hg.   Impressions:  No previous study from [facility] was   available for comparison.       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 INR down to 1.4 after Vit K  yesterday.   Transfuse per parameters  Holding ASA, coumadin. Resume once bleed risk allows, per GI.   Consider Watchman in future. He will f/u with Dr. Fagan to discuss.   Continue metoprolol, amiodarone  Losartan, jl on hold for ILIR, resume per renal   S/p IVF, follow renal function    Marc Walker MD  Interventional Cardiology  [facility]  Office: [phone]             [1]   Allergies  Allergen Reactions    Ace Inhibitors Coughing   [2]   Current Facility-Administered Medications   Medication Dose Route Frequency    lactated ringers infusion   Intravenous Continuous    allopurinol (Zyloprim) tab 100 mg  100 mg Oral Daily    [Held by provider] losartan (Cozaar) tab 25 mg  25 mg Oral Daily    amiodarone (Pacerone) tab 200 mg  200 mg Oral Daily    atorvastatin (Lipitor) tab 40 mg  40 mg Oral Nightly    metoprolol succinate ER (Toprol XL) 24 hr tab 200 mg  200 mg Oral Daily    sodium bicarbonate tab 650 mg  650 mg Oral BID    glucose (Dex4) 15 GM/59ML oral liquid 15 g  15 g Oral Q15 Min PRN    Or    glucose (Glutose) 40% oral gel 15 g  15 g Oral Q15 Min PRN    Or    glucose-vitamin C (Dex-4) chewable tab 4 tablet  4 tablet Oral Q15 Min PRN    Or    dextrose 50% injection 50 mL  50 mL Intravenous Q15 Min PRN    Or    glucose (Dex4) 15 GM/59ML oral liquid 30 g  30 g Oral Q15 Min PRN    Or    glucose (Glutose) 40% oral gel 30 g  30 g Oral Q15 Min PRN    Or    glucose-vitamin C (Dex-4) chewable tab 8 tablet  8 tablet Oral Q15 Min PRN    acetaminophen (Tylenol Extra Strength) tab 500 mg  500 mg Oral Q4H PRN    melatonin tab 3 mg  3 mg Oral Nightly PRN    insulin aspart (NovoLOG) 100 Units/mL FlexPen 2-10 Units  2-10 Units Subcutaneous TID AC and HS    pantoprazole (Protonix) 40 mg in sodium chloride 0.9% PF 10 mL IV push  40 mg Intravenous Q12H   [3]   Patient Active Problem List  Diagnosis    Paroxysmal A-fib (HCC)    S/P CABG (coronary artery bypass graft)    H/O aortic valve replacement    Essential  hypertension    LBBB (left bundle branch block)    Monitoring for long-term anticoagulant use    Coronary artery disease involving native heart without angina pectoris    Type 2 diabetes mellitus with stage 4 chronic kidney disease, without long-term current use of insulin (HCC)    Right-sided carotid artery occlusion without cerebral infarction    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%    Hypertension associated with diabetes (HCC)    Acquired coagulation factor inhibitor disorder (HCC)    Hyperlipidemia associated with type 2 diabetes mellitus (HCC)    Hyperkalemia    Chronic systolic heart failure (HCC)    Carotid arterial disease    CKD (chronic kidney disease) stage 4, GFR 15-29 ml/min ([facility])    ICD (implantable cardioverter-defibrillator) BiV [facility]    Generalized weakness    Symptomatic anemia    Gastrointestinal hemorrhage, unspecified gastrointestinal hemorrhage type    Chronic kidney disease, unspecified CKD stage    Current use of long term anticoagulation    Iron deficiency anemia due to chronic blood loss    Acute renal failure (ARF) (HCC)    Acute kidney injury (HCC)    Metabolic acidosis    Hyperglycemia    Valvular heart disease

## 2025-06-22 NOTE — PLAN OF CARE
Down for procedure, all personal belongings removed, under garments removed, Ivs flushed, dentures removed, consent signed and on the chart. All safety measures maintained.

## 2025-06-22 NOTE — OPERATIVE REPORT
Operative Report-Push Enteroscopy with cold biopsies and cauterization of AVM  Kyler Haji 8/21/1940   [facility] [id number] MRN [id number]   Admission Date 6/19/2025 Operation Date 6/22/2025   Attending Physician Kendra Flynn MD Operating Physician Gay Blair DO     PREOPERATIVE DIAGNOSIS/INDICATION: Melena, Anemia  POSTOPERTATIVE DIAGNOSIS: Small bowel AVM  PROCEDURE PERFORMED: Push enteroscopy  INFORMED CONSENT: Once a brief history and physical examination was performed, the risks, benefits and alternatives to the procedure were discussed with the patient and/or family and informed consent was obtained.  The risks of sedation, perforation, missed lesions and need for surgery were all discussed.  Patient expressed understanding of the risks and agreed to proceed.    PROCEDURE DESCRIPTION:  The patient was then brought to the endoscopy suite where the patient's pulse, pulse oximetry and blood pressure were monitored. The pateint was placed in the left lateral decubitus position and deep sedation was administered. Once adequate sedation was achieved, a bite block was placed and a lubricated tip of an Olympus video upper endoscope was inserted through the oropharynx and gently manipulated through the esophagus into the stomach and the distal duodenum and proximal jejunum (to 60 cm from the incisors). Upon withdrawal of the endoscope, careful visualization of the mucosa was performed.   FINDINGS:  ESOPHAGUS: The Esophagus was normal.   EGJ: The GEJ was located at 40 cm. The SCJ was located at 40 cm from the incisors, and was regular.   STOMACH: Normal.   DUODENUM/JEJUNUM:There was an AVM in the distal duodenum which was cauterized with APC using the small bowel settings with a pulsed, circumferential force.   THERAPEUTICS: Biopsies were performed from the antrum and body with a cold biopsy forceps to rule out H Pylori and from the duodenum to rule out celiac disease.   RECOMMENDATIONS:   Post EGD precautions,  watch for bleeding, infection, perforation, adverse drug reactions   Follow biopsies.  Ok to resume Coumadin tomorrow. Monitor for further GI bleeding.   CC Report:     Gay Blair DO  6/22/2025  10:35 AM

## 2025-06-22 NOTE — PLAN OF CARE
Patient A&Ox4  Room air  Takes pills whole  Ambulates 1 assist with a walker    Procedure today  Fluids running per order  Soft diet   QID accucheck, insulin coverage   Call light within reach, all safety measures maintained

## 2025-06-22 NOTE — DISCHARGE SUMMARY
[facility]ITAL[facility]  DISCHARGE SUMMARY     Kyler Haji Patient Status:  Inpatient    1940 MRN [id number]   Location [facility] 7NE-A Attending No att. providers found   Hosp Day # 3 PCP Abhi Arellano MD     Date of Admission: 2025  Date of Discharge:  2025     Discharge Disposition: Home or Self Care    Discharge Diagnosis:    # Acute on chronic blood loss anemia   #Symptomatic anemia /presumptive GIB sp 1 unit PRBC  - H&H improved   - PPI daily as OP   - d/w home iron per GI  - GI consult-> push enteroscopy done small bowel AVM   - iron IV given      # ILIR , improved      #Hypertension     #Hyperlipidemia  #CKD 4  - monitor      #Diabetes type 2  - ISS     #Coagulopathy due to coumadin   - may resume home dose tomorrow and follow up at coumadin clinic      #Paroxysmal A-fib  - f/u as OP for watchman device     #Aortic stenosis status post bioprosthetic valve  #Chronic HFrEF, monitor volume status      #Status post AICD  #Metabolic acidosis due to CKD  #CAD status post CABG  #MILES       History of Present Illness: Kyler Haji is a 84 year old male with  HTN, HL, CHF, CAD, CKD, DM, GIB, AVR on coumadin presented with generalized weakness and found to have low Hg as OP. He denies dizziness, CP/SOB/abd pain, hematochezia. Has seen some dark stools. Otherwise stable in ER.        Brief Synopsis: patient admitted and underwent PRBC transfusion and once INR subtherapeutic (vitamin K given) push enteroscopy done which showed small bowel AVM. His Hg stable and he will be dc today and f/u as OP.     Lace+ Score: 83  59-90 High Risk  29-58 Medium Risk  0-28   Low Risk       TCM Follow-Up Recommendation:  LACE > 58: High Risk of readmission after discharge from the hospital.      Consultants:  GI, cardiology     Discharge Medication List:     Discharge Medications        CHANGE how you take these medications        Instructions Prescription details   pantoprazole 40 MG Tbec  Commonly known as:  Protonix  What changed: when to take this      Take 1 tablet (40 mg total) by mouth every morning before breakfast.   Stop taking on: August 21, 2025  Quantity: 60 tablet  Refills: 0            CONTINUE taking these medications        Instructions Prescription details   allopurinol 100 MG Tabs  Commonly known as: Zyloprim      Take 1 tablet (100 mg total) by mouth daily.   Quantity: 90 tablet  Refills: 3     amiodarone 200 MG Tabs  Commonly known as: Pacerone      Take 1 tablet (200 mg total) by mouth daily.   Quantity: 90 tablet  Refills: 3     aspirin 81 MG Tbec  Start taking on: June 23, 2025      Take 1 tablet (81 mg total) by mouth every evening.   Refills: 0     atorvastatin 40 MG Tabs  Commonly known as: Lipitor      Take 1 tablet (40 mg total) by mouth daily.   Refills: 0     cholecalciferol 50 MCG (2000 UT) Caps  Commonly known as: Vitamin D3      Take 1 capsule (2,000 Units total) by mouth in the morning.   Refills: 0     furosemide 40 MG Tabs  Commonly known as: Lasix      Take 1 tablet (40 mg total) by mouth every other day.   Quantity: 45 tablet  Refills: 3     furosemide 20 MG Tabs  Commonly known as: Lasix      TAKE 40MG ON EVEN DAYS, AND 20MG ON ODD DAYS   Quantity: 45 tablet  Refills: 2     glipiZIDE 5 MG Tabs  Commonly known as: Glucotrol      Take 1 tablet (5 mg total) by mouth 2 (two) times daily before meals.   Quantity: 180 tablet  Refills: 1     losartan 25 MG Tabs  Commonly known as: Cozaar      Take 1 tablet (25 mg total) by mouth daily.   Quantity: 90 tablet  Refills: 1     metFORMIN 500 MG Tabs  Commonly known as: Glucophage      Take 1 tablet (500 mg total) by mouth 2 (two) times daily with meals.   Quantity: 180 tablet  Refills: 1     Metoprolol Succinate  MG Tb24  Commonly known as: TOPROL-XL      Take 1 tablet (200 mg total) by mouth daily.   Quantity: 90 tablet  Refills: 2     sodium bicarbonate 650 MG Tabs      Take 1 tablet (650 mg total) by mouth 2 (two) times daily.    Quantity: 60 tablet  Refills: 3     spironolactone 25 MG Tabs  Commonly known as: Aldactone      Take 0.5 tablets (12.5 mg total) by mouth daily.   Quantity: 90 tablet  Refills: 3     warfarin 4 MG Tabs  Commonly known as: Coumadin      Take as directed. If you are unsure how to take this medication, talk to your nurse or doctor.  Original instructions: Take 0.5-1 tablets (2-4 mg total) by mouth As Directed.   Refills: 0            STOP taking these medications      ferrous sulfate 325 (65 FE) MG Tbec               ASK your doctor about these medications        Instructions Prescription details   azelastine 0.1 % Soln  Commonly known as: Astelin      1 spray by Nasal route in the morning and 1 spray in the evening.   Quantity: 30 mL  Refills: 0               Where to Get Your Medications        These medications were sent to [id number] - [place], [place] - [address], [phone], [phone] [address], [facility] [address]      Phone: [phone]   pantoprazole 40 MG Tbec         [place]    Follow-up appointment:   Juan J Roach DO  [address]  [facility] [address] [phone]    Follow up in 1 month(s)      Marc Walker MD  [address]  [address]  [facility] [address] [phone]    Follow up in 2 week(s)  Schedule with APN, Follow up with Coumadin clinic    Abhi Arellano MD  [address] [place] [address]  [facility] [address] [phone]    Follow up in 1 week(s)      Appointments for Next 30 Days 6/22/2025 - 7/22/2025        Date Arrival Time Visit Type Length Department Provider     7/14/2025  1:15 PM  FOLLOW UP VISIT [3655] 15 min [facility], [address] [place] [address] Abhi Valderrama MD    Patient Instructions:         Location Instructions:     Masks are optional for all patients and visitors, unless otherwise indicated. Note: A $50 fee will be charged for missed appointments or same-day cancellations. Please provide 24 hours'  notice if you need to cancel or reschedule your appointment.                      Vital signs:  Temp:  [97.3 °F (36.3 °C)-97.8 °F (36.6 °C)] 97.3 °F (36.3 °C)  Pulse:  [59-63] 60  Resp:  [12-18] 12  BP: ()/(44-71) 132/63  SpO2:  [91 %-99 %] 96 %    Physical Exam:    General: No acute distress   Lungs: clear to auscultation  Cardiovascular: S1, S2  Abdomen: Soft      -----------------------------------------------------------------------------------------------  PATIENT DISCHARGE INSTRUCTIONS: See electronic chart    Kendra Flynn MD    Total time spent on discharge plannin minutes     The  [employer] Cures Act makes medical notes like these available to patients in the interest of transparency. Please be advised this is a medical document. Medical documents are intended to carry relevant information, facts as evident, and the clinical opinion of the practitioner. The medical note is intended as peer to peer communication and may appear blunt or direct. It is written in medical language and may contain abbreviations or verbiage that are unfamiliar.

## 2025-06-22 NOTE — PLAN OF CARE
[facility] care 1900, 6/21, NOC. Glucose controlled per protocol, denies pain, denies nausea, slept overnight, trending labs in AM,

## 2025-06-22 NOTE — PROGRESS NOTES
Pt without signs of melena for several days. He had a small bowel AVM without active bleeding which was cauterized with APC. Ok to resume Coumadin. He wants to go home. He was told to monitor stools. Will discontinue Iron as it will interfere with assessment. PPI daily. OV in 1 month and Hgb check in 1week.   We will sign off    Gay Blair DO  10:54 AM  6/22/2025  [facility]  [phone]

## 2025-06-22 NOTE — PLAN OF CARE
NURSING DISCHARGE NOTE    Discharged Home via Wheelchair.  Accompanied by Family member  Belongings Taken by patient/family.    IV removed, catheter tip intact. Discharge instructions completed included when to resume coumadin, asa, and how to take all medications. Signs and symptoms to look out for gone over and when to return to hospital were gone over. All questions answered, no further questions from patient or family. Follow up gone over. All safety measures maintained.

## 2025-06-23 ENCOUNTER — PATIENT OUTREACH (OUTPATIENT)
Age: 85
End: 2025-06-23

## 2025-06-23 ENCOUNTER — PATIENT OUTREACH (OUTPATIENT)
Dept: CASE MANAGEMENT | Age: 85
End: 2025-06-23

## 2025-06-23 NOTE — PROGRESS NOTES
Transitional Care Management   Discharge Date: 25  Contact Date: 2025    Assessment:  TCM Initial Assessment    General:  Assessment completed with: Patient  Patient Subjective: Pt doing good since being home.  Chief Complaint: Symptomatic anemia  Verify patient name and  with patient/ caregiver: Yes    Hospital Stay/Discharge:  Prior to leaving the hospital were your Discharge Instructions reviewed with you?: Yes  Did you receive a copy of your written Discharge Instructions?: Yes  Do you feel better or worse since you left the hospital or emergency department?: Better    Follow - Up Appointment:  Do you have a follow-up appointment?: No  Are there any barriers to getting to your follow-up appointment?: No    Home Health/DME:  Prior to leaving the hospital was Home Health (HH) arranged for you?: No     Prior to leaving the hospital or emergency department was Durable Medical Equipment (DME), medical supplies, or infusions arranged for you?: No  Are DME/medical supply/infusions needs identified by staff during this assessment?: No     Medications/Diet:       Were you given a different diet per your Discharge Instructions?: No     Questions/Concerns:  Do you have any questions or concerns that have not been discussed?: No       Follow-up Appointments:  Your appointments       Date & Time Appointment Department (Center)    2025 1:15 PM CDT Follow Up Visit with Abhi Arellano MD [address] ([address] & [facility])        Aug 20, 2025 10:20 AM CDT Exam - Established with Rhett oJnes MD [facility] ([facility])        Oct 28, 2025 10:20 AM CDT Follow-Up OV with Kenzie Gandara APRN [facility] ([facility])    Please arrive 15 minutes prior to your scheduled appointment time.               [facility]  [address]  [facility] [address]  [phone] [facility], [address] [facility] [address]  [address] [facility] [address]  [facility] [address] [phone] [facility],  [facility]  [address] [facility] [address] [phone]            Transitional Care Clinic  Was TCC Ordered: No      Primary Care Provider (If no TCC appointment)  Does patient already have a PCP appointment scheduled? No  Care Manager Scheduled PCP office TCM appointment with patient      Specialist  Does the patient have any other follow-up appointment(s) that need to be scheduled? Yes   -If yes: Care Manager reviewed upcoming specialist appointments with patient: Yes   -Does the patient need assistance scheduling appointment(s): No      Book By Date: 7/6/25

## 2025-06-24 NOTE — PAYOR COMM NOTE
--------------  6/21 CONTINUED STAY REVIEW    Payor: [employer]  Subscriber #:  [id number]  Authorization Number: [id number]    HOSPITALIST:    Patient with no abd pain, dizziness, weakness. He is frustrated that procedure is cancelled.   + neck pain with movement       Vital signs:  Temp:  [97.6 °F (36.4 °C)-98.5 °F (36.9 °C)] 97.7 °F (36.5 °C)  Pulse:  [60] 60  Resp:  [18-20] 20  BP: (115-146)/(63-75) 125/72  SpO2:  [94 %-100 %] 96 %     Physical Exam:    General: No acute distress  Respiratory: No wheezes, no rhonchi  Cardiovascular: S1, S2, regular rate and rhythm  Abdomen: Soft, Non-tender, non-distended, positive bowel sounds  Neuro: No new focal deficits.   Extremities: No edema     Lab 06/18/25  1523 06/19/25  1157 06/19/25  1310 06/19/25  1750 06/19/25  2052 06/20/25  0536 06/21/25  0619   WBC 6.0 6.7  --   --   --  6.6 5.5   HGB 7.0* 6.9*  --  6.4* 7.1* 7.9* 7.3*   .0 99.5  --   --   --  96.4 97.5   .0 199.0  --   --   --  184.0 168.0   INR  --   --  3.19*  --   --  3.19* 2.88*      Lab 06/19/25  1157 06/20/25  0536 06/21/25  0619   * 101* 122*   BUN 69* 64* 50*   CREATSERUM 4.38* 3.88* 3.40*   CA 9.2 9.5 9.3   ALB 4.4  --   --     145 144   K 5.3* 4.4 4.2    111 110   CO2 20.0* 22.0 23.0   ALKPHO 62  --   --    AST 38*  --   --    ALT 27  --   --    BILT 0.3  --   --    TP 6.5  --   --       Lab 06/19/25  1310 06/20/25  0536 06/21/25  0619   PTP 32.9* 32.9* 30.4*   INR 3.19* 3.19* 2.88*       Medications: [Scheduled Medications]    [Scheduled Medications]   allopurinol  100 mg Oral Daily    losartan  25 mg Oral Daily    amiodarone  200 mg Oral Daily    atorvastatin  40 mg Oral Nightly    Metoprolol Succinate ER  200 mg Oral Daily    sodium bicarbonate  650 mg Oral BID    insulin aspart  2-10 Units Subcutaneous TID AC and HS    sodium ferric gluconate  125 mg Intravenous Daily    pantoprazole  40 mg Intravenous Q12H        Assessment & Plan:  # Acute on chronic  blood loss anemia   #Symptomatic anemia /presumptive GIB sp 1 unit PRBC  - H&H  - PPI IV BID   - GI consult-> push enteroscopy when INR lower  - soft diet today    - iron IV     # ILIR , improved      #Hypertension     #Hyperlipidemia  #CKD 4  - monitor      #Diabetes type 2  - ISS     #Coagulopathy due to coumadin   - check INR in am   - sp vit K today      #Paroxysmal A-fib  #Aortic stenosis status post bioprosthetic valve  #Chronic HFrEF, monitor volume status      #Status post AICD  #Metabolic acidosis due to CKD  #CAD status post CABG  #MILES      CARDS:    Impression:  Acute blood loss anemia from GI bleed  ILIR  PAF on amiodarone  CHADS VASC 5, on coumadin  ICM - EF yashira 35%, current 45-50%  Chronic HFrEF - compensated, euvolemic  LBBB  S/p BIV/ICD 6/18 (Rylan) - longevity ~ 3 yrs, no therapies  CAD s/p CABG 2012 - no angina  S/p SAVR 2012 - normal function, bioprosthetic  S/p L CEA 2012  CKD 4  HTN - controlled  HLD - LDL at target < 70      Recommendations:  GI planning EGD but needs INR < 1.8. Will give Vit K and repeat INR this morning  Transfuse per parameters  Holding ASA, coumadin  Consider Watchman in future  Continue metoprolol, amiodarone  Losartan, jl on hold for ILIR  S/p IVF, follow renal function    GI:    Assessment: 84-year-old male with a Hx of CAD s/p CABG, A-fib (Coumadin), s/p AVR HF s/p ICD, DM, CKD, and prostate cancer no RTX who presented to the ER with weakness in the setting of black stools found to have acute on chronic anemia Hgb 6.9 from Hgb 7 yesterday and 8.8 two months prior however steadily declining from 9-11 in 2024.  Patient with extensive endoscopic evaluations this year with only small bowel revealing active bleeding.  Will treat with PPI IV twice daily and plan for push enteroscopy in a.m. if INR normalizes     Plan:  Okay to restart GI soft diet today.  N.p.o. after midnight.  Pantoprazole 40 mg IV twice daily  Plan for push enteroscopy tomorrow if INR improved. He  received vitamin K today.   Cardiology following and appreciate recommendations.  Continue to hold Coumadin if okay with cardiology.  Continue to monitor hemoglobin daily and transfuse for hemoglobin less than 7  CBC, BMP, and INR in the morning    6/22:  GI:  Operative Report-Push Enteroscopy with cold biopsies and cauterization of AVM  PREOPERATIVE DIAGNOSIS/INDICATION: Melena, Anemia  POSTOPERTATIVE DIAGNOSIS: Small bowel AVM  PROCEDURE PERFORMED: Push enteroscopy    ESOPHAGUS: The Esophagus was normal.   EGJ: The GEJ was located at 40 cm. The SCJ was located at 40 cm from the incisors, and was regular.   STOMACH: Normal.   DUODENUM/JEJUNUM:There was an AVM in the distal duodenum which was cauterized with APC using the small bowel settings with a pulsed, circumferential force.   THERAPEUTICS: Biopsies were performed from the antrum and body with a cold biopsy forceps to rule out H Pylori and from the duodenum to rule out celiac disease.   RECOMMENDATIONS:   Post EGD precautions, watch for bleeding, infection, perforation, adverse drug reactions   Follow biopsies.  Ok to resume Coumadin tomorrow. Monitor for further GI bleeding.   Vitals (last day) before discharge       Date/Time Temp Pulse Resp BP SpO2 Weight O2 Device O2 Flow Rate (L/min) [facility]    06/22/25 1145 97.3 °F (36.3 °C) 60 12 132/63 96 % -- None (Room air) --     06/22/25 1130 -- 61 13 -- 96 % -- -- -- [place]    06/22/25 1125 -- 63 15 140/65 96 % -- -- -- FL    06/22/25 1120 -- 60 13 139/62 95 % -- -- -- [place]    06/22/25 1105 -- 59 16 130/62 96 % -- -- -- [place]    06/22/25 1104 -- 59 15 -- 96 % -- -- -- [place]    06/22/25 1100 -- 60 17 129/60 93 % -- -- -- [place]    06/22/25 1055 -- 60 12 106/60 99 % -- -- -- [place]    06/22/25 1050 -- 59 14 115/57 94 % -- -- -- [place]    06/22/25 1045 -- 59 14 91/52 93 % -- None (Room air) -- [place]    06/22/25 1040 -- 62 16 82/46 91 % -- -- -- [place]    06/22/25 1035 -- 59 18 83/44 96 % -- Nasal cannula 2 L/min [place]    06/22/25 0830 97.8 °F (36.6 °C)  60 18 141/71 96 % -- None (Room air) --     06/22/25 0500 97.6 °F (36.4 °C) 60 18 104/47 96 % -- None (Room air) --     06/22/25 0500 -- -- -- -- -- 190 lb 1.6 oz (86.2 kg) -- --     06/22/25 0000 97.4 °F (36.3 °C) 60 18 110/58 95 % -- None (Room air) --     06/21/25 2000 97.4 °F (36.3 °C) 61 16 128/63 -- -- None (Room air) --     06/21/25 1600 97.5 °F (36.4 °C) 63 16 139/75 95 % -- None (Room air) -- AS    06/21/25 1145 98.5 °F (36.9 °C) 60 18 129/68 100 % -- None (Room air) -- AS    06/21/25 0730 98.1 °F (36.7 °C) 60 18 129/60 95 % -- None (Room air) -- AS    06/21/25 0600 -- 60 -- -- 96 % -- -- --     06/21/25 0500 -- 60 -- -- 94 % -- -- --     06/21/25 0400 97.7 °F (36.5 °C) -- 20 125/72 94 % -- -- --     06/21/25 0400 -- 60 -- -- -- -- None (Room air) --     06/21/25 0300 -- 60 -- -- -- -- -- --     06/21/25 0200 -- 60 -- -- -- -- -- --     06/21/25 0100 -- 60 -- -- -- -- -- --     06/21/25 0000 98.3 °F (36.8 °C) -- 18 116/75 98 % 195 lb 6.4 oz (88.6 kg) -- --     06/21/25 0000 -- 60 -- -- -- -- None (Room air) --          Blood Transfusion Record       Product Unit Status Volume Start End            Transfuse RBC       25  [id number]  [id number] Completed 06/19/25 1906 295.83 mL 06/19/25 1720 06/19/25 1900

## 2025-06-25 ENCOUNTER — OFFICE VISIT (OUTPATIENT)
Dept: INTERNAL MEDICINE CLINIC | Facility: CLINIC | Age: 85
End: 2025-06-25
Payer: MEDICARE

## 2025-06-25 VITALS
TEMPERATURE: 97 F | SYSTOLIC BLOOD PRESSURE: 118 MMHG | OXYGEN SATURATION: 100 % | HEIGHT: 71.5 IN | RESPIRATION RATE: 18 BRPM | DIASTOLIC BLOOD PRESSURE: 68 MMHG | HEART RATE: 67 BPM | BODY MASS INDEX: 26.84 KG/M2 | WEIGHT: 196 LBS

## 2025-06-25 DIAGNOSIS — Z79.01 CURRENT USE OF LONG TERM ANTICOAGULATION: ICD-10-CM

## 2025-06-25 DIAGNOSIS — N18.9 CHRONIC KIDNEY DISEASE, UNSPECIFIED CKD STAGE: ICD-10-CM

## 2025-06-25 DIAGNOSIS — I10 ESSENTIAL HYPERTENSION: ICD-10-CM

## 2025-06-25 DIAGNOSIS — K55.21 AVM (ARTERIOVENOUS MALFORMATION) OF SMALL BOWEL, ACQUIRED WITH HEMORRHAGE: Primary | ICD-10-CM

## 2025-06-25 DIAGNOSIS — K92.2 GASTROINTESTINAL HEMORRHAGE, UNSPECIFIED GASTROINTESTINAL HEMORRHAGE TYPE: ICD-10-CM

## 2025-06-25 DIAGNOSIS — I48.0 PAROXYSMAL A-FIB (HCC): ICD-10-CM

## 2025-06-25 DIAGNOSIS — D64.9 CHRONIC ANEMIA: ICD-10-CM

## 2025-06-25 PROBLEM — N17.9 ACUTE RENAL FAILURE (ARF): Status: RESOLVED | Noted: 2025-06-19 | Resolved: 2025-06-25

## 2025-06-25 PROBLEM — N17.9 ACUTE KIDNEY INJURY: Status: RESOLVED | Noted: 2025-06-19 | Resolved: 2025-06-25

## 2025-06-25 PROBLEM — E87.20 METABOLIC ACIDOSIS: Status: RESOLVED | Noted: 2025-06-19 | Resolved: 2025-06-25

## 2025-06-25 PROBLEM — I77.9 CAROTID ARTERIAL DISEASE: Status: RESOLVED | Noted: 2019-03-24 | Resolved: 2025-06-25

## 2025-06-25 PROBLEM — R53.1 GENERALIZED WEAKNESS: Status: RESOLVED | Noted: 2025-02-14 | Resolved: 2025-06-25

## 2025-06-25 PROCEDURE — 1111F DSCHRG MED/CURRENT MED MERGE: CPT | Performed by: NURSE PRACTITIONER

## 2025-06-25 PROCEDURE — 1159F MED LIST DOCD IN RCRD: CPT | Performed by: NURSE PRACTITIONER

## 2025-06-25 PROCEDURE — 1160F RVW MEDS BY RX/DR IN RCRD: CPT | Performed by: NURSE PRACTITIONER

## 2025-06-25 PROCEDURE — 1170F FXNL STATUS ASSESSED: CPT | Performed by: NURSE PRACTITIONER

## 2025-06-25 PROCEDURE — 99496 TRANSJ CARE MGMT HIGH F2F 7D: CPT | Performed by: NURSE PRACTITIONER

## 2025-06-25 NOTE — PROGRESS NOTES
Subjective:   Kyler Haji is a 84 year old male who presents for hospital follow up.   He was discharged from [facility] to Home or Self Care  Admission Date: 6/19/25   Discharge Date: 6/22/25  Hospital Discharge Diagnosis: small bowel AVM with hemorrhage. Chronic anemia, chronic kidney disease,     Interactive contact within 2 business days post discharge first initiated on Date: 6/23/2025    During the visit, the following was completed:  Obtained and reviewed discharge summary, continuity of care documents, Hospitalist notes, Cardiology notes, and gastroenterology  Reviewed Labs (CBC, CMP), Echocardiogram, and Operative reports: GI    HPI: reports feeling better since admission. Normal bowel movements. Wants to get off coumadin has talked with cardiologist about Watchman implant    History/Other:   Current Medications:  Medication Reconciliation:  I am aware of an inpatient discharge within the last 30 days.  The discharge medication list has been reconciled with the patient's current medication list and reviewed by me. See medication list for additions of new medication, and changes to current doses of medications and discontinued medications.  Outpatient Medications Marked as Taking for the 6/25/25 encounter (Office Visit) with Brittany Ramires APRN   Medication Sig    pantoprazole 40 MG Oral Tab EC Take 1 tablet (40 mg total) by mouth every morning before breakfast.    aspirin 81 MG Oral Tab EC Take 1 tablet (81 mg total) by mouth every evening.    warfarin 4 MG Oral Tab Take 0.5-1 tablets (2-4 mg total) by mouth As Directed.    Metoprolol Succinate  MG Oral Tablet 24 Hr Take 1 tablet (200 mg total) by mouth daily.    metFORMIN 500 MG Oral Tab Take 1 tablet (500 mg total) by mouth 2 (two) times daily with meals.    sodium bicarbonate 650 MG Oral Tab Take 1 tablet (650 mg total) by mouth 2 (two) times daily.    furosemide 20 MG Oral Tab TAKE 40MG ON EVEN DAYS, AND 20MG ON ODD DAYS    glipiZIDE 5 MG  Oral Tab Take 1 tablet (5 mg total) by mouth 2 (two) times daily before meals.    allopurinol 100 MG Oral Tab Take 1 tablet (100 mg total) by mouth daily.    atorvastatin 40 MG Oral Tab Take 1 tablet (40 mg total) by mouth daily.    cholecalciferol 50 MCG (2000 UT) Oral Cap Take 1 capsule (2,000 Units total) by mouth in the morning.    losartan 25 MG Oral Tab Take 1 tablet (25 mg total) by mouth daily.    furosemide 40 MG Oral Tab Take 1 tablet (40 mg total) by mouth every other day.    spironolactone 25 MG Oral Tab Take 0.5 tablets (12.5 mg total) by mouth daily.    amiodarone 200 MG Oral Tab Take 1 tablet (200 mg total) by mouth daily.       Review of Systems  GENERAL: feels well otherwise  SKIN: bruising bilateral upper extremities   EYES: denies blurred vision or double vision  HEENT: denies nasal congestion, sinus pain or ST  LUNGS: denies shortness of breath with exertion  CARDIOVASCULAR: denies chest pain on exertion  GI: denies abdominal pain, denies heartburn  : 1 per night nocturia, no complaint of urinary incontinence  MUSCULOSKELETAL: denies back pain  NEURO: denies headaches  PSYCHE: denies depression or anxiety  HEMATOLOGIC: hx of anemia  ENDOCRINE: denies thyroid history  ALL/ASTHMA: denies hx of allergy or asthma    Objective:   Physical Exam  General Appearance:  Alert, cooperative, no distress, appears stated age   Back:   Symmetric, no curvature, ROM normal, no CVA tenderness   Lungs:   Clear to auscultation bilaterally, respirations unlabored   Chest Wall:  No tenderness or deformity   Heart:  Regular rate and rhythm, S1, S2 normal, no murmur, rub or gallop   Abdomen:   Soft, non-tender, bowel sounds active all four quadrants,  no masses, no organomegaly   Extremities: Extremities normal, atraumatic, no cyanosis or edema   Pulses: 2+ and symmetric   Skin: Skin color, texture, turgor normal, no rashes or lesions   Neurologic: Normal     /68   Pulse 67   Temp 97.2 °F (36.2 °C) (Temporal)    Resp 18   Ht 5' 11.5\" (1.816 m)   Wt 196 lb (88.9 kg)   SpO2 100%   BMI 26.96 kg/m²  Estimated body mass index is 26.96 kg/m² as calculated from the following:    Height as of this encounter: 5' 11.5\" (1.816 m).    Weight as of this encounter: 196 lb (88.9 kg).    Assessment & Plan:   1. AVM (arteriovenous malformation) of small bowel, acquired with hemorrhage (Primary)  -     CBC With Differential With Platelet; Future; Expected date: 07/09/2025  2. Chronic anemia  3. Chronic kidney disease, unspecified CKD stage  4. Current use of long term anticoagulation  5. Gastrointestinal hemorrhage, unspecified gastrointestinal hemorrhage type  -     CBC With Differential With Platelet; Future; Expected date: 07/09/2025  6. Paroxysmal A-fib (HCC)    Repeat cbc in 2 weeks   Make appt with cards to discuss watchman again  Appt with renal in 3 months as scheduled  Make appt with GI        Return with Dr. Arellano as scheduled.

## 2025-06-25 NOTE — PROGRESS NOTES
The following individual(s) verbally consented to be recorded using ambient AI listening technology and understand that they can each withdraw their consent to this listening technology at any point by asking the clinician to turn off or pause the recording:    Patient name: Kyler Haji  Additional names:  Lelia (spouse)

## 2025-06-26 RX ORDER — LOSARTAN POTASSIUM 25 MG/1
25 TABLET ORAL DAILY
Qty: 90 TABLET | Refills: 1 | Status: SHIPPED | OUTPATIENT
Start: 2025-06-26

## 2025-06-26 NOTE — TELEPHONE ENCOUNTER
Last time medication was refilled 11/25/2024  Last office visit  06/25/2025  Next office visit due/scheduled   Future Appointments   Date Time Provider Department Center   7/14/2025  1:15 PM Abhi Arellano MD [facility] [address]   8/4/2025 10:45 AM [facility] AUDIO [facility] EMGAUDIONAPE [id number]   8/4/2025 11:00 AM Rachel Andrade MD EMGOTONAPER [id number]   8/20/2025 10:20 AM Rhett Jones MD EMGNEPHNAPER [facility] [place]   10/28/2025 10:20 AM Kenzie Gandara APRN SGINP [facility] SUB GI     Medication failed protocol.

## 2025-06-29 DIAGNOSIS — I10 ESSENTIAL HYPERTENSION: ICD-10-CM

## 2025-06-30 ENCOUNTER — LAB ENCOUNTER (OUTPATIENT)
Dept: LAB | Age: 85
End: 2025-06-30
Attending: INTERNAL MEDICINE
Payer: MEDICARE

## 2025-06-30 DIAGNOSIS — K55.21 AVM (ARTERIOVENOUS MALFORMATION) OF SMALL BOWEL, ACQUIRED WITH HEMORRHAGE: ICD-10-CM

## 2025-06-30 DIAGNOSIS — K92.2 GASTROINTESTINAL HEMORRHAGE, UNSPECIFIED GASTROINTESTINAL HEMORRHAGE TYPE: ICD-10-CM

## 2025-06-30 LAB
BASOPHILS # BLD AUTO: 0.04 X10(3) UL (ref 0–0.2)
BASOPHILS NFR BLD AUTO: 0.8 %
EOSINOPHIL # BLD AUTO: 0.13 X10(3) UL (ref 0–0.7)
EOSINOPHIL NFR BLD AUTO: 2.6 %
ERYTHROCYTE [DISTWIDTH] IN BLOOD BY AUTOMATED COUNT: 16 %
HCT VFR BLD AUTO: 24.4 % (ref 39–53)
HGB BLD-MCNC: 7.6 G/DL (ref 13–17.5)
IMM GRANULOCYTES # BLD AUTO: 0.02 X10(3) UL (ref 0–1)
IMM GRANULOCYTES NFR BLD: 0.4 %
LYMPHOCYTES # BLD AUTO: 0.78 X10(3) UL (ref 1–4)
LYMPHOCYTES NFR BLD AUTO: 15.4 %
MCH RBC QN AUTO: 31.5 PG (ref 26–34)
MCHC RBC AUTO-ENTMCNC: 31.1 G/DL (ref 31–37)
MCV RBC AUTO: 101.2 FL (ref 80–100)
MONOCYTES # BLD AUTO: 0.49 X10(3) UL (ref 0.1–1)
MONOCYTES NFR BLD AUTO: 9.7 %
NEUTROPHILS # BLD AUTO: 3.61 X10 (3) UL (ref 1.5–7.7)
NEUTROPHILS # BLD AUTO: 3.61 X10(3) UL (ref 1.5–7.7)
NEUTROPHILS NFR BLD AUTO: 71.1 %
PLATELET # BLD AUTO: 177 10(3)UL (ref 150–450)
RBC # BLD AUTO: 2.41 X10(6)UL (ref 3.8–5.8)
WBC # BLD AUTO: 5.1 X10(3) UL (ref 4–11)

## 2025-06-30 PROCEDURE — 36415 COLL VENOUS BLD VENIPUNCTURE: CPT

## 2025-06-30 PROCEDURE — 85025 COMPLETE CBC W/AUTO DIFF WBC: CPT

## 2025-06-30 RX ORDER — FUROSEMIDE 20 MG/1
TABLET ORAL
Qty: 45 TABLET | Refills: 2 | Status: SHIPPED | OUTPATIENT
Start: 2025-06-30

## 2025-06-30 RX ORDER — FUROSEMIDE 40 MG/1
40 TABLET ORAL EVERY OTHER DAY
Qty: 45 TABLET | Refills: 3 | Status: SHIPPED | OUTPATIENT
Start: 2025-06-30

## 2025-06-30 NOTE — TELEPHONE ENCOUNTER
Last time medication was refilled 06/19/2024  Last office visit  06/25/20250  Next office visit due/scheduled   Future Appointments   Date Time Provider Department Center   6/30/2025  1:00 PM REF BK RD REF [facility] Ref Book 14   7/14/2025  1:15 PM Abhi Arellano MD [facility] [address]   8/4/2025 10:45 AM [facility] [id number]   8/4/2025 11:00 AM Rachel Andrade MD [facility] [id number]   8/20/2025 10:20 AM Rhett Jones MD [facility] [place]   10/28/2025 10:20 AM Kenzie Gandara APRN SGINP [facility]     Medication not on protocol.

## 2025-06-30 NOTE — TELEPHONE ENCOUNTER
Last time medication was refilled 04/13/2025  Last office visit  06/25/2025  Next office visit due/scheduled   Future Appointments   Date Time Provider Department Center   7/14/2025  1:15 PM Abhi Arellano MD [facility] [address]   8/4/2025 10:45 AM [facility] [id number]   8/4/2025 11:00 AM Rachel Andrade MD [facility] [id number]   8/20/2025 10:20 AM Rhett Jones MD [facility] [place]   10/28/2025 10:20 AM Kenzie Gandara APRN [facility]     Medication failed protocol.

## 2025-07-01 ENCOUNTER — NURSE ONLY (OUTPATIENT)
Age: 85
End: 2025-07-01
Attending: INTERNAL MEDICINE
Payer: MEDICARE

## 2025-07-01 DIAGNOSIS — K92.2 GASTROINTESTINAL HEMORRHAGE, UNSPECIFIED GASTROINTESTINAL HEMORRHAGE TYPE: ICD-10-CM

## 2025-07-01 LAB
ANTIBODY SCREEN: NEGATIVE
RH BLOOD TYPE: NEGATIVE

## 2025-07-01 NOTE — PROGRESS NOTES
Spoke to pt. Made aware of results & recommendations. Pt voiced understanding.  Paper order created and faxed to out patient infusion center

## 2025-07-02 ENCOUNTER — OFFICE VISIT (OUTPATIENT)
Age: 85
End: 2025-07-02
Attending: INTERNAL MEDICINE
Payer: MEDICARE

## 2025-07-02 VITALS
RESPIRATION RATE: 16 BRPM | OXYGEN SATURATION: 100 % | SYSTOLIC BLOOD PRESSURE: 171 MMHG | HEART RATE: 60 BPM | TEMPERATURE: 97 F | DIASTOLIC BLOOD PRESSURE: 82 MMHG

## 2025-07-02 DIAGNOSIS — K92.2 GASTROINTESTINAL HEMORRHAGE, UNSPECIFIED GASTROINTESTINAL HEMORRHAGE TYPE: Primary | ICD-10-CM

## 2025-07-02 NOTE — PROGRESS NOTES
Education Record    Learner:  Patient    Pt here for: 2 u prbc's    Barriers / Limitations:  None    Method:  Brief focused, printed material and  reinforcement    General Topics:  Plan of care reviewed    Outcome: Pt tolerated transfusions with no c/o. BP elevated. Pt reported did not take BP meds yet. asymptomatic

## 2025-07-03 LAB
BLOOD TYPE BARCODE: 9500
UNIT VOLUME: 350 ML

## 2025-07-08 ENCOUNTER — LAB ENCOUNTER (OUTPATIENT)
Dept: LAB | Age: 85
End: 2025-07-08
Attending: INTERNAL MEDICINE
Payer: MEDICARE

## 2025-07-08 DIAGNOSIS — E11.22 TYPE 2 DIABETES MELLITUS WITH STAGE 4 CHRONIC KIDNEY DISEASE, WITHOUT LONG-TERM CURRENT USE OF INSULIN (HCC): ICD-10-CM

## 2025-07-08 DIAGNOSIS — I10 ESSENTIAL HYPERTENSION: ICD-10-CM

## 2025-07-08 DIAGNOSIS — N18.4 TYPE 2 DIABETES MELLITUS WITH STAGE 4 CHRONIC KIDNEY DISEASE, WITHOUT LONG-TERM CURRENT USE OF INSULIN (HCC): ICD-10-CM

## 2025-07-08 LAB
ALBUMIN SERPL-MCNC: 4.2 G/DL (ref 3.2–4.8)
ALBUMIN/GLOB SERPL: 2 {RATIO} (ref 1–2)
ALP LIVER SERPL-CCNC: 83 U/L (ref 45–117)
ALT SERPL-CCNC: 50 U/L (ref 10–49)
ANION GAP SERPL CALC-SCNC: 7 MMOL/L (ref 0–18)
AST SERPL-CCNC: 45 U/L (ref ?–34)
BASOPHILS # BLD AUTO: 0.04 X10(3) UL (ref 0–0.2)
BASOPHILS NFR BLD AUTO: 0.8 %
BILIRUB SERPL-MCNC: 0.4 MG/DL (ref 0.2–1.1)
BILIRUB UR QL STRIP.AUTO: NEGATIVE
BUN BLD-MCNC: 56 MG/DL (ref 9–23)
CALCIUM BLD-MCNC: 9.2 MG/DL (ref 8.7–10.6)
CHLORIDE SERPL-SCNC: 106 MMOL/L (ref 98–112)
CHOLEST SERPL-MCNC: 118 MG/DL (ref ?–200)
CLARITY UR REFRACT.AUTO: CLEAR
CO2 SERPL-SCNC: 28 MMOL/L (ref 21–32)
CREAT BLD-MCNC: 3.64 MG/DL (ref 0.7–1.3)
EGFRCR SERPLBLD CKD-EPI 2021: 16 ML/MIN/1.73M2 (ref 60–?)
EOSINOPHIL # BLD AUTO: 0.19 X10(3) UL (ref 0–0.7)
EOSINOPHIL NFR BLD AUTO: 3.6 %
ERYTHROCYTE [DISTWIDTH] IN BLOOD BY AUTOMATED COUNT: 17.2 %
EST. AVERAGE GLUCOSE BLD GHB EST-MCNC: 123 MG/DL (ref 68–126)
FASTING PATIENT LIPID ANSWER: YES
FASTING STATUS PATIENT QL REPORTED: YES
GLOBULIN PLAS-MCNC: 2.1 G/DL (ref 2–3.5)
GLUCOSE BLD-MCNC: 132 MG/DL (ref 70–99)
GLUCOSE UR STRIP.AUTO-MCNC: NORMAL MG/DL
HBA1C MFR BLD: 5.9 % (ref ?–5.7)
HCT VFR BLD AUTO: 31.7 % (ref 39–53)
HDLC SERPL-MCNC: 56 MG/DL (ref 40–59)
HGB BLD-MCNC: 10.1 G/DL (ref 13–17.5)
IMM GRANULOCYTES # BLD AUTO: 0.01 X10(3) UL (ref 0–1)
IMM GRANULOCYTES NFR BLD: 0.2 %
KETONES UR STRIP.AUTO-MCNC: NEGATIVE MG/DL
LDLC SERPL CALC-MCNC: 45 MG/DL (ref ?–100)
LEUKOCYTE ESTERASE UR QL STRIP.AUTO: NEGATIVE
LYMPHOCYTES # BLD AUTO: 0.76 X10(3) UL (ref 1–4)
LYMPHOCYTES NFR BLD AUTO: 14.4 %
MCH RBC QN AUTO: 31.6 PG (ref 26–34)
MCHC RBC AUTO-ENTMCNC: 31.9 G/DL (ref 31–37)
MCV RBC AUTO: 99.1 FL (ref 80–100)
MONOCYTES # BLD AUTO: 0.57 X10(3) UL (ref 0.1–1)
MONOCYTES NFR BLD AUTO: 10.8 %
NEUTROPHILS # BLD AUTO: 3.7 X10 (3) UL (ref 1.5–7.7)
NEUTROPHILS # BLD AUTO: 3.7 X10(3) UL (ref 1.5–7.7)
NEUTROPHILS NFR BLD AUTO: 70.2 %
NITRITE UR QL STRIP.AUTO: NEGATIVE
NONHDLC SERPL-MCNC: 62 MG/DL (ref ?–130)
OSMOLALITY SERPL CALC.SUM OF ELEC: 309 MOSM/KG (ref 275–295)
PH UR STRIP.AUTO: 5.5 [PH] (ref 5–8)
PLATELET # BLD AUTO: 162 10(3)UL (ref 150–450)
POTASSIUM SERPL-SCNC: 4.2 MMOL/L (ref 3.5–5.1)
PROT SERPL-MCNC: 6.3 G/DL (ref 5.7–8.2)
PROT UR STRIP.AUTO-MCNC: NEGATIVE MG/DL
RBC # BLD AUTO: 3.2 X10(6)UL (ref 3.8–5.8)
RBC UR QL AUTO: NEGATIVE
SODIUM SERPL-SCNC: 141 MMOL/L (ref 136–145)
SP GR UR STRIP.AUTO: 1.02 (ref 1–1.03)
TRIGL SERPL-MCNC: 85 MG/DL (ref 30–149)
TSI SER-ACNC: 1.85 UIU/ML (ref 0.55–4.78)
UROBILINOGEN UR STRIP.AUTO-MCNC: NORMAL MG/DL
VLDLC SERPL CALC-MCNC: 12 MG/DL (ref 0–30)
WBC # BLD AUTO: 5.3 X10(3) UL (ref 4–11)

## 2025-07-08 PROCEDURE — 81003 URINALYSIS AUTO W/O SCOPE: CPT

## 2025-07-08 PROCEDURE — 83036 HEMOGLOBIN GLYCOSYLATED A1C: CPT

## 2025-07-08 PROCEDURE — 85025 COMPLETE CBC W/AUTO DIFF WBC: CPT

## 2025-07-08 PROCEDURE — 36415 COLL VENOUS BLD VENIPUNCTURE: CPT

## 2025-07-08 PROCEDURE — 80061 LIPID PANEL: CPT

## 2025-07-08 PROCEDURE — 84443 ASSAY THYROID STIM HORMONE: CPT

## 2025-07-08 PROCEDURE — 80053 COMPREHEN METABOLIC PANEL: CPT

## 2025-07-11 ENCOUNTER — HOSPITAL ENCOUNTER (OUTPATIENT)
Age: 85
Discharge: HOME | End: 2025-07-11
Payer: MEDICARE

## 2025-07-11 VITALS
RESPIRATION RATE: 18 BRPM | DIASTOLIC BLOOD PRESSURE: 84 MMHG | HEART RATE: 60 BPM | SYSTOLIC BLOOD PRESSURE: 145 MMHG | TEMPERATURE: 98 F | OXYGEN SATURATION: 98 %

## 2025-07-11 DIAGNOSIS — S51.811A SKIN TEAR OF RIGHT FOREARM WITHOUT COMPLICATION, INITIAL ENCOUNTER: Primary | ICD-10-CM

## 2025-07-11 LAB
#MXD IC: 0.7 X10ˆ3/UL (ref 0.1–1)
HCT VFR BLD AUTO: 31.5 % (ref 39–53)
HGB BLD-MCNC: 10.3 G/DL (ref 13–17.5)
INR BLDC: 2.6 (ref 0.9–1.1)
LYMPHOCYTES # BLD AUTO: 1.2 X10ˆ3/UL (ref 1–4)
LYMPHOCYTES NFR BLD AUTO: 20.8 %
MCH RBC QN AUTO: 30.7 PG (ref 26–34)
MCHC RBC AUTO-ENTMCNC: 32.7 G/DL (ref 31–37)
MCV RBC AUTO: 93.8 FL (ref 80–100)
MIXED CELL %: 11.7 %
NEUTROPHILS # BLD AUTO: 3.8 X10ˆ3/UL (ref 1.5–7.7)
NEUTROPHILS NFR BLD AUTO: 67.5 %
PLATELET # BLD AUTO: 154 X10ˆ3/UL (ref 150–450)
RBC # BLD AUTO: 3.36 X10ˆ6/UL (ref 3.8–5.8)
WBC # BLD AUTO: 5.7 X10ˆ3/UL (ref 4–11)

## 2025-07-11 PROCEDURE — 85610 PROTHROMBIN TIME: CPT | Performed by: NURSE PRACTITIONER

## 2025-07-11 PROCEDURE — 99213 OFFICE O/P EST LOW 20 MIN: CPT | Performed by: NURSE PRACTITIONER

## 2025-07-11 PROCEDURE — 85025 COMPLETE CBC W/AUTO DIFF WBC: CPT | Performed by: NURSE PRACTITIONER

## 2025-07-11 RX ORDER — LIDOCAINE HYDROCHLORIDE AND EPINEPHRINE 10; 10 MG/ML; UG/ML
1 INJECTION, SOLUTION INFILTRATION; PERINEURAL ONCE
Status: COMPLETED | OUTPATIENT
Start: 2025-07-11 | End: 2025-07-11

## 2025-07-11 NOTE — DISCHARGE INSTRUCTIONS
Keep the dressing clean and dry. You may shower/bathe in 48 hours and soak the dressing. Do not pull at it. Schedule recheck with Dr Arellano

## 2025-07-11 NOTE — ED PROVIDER NOTES
Patient Seen in: [facility]       The following individual(s) verbally consented to be recorded using ambient AI listening technology and understand that they can each withdraw their consent to this listening technology at any point by asking the clinician to turn off or pause the recording:    Patient name: Kyler Haji  Additional names:  wife, Lelia      History  Chief Complaint   Patient presents with    Laceration/Abrasion     Stated Complaint: Laceration    Subjective:   HPI     Kyler Haji is an 84 year old male on Coumadin who presents with a bleeding arm wound.    He sustained a bleeding wound on his arm on Wednesday after cutting it on a door handle, described as having 'peeled the skin down.' Despite applying a bandage provided at a restaurant, the wound has continued to bleed intermittently over the past two days. His wife assisted in removing the bandage and reapplying a new one, but the bleeding resumed after a few hours. He recalls receiving a tetanus shot within the last few years.    He is currently on Coumadin following open heart surgery 13 years ago. He has not had his INR checked recently, as he was taken off Coumadin during a recent hospital stay. He was hospitalized at the end of June due to a hemoglobin drop to 6.9, which required two infusions to raise it to 10.1 as of July 8th.         Objective:     Past Medical History:    Abdominal hernia    Actinic keratosis    Black stools    Chest pain, unspecified    Cough    Diabetes (HCC)    Diabetes mellitus (HCC)    Dysmetabolic syndrome X    Easy bruising    Essential hypertension    Essential hypertension, malignant    Flatulence/gas pain/belching    Hemorrhoids    High cholesterol    Inguinal hernia without mention of obstruction or gangrene, unilateral or unspecified, (not specified as recurrent)    Myalgia and myositis, unspecified    Obesity, unspecified    Pacemaker    Special screening for malignant neoplasm  of prostate    Type II or unspecified type diabetes mellitus without mention of complication, uncontrolled    Undiagnosed cardiac murmurs    Unspecified sleep apnea    AHI 21.7 SaO2 yashira 79.9 %     Wears glasses              Past Surgical History:   Procedure Laterality Date    Cabg  2012    CABG x 3    Cath transcatheter aortic valve replacement  2012    Colonoscopy  2014    Procedure: COLONOSCOPY;  Surgeon: Ton Oliveira MD;  Location:  ENDOSCOPY    Colonoscopy N/A 2025    Procedure: COLONOSCOPY;  Surgeon: Israel Martin MD;  Location:  ENDOSCOPY    Colonoscopy N/A 4/3/2025    Procedure: COLONOSCOPY;  Surgeon: Sacha Simmons MD;  Location:  ENDOSCOPY    Colonoscopy N/A 2025    Procedure: COLONOSCOPY;  Surgeon: Sacha Simmons MD;  Location:  ENDOSCOPY    Hernia surgery      Valve repair                  Social History     Socioeconomic History    Marital status:    Tobacco Use    Smoking status: Former     Current packs/day: 0.00     Types: Cigarettes     Start date: 1955     Quit date: 1975     Years since quittin.0     Passive exposure: Never    Smokeless tobacco: Never   Vaping Use    Vaping status: Never Used   Substance and Sexual Activity    Alcohol use: Yes     Alcohol/week: 1.0 standard drink of alcohol     Types: 1 Standard drinks or equivalent per week     Comment: 8-10 drinks per week    Drug use: Never   Other Topics Concern    Caffeine Concern Yes    Exercise No     Social Drivers of Health     Food Insecurity: No Food Insecurity (2025)    NCSS - Food Insecurity     Worried About Running Out of Food in the Last Year: No     Ran Out of Food in the Last Year: No   Transportation Needs: No Transportation Needs (2025)    NCSS - Transportation     Lack of Transportation: No   Housing Stability: Not At Risk (2025)    NCSS - Housing/Utilities     Has Housing: Yes     Worried About Losing Housing: No     Unable to Get Utilities: No               Review of Systems    Positive for stated complaint: Laceration  Other systems are as noted in HPI.  Constitutional and vital signs reviewed.      All other systems reviewed and negative except as noted above.    Physical Exam    ED Triage Vitals [07/11/25 1408]   /84   Pulse 60   Resp 18   Temp 98.3 °F (36.8 °C)   Temp src    SpO2 98 %   O2 Device None (Room air)       Current Vitals:   Vital Signs  BP: 145/84  Pulse: 60  Resp: 18  Temp: 98.3 °F (36.8 °C)    Oxygen Therapy  SpO2: 98 %  O2 Device: None (Room air)            Physical Exam  Vitals and nursing note reviewed.   Constitutional:       General: He is not in acute distress.     Appearance: Normal appearance. He is not ill-appearing or toxic-appearing.   HENT:      Head: Normocephalic and atraumatic.      Nose: Nose normal.      Mouth/Throat:      Mouth: Mucous membranes are moist.      Pharynx: Oropharynx is clear.   Eyes:      Pupils: Pupils are equal, round, and reactive to light.   Cardiovascular:      Rate and Rhythm: Normal rate.      Pulses: Normal pulses.   Pulmonary:      Effort: Pulmonary effort is normal. No respiratory distress.      Breath sounds: Normal breath sounds.      Comments: Lungs clear.  No adventitious lung sounds.  No distress.  No hypoxia.  Pulse ox 98% ra. Which is normal    Abdominal:      General: Abdomen is flat.      Palpations: Abdomen is soft.   Musculoskeletal:         General: No signs of injury. Normal range of motion.      Cervical back: Normal range of motion and neck supple.   Skin:     General: Skin is warm and dry.      Capillary Refill: Capillary refill takes less than 2 seconds.      Comments: Arrives with ace wrap to right forearm. 1cm x 1 cm skin tear to right dorsal forearm. Superficial. Oozing blood, controlled with pressure. No hematoma. Right hand grasp intact.     Scattered bruising   Neurological:      General: No focal deficit present.      Mental Status: He is alert and oriented to person,  place, and time.      GCS: GCS eye subscore is 4. GCS verbal subscore is 5. GCS motor subscore is 6.   Psychiatric:         Mood and Affect: Mood normal.         Behavior: Behavior normal.         Thought Content: Thought content normal.         Judgment: Judgment normal.       ED Course  Labs Reviewed   POCT CBC - Abnormal; Notable for the following components:       Result Value    RBC IC 3.36 (*)     HGB IC 10.3 (*)     HCT IC 31.5 (*)     All other components within normal limits   POCT COAGUCHECK - Abnormal; Notable for the following components:    POC INR  2.60 (*)     All other components within normal limits    Narrative:     INR Therapeutic Interval Group A (2.00-3.00)     Venous Thrombosis, Pulmonary & Systemic Thrombosis, Tissue Heart Valve,         Acute Myocardidal Infarction, Valvular Heart Disease or Atrial Fibrillation     INR Therapeutic Interval Group B (2.50-3.50)        Recurrent Systemic Embolism or Mechanical Prosthetic Valve           Recent Results (from the past 24 hours)   POCT CBC    Collection Time: 07/11/25  2:28 PM   Result Value Ref Range    WBC IC 5.7 4.0 - 11.0 x10ˆ3/uL    RBC IC 3.36 (L) 3.80 - 5.80 X10ˆ6/uL    HGB IC 10.3 (L) 13.0 - 17.5 g/dL    HCT IC 31.5 (L) 39.0 - 53.0 %    MCV IC 93.8 80.0 - 100.0 fL    MCH IC 30.7 26.0 - 34.0 pg    MCHC IC 32.7 31.0 - 37.0 g/dL    PLT .0 150.0 - 450.0 X10ˆ3/uL    # Neutrophil 3.8 1.5 - 7.7 X10ˆ3/uL    # Lymphocyte 1.2 1.0 - 4.0 X10ˆ3/uL    # Mixed Cells 0.7 0.1 - 1.0 X10ˆ3/uL    Neutrophil % 67.5 %    Lymphocyte % 20.8 %    Mixed Cell % 11.7 %   POCT Coagucheck    Collection Time: 07/11/25  2:28 PM   Result Value Ref Range    POC INR  2.60 (H) 0.90 - 1.10            MDM         Medical Decision Making  Differential diagnoses reflecting the complexity of care include: Right forearm skin tear, anemia, anticoagulated  Patient with superficial skin tear to the right forearm that continues to bleed intermittently for the last 2 days  1 mL of  lidocaine with epinephrine was injected for vasoconstriction and a Gelfoam dressing was applied with hemostasis.  No wound closure  No bony tenderness, no significant injury, no suspicion for fracture, no indication for xray  Tetanus is up-to-date  Patient is anticoagulated on Coumadin.  INR today is 2.6 which is therapeutic  Recent admit for anemia, blood transfusion.  Hemoglobin today is 10.3  Patient is well-appearing.  No tachycardia or hypotension    Plan of Care: Patient was instructed to keep the dressing clean and dry.  He does have an appointment with his primary physician on Monday.  I instructed him to leave the dressing in place and have his primary physician check it then    Results and plan of care discussed with the patient/family. They are in agreement with discharge. They understand to follow up with their primary doctor or the referral physician for further evaluation, especially if no improvement.  Also discussed the limitations of immediate care, patient is aware that if symptoms are worse they should go to the emergency room. Verbal and written discharge instructions were given.     My independent interpretation of studies of: hgb showing mild anemia, improved since admission  Diagnostic tests and medications considered but not ordered were: No indication for x-ray of the forearm   Shared decision making was done by: patient agreeable to screening labs given recent admission for anemia/blood transfusion  Comorbidities that add complexity to management include: Anticoagulated, A-fib, CKD, diabetes  External chart review was done and was noted: Admit 6/18 for anemia.  Received blood transfusion.  Hemoglobin 6/18 7.0, most recent 10.1 on 7/8.  Last INR 1.47 on 6/22. Last tetanus 2023  History obtained by an independent source was from: wife  Discussions and management was done with: N/A  Social determinants of health that affect care: age              Problems Addressed:  Skin tear of right forearm  without complication, initial encounter: acute illness or injury    Amount and/or Complexity of Data Reviewed  Independent Historian: spouse  External Data Reviewed: labs and notes.  Labs: ordered. Decision-making details documented in ED Course.    Risk  OTC drugs.        Disposition and Plan     Clinical Impression:  1. Skin tear of right forearm without complication, initial encounter         Disposition:  Discharge  7/11/2025  3:15 pm    Follow-up:  Abhi Arellano MD  [address] [place] [id number] [phone]                Medications Prescribed:  Current Discharge Medication List                Supplementary Documentation:

## 2025-07-11 NOTE — ED INITIAL ASSESSMENT (HPI)
Pt c/o open area to his anterior right forearm.  Pt states he bumped his arm into a door. Pt states the injury happened on Wednesday ..  Pt states the wound started to bleed again today. .

## 2025-07-13 PROBLEM — K92.2 GASTROINTESTINAL HEMORRHAGE, UNSPECIFIED GASTROINTESTINAL HEMORRHAGE TYPE: Status: RESOLVED | Noted: 2025-03-28 | Resolved: 2025-07-13

## 2025-07-13 PROBLEM — R73.9 HYPERGLYCEMIA: Status: RESOLVED | Noted: 2025-06-19 | Resolved: 2025-07-13

## 2025-07-13 PROBLEM — D64.9 SYMPTOMATIC ANEMIA: Status: RESOLVED | Noted: 2025-03-28 | Resolved: 2025-07-13

## 2025-07-13 PROBLEM — N18.9 CHRONIC KIDNEY DISEASE, UNSPECIFIED CKD STAGE: Status: RESOLVED | Noted: 2025-03-28 | Resolved: 2025-07-13

## 2025-07-14 ENCOUNTER — OFFICE VISIT (OUTPATIENT)
Dept: INTERNAL MEDICINE CLINIC | Facility: CLINIC | Age: 85
End: 2025-07-14
Payer: MEDICARE

## 2025-07-14 VITALS
DIASTOLIC BLOOD PRESSURE: 70 MMHG | BODY MASS INDEX: 27.72 KG/M2 | TEMPERATURE: 98 F | RESPIRATION RATE: 16 BRPM | HEIGHT: 71 IN | SYSTOLIC BLOOD PRESSURE: 116 MMHG | WEIGHT: 198 LBS | OXYGEN SATURATION: 98 % | HEART RATE: 70 BPM

## 2025-07-14 DIAGNOSIS — N18.4 TYPE 2 DIABETES MELLITUS WITH STAGE 4 CHRONIC KIDNEY DISEASE, WITHOUT LONG-TERM CURRENT USE OF INSULIN (HCC): Primary | ICD-10-CM

## 2025-07-14 DIAGNOSIS — E11.22 TYPE 2 DIABETES MELLITUS WITH STAGE 4 CHRONIC KIDNEY DISEASE, WITHOUT LONG-TERM CURRENT USE OF INSULIN (HCC): Primary | ICD-10-CM

## 2025-07-14 DIAGNOSIS — I48.0 PAROXYSMAL A-FIB (HCC): ICD-10-CM

## 2025-07-14 DIAGNOSIS — I15.2 HYPERTENSION ASSOCIATED WITH DIABETES (HCC): ICD-10-CM

## 2025-07-14 DIAGNOSIS — E11.59 HYPERTENSION ASSOCIATED WITH DIABETES (HCC): ICD-10-CM

## 2025-07-14 PROCEDURE — G2211 COMPLEX E/M VISIT ADD ON: HCPCS | Performed by: INTERNAL MEDICINE

## 2025-07-14 PROCEDURE — 1159F MED LIST DOCD IN RCRD: CPT | Performed by: INTERNAL MEDICINE

## 2025-07-14 PROCEDURE — 1160F RVW MEDS BY RX/DR IN RCRD: CPT | Performed by: INTERNAL MEDICINE

## 2025-07-14 PROCEDURE — 99214 OFFICE O/P EST MOD 30 MIN: CPT | Performed by: INTERNAL MEDICINE

## 2025-07-14 PROCEDURE — 1111F DSCHRG MED/CURRENT MED MERGE: CPT | Performed by: INTERNAL MEDICINE

## 2025-07-14 NOTE — PROGRESS NOTES
Subjective:   Patient ID: Kyler Haji is a 84 year old male.    HPI  HPI:   Kyler Haji is a 84 year old male who presents for recheck of his diabetes, HTN and PAF. Patient’s FBS have been at goal. No hypoglycemia noted  Anemia has improved and no further GI blees  Pt has been checking his feet on a regular basis. Pt denies any tingling of the feet  Pt feels well. No sob or palpitations    Wt Readings from Last 6 Encounters:   07/14/25 198 lb (89.8 kg)   06/25/25 196 lb (88.9 kg)   06/22/25 190 lb 1.6 oz (86.2 kg)   06/18/25 197 lb (89.4 kg)   05/05/25 197 lb 8 oz (89.6 kg)   05/01/25 198 lb (89.8 kg)     Body mass index is 27.62 kg/m².     Lab Results   Component Value Date    A1C 5.9 (H) 07/08/2025    A1C 5.9 (H) 06/19/2025    A1C 7.2 (H) 01/20/2025     Lab Results   Component Value Date    CHOLEST 118 07/08/2025    CHOLEST 129 01/20/2025    CHOLEST 137 07/16/2024     Lab Results   Component Value Date    HDL 56 07/08/2025    HDL 49 01/20/2025    HDL 58 07/16/2024     Lab Results   Component Value Date    LDL 45 07/08/2025    LDL 57 01/20/2025    LDL 59 07/16/2024     Lab Results   Component Value Date    TRIG 85 07/08/2025    TRIG 134 01/20/2025    TRIG 110 07/16/2024     Lab Results   Component Value Date    AST 45 (H) 07/08/2025    AST 38 (H) 06/19/2025    AST 31 03/28/2025     Lab Results   Component Value Date    ALT 50 (H) 07/08/2025    ALT 27 06/19/2025    ALT 31 03/28/2025     Malb/Cre Calc   Date Value Ref Range Status   01/20/2025 36.2 (H) <=30.0 ug/mg Final     Comment:     <30 ug/mg creatinine       Normal     ug/mg creatinine   Microalbuminuria   >300 ug/mg creatinine      Albuminuria       07/16/2024 23.4 <=30.0 ug/mg Final     Comment:     <30 ug/mg creatinine       Normal     ug/mg creatinine   Microalbuminuria   >300 ug/mg creatinine      Albuminuria       01/16/2024 24.6 <=30.0 ug/mg Final     Comment:     <30 ug/mg creatinine       Normal     ug/mg creatinine    Microalbuminuria   >300 ug/mg creatinine      Albuminuria           Current Medications[1]   Past Medical History[2]   Past Surgical History[3]   Social History: Short Social Hx on File[4]  Exercise: minimal.  Diet: watches minimally     REVIEW OF SYSTEMS:   GENERAL HEALTH: feels well otherwise  SKIN: denies any unusual skin lesions or rashes  RESPIRATORY: denies shortness of breath with exertion  CARDIOVASCULAR: denies chest pain on exertion  GI: denies abdominal pain and denies heartburn  NEURO: denies headaches    EXAM:   /70   Pulse 70   Temp 98.1 °F (36.7 °C)   Resp 16   Ht 5' 11\" (1.803 m)   Wt 198 lb (89.8 kg)   SpO2 98%   BMI 27.62 kg/m²   GENERAL: well developed, well nourished,in no apparent distress  SKIN: no rashes,no suspicious lesions  NECK: supple,no adenopathy,no bruits  LUNGS: clear to auscultation  CARDIO: RRR without murmur  GI: good BS's,no masses, HSM or tenderness  EXTREMITIES: no cyanosis, clubbing or edema  NEURO: Bilateral barefoot skin diabetic exam is normal, visualized feet and the appearance is normal.  Bilateral monofilament/sensation of both feet is normal.  Pulsation pedal pulse exam of both lower legs/feet is normal as well.        ASSESSMENT AND PLAN:   Kyler Haji is a 84 year old male who presents for a recheck of his Diabetes mellitus with complication (HCC) and Hypertension associated with diabetes (HCC) which are controlled  Paroxysmal A-fib (HCC) - rate controlled. On warfarin. Consider discussion with cardio for watchman  F/U GI and heme for anemia management  Recommendations are: continue present meds, we reviewed the results of his recent HgbA1C,  fasting lipids and CMP, lose wgt with carbohydrate controlled diet and exercise, , check feet daily.  The patient indicates understanding of these issues and agrees to the plan.  The patient is asked to return in 6 m.    History/Other:   Review of Systems  Current  Medications[5]  Allergies:Allergies[6]    Objective:   Physical Exam    Assessment & Plan:   1. Type 2 diabetes mellitus with stage 4 chronic kidney disease, without long-term current use of insulin (HCC)    2. Hypertension associated with diabetes (HCC)    3. Paroxysmal A-fib (HCC)        No orders of the defined types were placed in this encounter.      Meds This Visit:  Requested Prescriptions      No prescriptions requested or ordered in this encounter       Imaging & Referrals:  None         [1]   Current Outpatient Medications   Medication Sig Dispense Refill    furosemide 20 MG Oral Tab TAKE 40MG ON EVEN DAYS, AND 20MG ON ODD DAYS 45 tablet 2    furosemide 40 MG Oral Tab Take 1 tablet (40 mg total) by mouth every other day. 45 tablet 3    losartan 25 MG Oral Tab Take 1 tablet (25 mg total) by mouth daily. 90 tablet 1    pantoprazole 40 MG Oral Tab EC Take 1 tablet (40 mg total) by mouth every morning before breakfast. 60 tablet 0    aspirin 81 MG Oral Tab EC Take 1 tablet (81 mg total) by mouth every evening.      warfarin 4 MG Oral Tab Take 0.5-1 tablets (2-4 mg total) by mouth As Directed.      Metoprolol Succinate  MG Oral Tablet 24 Hr Take 1 tablet (200 mg total) by mouth daily. 90 tablet 2    metFORMIN 500 MG Oral Tab Take 1 tablet (500 mg total) by mouth 2 (two) times daily with meals. 180 tablet 1    sodium bicarbonate 650 MG Oral Tab Take 1 tablet (650 mg total) by mouth 2 (two) times daily. 60 tablet 3    glipiZIDE 5 MG Oral Tab Take 1 tablet (5 mg total) by mouth 2 (two) times daily before meals. 180 tablet 1    allopurinol 100 MG Oral Tab Take 1 tablet (100 mg total) by mouth daily. 90 tablet 3    atorvastatin 40 MG Oral Tab Take 1 tablet (40 mg total) by mouth daily.      cholecalciferol 50 MCG (2000 UT) Oral Cap Take 1 capsule (2,000 Units total) by mouth in the morning.      spironolactone 25 MG Oral Tab Take 0.5 tablets (12.5 mg total) by mouth daily. 90 tablet 3    amiodarone 200 MG Oral  Tab Take 1 tablet (200 mg total) by mouth daily. 90 tablet 3   [2]   Past Medical History:   Abdominal hernia    Actinic keratosis    Black stools    Chest pain, unspecified    Cough    Diabetes (HCC)    Diabetes mellitus (HCC)    Dysmetabolic syndrome X    Easy bruising    Essential hypertension    Essential hypertension, malignant    Flatulence/gas pain/belching    Hemorrhoids    High cholesterol    Inguinal hernia without mention of obstruction or gangrene, unilateral or unspecified, (not specified as recurrent)    Myalgia and myositis, unspecified    Obesity, unspecified    Pacemaker    Special screening for malignant neoplasm of prostate    Type II or unspecified type diabetes mellitus without mention of complication, uncontrolled    Undiagnosed cardiac murmurs    Unspecified sleep apnea    AHI 21.7 SaO2 yashira 79.9 %     Wears glasses   [3]   Past Surgical History:  Procedure Laterality Date    Cabg  2012    CABG x 3    Cath transcatheter aortic valve replacement  2012    Colonoscopy  2014    Procedure: COLONOSCOPY;  Surgeon: Ton Oliveira MD;  Location:  ENDOSCOPY    Colonoscopy N/A 2025    Procedure: COLONOSCOPY;  Surgeon: Israel Martin MD;  Location:  ENDOSCOPY    Colonoscopy N/A 4/3/2025    Procedure: COLONOSCOPY;  Surgeon: Sacha Simmons MD;  Location:  ENDOSCOPY    Colonoscopy N/A 2025    Procedure: COLONOSCOPY;  Surgeon: Sacha Simmons MD;  Location:  ENDOSCOPY    Hernia surgery      Valve repair     [4]   Social History  Socioeconomic History    Marital status:    Tobacco Use    Smoking status: Former     Current packs/day: 0.00     Types: Cigarettes     Start date: 1955     Quit date: 1975     Years since quittin.1     Passive exposure: Never    Smokeless tobacco: Never   Vaping Use    Vaping status: Never Used   Substance and Sexual Activity    Alcohol use: Yes     Alcohol/week: 1.0 standard drink of alcohol     Types: 1 Standard drinks or  equivalent per week     Comment: 8-10 drinks per week    Drug use: Never   Other Topics Concern    Caffeine Concern Yes    Exercise No     Social Drivers of Health     Food Insecurity: No Food Insecurity (6/19/2025)    NCSS - Food Insecurity     Worried About Running Out of Food in the Last Year: No     Ran Out of Food in the Last Year: No   Transportation Needs: No Transportation Needs (6/19/2025)    NCSS - Transportation     Lack of Transportation: No   Housing Stability: Not At Risk (6/19/2025)    NCSS - Housing/Utilities     Has Housing: Yes     Worried About Losing Housing: No     Unable to Get Utilities: No   [5]   Current Outpatient Medications   Medication Sig Dispense Refill    furosemide 20 MG Oral Tab TAKE 40MG ON EVEN DAYS, AND 20MG ON ODD DAYS 45 tablet 2    furosemide 40 MG Oral Tab Take 1 tablet (40 mg total) by mouth every other day. 45 tablet 3    losartan 25 MG Oral Tab Take 1 tablet (25 mg total) by mouth daily. 90 tablet 1    pantoprazole 40 MG Oral Tab EC Take 1 tablet (40 mg total) by mouth every morning before breakfast. 60 tablet 0    aspirin 81 MG Oral Tab EC Take 1 tablet (81 mg total) by mouth every evening.      warfarin 4 MG Oral Tab Take 0.5-1 tablets (2-4 mg total) by mouth As Directed.      Metoprolol Succinate  MG Oral Tablet 24 Hr Take 1 tablet (200 mg total) by mouth daily. 90 tablet 2    metFORMIN 500 MG Oral Tab Take 1 tablet (500 mg total) by mouth 2 (two) times daily with meals. 180 tablet 1    sodium bicarbonate 650 MG Oral Tab Take 1 tablet (650 mg total) by mouth 2 (two) times daily. 60 tablet 3    glipiZIDE 5 MG Oral Tab Take 1 tablet (5 mg total) by mouth 2 (two) times daily before meals. 180 tablet 1    allopurinol 100 MG Oral Tab Take 1 tablet (100 mg total) by mouth daily. 90 tablet 3    atorvastatin 40 MG Oral Tab Take 1 tablet (40 mg total) by mouth daily.      cholecalciferol 50 MCG (2000 UT) Oral Cap Take 1 capsule (2,000 Units total) by mouth in the morning.       spironolactone 25 MG Oral Tab Take 0.5 tablets (12.5 mg total) by mouth daily. 90 tablet 3    amiodarone 200 MG Oral Tab Take 1 tablet (200 mg total) by mouth daily. 90 tablet 3   [6]   Allergies  Allergen Reactions    Ace Inhibitors Coughing

## 2025-08-04 ENCOUNTER — OFFICE VISIT (OUTPATIENT)
Facility: LOCATION | Age: 85
End: 2025-08-04

## 2025-08-04 DIAGNOSIS — H93.13 TINNITUS OF BOTH EARS: ICD-10-CM

## 2025-08-04 DIAGNOSIS — H90.3 SENSORINEURAL HEARING LOSS (SNHL) OF BOTH EARS: Primary | ICD-10-CM

## 2025-08-04 DIAGNOSIS — H93.293 ABNORMAL AUDITORY PERCEPTION OF BOTH EARS: Primary | ICD-10-CM

## 2025-08-04 PROCEDURE — 1159F MED LIST DOCD IN RCRD: CPT | Performed by: OTOLARYNGOLOGY

## 2025-08-04 PROCEDURE — 99203 OFFICE O/P NEW LOW 30 MIN: CPT | Performed by: OTOLARYNGOLOGY

## 2025-08-04 PROCEDURE — 92557 COMPREHENSIVE HEARING TEST: CPT | Performed by: AUDIOLOGIST

## 2025-08-04 PROCEDURE — 1160F RVW MEDS BY RX/DR IN RCRD: CPT | Performed by: OTOLARYNGOLOGY

## 2025-08-04 PROCEDURE — 1159F MED LIST DOCD IN RCRD: CPT | Performed by: AUDIOLOGIST

## 2025-08-04 PROCEDURE — 92567 TYMPANOMETRY: CPT | Performed by: AUDIOLOGIST

## 2025-08-16 ENCOUNTER — TELEPHONE (OUTPATIENT)
Dept: INTERNAL MEDICINE CLINIC | Facility: CLINIC | Age: 85
End: 2025-08-16

## 2025-08-16 DIAGNOSIS — D50.0 IRON DEFICIENCY ANEMIA DUE TO CHRONIC BLOOD LOSS: ICD-10-CM

## 2025-08-16 DIAGNOSIS — Z79.01 LONG TERM (CURRENT) USE OF ANTICOAGULANTS: ICD-10-CM

## 2025-08-16 DIAGNOSIS — D64.9 CHRONIC ANEMIA: Primary | ICD-10-CM

## 2025-08-17 ENCOUNTER — LAB ENCOUNTER (OUTPATIENT)
Dept: LAB | Facility: HOSPITAL | Age: 85
End: 2025-08-17
Attending: NURSE PRACTITIONER

## 2025-08-17 DIAGNOSIS — R74.01 ELEVATED AST (SGOT): ICD-10-CM

## 2025-08-17 DIAGNOSIS — R74.01 ELEVATED ALT MEASUREMENT: ICD-10-CM

## 2025-08-17 DIAGNOSIS — D50.0 IRON DEFICIENCY ANEMIA DUE TO CHRONIC BLOOD LOSS: ICD-10-CM

## 2025-08-17 DIAGNOSIS — Z79.01 LONG TERM (CURRENT) USE OF ANTICOAGULANTS: ICD-10-CM

## 2025-08-17 DIAGNOSIS — D64.9 CHRONIC ANEMIA: ICD-10-CM

## 2025-08-17 LAB
ALBUMIN SERPL-MCNC: 4.3 G/DL (ref 3.2–4.8)
ALP LIVER SERPL-CCNC: 86 U/L (ref 45–117)
ALT SERPL-CCNC: 37 U/L (ref 10–49)
AST SERPL-CCNC: 46 U/L (ref ?–34)
BASOPHILS # BLD AUTO: 0.04 X10(3) UL (ref 0–0.2)
BASOPHILS NFR BLD AUTO: 0.7 %
BILIRUB DIRECT SERPL-MCNC: 0.2 MG/DL (ref ?–0.3)
BILIRUB SERPL-MCNC: 0.4 MG/DL (ref 0.2–1.1)
DEPRECATED HBV CORE AB SER IA-ACNC: 37 NG/ML (ref 50–336)
EOSINOPHIL # BLD AUTO: 0.17 X10(3) UL (ref 0–0.7)
EOSINOPHIL NFR BLD AUTO: 3.2 %
ERYTHROCYTE [DISTWIDTH] IN BLOOD BY AUTOMATED COUNT: 15.8 %
HCT VFR BLD AUTO: 25.7 % (ref 39–53)
HGB BLD-MCNC: 8.4 G/DL (ref 13–17.5)
IMM GRANULOCYTES # BLD AUTO: 0.02 X10(3) UL (ref 0–1)
IMM GRANULOCYTES NFR BLD: 0.4 %
IRON SATN MFR SERPL: 8 % (ref 20–50)
IRON SERPL-MCNC: 32 UG/DL (ref 65–175)
LYMPHOCYTES # BLD AUTO: 0.91 X10(3) UL (ref 1–4)
LYMPHOCYTES NFR BLD AUTO: 16.9 %
MCH RBC QN AUTO: 31.7 PG (ref 26–34)
MCHC RBC AUTO-ENTMCNC: 32.7 G/DL (ref 31–37)
MCV RBC AUTO: 97 FL (ref 80–100)
MONOCYTES # BLD AUTO: 0.57 X10(3) UL (ref 0.1–1)
MONOCYTES NFR BLD AUTO: 10.6 %
NEUTROPHILS # BLD AUTO: 3.68 X10 (3) UL (ref 1.5–7.7)
NEUTROPHILS # BLD AUTO: 3.68 X10(3) UL (ref 1.5–7.7)
NEUTROPHILS NFR BLD AUTO: 68.2 %
PLATELET # BLD AUTO: 178 10(3)UL (ref 150–450)
PROT SERPL-MCNC: 6.6 G/DL (ref 5.7–8.2)
RBC # BLD AUTO: 2.65 X10(6)UL (ref 3.8–5.8)
TOTAL IRON BINDING CAPACITY: 409 UG/DL (ref 250–425)
TRANSFERRIN SERPL-MCNC: 322 MG/DL (ref 215–365)
WBC # BLD AUTO: 5.4 X10(3) UL (ref 4–11)

## 2025-08-17 PROCEDURE — 83540 ASSAY OF IRON: CPT

## 2025-08-17 PROCEDURE — 36415 COLL VENOUS BLD VENIPUNCTURE: CPT

## 2025-08-17 PROCEDURE — 82728 ASSAY OF FERRITIN: CPT

## 2025-08-17 PROCEDURE — 85025 COMPLETE CBC W/AUTO DIFF WBC: CPT

## 2025-08-17 PROCEDURE — 83550 IRON BINDING TEST: CPT

## 2025-08-17 PROCEDURE — 80076 HEPATIC FUNCTION PANEL: CPT

## 2025-08-19 ENCOUNTER — HOSPITAL ENCOUNTER (OUTPATIENT)
Dept: CV DIAGNOSTICS | Facility: HOSPITAL | Age: 85
Discharge: HOME | End: 2025-08-19
Attending: INTERNAL MEDICINE

## 2025-08-19 ENCOUNTER — HOSPITAL ENCOUNTER (OUTPATIENT)
Dept: INTERVENTIONAL RADIOLOGY/VASCULAR | Facility: HOSPITAL | Age: 85
Discharge: HOME | End: 2025-08-19
Attending: INTERNAL MEDICINE | Admitting: INTERNAL MEDICINE

## 2025-08-19 VITALS
OXYGEN SATURATION: 94 % | RESPIRATION RATE: 19 BRPM | WEIGHT: 195 LBS | HEART RATE: 60 BPM | BODY MASS INDEX: 26.7 KG/M2 | HEIGHT: 71.5 IN | SYSTOLIC BLOOD PRESSURE: 134 MMHG | DIASTOLIC BLOOD PRESSURE: 66 MMHG

## 2025-08-19 DIAGNOSIS — I48.91 A-FIB (HCC): ICD-10-CM

## 2025-08-19 LAB — GLUCOSE BLD-MCNC: 176 MG/DL (ref 70–99)

## 2025-08-19 PROCEDURE — 93320 DOPPLER ECHO COMPLETE: CPT | Performed by: INTERNAL MEDICINE

## 2025-08-19 PROCEDURE — 93325 DOPPLER ECHO COLOR FLOW MAPG: CPT | Performed by: INTERNAL MEDICINE

## 2025-08-19 PROCEDURE — 82962 GLUCOSE BLOOD TEST: CPT

## 2025-08-19 PROCEDURE — 99153 MOD SED SAME PHYS/QHP EA: CPT | Performed by: INTERNAL MEDICINE

## 2025-08-19 PROCEDURE — 99152 MOD SED SAME PHYS/QHP 5/>YRS: CPT | Performed by: INTERNAL MEDICINE

## 2025-08-19 PROCEDURE — 93312 ECHO TRANSESOPHAGEAL: CPT | Performed by: INTERNAL MEDICINE

## 2025-08-19 RX ORDER — MIDAZOLAM HYDROCHLORIDE 1 MG/ML
INJECTION INTRAMUSCULAR; INTRAVENOUS
Status: COMPLETED
Start: 2025-08-19 | End: 2025-08-19

## 2025-08-19 RX ORDER — MIDAZOLAM HYDROCHLORIDE 1 MG/ML
1 INJECTION INTRAMUSCULAR; INTRAVENOUS EVERY 5 MIN PRN
Status: DISCONTINUED | OUTPATIENT
Start: 2025-08-19 | End: 2025-08-19

## 2025-08-19 RX ORDER — SODIUM CHLORIDE 9 MG/ML
INJECTION, SOLUTION INTRAVENOUS
Status: COMPLETED | OUTPATIENT
Start: 2025-08-20 | End: 2025-08-19

## 2025-08-19 RX ADMIN — SODIUM CHLORIDE: 9 INJECTION, SOLUTION INTRAVENOUS at 08:50:00

## 2025-08-19 RX ADMIN — MIDAZOLAM HYDROCHLORIDE 2 MG: 1 INJECTION INTRAMUSCULAR; INTRAVENOUS at 09:13:00

## 2025-08-20 ENCOUNTER — TELEPHONE (OUTPATIENT)
Dept: NEPHROLOGY | Facility: CLINIC | Age: 85
End: 2025-08-20

## 2025-08-20 ENCOUNTER — OFFICE VISIT (OUTPATIENT)
Dept: NEPHROLOGY | Facility: CLINIC | Age: 85
End: 2025-08-20

## 2025-08-20 VITALS — SYSTOLIC BLOOD PRESSURE: 138 MMHG | WEIGHT: 199.19 LBS | DIASTOLIC BLOOD PRESSURE: 68 MMHG | BODY MASS INDEX: 27 KG/M2

## 2025-08-20 DIAGNOSIS — N18.4 CKD (CHRONIC KIDNEY DISEASE) STAGE 4, GFR 15-29 ML/MIN (HCC): Primary | ICD-10-CM

## 2025-08-20 PROCEDURE — 1160F RVW MEDS BY RX/DR IN RCRD: CPT | Performed by: INTERNAL MEDICINE

## 2025-08-20 PROCEDURE — 99214 OFFICE O/P EST MOD 30 MIN: CPT | Performed by: INTERNAL MEDICINE

## 2025-08-20 PROCEDURE — 1159F MED LIST DOCD IN RCRD: CPT | Performed by: INTERNAL MEDICINE

## 2025-08-20 RX ORDER — DOCUSATE SODIUM 100 MG/1
100 CAPSULE, LIQUID FILLED ORAL 2 TIMES DAILY PRN
Qty: 180 CAPSULE | Refills: 0 | Status: SHIPPED | OUTPATIENT
Start: 2025-08-20

## 2025-08-20 RX ORDER — SODIUM BICARBONATE 650 MG/1
650 TABLET ORAL 2 TIMES DAILY
Qty: 180 TABLET | Refills: 3 | Status: SHIPPED | OUTPATIENT
Start: 2025-08-20

## 2025-08-22 ENCOUNTER — NURSE ONLY (OUTPATIENT)
Dept: NEPHROLOGY | Facility: CLINIC | Age: 85
End: 2025-08-22

## 2025-08-22 VITALS — DIASTOLIC BLOOD PRESSURE: 70 MMHG | WEIGHT: 199.5 LBS | SYSTOLIC BLOOD PRESSURE: 110 MMHG | BODY MASS INDEX: 27 KG/M2

## 2025-08-22 DIAGNOSIS — D63.1 ANEMIA DUE TO STAGE 4 CHRONIC KIDNEY DISEASE (HCC): Primary | ICD-10-CM

## 2025-08-22 DIAGNOSIS — N18.4 ANEMIA DUE TO STAGE 4 CHRONIC KIDNEY DISEASE (HCC): Primary | ICD-10-CM

## (undated) DIAGNOSIS — E11.22 TYPE 2 DIABETES MELLITUS WITH STAGE 4 CHRONIC KIDNEY DISEASE, WITHOUT LONG-TERM CURRENT USE OF INSULIN (HCC): ICD-10-CM

## (undated) DIAGNOSIS — N18.4 TYPE 2 DIABETES MELLITUS WITH STAGE 4 CHRONIC KIDNEY DISEASE, WITHOUT LONG-TERM CURRENT USE OF INSULIN (HCC): ICD-10-CM

## (undated) DEVICE — 10FT COMBINED O2 DELIVERY/CO2 MONITORING. FILTER WITH MICROSTREAM TYPE LUER: Brand: DUAL ADULT NASAL CANNULA

## (undated) DEVICE — V2 SPECIMEN COLLECTION MANIFOLD KIT: Brand: NEPTUNE

## (undated) DEVICE — 3M™ RED DOT™ MONITORING ELECTRODE WITH FOAM TAPE AND STICKY GEL, 50/BAG, 20/CASE, 72/PLT 2570: Brand: RED DOT™

## (undated) DEVICE — MASK,FACE,MAXFLUIDPROTECT,SHIELD/TIES: Brand: MEDLINE

## (undated) DEVICE — GIJAW SINGLE-USE BIOPSY FORCEPS WITH NEEDLE: Brand: GIJAW

## (undated) DEVICE — DEVICE ENDOSCP DEL SHTH L180CM DIA2.5MM

## (undated) DEVICE — KIT CUSTOM ENDOPROCEDURE STERIS

## (undated) DEVICE — BITEBLOCK ENDOSCP 60FR MAXI STRP

## (undated) DEVICE — Device

## (undated) DEVICE — KIT VLV 5 PC AIR H2O SUCT BX ENDOGATOR CONN

## (undated) DEVICE — FIAPC® PROBE W/ FILTER 2200 C OD 2.3MM/6.9FR; L 2.2M/7.2FT: Brand: ERBE

## (undated) DEVICE — NEEDLE INJ 25GA CATH 230CM CHN 2.8MM ACUJECT

## (undated) DEVICE — 1200CC GUARDIAN II: Brand: GUARDIAN

## (undated) DEVICE — ENDOSCOPE CAP SB3 PILLCAM

## (undated) DEVICE — REM POLYHESIVE ADULT PATIENT RETURN ELECTRODE: Brand: VALLEYLAB

## (undated) NOTE — MR AVS SNAPSHOT
[address]  [address] [address] [phone]               Thank you for choosing us for your health care visit with Molly Cheadle, MD.  We are glad to serve you and happy to provide you with this menjivar CARD ECHO 2D DOPPLER (CPT=93306)    Complete by: Apr 06, 2017 (Approximate)    Assoc Dx:  Ischemic dilated cardiomyopathy ([facility] [I25.5, I42.0]                 Follow-up Instructions     Return in about 6 months (around 10/6/2017).       Scheduling Instruc is 120 to 139 mm Hg, or your diastolic blood pressure reading is 80 to 89 mm Hg   Colorectal cancer All men in this age group Flexible sigmoidoscopy every 5 years, or colonoscopy every 10 years, or double-contrast barium enema every 5 years; yearly fecal o weeks after the first dose   Hepatitis A Men at increased risk for infection – talk with your healthcare provider 2 doses given at least 6 months apart   Hepatitis B Men at increased risk for infection – talk with your healthcare provider 3 doses over 6 mo Allergies as of Apr 06, 2017     Ace Inhibitors Coughing                Today's Vital Signs     BP Pulse Temp Height Weight BMI    144/80 mmHg 62 97.8 °F (36.6 °C) 72\" 198 lb 26.85 kg/m2         Current Medications          This list is accurate as of: 4/ [place]                  Visit [facility] online at  [email or web address]

## (undated) NOTE — MR AVS SNAPSHOT
Cachorro Mckeon  [address] [facility] [address]  [phone]               Thank you for choosing us for your health care visit with [facility] NURSE.   We are glad to serve you and happy to provide you with this summary MetFORMIN HCl 850 MG Tabs   TAKE 1 TABLET BY MOUTH TWICE DAILY WITH MEALS   Commonly known as:  GLUCOPHAGE           Metoprolol Tartrate 50 MG Tabs   TAKE ONE-HALF TABLET BY MOUTH TWICE DAILY   Commonly known as:  LOPRESSOR           Verapamil HCl

## (undated) NOTE — Clinical Note
Wanted to notify you that your patient has consented to receive CCM services as of today's date.  We will keep you updated with progress etc.

## (undated) NOTE — ED AVS SNAPSHOT
Elenita Yoon   MRN: [id number]    Department:  [facility] Emergency Department   Date of Visit:  7/2/2019           Disclosure     Insurance plans vary and the physician(s) referred by the ER may not be covered by your plan.  Please contact your tell this physician (or your personal doctor if your instructions are to return to your personal doctor) about any new or lasting problems. The primary care or specialist physician will see patients referred from the [facility] Emergency Department.  Rowdy Amin

## (undated) NOTE — LETTER
January 8, 2018    [address] [place] [address] Rosa Cohen, [address]      Dear Tracey Hunter: You had previously enrolled in our Chronic Care Management program and had been speaking with your care manager.  We have since made several attempts to reach

## (undated) NOTE — LETTER
[address]  [address]  Authorization for Surgical Operation and Procedure     Date:___________                                                                                                         Time:__________  I hereby authorize Surgeon(s):  Israel Martin MD, my physician and his/her assistants (if applicable), which may include medical students, residents, and/or fellows, to perform the following surgical operation/ procedure and administer such anesthesia as may be determined necessary by my physician:  Operation/Procedure name (s) Procedure(s):  ESOPHAGOGASTRODUODENOSCOPY (EGD), COLONOSCOPY  COLONOSCOPY on Kyler Haji   2.   I recognize that during the surgical operation/procedure, unforeseen conditions may necessitate additional or different procedures than those listed above.  I, therefore, further authorize and request that the above-named surgeon, assistants, or designees perform such procedures as are, in their judgment, necessary and desirable.    3.   My surgeon/physician has discussed prior to my surgery the potential benefits, risks and side effects of this procedure; the likelihood of achieving goals; and potential problems that might occur during recuperation.  They also discussed reasonable alternatives to the procedure, including risks, benefits, and side effects related to the alternatives and risks related to not receiving this procedure.  I have had all my questions answered and I acknowledge that no guarantee has been made as to the result that may be obtained.    4.   Should the need arise during my operation/procedure, which includes change of level of care prior to discharge, I also consent to the administration of blood and/or blood products.  Further, I understand that despite careful testing and screening of blood or blood products by collecting agencies, I may still be subject to ill effects as a result of receiving a blood  transfusion and/or blood products.  The following are some, but not all, of the potential risks that can occur: fever and allergic reactions, hemolytic reactions, transmission of diseases such as Hepatitis, AIDS and Cytomegalovirus (CMV) and fluid overload.  In the event that I wish to have an autologous transfusion of my own blood, or a directed donor transfusion, I will discuss this with my physician.  Check only if Refusing Blood or Blood Products  I understand refusal of blood or blood products as deemed necessary by my physician may have serious consequences to my condition to include possible death. I hereby assume responsibility for my refusal and release the hospital, its personnel, and my physicians from any responsibility for the consequences of my refusal.          o  Refuse      5.   I authorize the use of any specimen, organs, tissues, body parts or foreign objects that may be removed from my body during the operation/procedure for diagnosis, research or teaching purposes and their subsequent disposal by hospital authorities.  I also authorize the release of specimen test results and/or written reports to my treating physician on the hospital medical staff or other referring or consulting physicians involved in my care, at the discretion of the Pathologist or my treating physician.    6.   I consent to the photographing or videotaping of the operations or procedures to be performed, including appropriate portions of my body for medical, scientific, or educational purposes, provided my identity is not revealed by the pictures or by descriptive texts accompanying them.  If the procedure has been photographed/videotaped, the surgeon will obtain the original picture, image, videotape or CD.  The hospital will not be responsible for storage, release or maintenance of the picture, image, tape or CD.    7.   I consent to the presence of a  or observers in the operating room as deemed  necessary by my physician or their designees.    8.   I recognize that in the event my procedure results in extended X-Ray/fluoroscopy time, I may develop a skin reaction.    9. If I have a Do Not Attempt Resuscitation (DNAR) order in place, that status will be suspended while in the operating room, procedural suite, and during the recovery period unless otherwise explicitly stated by me (or a person authorized to consent on my behalf). The surgeon or my attending physician will determine when the applicable recovery period ends for purposes of reinstating the DNAR order.  10. Patients having a sterilization procedure: I understand that if the procedure is successful the results will be permanent and it will therefore be impossible for me to inseminate, conceive, or bear children.  I also understand that the procedure is intended to result in sterility, although the result has not been guaranteed.   11. I acknowledge that my physician has explained sedation/analgesia administration to me including the risk and benefits I consent to the administration of sedation/analgesia as may be necessary or desirable in the judgment of my physician.    I CERTIFY THAT I HAVE READ AND FULLY UNDERSTAND THE ABOVE CONSENT TO OPERATION and/or OTHER PROCEDURE.    _________________________________________  __________________________________  Signature of Patient     Signature of Responsible Person         ___________________________________         Printed Name of Responsible Person           _________________________________                 Relationship to Patient  _________________________________________  ______________________________  Signature of Witness          Date  Time      Patient Name: Kyler Haji     : 1940                 Printed: 2025     Medical Record #: [id number]                     Page 1 of 2                                    [address]   [address]    Consent for Anesthesia    I, Klyer Haji agree to be cared for by an anesthesiologist, who is specially trained to monitor me and give me medicine to put me to sleep or keep me comfortable during my procedure    I understand that my anesthesiologist is not an employee or agent of [facility] or [employer]. He or she works for [email or web address].    As the patient asking for anesthesia services, I agree to:  Allow the anesthesiologist (anesthesia doctor) to give me medicine and do additional procedures as necessary. Some examples are: Starting or using an “IV” to give me medicine, fluids or blood during my procedure, and having a breathing tube placed to help me breathe when I’m asleep (intubation). In the event that my heart stops working properly, I understand that my anesthesiologist will make every effort to sustain my life, unless otherwise directed by [facility] Do Not Resuscitate documents.  Tell my anesthesia doctor before my procedure:  If I am pregnant.  The last time that I ate or drank.  All of the medicines I take (including prescriptions, herbal supplements, and pills I can buy without a prescription (including street drugs/illegal medications). Failure to inform my anesthesiologist about these medicines may increase my risk of anesthetic complications.  If I am allergic to anything or have had a reaction to anesthesia before.  I understand how the anesthesia medicine will help me (benefits).  I understand that with any type of anesthesia medicine there are risks:  The most common risks are: nausea, vomiting, sore throat, muscle soreness, damage to my eyes, mouth, or teeth (from breathing tube placement).  Rare risks include: remembering what happened during my procedure, allergic reactions to medications, injury to my airway, heart, lungs, vision, nerves, or muscles and in extremely rare instances death.  My doctor has explained to me other choices available  to me for my care (alternatives).  Pregnant Patients (“epidural”):  I understand that the risks of having an epidural (medicine given into my back to help control pain during labor), include itching, low blood pressure, difficulty urinating, headache or slowing of the baby’s heart. Very rare risks include infection, bleeding, seizure, irregular heart rhythms and nerve injury.  Regional Anesthesia (“spinal”, “epidural”, & “nerve blocks”):  I understand that rare but potential complications include headache, bleeding, infection, seizure, irregular heart rhythms, and nerve injury.    I can change my mind about having anesthesia services at any time before I get the medicine.    _____________________________________________________________________________  Patient (or Representative) Signature/Relationship to Patient  Date   Time    _____________________________________________________________________________   Name (if used)    Language/Organization   Time    _____________________________________________________________________________  Anesthesiologist Signature     Date   Time  I have discussed the procedure and information above with the patient (or patient’s representative) and answered their questions. The patient or their representative has agreed to have anesthesia services.    _____________________________________________________________________________  Witness        Date   Time  I have verified that the signature is that of the patient or patient’s representative, and that it was signed before the procedure  Patient Name: Kyler Haji     : 1940                 Printed: 2025     Medical Record #: [id number]                     Page 2 of 2

## (undated) NOTE — LETTER
Your patient was recently seen at the [facility] for a hospital follow-up visit. The visit note is attached. Please contact the clinic with any questions at [phone].    Thank you,  CR Camarillo

## (undated) NOTE — ED AVS SNAPSHOT
Christopher Patel   MRN: [id number]    Department:  [facility] Emergency Department   Date of Visit:  3/5/2019           Disclosure     Insurance plans vary and the physician(s) referred by the ER may not be covered by your plan.  Please contact your tell this physician (or your personal doctor if your instructions are to return to your personal doctor) about any new or lasting problems. The primary care or specialist physician will see patients referred from the [facility] Emergency Department.  Rowan Alegria

## (undated) NOTE — MR AVS SNAPSHOT
Cachorro Mckeon  [address] [facility] [address]  [phone]               Thank you for choosing us for your health care visit with [facility] NURSE.   We are glad to serve you and happy to provide you with this summary Commonly known as:  LASIX           * glimepiride 2 MG Tabs   Take 1 tablet (2 mg total) by mouth 2 (two) times daily.    Commonly known as:  AMARYL           * glimepiride 2 MG Tabs   TAKE 1 TABLET BY MOUTH TWICE DAILY   Commonly known as:  AMARYL

## (undated) NOTE — LETTER
24      Kyler Haji  : 1940      Per your request, attached is the above patient's current medication list we have on file. As discussed during phone conversation the following medications: Amiodarone and Warfarin, are managed by providers outside of your primary care providers practice. Please reach out to specialist office with any questions regarding those medications.      Abhi Arellano MD    [facility]

## (undated) NOTE — LETTER
October 11, 2017    [address]      Dear Daphne Delarosa: It was a pleasure speaking to you over the phone recently. I have enclosed some information that you may find helpful.   I look forward to talking with you in t

## (undated) NOTE — LETTER
[facility]  [address] [facility] [address]  [phone]    Blood Transfusion Consent    In the course of your treatment, it may become necessary to administer a transfusion of blood or blood components. This form provides basic information concerning this procedure and, if signed by you, authorizes its administration. By signing this form, you agree that all of your questions about the administration of blood or blood products have been answered by the ordering medical professional or designee.    Description of Procedure  Blood is introduced into one of your veins, commonly in the arm, using a sterilized disposable needle. The amount of blood transfused, and whether the transfusion will be of blood or blood components is a judgement the physician will make based on your particular needs.    Risks  The transfusion is a common procedure of low risk.  MINOR AND TEMPORARY REACTIONS ARE NOT UNCOMMON, including a slight bruise, swelling or local reaction in the area where the needle pierces your skin, or a nonserious reaction to the transfused material itself, including headache, fever or mild skin reaction, such as rash.  Serious reactions are possible, though very unlikely, and include severe allergic reaction (shock) and destruction (hemolysis) of transfused blood cells.  Infectious diseases which are known to be transmitted by blood transfusion include certain types of viral Hepatitis(liver infection from a virus), Human Immunodeficiency Virus (HIV-1,2) infection, a viral infection known to cause Acquired Immunodeficiency Syndrome (AIDS), as well as certain other bacterial, viral, and parasitic diseases. While a minimal risk of acquiring an infectious disease from transfused blood exists, in accordance with the Federal and State law, all due care has been taken in donor selection and testing to avoid transmission of disease.    Alternatives  If loss of blood poses serious threats during your  treatment, THERE IS NO EFFECTIVE ALTERNATIVE TO BLOOD TRANSFUSION. However, if you have any further questions on this matter, your provider will fully explain the alternatives to you if it has not already been done.    I, ______________________________, have read/had read to me the above. I understand the matters bearing on the decision whether or not to authorize a transfusion of blood or blood components. I have no questions which have not been answered to my full satisfaction. I hereby consent to such transfusion as my physician may deem necessary or advisable in the course of my treatment.    ______________________________________________                    ___________________________  (Signature of Patient or Responsible party in case of minor,                 (Printed Name of Patient or incompetent, or unconscious patient)              Responsible Party)    ___________________________               _____________________  (Relationship to Patient if not self)                                    (Date and Time)    __________________________                                                           ______________________              (Signature of Witness)               (Printed Name of Witness)     Language line ()    Telephone/Verbal/Video Consent    __________________________                     ____________________  (Signature of 2nd Witness           (Printed Name of 2nd  Telephone/Verbal/Video Consent)           Witness)    Patient Name: Kyler Haji     : 1940                 Printed: 2025     Medical Record #: [id number]      Rev: 2023

## (undated) NOTE — LETTER
[facility]  [address] [facility] [address]  [phone]    Blood Transfusion Consent    In the course of your treatment, it may become necessary to administer a transfusion of blood or blood components. This form provides basic information concerning this procedure and, if signed by you, authorizes its administration. By signing this form, you agree that all of your questions about the administration of blood or blood products have been answered by the ordering medical professional or designee.    Description of Procedure  Blood is introduced into one of your veins, commonly in the arm, using a sterilized disposable needle. The amount of blood transfused, and whether the transfusion will be of blood or blood components is a judgement the physician will make based on your particular needs.    Risks  The transfusion is a common procedure of low risk.  MINOR AND TEMPORARY REACTIONS ARE NOT UNCOMMON, including a slight bruise, swelling or local reaction in the area where the needle pierces your skin, or a nonserious reaction to the transfused material itself, including headache, fever or mild skin reaction, such as rash.  Serious reactions are possible, though very unlikely, and include severe allergic reaction (shock) and destruction (hemolysis) of transfused blood cells.  Infectious diseases which are known to be transmitted by blood transfusion include certain types of viral Hepatitis(liver infection from a virus), Human Immunodeficiency Virus (HIV-1,2) infection, a viral infection known to cause Acquired Immunodeficiency Syndrome (AIDS), as well as certain other bacterial, viral, and parasitic diseases. While a minimal risk of acquiring an infectious disease from transfused blood exists, in accordance with the Federal and State law, all due care has been taken in donor selection and testing to avoid transmission of disease.    Alternatives  If loss of blood poses serious threats during your  treatment, THERE IS NO EFFECTIVE ALTERNATIVE TO BLOOD TRANSFUSION. However, if you have any further questions on this matter, your provider will fully explain the alternatives to you if it has not already been done.    I, ______________________________, have read/had read to me the above. I understand the matters bearing on the decision whether or not to authorize a transfusion of blood or blood components. I have no questions which have not been answered to my full satisfaction. I hereby consent to such transfusion as my physician may deem necessary or advisable in the course of my treatment.    ______________________________________________                    ___________________________  (Signature of Patient or Responsible party in case of minor,                 (Printed Name of Patient or incompetent, or unconscious patient)              Responsible Party)    ___________________________               _____________________  (Relationship to Patient if not self)                                    (Date and Time)    __________________________                                                           ______________________              (Signature of Witness)               (Printed Name of Witness)     Language line ()    Telephone/Verbal/Video Consent    __________________________                     ____________________  (Signature of 2nd Witness           (Printed Name of 2nd  Telephone/Verbal/Video Consent)           Witness)    Patient Name: Kyler Haji     : 1940                 Printed: 2025     Medical Record #: [id number]      Rev: 2023

## (undated) NOTE — Clinical Note
June 12, 2017    [address]      Dear Pamela Harrison: It was a pleasure speaking to you over the phone recently. I have enclosed some information that you may find helpful.   I look forward to talking with you in the

## (undated) NOTE — LETTER
18        Enma Meyer  :  1940        Discontinue Oxygen    Please  oxygen and supplies from patient's home              Malaika Tomlinson PA-C

## (undated) NOTE — MR AVS SNAPSHOT
Cachorro Mckeon  [address] [facility] [address]  [phone]               Thank you for choosing us for your health care visit with [facility] NURSE.   We are glad to serve you and happy to provide you with this summary Take 1 tablet (10 mEq total) by mouth 2 (two) times daily.            MetFORMIN HCl 850 MG Tabs   TAKE ONE TABLET BY MOUTH TWICE DAILY WITH MEALS   Commonly known as:  GLUCOPHAGE           Metoprolol Tartrate 50 MG Tabs   TAKE ONE-HALF TABLET BY MOUTH TWICE Eat plenty of low-fat dairy products High fat meats and dairy   Choose whole grain products Foods high in sodium   Water is best for hydration Fast food.    Eat at home when possible     Tips for increasing your physical activity – Adults who are physically

## (undated) NOTE — LETTER
[address]  [address]  Authorization for Surgical Operation and Procedure     Date:___________                                                                                                         Time:__________  I hereby authorize Surgeon(s):  Darion Roach MD, my physician and his/her assistants (if applicable), which may include medical students, residents, and/or fellows, to perform the following surgical operation/ procedure and administer such anesthesia as may be determined necessary by my physician:  Operation/Procedure name (s) Push enteroscopy with possible biopsy, possible control of bleeding under monitored anesthesia care  on Kyler Haji   2.   I recognize that during the surgical operation/procedure, unforeseen conditions may necessitate additional or different procedures than those listed above.  I, therefore, further authorize and request that the above-named surgeon, assistants, or designees perform such procedures as are, in their judgment, necessary and desirable.    3.   My surgeon/physician has discussed prior to my surgery the potential benefits, risks and side effects of this procedure; the likelihood of achieving goals; and potential problems that might occur during recuperation.  They also discussed reasonable alternatives to the procedure, including risks, benefits, and side effects related to the alternatives and risks related to not receiving this procedure.  I have had all my questions answered and I acknowledge that no guarantee has been made as to the result that may be obtained.    4.   Should the need arise during my operation/procedure, which includes change of level of care prior to discharge, I also consent to the administration of blood and/or blood products.  Further, I understand that despite careful testing and screening of blood or blood products by collecting agencies, I may still be subject to ill effects as a result of  receiving a blood transfusion and/or blood products.  The following are some, but not all, of the potential risks that can occur: fever and allergic reactions, hemolytic reactions, transmission of diseases such as Hepatitis, AIDS and Cytomegalovirus (CMV) and fluid overload.  In the event that I wish to have an autologous transfusion of my own blood, or a directed donor transfusion, I will discuss this with my physician.  Check only if Refusing Blood or Blood Products  I understand refusal of blood or blood products as deemed necessary by my physician may have serious consequences to my condition to include possible death. I hereby assume responsibility for my refusal and release the hospital, its personnel, and my physicians from any responsibility for the consequences of my refusal.          o  Refuse      5.   I authorize the use of any specimen, organs, tissues, body parts or foreign objects that may be removed from my body during the operation/procedure for diagnosis, research or teaching purposes and their subsequent disposal by hospital authorities.  I also authorize the release of specimen test results and/or written reports to my treating physician on the hospital medical staff or other referring or consulting physicians involved in my care, at the discretion of the Pathologist or my treating physician.    6.   I consent to the photographing or videotaping of the operations or procedures to be performed, including appropriate portions of my body for medical, scientific, or educational purposes, provided my identity is not revealed by the pictures or by descriptive texts accompanying them.  If the procedure has been photographed/videotaped, the surgeon will obtain the original picture, image, videotape or CD.  The hospital will not be responsible for storage, release or maintenance of the picture, image, tape or CD.    7.   I consent to the presence of a  or observers in the operating room  as deemed necessary by my physician or their designees.    8.   I recognize that in the event my procedure results in extended X-Ray/fluoroscopy time, I may develop a skin reaction.    9. If I have a Do Not Attempt Resuscitation (DNAR) order in place, that status will be suspended while in the operating room, procedural suite, and during the recovery period unless otherwise explicitly stated by me (or a person authorized to consent on my behalf). The surgeon or my attending physician will determine when the applicable recovery period ends for purposes of reinstating the DNAR order.  10. Patients having a sterilization procedure: I understand that if the procedure is successful the results will be permanent and it will therefore be impossible for me to inseminate, conceive, or bear children.  I also understand that the procedure is intended to result in sterility, although the result has not been guaranteed.   11. I acknowledge that my physician has explained sedation/analgesia administration to me including the risk and benefits I consent to the administration of sedation/analgesia as may be necessary or desirable in the judgment of my physician.    I CERTIFY THAT I HAVE READ AND FULLY UNDERSTAND THE ABOVE CONSENT TO OPERATION and/or OTHER PROCEDURE.    _________________________________________  __________________________________  Signature of Patient     Signature of Responsible Person         ___________________________________         Printed Name of Responsible Person           _________________________________                 Relationship to Patient  _________________________________________  ______________________________  Signature of Witness          Date  Time      Patient Name: Kyler Haji     : 1940                 Printed: 2025     Medical Record #: [id number]                     Page 1 of 2                                    [address]   [address]    Consent for Anesthesia    I, Kyler Haji agree to be cared for by an anesthesiologist, who is specially trained to monitor me and give me medicine to put me to sleep or keep me comfortable during my procedure    I understand that my anesthesiologist is not an employee or agent of [facility] or [employer]. He or she works for [employer].    As the patient asking for anesthesia services, I agree to:  Allow the anesthesiologist (anesthesia doctor) to give me medicine and do additional procedures as necessary. Some examples are: Starting or using an “IV” to give me medicine, fluids or blood during my procedure, and having a breathing tube placed to help me breathe when I’m asleep (intubation). In the event that my heart stops working properly, I understand that my anesthesiologist will make every effort to sustain my life, unless otherwise directed by [facility] Do Not Resuscitate documents.  Tell my anesthesia doctor before my procedure:  If I am pregnant.  The last time that I ate or drank.  All of the medicines I take (including prescriptions, herbal supplements, and pills I can buy without a prescription (including street drugs/illegal medications). Failure to inform my anesthesiologist about these medicines may increase my risk of anesthetic complications.  If I am allergic to anything or have had a reaction to anesthesia before.  I understand how the anesthesia medicine will help me (benefits).  I understand that with any type of anesthesia medicine there are risks:  The most common risks are: nausea, vomiting, sore throat, muscle soreness, damage to my eyes, mouth, or teeth (from breathing tube placement).  Rare risks include: remembering what happened during my procedure, allergic reactions to medications, injury to my airway, heart, lungs, vision, nerves, or muscles and in extremely rare instances death.  My doctor has explained to me other choices available  to me for my care (alternatives).  Pregnant Patients (“epidural”):  I understand that the risks of having an epidural (medicine given into my back to help control pain during labor), include itching, low blood pressure, difficulty urinating, headache or slowing of the baby’s heart. Very rare risks include infection, bleeding, seizure, irregular heart rhythms and nerve injury.  Regional Anesthesia (“spinal”, “epidural”, & “nerve blocks”):  I understand that rare but potential complications include headache, bleeding, infection, seizure, irregular heart rhythms, and nerve injury.    I can change my mind about having anesthesia services at any time before I get the medicine.    _____________________________________________________________________________  Patient (or Representative) Signature/Relationship to Patient  Date   Time    _____________________________________________________________________________   Name (if used)    Language/Organization   Time    _____________________________________________________________________________  Anesthesiologist Signature     Date   Time  I have discussed the procedure and information above with the patient (or patient’s representative) and answered their questions. The patient or their representative has agreed to have anesthesia services.    _____________________________________________________________________________  Witness        Date   Time  I have verified that the signature is that of the patient or patient’s representative, and that it was signed before the procedure  Patient Name: Kyler Haji     : 1940                 Printed: 2025     Medical Record #: [id number]                     Page 2 of 2

## (undated) NOTE — Clinical Note
March 6, 2017    [address]      Dear Daphne Delarosa: It was a pleasure speaking with you over the phone recently.  I wanted to send you my contact information for you to utilize when you have a question and or need

## (undated) NOTE — MR AVS SNAPSHOT
Cachorro Mckeon  [address] [facility] [address]  [phone]               Thank you for choosing us for your health care visit with [facility] NURSE.   We are glad to serve you and happy to provide you with this summary Commonly known as:  COZAAR           MetFORMIN HCl 850 MG Tabs   TAKE 1 TABLET BY MOUTH TWICE DAILY WITH MEALS   Commonly known as:  GLUCOPHAGE           Metoprolol Tartrate 50 MG Tabs   TAKE ONE-HALF TABLET BY MOUTH TWICE DAILY   Commonly known as:  Angel Sol